# Patient Record
Sex: FEMALE | Race: OTHER | NOT HISPANIC OR LATINO | URBAN - METROPOLITAN AREA
[De-identification: names, ages, dates, MRNs, and addresses within clinical notes are randomized per-mention and may not be internally consistent; named-entity substitution may affect disease eponyms.]

---

## 2021-06-05 ENCOUNTER — EMERGENCY (EMERGENCY)
Facility: HOSPITAL | Age: 26
LOS: 1 days | Discharge: HOME | End: 2021-06-05
Attending: EMERGENCY MEDICINE | Admitting: EMERGENCY MEDICINE
Payer: MEDICAID

## 2021-06-05 VITALS
TEMPERATURE: 98 F | RESPIRATION RATE: 18 BRPM | SYSTOLIC BLOOD PRESSURE: 138 MMHG | HEART RATE: 68 BPM | DIASTOLIC BLOOD PRESSURE: 86 MMHG | OXYGEN SATURATION: 100 %

## 2021-06-05 VITALS
RESPIRATION RATE: 18 BRPM | OXYGEN SATURATION: 100 % | SYSTOLIC BLOOD PRESSURE: 124 MMHG | DIASTOLIC BLOOD PRESSURE: 90 MMHG | HEART RATE: 74 BPM | TEMPERATURE: 98 F

## 2021-06-05 LAB
ALBUMIN SERPL ELPH-MCNC: 4.3 G/DL — SIGNIFICANT CHANGE UP (ref 3.3–5)
ALP SERPL-CCNC: 52 U/L — SIGNIFICANT CHANGE UP (ref 40–120)
ALT FLD-CCNC: 16 U/L — SIGNIFICANT CHANGE UP (ref 4–33)
ANION GAP SERPL CALC-SCNC: 13 MMOL/L — SIGNIFICANT CHANGE UP (ref 7–14)
APPEARANCE UR: ABNORMAL
AST SERPL-CCNC: 17 U/L — SIGNIFICANT CHANGE UP (ref 4–32)
BACTERIA # UR AUTO: NEGATIVE — SIGNIFICANT CHANGE UP
BASOPHILS # BLD AUTO: 0.01 K/UL — SIGNIFICANT CHANGE UP (ref 0–0.2)
BASOPHILS NFR BLD AUTO: 0.1 % — SIGNIFICANT CHANGE UP (ref 0–2)
BILIRUB SERPL-MCNC: 0.5 MG/DL — SIGNIFICANT CHANGE UP (ref 0.2–1.2)
BILIRUB UR-MCNC: NEGATIVE — SIGNIFICANT CHANGE UP
BUN SERPL-MCNC: 4 MG/DL — LOW (ref 7–23)
CALCIUM SERPL-MCNC: 8.6 MG/DL — SIGNIFICANT CHANGE UP (ref 8.4–10.5)
CHLORIDE SERPL-SCNC: 100 MMOL/L — SIGNIFICANT CHANGE UP (ref 98–107)
CO2 SERPL-SCNC: 20 MMOL/L — LOW (ref 22–31)
COLOR SPEC: ABNORMAL
CREAT SERPL-MCNC: 0.59 MG/DL — SIGNIFICANT CHANGE UP (ref 0.5–1.3)
CRP SERPL-MCNC: <4 MG/L — SIGNIFICANT CHANGE UP
DIFF PNL FLD: ABNORMAL
EOSINOPHIL # BLD AUTO: 0 K/UL — SIGNIFICANT CHANGE UP (ref 0–0.5)
EOSINOPHIL NFR BLD AUTO: 0 % — SIGNIFICANT CHANGE UP (ref 0–6)
EPI CELLS # UR: 1 /HPF — SIGNIFICANT CHANGE UP (ref 0–5)
ERYTHROCYTE [SEDIMENTATION RATE] IN BLOOD: 10 MM/HR — SIGNIFICANT CHANGE UP (ref 4–25)
GLUCOSE SERPL-MCNC: 125 MG/DL — HIGH (ref 70–99)
GLUCOSE UR QL: NEGATIVE — SIGNIFICANT CHANGE UP
HCT VFR BLD CALC: 35.8 % — SIGNIFICANT CHANGE UP (ref 34.5–45)
HGB BLD-MCNC: 11.3 G/DL — LOW (ref 11.5–15.5)
IANC: 7.28 K/UL — SIGNIFICANT CHANGE UP (ref 1.5–8.5)
IMM GRANULOCYTES NFR BLD AUTO: 0.8 % — SIGNIFICANT CHANGE UP (ref 0–1.5)
KETONES UR-MCNC: ABNORMAL
LEUKOCYTE ESTERASE UR-ACNC: ABNORMAL
LIDOCAIN IGE QN: 19 U/L — SIGNIFICANT CHANGE UP (ref 7–60)
LYMPHOCYTES # BLD AUTO: 0.87 K/UL — LOW (ref 1–3.3)
LYMPHOCYTES # BLD AUTO: 10.3 % — LOW (ref 13–44)
MCHC RBC-ENTMCNC: 25.3 PG — LOW (ref 27–34)
MCHC RBC-ENTMCNC: 31.6 GM/DL — LOW (ref 32–36)
MCV RBC AUTO: 80.1 FL — SIGNIFICANT CHANGE UP (ref 80–100)
MONOCYTES # BLD AUTO: 0.18 K/UL — SIGNIFICANT CHANGE UP (ref 0–0.9)
MONOCYTES NFR BLD AUTO: 2.1 % — SIGNIFICANT CHANGE UP (ref 2–14)
NEUTROPHILS # BLD AUTO: 7.28 K/UL — SIGNIFICANT CHANGE UP (ref 1.8–7.4)
NEUTROPHILS NFR BLD AUTO: 86.7 % — HIGH (ref 43–77)
NITRITE UR-MCNC: NEGATIVE — SIGNIFICANT CHANGE UP
NRBC # BLD: 0 /100 WBCS — SIGNIFICANT CHANGE UP
NRBC # FLD: 0 K/UL — SIGNIFICANT CHANGE UP
PH UR: 8.5 — HIGH (ref 5–8)
PLATELET # BLD AUTO: 372 K/UL — SIGNIFICANT CHANGE UP (ref 150–400)
POTASSIUM SERPL-MCNC: 2.9 MMOL/L — CRITICAL LOW (ref 3.5–5.3)
POTASSIUM SERPL-SCNC: 2.9 MMOL/L — CRITICAL LOW (ref 3.5–5.3)
PROT SERPL-MCNC: 7.3 G/DL — SIGNIFICANT CHANGE UP (ref 6–8.3)
PROT UR-MCNC: ABNORMAL
RBC # BLD: 4.47 M/UL — SIGNIFICANT CHANGE UP (ref 3.8–5.2)
RBC # FLD: 13.6 % — SIGNIFICANT CHANGE UP (ref 10.3–14.5)
RBC CASTS # UR COMP ASSIST: >720 /HPF — HIGH (ref 0–4)
SODIUM SERPL-SCNC: 133 MMOL/L — LOW (ref 135–145)
SP GR SPEC: 1.02 — SIGNIFICANT CHANGE UP (ref 1.01–1.02)
UROBILINOGEN FLD QL: SIGNIFICANT CHANGE UP
WBC # BLD: 8.41 K/UL — SIGNIFICANT CHANGE UP (ref 3.8–10.5)
WBC # FLD AUTO: 8.41 K/UL — SIGNIFICANT CHANGE UP (ref 3.8–10.5)
WBC UR QL: 88 /HPF — HIGH (ref 0–5)

## 2021-06-05 PROCEDURE — 93010 ELECTROCARDIOGRAM REPORT: CPT

## 2021-06-05 PROCEDURE — 99284 EMERGENCY DEPT VISIT MOD MDM: CPT | Mod: 25

## 2021-06-05 RX ORDER — SUCRALFATE 1 G
1 TABLET ORAL ONCE
Refills: 0 | Status: COMPLETED | OUTPATIENT
Start: 2021-06-05 | End: 2021-06-05

## 2021-06-05 RX ORDER — MORPHINE SULFATE 50 MG/1
4 CAPSULE, EXTENDED RELEASE ORAL ONCE
Refills: 0 | Status: DISCONTINUED | OUTPATIENT
Start: 2021-06-05 | End: 2021-06-05

## 2021-06-05 RX ORDER — POTASSIUM CHLORIDE 20 MEQ
10 PACKET (EA) ORAL
Refills: 0 | Status: COMPLETED | OUTPATIENT
Start: 2021-06-05 | End: 2021-06-05

## 2021-06-05 RX ORDER — ONDANSETRON 8 MG/1
4 TABLET, FILM COATED ORAL ONCE
Refills: 0 | Status: COMPLETED | OUTPATIENT
Start: 2021-06-05 | End: 2021-06-05

## 2021-06-05 RX ORDER — FAMOTIDINE 10 MG/ML
1 INJECTION INTRAVENOUS
Qty: 60 | Refills: 0
Start: 2021-06-05 | End: 2021-07-04

## 2021-06-05 RX ORDER — POTASSIUM CHLORIDE 20 MEQ
40 PACKET (EA) ORAL ONCE
Refills: 0 | Status: COMPLETED | OUTPATIENT
Start: 2021-06-05 | End: 2021-06-05

## 2021-06-05 RX ORDER — SODIUM CHLORIDE 9 MG/ML
1000 INJECTION, SOLUTION INTRAVENOUS ONCE
Refills: 0 | Status: COMPLETED | OUTPATIENT
Start: 2021-06-05 | End: 2021-06-05

## 2021-06-05 RX ADMIN — ONDANSETRON 4 MILLIGRAM(S): 8 TABLET, FILM COATED ORAL at 19:21

## 2021-06-05 RX ADMIN — Medication 10 MILLIGRAM(S): at 15:49

## 2021-06-05 RX ADMIN — Medication 100 MILLIEQUIVALENT(S): at 16:11

## 2021-06-05 RX ADMIN — Medication 1 GRAM(S): at 20:43

## 2021-06-05 RX ADMIN — Medication 100 MILLIEQUIVALENT(S): at 17:40

## 2021-06-05 RX ADMIN — SODIUM CHLORIDE 1000 MILLILITER(S): 9 INJECTION, SOLUTION INTRAVENOUS at 14:55

## 2021-06-05 RX ADMIN — Medication 100 MILLIEQUIVALENT(S): at 18:49

## 2021-06-05 RX ADMIN — MORPHINE SULFATE 4 MILLIGRAM(S): 50 CAPSULE, EXTENDED RELEASE ORAL at 14:55

## 2021-06-05 RX ADMIN — Medication 40 MILLIEQUIVALENT(S): at 19:12

## 2021-06-05 RX ADMIN — Medication 30 MILLILITER(S): at 21:39

## 2021-06-05 RX ADMIN — Medication 1 GRAM(S): at 15:49

## 2021-06-05 RX ADMIN — ONDANSETRON 4 MILLIGRAM(S): 8 TABLET, FILM COATED ORAL at 14:55

## 2021-06-05 RX ADMIN — MORPHINE SULFATE 4 MILLIGRAM(S): 50 CAPSULE, EXTENDED RELEASE ORAL at 16:11

## 2021-06-05 NOTE — ED PROVIDER NOTE - OBJECTIVE STATEMENT
24 y/o F pmh Chron's disease c/o upper abd pain x 4 days. Pt reports she was admitted to [facility] 2 days ago and left this morning cause they weren't giving her enough pain medication. Pt states she was told she has chron's disease based on CT finding but never had a colonscopy to confirm. States she had a told of 3 flare ups and her last one was 2 weeks ago. States [facility] was giving her antibx (does not recall name) and steroids. +nausea. Denies fever, chills, cp, sob, vomiting, diarrhea.

## 2021-06-05 NOTE — ED ADULT NURSE NOTE - CHIEF COMPLAINT QUOTE
pt was seen at [facility]. this AM for ABD pain x 3 days, coming to [facility] for some GI symptoms/with epigastric pain/nausea.   LMP 6/4/21 all other ROS negative except as per HPI

## 2021-06-05 NOTE — ED PROVIDER NOTE - PROGRESS NOTE DETAILS
JENNIFER Hernadez: Plan was to admit pt, pt does not want to be admitted. States her abd pain is gone but is feeling a burning sensation around her chest. maalox ordered. will likely discharge pt JENNIFER Hernadez: Pt feeling better. tolerated PO K dose and maalox. Pt will f/u with her GI doctor.  The patient was given verbal and written discharge instructions. Specifically, instructions when to return to the ED and when to seek follow-up from their pcp was discussed. Any specialty follow-up was discussed, including how to make an appointment.  Any necessary referral lists provided.  Instructions were discussed in simple, plain language and was understood by the patient. The patient understands that should their symptoms worsen or any new symptoms arise, they should return to the ED immediately for further evaluation. All pt's questions were answered. Patient verbalizes understanding.

## 2021-06-05 NOTE — ED PROVIDER NOTE - PATIENT PORTAL LINK FT
You can access the FollowMyHealth Patient Portal offered by [facility] by registering at the following website: [email or web address]. By joining [facility]’s FollowMyHealth portal, you will also be able to view your health information using other applications (apps) compatible with our system.

## 2021-06-05 NOTE — ED PROVIDER NOTE - NSFOLLOWUPINSTRUCTIONS_ED_ALL_ED_FT
Please follow up with your GI doctor    Gastritis is soreness and swelling (inflammation) of the lining of the stomach. Gastritis can develop as a sudden onset (acute) or long-term (chronic) condition. If gastritis is not treated, it can lead to stomach bleeding and ulcers. Causes include viral and bacterial infections, excessive alcohol consumption, tobacco use, or certain medications. Symptoms include abdominal pain or burning especially after eating, nausea, vomiting. Avoid foods or drinks that make your symptoms worse such as caffeine, chocolate, spicy foods, acidic foods, alcohol.    SEEK IMMEDIATE MEDICAL CARE IF YOU HAVE THE FOLLOWING SYMPTOMS: black or bloody stools, blood or coffee-ground-colored vomitus, worsening abdominal pain, fever, or inability to keep fluids down.    Advance activity as tolerated.  Continue all previously prescribed medications as directed unless otherwise instructed.  Follow up with your primary care physician in 48-72 hours- bring copies of your results.  Return to the ER for worsening or persistent symptoms, and/or ANY NEW OR CONCERNING SYMPTOMS. If you have issues obtaining follow up, please call: [phone] to obtain a doctor or specialist who takes your insurance in your area.  You may call [phone] to make an appointment with the internal medicine clinic.

## 2021-06-05 NOTE — ED PROVIDER NOTE - CLINICAL SUMMARY MEDICAL DECISION MAKING FREE TEXT BOX
24 y/o F pmh Chron's disease c/o upper abd pain x 4 days. Pt reports she was admitted to [facility] 2 days ago and left this morning cause they weren't giving her enough pain medication. Pt states she was told she has chron's disease based on CT finding but never had a colonscopy to confirm. States she had a told of 3 flare ups and her last one was 2 weeks ago.    pt appears mildly uncomfortable, vss, mild tenderness epigastric    -labs, pain control, ivf,

## 2021-06-05 NOTE — ED ADULT TRIAGE NOTE - CHIEF COMPLAINT QUOTE
pt was seen at [facility]. this AM for ABD pain x 3 days, coming to [facility] for some GI symptoms/with epigastric pain/nausea.   LMP 6/4/21

## 2021-06-05 NOTE — ED ADULT NURSE NOTE - OBJECTIVE STATEMENT
Pt h/o Crohn's disease c/o abdominal pain to upper abdomen, + nausea, breathing even and unlabored, denies chest pain, no headache/dizziness, abd soft, tender, non-distended, afebrile, skin cool dry and intact, ivl placed, labs collected and sent.

## 2021-06-05 NOTE — ED ADULT NURSE REASSESSMENT NOTE - NS ED NURSE REASSESS COMMENT FT1
Pt tolerating PO intake, pain is tolerable at this time, receiving 3rd potassium chloride infusion, denies chest pain, no shortness of breath, no abd pain, afebrile. IVL clear and patent, will continue to monitor.

## 2021-06-06 LAB — SARS-COV-2 RNA SPEC QL NAA+PROBE: SIGNIFICANT CHANGE UP

## 2021-06-07 ENCOUNTER — INPATIENT (INPATIENT)
Facility: HOSPITAL | Age: 26
LOS: 5 days | Discharge: HOME | End: 2021-06-13
Attending: HOSPITALIST | Admitting: HOSPITALIST
Payer: MEDICAID

## 2021-06-07 VITALS
SYSTOLIC BLOOD PRESSURE: 119 MMHG | HEART RATE: 93 BPM | OXYGEN SATURATION: 100 % | TEMPERATURE: 99 F | DIASTOLIC BLOOD PRESSURE: 80 MMHG | RESPIRATION RATE: 18 BRPM

## 2021-06-07 DIAGNOSIS — R10.9 UNSPECIFIED ABDOMINAL PAIN: ICD-10-CM

## 2021-06-07 DIAGNOSIS — Z29.9 ENCOUNTER FOR PROPHYLACTIC MEASURES, UNSPECIFIED: ICD-10-CM

## 2021-06-07 DIAGNOSIS — R10.31 RIGHT LOWER QUADRANT PAIN: ICD-10-CM

## 2021-06-07 DIAGNOSIS — E87.6 HYPOKALEMIA: ICD-10-CM

## 2021-06-07 DIAGNOSIS — Z78.9 OTHER SPECIFIED HEALTH STATUS: Chronic | ICD-10-CM

## 2021-06-07 LAB
ALBUMIN SERPL ELPH-MCNC: 4.4 G/DL — SIGNIFICANT CHANGE UP (ref 3.3–5)
ALP SERPL-CCNC: 56 U/L — SIGNIFICANT CHANGE UP (ref 40–120)
ALT FLD-CCNC: 21 U/L — SIGNIFICANT CHANGE UP (ref 4–33)
ANION GAP SERPL CALC-SCNC: 11 MMOL/L — SIGNIFICANT CHANGE UP (ref 7–14)
ANION GAP SERPL CALC-SCNC: 13 MMOL/L — SIGNIFICANT CHANGE UP (ref 7–14)
APPEARANCE UR: ABNORMAL
AST SERPL-CCNC: 19 U/L — SIGNIFICANT CHANGE UP (ref 4–32)
BACTERIA # UR AUTO: NEGATIVE — SIGNIFICANT CHANGE UP
BASE EXCESS BLDV CALC-SCNC: 3.1 MMOL/L — HIGH (ref -3–2)
BASOPHILS # BLD AUTO: 0.03 K/UL — SIGNIFICANT CHANGE UP (ref 0–0.2)
BASOPHILS NFR BLD AUTO: 0.3 % — SIGNIFICANT CHANGE UP (ref 0–2)
BILIRUB SERPL-MCNC: 0.5 MG/DL — SIGNIFICANT CHANGE UP (ref 0.2–1.2)
BILIRUB UR-MCNC: NEGATIVE — SIGNIFICANT CHANGE UP
BLOOD GAS VENOUS - CREATININE: 0.6 MG/DL — SIGNIFICANT CHANGE UP (ref 0.5–1.3)
BLOOD GAS VENOUS COMPREHENSIVE RESULT: SIGNIFICANT CHANGE UP
BUN SERPL-MCNC: 4 MG/DL — LOW (ref 7–23)
BUN SERPL-MCNC: 5 MG/DL — LOW (ref 7–23)
CALCIUM SERPL-MCNC: 7.9 MG/DL — LOW (ref 8.4–10.5)
CALCIUM SERPL-MCNC: 8.9 MG/DL — SIGNIFICANT CHANGE UP (ref 8.4–10.5)
CHLORIDE BLDV-SCNC: 97 MMOL/L — SIGNIFICANT CHANGE UP (ref 96–108)
CHLORIDE SERPL-SCNC: 102 MMOL/L — SIGNIFICANT CHANGE UP (ref 98–107)
CHLORIDE SERPL-SCNC: 96 MMOL/L — LOW (ref 98–107)
CO2 SERPL-SCNC: 20 MMOL/L — LOW (ref 22–31)
CO2 SERPL-SCNC: 24 MMOL/L — SIGNIFICANT CHANGE UP (ref 22–31)
COLOR SPEC: ABNORMAL
CREAT SERPL-MCNC: 0.49 MG/DL — LOW (ref 0.5–1.3)
CREAT SERPL-MCNC: 0.52 MG/DL — SIGNIFICANT CHANGE UP (ref 0.5–1.3)
CRP SERPL-MCNC: <4 MG/L — SIGNIFICANT CHANGE UP
DIFF PNL FLD: ABNORMAL
EOSINOPHIL # BLD AUTO: 0.05 K/UL — SIGNIFICANT CHANGE UP (ref 0–0.5)
EOSINOPHIL NFR BLD AUTO: 0.6 % — SIGNIFICANT CHANGE UP (ref 0–6)
EPI CELLS # UR: 1 /HPF — SIGNIFICANT CHANGE UP (ref 0–5)
ERYTHROCYTE [SEDIMENTATION RATE] IN BLOOD: 5 MM/HR — SIGNIFICANT CHANGE UP (ref 4–25)
GAS PNL BLDV: 134 MMOL/L — LOW (ref 136–146)
GLUCOSE BLDV-MCNC: 104 MG/DL — HIGH (ref 70–99)
GLUCOSE SERPL-MCNC: 111 MG/DL — HIGH (ref 70–99)
GLUCOSE SERPL-MCNC: 98 MG/DL — SIGNIFICANT CHANGE UP (ref 70–99)
GLUCOSE UR QL: NEGATIVE — SIGNIFICANT CHANGE UP
HCO3 BLDV-SCNC: 27 MMOL/L — SIGNIFICANT CHANGE UP (ref 20–27)
HCT VFR BLD CALC: 38.3 % — SIGNIFICANT CHANGE UP (ref 34.5–45)
HCT VFR BLDA CALC: 39.3 % — SIGNIFICANT CHANGE UP (ref 34.5–46.5)
HGB BLD CALC-MCNC: 12.8 G/DL — SIGNIFICANT CHANGE UP (ref 11.5–15.5)
HGB BLD-MCNC: 12.6 G/DL — SIGNIFICANT CHANGE UP (ref 11.5–15.5)
HYALINE CASTS # UR AUTO: 1 /LPF — SIGNIFICANT CHANGE UP (ref 0–7)
IANC: 5.67 K/UL — SIGNIFICANT CHANGE UP (ref 1.5–8.5)
IMM GRANULOCYTES NFR BLD AUTO: 0.5 % — SIGNIFICANT CHANGE UP (ref 0–1.5)
KETONES UR-MCNC: ABNORMAL
LACTATE BLDV-MCNC: 1.6 MMOL/L — SIGNIFICANT CHANGE UP (ref 0.5–2)
LEUKOCYTE ESTERASE UR-ACNC: NEGATIVE — SIGNIFICANT CHANGE UP
LIDOCAIN IGE QN: 31 U/L — SIGNIFICANT CHANGE UP (ref 7–60)
LYMPHOCYTES # BLD AUTO: 1.93 K/UL — SIGNIFICANT CHANGE UP (ref 1–3.3)
LYMPHOCYTES # BLD AUTO: 22.3 % — SIGNIFICANT CHANGE UP (ref 13–44)
MAGNESIUM SERPL-MCNC: 2.4 MG/DL — SIGNIFICANT CHANGE UP (ref 1.6–2.6)
MCHC RBC-ENTMCNC: 25.5 PG — LOW (ref 27–34)
MCHC RBC-ENTMCNC: 32.9 GM/DL — SIGNIFICANT CHANGE UP (ref 32–36)
MCV RBC AUTO: 77.4 FL — LOW (ref 80–100)
MONOCYTES # BLD AUTO: 0.94 K/UL — HIGH (ref 0–0.9)
MONOCYTES NFR BLD AUTO: 10.9 % — SIGNIFICANT CHANGE UP (ref 2–14)
NEUTROPHILS # BLD AUTO: 5.67 K/UL — SIGNIFICANT CHANGE UP (ref 1.8–7.4)
NEUTROPHILS NFR BLD AUTO: 65.4 % — SIGNIFICANT CHANGE UP (ref 43–77)
NITRITE UR-MCNC: NEGATIVE — SIGNIFICANT CHANGE UP
NRBC # BLD: 0 /100 WBCS — SIGNIFICANT CHANGE UP
NRBC # FLD: 0 K/UL — SIGNIFICANT CHANGE UP
PCO2 BLDV: 38 MMHG — LOW (ref 41–51)
PH BLDV: 7.46 — HIGH (ref 7.32–7.43)
PH UR: 8 — SIGNIFICANT CHANGE UP (ref 5–8)
PHOSPHATE SERPL-MCNC: 1.7 MG/DL — LOW (ref 2.5–4.5)
PLATELET # BLD AUTO: 462 K/UL — HIGH (ref 150–400)
PO2 BLDV: 33 MMHG — LOW (ref 35–40)
POTASSIUM BLDV-SCNC: 2.6 MMOL/L — CRITICAL LOW (ref 3.4–4.5)
POTASSIUM SERPL-MCNC: 2.7 MMOL/L — CRITICAL LOW (ref 3.5–5.3)
POTASSIUM SERPL-MCNC: 3.5 MMOL/L — SIGNIFICANT CHANGE UP (ref 3.5–5.3)
POTASSIUM SERPL-SCNC: 2.7 MMOL/L — CRITICAL LOW (ref 3.5–5.3)
POTASSIUM SERPL-SCNC: 3.5 MMOL/L — SIGNIFICANT CHANGE UP (ref 3.5–5.3)
PROT SERPL-MCNC: 7.5 G/DL — SIGNIFICANT CHANGE UP (ref 6–8.3)
PROT UR-MCNC: ABNORMAL
RBC # BLD: 4.95 M/UL — SIGNIFICANT CHANGE UP (ref 3.8–5.2)
RBC # FLD: 13.1 % — SIGNIFICANT CHANGE UP (ref 10.3–14.5)
RBC CASTS # UR COMP ASSIST: >50 /HPF — SIGNIFICANT CHANGE UP (ref 0–4)
SAO2 % BLDV: 64.6 % — SIGNIFICANT CHANGE UP (ref 60–85)
SARS-COV-2 RNA SPEC QL NAA+PROBE: SIGNIFICANT CHANGE UP
SODIUM SERPL-SCNC: 133 MMOL/L — LOW (ref 135–145)
SODIUM SERPL-SCNC: 133 MMOL/L — LOW (ref 135–145)
SP GR SPEC: 1.01 — SIGNIFICANT CHANGE UP (ref 1.01–1.02)
UROBILINOGEN FLD QL: SIGNIFICANT CHANGE UP
WBC # BLD: 8.66 K/UL — SIGNIFICANT CHANGE UP (ref 3.8–10.5)
WBC # FLD AUTO: 8.66 K/UL — SIGNIFICANT CHANGE UP (ref 3.8–10.5)
WBC UR QL: 2 /HPF — SIGNIFICANT CHANGE UP (ref 0–5)

## 2021-06-07 PROCEDURE — 76705 ECHO EXAM OF ABDOMEN: CPT | Mod: 26

## 2021-06-07 PROCEDURE — 99223 1ST HOSP IP/OBS HIGH 75: CPT

## 2021-06-07 PROCEDURE — 99222 1ST HOSP IP/OBS MODERATE 55: CPT | Mod: GC

## 2021-06-07 PROCEDURE — 99284 EMERGENCY DEPT VISIT MOD MDM: CPT

## 2021-06-07 RX ORDER — SODIUM CHLORIDE 9 MG/ML
1000 INJECTION, SOLUTION INTRAVENOUS ONCE
Refills: 0 | Status: COMPLETED | OUTPATIENT
Start: 2021-06-07 | End: 2021-06-07

## 2021-06-07 RX ORDER — KETOROLAC TROMETHAMINE 30 MG/ML
15 SYRINGE (ML) INJECTION ONCE
Refills: 0 | Status: DISCONTINUED | OUTPATIENT
Start: 2021-06-07 | End: 2021-06-07

## 2021-06-07 RX ORDER — MAGNESIUM SULFATE 500 MG/ML
2 VIAL (ML) INJECTION ONCE
Refills: 0 | Status: COMPLETED | OUTPATIENT
Start: 2021-06-07 | End: 2021-06-07

## 2021-06-07 RX ORDER — ONDANSETRON 8 MG/1
4 TABLET, FILM COATED ORAL ONCE
Refills: 0 | Status: COMPLETED | OUTPATIENT
Start: 2021-06-07 | End: 2021-06-07

## 2021-06-07 RX ORDER — METOCLOPRAMIDE HCL 10 MG
10 TABLET ORAL ONCE
Refills: 0 | Status: COMPLETED | OUTPATIENT
Start: 2021-06-07 | End: 2021-06-07

## 2021-06-07 RX ORDER — FAMOTIDINE 10 MG/ML
20 INJECTION INTRAVENOUS ONCE
Refills: 0 | Status: COMPLETED | OUTPATIENT
Start: 2021-06-07 | End: 2021-06-07

## 2021-06-07 RX ORDER — SODIUM CHLORIDE 9 MG/ML
1000 INJECTION INTRAMUSCULAR; INTRAVENOUS; SUBCUTANEOUS
Refills: 0 | Status: DISCONTINUED | OUTPATIENT
Start: 2021-06-07 | End: 2021-06-10

## 2021-06-07 RX ORDER — SIMETHICONE 80 MG/1
80 TABLET, CHEWABLE ORAL ONCE
Refills: 0 | Status: COMPLETED | OUTPATIENT
Start: 2021-06-07 | End: 2021-06-07

## 2021-06-07 RX ORDER — POTASSIUM CHLORIDE 20 MEQ
40 PACKET (EA) ORAL ONCE
Refills: 0 | Status: COMPLETED | OUTPATIENT
Start: 2021-06-07 | End: 2021-06-07

## 2021-06-07 RX ORDER — KETOROLAC TROMETHAMINE 30 MG/ML
15 SYRINGE (ML) INJECTION EVERY 6 HOURS
Refills: 0 | Status: DISCONTINUED | OUTPATIENT
Start: 2021-06-07 | End: 2021-06-09

## 2021-06-07 RX ORDER — POTASSIUM CHLORIDE 20 MEQ
10 PACKET (EA) ORAL
Refills: 0 | Status: COMPLETED | OUTPATIENT
Start: 2021-06-07 | End: 2021-06-07

## 2021-06-07 RX ORDER — SODIUM CHLORIDE 9 MG/ML
1000 INJECTION INTRAMUSCULAR; INTRAVENOUS; SUBCUTANEOUS ONCE
Refills: 0 | Status: COMPLETED | OUTPATIENT
Start: 2021-06-07 | End: 2021-06-07

## 2021-06-07 RX ORDER — ONDANSETRON 8 MG/1
4 TABLET, FILM COATED ORAL EVERY 6 HOURS
Refills: 0 | Status: DISCONTINUED | OUTPATIENT
Start: 2021-06-07 | End: 2021-06-13

## 2021-06-07 RX ADMIN — FAMOTIDINE 20 MILLIGRAM(S): 10 INJECTION INTRAVENOUS at 02:34

## 2021-06-07 RX ADMIN — Medication 30 MILLILITER(S): at 02:34

## 2021-06-07 RX ADMIN — Medication 50 GRAM(S): at 04:37

## 2021-06-07 RX ADMIN — Medication 100 MILLIEQUIVALENT(S): at 07:50

## 2021-06-07 RX ADMIN — Medication 100 MILLIEQUIVALENT(S): at 06:25

## 2021-06-07 RX ADMIN — ONDANSETRON 4 MILLIGRAM(S): 8 TABLET, FILM COATED ORAL at 02:34

## 2021-06-07 RX ADMIN — SODIUM CHLORIDE 1000 MILLILITER(S): 9 INJECTION, SOLUTION INTRAVENOUS at 02:34

## 2021-06-07 RX ADMIN — SODIUM CHLORIDE 1000 MILLILITER(S): 9 INJECTION INTRAMUSCULAR; INTRAVENOUS; SUBCUTANEOUS at 04:37

## 2021-06-07 RX ADMIN — Medication 2 GRAM(S): at 05:37

## 2021-06-07 RX ADMIN — SODIUM CHLORIDE 75 MILLILITER(S): 9 INJECTION INTRAMUSCULAR; INTRAVENOUS; SUBCUTANEOUS at 10:46

## 2021-06-07 RX ADMIN — Medication 15 MILLIGRAM(S): at 04:37

## 2021-06-07 RX ADMIN — Medication 100 MILLIEQUIVALENT(S): at 05:26

## 2021-06-07 RX ADMIN — Medication 15 MILLIGRAM(S): at 02:34

## 2021-06-07 RX ADMIN — Medication 10 MILLIGRAM(S): at 05:25

## 2021-06-07 RX ADMIN — SIMETHICONE 80 MILLIGRAM(S): 80 TABLET, CHEWABLE ORAL at 23:20

## 2021-06-07 RX ADMIN — SODIUM CHLORIDE 1000 MILLILITER(S): 9 INJECTION, SOLUTION INTRAVENOUS at 04:37

## 2021-06-07 RX ADMIN — ONDANSETRON 4 MILLIGRAM(S): 8 TABLET, FILM COATED ORAL at 15:21

## 2021-06-07 RX ADMIN — Medication 10 MILLIEQUIVALENT(S): at 07:50

## 2021-06-07 NOTE — ED PROVIDER NOTE - OBJECTIVE STATEMENT
24yo F no medical problems here for abdominal pain. RUQ for last few days, was just here yesterday for same, says she had CT scan at [facility] 3 days ago and found to have Michel's. Currently admitting to n/v, constipation but no f/c. No abdominal surgeries. Currently menstruating. Yesterday was offered admission but she didn't want that as her pain improved. Now that her pain has been persistent she would like admission. Does not have an OP GI doctor.

## 2021-06-07 NOTE — H&P ADULT - PROBLEM SELECTOR PLAN 2
- Monitor and replete potassium levels   - Monitor on tele - Monitor and replete potassium levels pending   - Monitor on tele

## 2021-06-07 NOTE — ED PROVIDER NOTE - CLINICAL SUMMARY MEDICAL DECISION MAKING FREE TEXT BOX
Pt w/ presumed chrones here with RUQ abdominal pain for last few days w/o other systemic complaints. VSS. Exam with mild ttp in RUQ. Concern for cholecystitis vs chrones. Labs, lipase, ekg, US RUQ, UA, preg. Toradol, zofran, maalox, pepcid. Reassess.

## 2021-06-07 NOTE — CONSULT NOTE ADULT - ASSESSMENT
25F without PMHx p/w recurrent episode of RLQ abd pain + diarrhea x 4 days.       Impression:   #RLQ abd pain, diarrhea, ileitis: pt with recurrent episodes of RLQ abd pain w/ diarrhea, fevers, weight loss + imaging c/f terminal ileitis (+intramural abscess 2.2cm) on CT a/p 6/3/21. Clinical picture with high likelihood of inflammatory bowel disease (Crohn's >> UC) vs will need to r/o infectious colitis (c diff, viral gastroenteritis).       Recommendations:   - Collect c diff, GI PCR   - Send ESR, CRP  - Send quant gold, hep serologies (Hep B surface Ab/Ag, Total Core, Hep C Ab with reflex to PCR)   - If infectious work up negative, pt will likely need endoscopic evaluation for diagnosis of disease        Thank you for involving us in the care of this patient, please reach out if any further questions.     Rhett Sarmiento MD  Gastroenterology Fellow, PGY4    Available on [employer] Teams  [phone] ([facility])  [phone] ([facility])  Please contact on call fellow weekdays after 5pm-7am and weekends: [phone]       25F without PMHx p/w recurrent episode of RLQ abd pain + diarrhea x 4 days.       Impression:   #RLQ abd pain, diarrhea, ileitis: pt with recurrent episodes of RLQ abd pain w/ diarrhea, fevers, weight loss + imaging c/f terminal ileitis (+intramural abscess 2.2cm) on CT a/p 6/3/21. Clinical picture with high likelihood of inflammatory bowel disease (Crohn's >> UC) vs will need to r/o infectious colitis (c diff, viral gastroenteritis).       Recommendations:   - Collect c diff, GI PCR   - Send ESR, CRP  - Send quant gold, hep serologies (Hep B surface Ab/Ag, Total Core, Hep C Ab with reflex to PCR)   - Please obtain MR abd to evaluate for previously seen intramural abscess   - If infectious work up above ^^ negative, pt will likely need endoscopic evaluation for diagnosis of disease        Thank you for involving us in the care of this patient, please reach out if any further questions.     Rhett Sarmiento MD  Gastroenterology Fellow, PGY4    Available on [employer] Teams  [phone] ([facility])  [phone] ([facility])  Please contact on call fellow weekdays after 5pm-7am and weekends: [phone]

## 2021-06-07 NOTE — H&P ADULT - NSHPSOCIALHISTORY_GEN_ALL_CORE
Lives with parents. Denies current or past sexual partners. Denies any current or past alcohol, drug or tobacco use.

## 2021-06-07 NOTE — H&P ADULT - ASSESSMENT
26 YO F no significant PMHx  LMP 21 c/o RLQ x 3 days, most likely Crohn's disease or irritable bowel syndrome.  24 YO F no significant PMHx  LMP 21 c/o RLQ x 3 days admitted to [facility] for intractable abdominal pain and hypokalemia.

## 2021-06-07 NOTE — H&P ADULT - ATTENDING COMMENTS
Pt seen at bedside, laying in stretcher, reports nausea. Otherwise abd pain has subsided.   25F w/?hx of IBD (saw GI in past, never followed up, but has been treated with steroids/abx in the past for "flares") presenting to ED w/abdominal pain, nausea. Pt seen in ER 6/5 and discharged home after pain control achieved w/GI followup.   Labs reviewed, ESR/CRP from 6/5 wnl, repeat pending. K 2.6, repleted, repeat pending.   UA w/blood, pt menstruating, no LE/nitrates, WBC 2, no bacteria, 1 epithelial cell.   RUQ reviewed, 5mm nonobstructing intrarenal calculus, no cholelithiasis or cholecystitis.   Stool studies ordered, GI consulted.   For now c/w symptomatic care, reports normal CTAP at [facility], if pain worsens, can repeat scan here w/oral/IV contrast.   D/w ACP

## 2021-06-07 NOTE — ED ADULT NURSE REASSESSMENT NOTE - COMFORT CARE
steady gait observed Stretcher in lowest position Wheels locked Call bell in reach/ambulated to bathroom/side rails up/warm blanket provided

## 2021-06-07 NOTE — H&P ADULT - HISTORY OF PRESENT ILLNESS
24 YO F no significant PMHx  LMP 21 c/o RLQ x 3 days. Pain described as constant, gnawing, initially 9/10. Pt tried Tylenol without relief. Had seen a GI doctor (Dr. Poli Davis) in 2020 which revealed elevated markers for Crohn's disease and a colonoscopy was recommended. Due to pt's [employer], her family declined. Pt had a flare up in February and went to [facility], was diagnosed with colitis and was treated with cipro and flagyl. 3 days ago when this pain began pt was admitted to [facility] and was diagnosed with Crohn's based on CT scan. Was given cipro and flagyl upon discharge but did not take them consistently. Pt came to [facility] ED on 21 for same pain, was discharged home but pt was unable to  medications from pharmacy and returned to [facility] ED this morning.  Has associated nausea and vomiting (3 days ago she had 5 episodes of vomiting, yesterday had 4 episodes, and today had 3 episodes. Was initially food and bilious in consistency but is now clear). Also admits to being under a lot of stress, weakness, loss of appetite and difficulty going to the bathroom.  Bowel movement described as very little, that is watery consistency. Denies fevers, chills, chest pain, difficulty breathing, abnormal skin lesions, hematuria, hematochezia.

## 2021-06-07 NOTE — ED PROVIDER NOTE - ATTENDING CONTRIBUTION TO CARE
Franc HERMOSILLO: I agree with the above provided history and exam and addend/modify it as follows.    25F w/out known pmh - p/w RUQ abd pain, assc w/ n/v this AM,    similar to recent multiple recent episodes pain.     Pt was seen in ED 2 days ago for upper abd pain x 4 days; at that    time, she reported she had AMA'd from [facility] 2 days prior    from admission for abd pain, and said they weren't giving her    enough pain medication (CTAP at OSH was unremarkable). At that time    patient was offered admission but declined, since the pain    improved, and was discharged for GI followup. At that visit, pt    got: morphine, zofran, dicyclomine, sucralfate, potassium, maalox.     On exam, pt has RUQ tenderness to palpation, no RLQ tenderness or    tenderness elsewhere. Plan to check RUQ US, labs, give pain meds,    reassess - patient at this time is requesting admission for further    workup and pain management, will consider admission    I Stanley Bruner MD performed a history and physical exam of the patient and discussed their management with the resident and /or advanced care provider. I reviewed the resident and /or ACP's note and agree with the documented findings and plan of care. My medical decision making and observations are found above.

## 2021-06-07 NOTE — CONSULT NOTE ADULT - SUBJECTIVE AND OBJECTIVE BOX
Chief Complaint:  Patient is a 25y old  Female who presents with a chief complaint of RLQ abdominal pain (2021 08:49)      HPI: 25F without PMHx p/w recurrent episode of RLQ abd pain + diarrhea x 4 days.     Pt reports RLQ abd pain starting around 6/3, initially gnawing and constant. Accompanied by up to 10 watery brown (non bloody BMs) daily, as well as intermittent fevers throughout the year, and 20lb weight loss / 1 year.     Reports these episodes of RLQ abd pain + diarrhea have now occurred 3x total (February, 4 days ago, and now again). In 2021 was dx with colitis (suspected Crohn's) reportedly and tx with abx at [facility]. She was offered, but did not complete, an outpatient colonoscopy. Pt also went to [facility] a few days prior, where she got a CT scan (6/3) showing "marked thickening of TI w/ adjacent inflammatory changes, fecalization of proximal contents, developing intramural abscess measuring up to 2.2 cm."     Denies vomiting, joint pains, rashes, melena, hematochezia, hematemesis. No family Hx IBD.         Allergies:  No Known Allergies      Home Medications:    Hospital Medications:  ketorolac   Injectable 15 milliGRAM(s) IV Push every 6 hours PRN  ondansetron Injectable 4 milliGRAM(s) IV Push every 6 hours PRN  sodium chloride 0.9%. 1000 milliLiter(s) IV Continuous <Continuous>      PMHX/PSHX:  No pertinent past medical history    No pertinent past surgical history        Family history:      Denies family history of colon cancer/polyps, stomach cancer/polyps, pancreatic cancer/masses, liver cancer/disease, ovarian cancer and endometrial cancer.    Social History:     Tob: Denies  EtOH: Denies  Illicit Drugs: Denies    ROS:     General:  No wt loss, fevers, chills, night sweats, fatigue  Eyes:  Good vision, no reported pain  ENT:  No sore throat, pain, runny nose, dysphagia  CV:  No pain, palpitations, hypo/hypertension  Pulm:  No dyspnea, cough, tachypnea, wheezing  GI: see above  :  No pain, bleeding, incontinence, nocturia  Muscle:  No pain, weakness  Neuro:  No weakness, tingling, memory problems  Psych:  No fatigue, insomnia, mood problems, depression  Endocrine:  No polyuria, polydipsia, cold/heat intolerance  Heme:  No petechiae, ecchymosis, easy bruisability  Skin:  No rash, tattoos, scars, edema    PHYSICAL EXAM:     GENERAL:  No acute distress, thin w/ temporal wasting  HEENT:  Normocephalic/atraumatic, no scleral icterus  CHEST:  Clear to auscultation bilaterally, no wheezes/rales/ronchi, no accessory muscle use  HEART:  Regular rate and rhythm, no murmurs/rubs/gallops  ABDOMEN:  Soft, +TTP RLQ, non-distended, normoactive bowel sounds  EXTREMITIES: No cyanosis, clubbing, or edema  SKIN:  No rash/warm/dry  NEURO:  Alert and oriented x 3    Vital Signs:  Vital Signs Last 24 Hrs  T(C): 36.9 (2021 10:58), Max: 37.1 (2021 00:27)  T(F): 98.5 (2021 10:58), Max: 98.8 (2021 04:36)  HR: 67 (2021 10:58) (67 - 93)  BP: 115/82 (2021 10:58) (115/82 - 127/88)  BP(mean): --  RR: 17 (2021 10:58) (16 - 18)  SpO2: 100% (2021 10:58) (99% - 100%)  Daily Height in cm: 154.94 (2021 08:49)    Daily     LABS:                        12.6   8.66  )-----------( 462      ( 2021 03:18 )             38.3     Mean Cell Volume: 77.4 fL (21 @ 03:18)        133<L>  |  102  |  4<L>  ----------------------------<  111<H>  3.5   |  20<L>  |  0.49<L>    Ca    7.9<L>      2021 10:30  Phos  1.7     -  Mg     2.4     -    TPro  7.5  /  Alb  4.4  /  TBili  0.5  /  DBili  x   /  AST  19  /  ALT  21  /  AlkPhos  56  06-07    LIVER FUNCTIONS - ( 2021 03:18 )  Alb: 4.4 g/dL / Pro: 7.5 g/dL / ALK PHOS: 56 U/L / ALT: 21 U/L / AST: 19 U/L / GGT: x             Urinalysis Basic - ( 2021 03:18 )    Color: Light Brown / Appearance: Slightly Turbid / S.011 / pH: x  Gluc: x / Ketone: Trace  / Bili: Negative / Urobili: <2 mg/dL   Blood: x / Protein: Trace / Nitrite: Negative   Leuk Esterase: Negative / RBC: >50 /HPF / WBC 2 /HPF   Sq Epi: x / Non Sq Epi: 1 /HPF / Bacteria: Negative      Amylase Serum--      Lipase serum31       Ammonia--                          12.6   8.66  )-----------( 462      ( 2021 03:18 )             38.3                         11.3   8.41  )-----------( 372      ( 2021 15:08 )             35.8       Imaging:

## 2021-06-07 NOTE — ED PROVIDER NOTE - PHYSICAL EXAMINATION
General: non-toxic, NAD  HEENT: NCAT, PERRL  Cardiac: RRR, no murmurs, 2+ peripheral pulses  Chest: CTA  Abdomen: soft, non-distended, bowel sounds present, +RUQ ttp, mild  Extremities: no peripheral edema, calf tenderness, or leg size discrepancies  Skin: no rashes  Neuro: AAOx4, 5+motor, sensory grossly intact  Psych: mood and affect appropriate

## 2021-06-07 NOTE — CONSULT NOTE ADULT - ATTENDING COMMENTS
TI inflammation and diarrhea. Plan for infectious workup and if negative, then plan for colonoscopy.

## 2021-06-07 NOTE — H&P ADULT - PROBLEM SELECTOR PLAN 1
- Abdominal x-ray?  - GI consult  - Pain control as needed  - Manage nausea and vomiting   - Patient to be on NPO except for medications - GI consulted  - Pain control as needed  -IV Zofran 4mg PRN Q6h  - Patient to be on NPO except for medications  -ESR, CRP sent  -consider GI imaging after GI consulted  -IV Fluids hydration with NS @ 75cc/hr

## 2021-06-07 NOTE — H&P ADULT - GASTROINTESTINAL DETAILS
soft/no distention/no masses palpable/bowel sounds normal/no bruit/no rebound tenderness/no guarding/no rigidity

## 2021-06-07 NOTE — ED PROVIDER NOTE - NS ED ROS FT
Constitutional: no fevers, chills  HEENT: no cough, rhinorrhea  Cardiac: no chest pain, palpitations  Respiratory: no SOB  GI: +abdominal pain  : no dysuria, frequency, or hematuria  MSK: no joint pain  Skin: no rashes  Neuro: no headache, change in vision, weakness  Psych: negative

## 2021-06-07 NOTE — H&P ADULT - NSHPLABSRESULTS_GEN_ALL_CORE
12.6   8.66  )-----------( 462      ( 2021 03:18 )             38.3     06-07    133<L>  |  96<L>  |  5<L>  ----------------------------<  98  2.7<LL>   |  24  |  0.52    Ca    8.9      2021 03:18    TPro  7.5  /  Alb  4.4  /  TBili  0.5  /  DBili  x   /  AST  19  /  ALT  21  /  AlkPhos  56  06-07      Urinalysis Basic - ( 2021 03:18 )    Color: Light Brown / Appearance: Slightly Turbid / S.011 / pH: x  Gluc: x / Ketone: Trace  / Bili: Negative / Urobili: <2 mg/dL   Blood: x / Protein: Trace / Nitrite: Negative   Leuk Esterase: Negative / RBC: >50 /HPF / WBC 2 /HPF   Sq Epi: x / Non Sq Epi: 1 /HPF / Bacteria: Negative    LIVER FUNCTIONS - ( 2021 03:18 )  Alb: 4.4 g/dL / Pro: 7.5 g/dL / ALK PHOS: 56 U/L / ALT: 21 U/L / AST: 19 U/L / GGT: x           EKG: NSR 74bpm QTc 421ms T wave inversions in AVL, V1    RUQ US: No cholelithiasis or signs of cholecystitis. Non-obstructing 5 mm right intrarenal calculus. 12.6   8.66  )-----------( 462      ( 2021 03:18 )             38.3     06-07    133<L>  |  96<L>  |  5<L>  ----------------------------<  98  2.7<LL>   |  24  |  0.52    Ca    8.9      2021 03:18    TPro  7.5  /  Alb  4.4  /  TBili  0.5  /  DBili  x   /  AST  19  /  ALT  21  /  AlkPhos  56  06-07      Urinalysis Basic - ( 2021 03:18 )    Color: Light Brown / Appearance: Slightly Turbid / S.011 / pH: x  Gluc: x / Ketone: Trace  / Bili: Negative / Urobili: <2 mg/dL   Blood: x / Protein: Trace / Nitrite: Negative   Leuk Esterase: Negative / RBC: >50 /HPF / WBC 2 /HPF   Sq Epi: x / Non Sq Epi: 1 /HPF / Bacteria: Negative    LIVER FUNCTIONS - ( 2021 03:18 )  Alb: 4.4 g/dL / Pro: 7.5 g/dL / ALK PHOS: 56 U/L / ALT: 21 U/L / AST: 19 U/L / GGT: x           EKG: NSR 74bpm QTc 421ms with PAC.    RUQ US: No cholelithiasis or signs of cholecystitis. Non-obstructing 5 mm right intrarenal calculus.

## 2021-06-07 NOTE — ED ADULT NURSE NOTE - OBJECTIVE STATEMENT
patient is a 25y female, A&OX4, ambulatory, steady gait observed, complaining of epigastric pain. Was recently here complaining of epigastric pain and decided to go home because she felt better. Patient was also seen earlier that day at [facility] and had negative CT. Patient is currently menstruating. Denies any urinary symptoms. At this time patient is requesting admission. Respirations even and unlabored. Stretcher in lowest position, wheels locked, side rails up, call bell in reach. patient is a 25y female, A&OX4, ambulatory, steady gait observed, complaining of epigastric pain. Was recently here complaining of epigastric pain and decided to go home because she felt better. Patient was also seen earlier that day at [facility] and had CT that showed Crohns. Patient is currently menstruating. Denies any urinary symptoms. At this time patient is requesting admission. Respirations even and unlabored. Stretcher in lowest position, wheels locked, side rails up, call bell in reach.

## 2021-06-07 NOTE — CHART NOTE - NSCHARTNOTEFT_GEN_A_CORE
CT Abd +Pelvis w/con report from [facility] placed in the chart. The actual record is in [facility].

## 2021-06-08 LAB
ANION GAP SERPL CALC-SCNC: 16 MMOL/L — HIGH (ref 7–14)
BUN SERPL-MCNC: 5 MG/DL — LOW (ref 7–23)
C DIFF BY PCR RESULT: SIGNIFICANT CHANGE UP
C DIFF TOX GENS STL QL NAA+PROBE: SIGNIFICANT CHANGE UP
CALCIUM SERPL-MCNC: 8.9 MG/DL — SIGNIFICANT CHANGE UP (ref 8.4–10.5)
CHLORIDE SERPL-SCNC: 99 MMOL/L — SIGNIFICANT CHANGE UP (ref 98–107)
CO2 SERPL-SCNC: 20 MMOL/L — LOW (ref 22–31)
COVID-19 SPIKE DOMAIN AB INTERP: POSITIVE
COVID-19 SPIKE DOMAIN ANTIBODY RESULT: >250 U/ML — HIGH
CREAT SERPL-MCNC: 0.47 MG/DL — LOW (ref 0.5–1.3)
CULTURE RESULTS: SIGNIFICANT CHANGE UP
CULTURE RESULTS: SIGNIFICANT CHANGE UP
GLUCOSE SERPL-MCNC: 83 MG/DL — SIGNIFICANT CHANGE UP (ref 70–99)
HAV IGM SER-ACNC: SIGNIFICANT CHANGE UP
HBV CORE IGM SER-ACNC: SIGNIFICANT CHANGE UP
HBV SURFACE AG SER-ACNC: SIGNIFICANT CHANGE UP
HCT VFR BLD CALC: 36.1 % — SIGNIFICANT CHANGE UP (ref 34.5–45)
HCV AB S/CO SERPL IA: 0.13 S/CO — SIGNIFICANT CHANGE UP (ref 0–0.99)
HCV AB SERPL-IMP: SIGNIFICANT CHANGE UP
HGB BLD-MCNC: 11.7 G/DL — SIGNIFICANT CHANGE UP (ref 11.5–15.5)
MAGNESIUM SERPL-MCNC: 2.1 MG/DL — SIGNIFICANT CHANGE UP (ref 1.6–2.6)
MCHC RBC-ENTMCNC: 25.9 PG — LOW (ref 27–34)
MCHC RBC-ENTMCNC: 32.4 GM/DL — SIGNIFICANT CHANGE UP (ref 32–36)
MCV RBC AUTO: 80 FL — SIGNIFICANT CHANGE UP (ref 80–100)
NRBC # BLD: 0 /100 WBCS — SIGNIFICANT CHANGE UP
NRBC # FLD: 0 K/UL — SIGNIFICANT CHANGE UP
PHOSPHATE SERPL-MCNC: 3 MG/DL — SIGNIFICANT CHANGE UP (ref 2.5–4.5)
PLATELET # BLD AUTO: 446 K/UL — HIGH (ref 150–400)
POTASSIUM SERPL-MCNC: 3.2 MMOL/L — LOW (ref 3.5–5.3)
POTASSIUM SERPL-SCNC: 3.2 MMOL/L — LOW (ref 3.5–5.3)
RBC # BLD: 4.51 M/UL — SIGNIFICANT CHANGE UP (ref 3.8–5.2)
RBC # FLD: 13.4 % — SIGNIFICANT CHANGE UP (ref 10.3–14.5)
SARS-COV-2 IGG+IGM SERPL QL IA: >250 U/ML — HIGH
SARS-COV-2 IGG+IGM SERPL QL IA: POSITIVE
SODIUM SERPL-SCNC: 135 MMOL/L — SIGNIFICANT CHANGE UP (ref 135–145)
SPECIMEN SOURCE: SIGNIFICANT CHANGE UP
SPECIMEN SOURCE: SIGNIFICANT CHANGE UP
WBC # BLD: 8.79 K/UL — SIGNIFICANT CHANGE UP (ref 3.8–10.5)
WBC # FLD AUTO: 8.79 K/UL — SIGNIFICANT CHANGE UP (ref 3.8–10.5)

## 2021-06-08 PROCEDURE — 99232 SBSQ HOSP IP/OBS MODERATE 35: CPT

## 2021-06-08 PROCEDURE — 99232 SBSQ HOSP IP/OBS MODERATE 35: CPT | Mod: GC

## 2021-06-08 PROCEDURE — 74182 MRI ABDOMEN W/CONTRAST: CPT | Mod: 26

## 2021-06-08 RX ORDER — SIMETHICONE 80 MG/1
80 TABLET, CHEWABLE ORAL ONCE
Refills: 0 | Status: COMPLETED | OUTPATIENT
Start: 2021-06-08 | End: 2021-06-08

## 2021-06-08 RX ORDER — POTASSIUM CHLORIDE 20 MEQ
40 PACKET (EA) ORAL EVERY 4 HOURS
Refills: 0 | Status: COMPLETED | OUTPATIENT
Start: 2021-06-08 | End: 2021-06-08

## 2021-06-08 RX ORDER — FAMOTIDINE 10 MG/ML
20 INJECTION INTRAVENOUS ONCE
Refills: 0 | Status: COMPLETED | OUTPATIENT
Start: 2021-06-08 | End: 2021-06-08

## 2021-06-08 RX ADMIN — FAMOTIDINE 20 MILLIGRAM(S): 10 INJECTION INTRAVENOUS at 08:20

## 2021-06-08 RX ADMIN — Medication 40 MILLIEQUIVALENT(S): at 16:59

## 2021-06-08 RX ADMIN — Medication 30 MILLILITER(S): at 08:20

## 2021-06-08 RX ADMIN — Medication 30 MILLILITER(S): at 12:18

## 2021-06-08 RX ADMIN — SODIUM CHLORIDE 75 MILLILITER(S): 9 INJECTION INTRAMUSCULAR; INTRAVENOUS; SUBCUTANEOUS at 17:01

## 2021-06-08 RX ADMIN — SIMETHICONE 80 MILLIGRAM(S): 80 TABLET, CHEWABLE ORAL at 06:46

## 2021-06-08 RX ADMIN — SODIUM CHLORIDE 75 MILLILITER(S): 9 INJECTION INTRAMUSCULAR; INTRAVENOUS; SUBCUTANEOUS at 04:00

## 2021-06-08 NOTE — PROGRESS NOTE ADULT - PROBLEM SELECTOR PLAN 1
Improved. GI consulted, recs appreciated  - IV Zofran 4mg PRN Q6h  - diet as tolerated, currently on full liquids  - continue with IV fluids  - PRN Maalox  - obtain quant gold, hep serologies (Hep B surface Ab/Ag, Total Core, Hep C Ab with reflex to PCR); thiopurine methyltransferase level  - pending MRI abdomen with contrast  - f/u further GI recs

## 2021-06-08 NOTE — PROGRESS NOTE ADULT - SUBJECTIVE AND OBJECTIVE BOX
Patient is a 25y old  Female who presents with a chief complaint of RLQ abdominal pain (08 Jun 2021 08:13)      SUBJECTIVE / OVERNIGHT EVENTS:  Patient reported improvement in abdominal pain. No fever, chills, chest pain, SOB, n/v. No melena or BRBPR. No mucus in stool noted. Pt with 3 BMs today but of very little volume. Pt tolerating current diet. Pt concerned she may have Celiac disease.     MEDICATIONS  (STANDING):  potassium chloride    Tablet ER 40 milliEquivalent(s) Oral every 4 hours  sodium chloride 0.9%. 1000 milliLiter(s) (75 mL/Hr) IV Continuous <Continuous>    MEDICATIONS  (PRN):  aluminum hydroxide/magnesium hydroxide/simethicone Suspension 30 milliLiter(s) Oral every 6 hours PRN Dyspepsia  ketorolac   Injectable 15 milliGRAM(s) IV Push every 6 hours PRN mod-severe pain  ondansetron Injectable 4 milliGRAM(s) IV Push every 6 hours PRN Nausea and/or Vomiting      Vital Signs Last 24 Hrs  T(C): 37.3 (08 Jun 2021 12:13), Max: 37.3 (07 Jun 2021 22:38)  T(F): 99.2 (08 Jun 2021 12:13), Max: 99.2 (08 Jun 2021 12:13)  HR: 69 (08 Jun 2021 12:13) (69 - 80)  BP: 128/89 (08 Jun 2021 12:13) (126/84 - 128/89)  RR: 17 (08 Jun 2021 12:13) (16 - 17)  SpO2: 100% (08 Jun 2021 12:13) (100% - 100%)          PHYSICAL EXAM  GENERAL: NAD, thin framed,   CHEST/LUNG: Clear to auscultation bilaterally; No wheeze  HEART: Regular rate and rhythm; No murmurs, rubs, or gallops  ABDOMEN: Soft, Nontender, Nondistended; Bowel sounds present  EXTREMITIES:  2+ Peripheral Pulses, No clubbing, cyanosis, or edema  PSYCH: AAOx3        LABS:                        11.7   8.79  )-----------( 446      ( 08 Jun 2021 07:16 )             36.1     06-08    135  |  99  |  5<L>  ----------------------------<  83  3.2<L>   |  20<L>  |  0.47<L>    Ca    8.9      08 Jun 2021 07:16  Phos  3.0     06-08  Mg     2.1     06-08    TPro  7.5  /  Alb  4.4  /  TBili  0.5  /  DBili  x   /  AST  19  /  ALT  21  /  AlkPhos  56  06-07          GI PCR Panel, Stool (collected 07 Jun 2021 21:43)  Source: .Stool Feces  Final Report (08 Jun 2021 11:13):    GI PCR Results: NOT detected    *******Please Note:*******    GI panel PCR evaluates for:    Campylobacter, Plesiomonas shigelloides, Salmonella,    Vibrio, Yersinia enterocolitica, Enteroaggregative    Escherichia coli (EAEC), Enteropathogenic E.coli (EPEC),    Enterotoxigenic E. coli (ETEC) lt/st, Shiga-like    toxin-producing E. coli (STEC) stx1/stx2,    Shigella/ Enteroinvasive E. coli (EIEC), Cryptosporidium,    Cyclospora cayetanensis, Entamoeba histolytica,    Giardia lamblia, Adenovirus F 40/41, Astrovirus,    Norovirus GI/GII, Rotavirus A, Sapovirus    Culture - Urine (collected 07 Jun 2021 02:30)  Source: .Urine Clean Catch (Midstream)  Final Report (08 Jun 2021 08:07):    <10,000 CFU/mL Normal Urogenital Tammie          Consultant(s) Notes Reviewed:  yes  Care Discussed with Consultants/Other Providers:

## 2021-06-08 NOTE — PROGRESS NOTE ADULT - SUBJECTIVE AND OBJECTIVE BOX
Chief Complaint:  Patient is a 25y old  Female who presents with a chief complaint of RLQ abdominal pain (2021 13:13)      Interval Hx:   - NAEON, afebrile HD stable  - ESR, CRP wnl  - Awaiting stool studies + MR abd       Allergies:  No Known Allergies      Home Medications:    Hospital Medications:  aluminum hydroxide/magnesium hydroxide/simethicone Suspension 30 milliLiter(s) Oral once PRN  famotidine Injectable 20 milliGRAM(s) IV Push once  ketorolac   Injectable 15 milliGRAM(s) IV Push every 6 hours PRN  ondansetron Injectable 4 milliGRAM(s) IV Push every 6 hours PRN  sodium chloride 0.9%. 1000 milliLiter(s) IV Continuous <Continuous>      PMHX/PSHX:  No pertinent past medical history    No pertinent past surgical history        Family history:      Denies family history of colon cancer/polyps, stomach cancer/polyps, pancreatic cancer/masses, liver cancer/disease, ovarian cancer and endometrial cancer.    Social History:     Tob: Denies  EtOH: Denies  Illicit Drugs: Denies    ROS:     General:  No wt loss, fevers, chills, night sweats, fatigue  Eyes:  Good vision, no reported pain  ENT:  No sore throat, pain, runny nose, dysphagia  CV:  No pain, palpitations, hypo/hypertension  Pulm:  No dyspnea, cough, tachypnea, wheezing  GI: see above  :  No pain, bleeding, incontinence, nocturia  Muscle:  No pain, weakness  Neuro:  No weakness, tingling, memory problems  Psych:  No fatigue, insomnia, mood problems, depression  Endocrine:  No polyuria, polydipsia, cold/heat intolerance  Heme:  No petechiae, ecchymosis, easy bruisability  Skin:  No rash, tattoos, scars, edema    PHYSICAL EXAM:     GENERAL:  No acute distress, thin w/ temporal wasting  HEENT:  Normocephalic/atraumatic, no scleral icterus  CHEST:  Clear to auscultation bilaterally, no wheezes/rales/ronchi, no accessory muscle use  HEART:  Regular rate and rhythm, no murmurs/rubs/gallops  ABDOMEN:  Soft, +TTP RLQ, non-distended, normoactive bowel sounds  EXTREMITIES: No cyanosis, clubbing, or edema  SKIN:  No rash/warm/dry  NEURO:  Alert and oriented x 3    Vital Signs:  Vital Signs Last 24 Hrs  T(C): 37.1 (2021 06:28), Max: 37.3 (2021 22:38)  T(F): 98.8 (2021 06:28), Max: 99.1 (2021 22:38)  HR: 80 (2021 06:28) (67 - 80)  BP: 126/84 (2021 06:28) (113/70 - 127/88)  BP(mean): --  RR: 17 (2021 06:28) (16 - 18)  SpO2: 100% (2021 06:28) (99% - 100%)  Daily Height in cm: 154.94 (2021 14:48)    Daily     LABS:                        11.7   8.79  )-----------( 446      ( 2021 07:16 )             36.1     Mean Cell Volume: 80.0 fL (- @ 07:16)    -    135  |  99  |  x   ----------------------------<  x   3.2<L>   |  x   |  x     Ca    7.9<L>      2021 10:30  Phos  1.7     06-  Mg     2.1         TPro  7.5  /  Alb  4.4  /  TBili  0.5  /  DBili  x   /  AST  19  /  ALT  21  /  AlkPhos  56  06-07    LIVER FUNCTIONS - ( 2021 03:18 )  Alb: 4.4 g/dL / Pro: 7.5 g/dL / ALK PHOS: 56 U/L / ALT: 21 U/L / AST: 19 U/L / GGT: x             Urinalysis Basic - ( 2021 03:18 )    Color: Light Brown / Appearance: Slightly Turbid / S.011 / pH: x  Gluc: x / Ketone: Trace  / Bili: Negative / Urobili: <2 mg/dL   Blood: x / Protein: Trace / Nitrite: Negative   Leuk Esterase: Negative / RBC: >50 /HPF / WBC 2 /HPF   Sq Epi: x / Non Sq Epi: 1 /HPF / Bacteria: Negative                              11.7   8.79  )-----------( 446      ( 2021 07:16 )             36.1                         12.6   8.66  )-----------( 462      ( 2021 03:18 )             38.3                         11.3   8.41  )-----------( 372      ( 2021 15:08 )             35.8       Imaging:

## 2021-06-09 LAB
ANION GAP SERPL CALC-SCNC: 16 MMOL/L — HIGH (ref 7–14)
BASOPHILS # BLD AUTO: 0.05 K/UL — SIGNIFICANT CHANGE UP (ref 0–0.2)
BASOPHILS NFR BLD AUTO: 0.5 % — SIGNIFICANT CHANGE UP (ref 0–2)
BUN SERPL-MCNC: 5 MG/DL — LOW (ref 7–23)
CALCIUM SERPL-MCNC: 8.9 MG/DL — SIGNIFICANT CHANGE UP (ref 8.4–10.5)
CHLORIDE SERPL-SCNC: 99 MMOL/L — SIGNIFICANT CHANGE UP (ref 98–107)
CO2 SERPL-SCNC: 19 MMOL/L — LOW (ref 22–31)
CREAT SERPL-MCNC: 0.48 MG/DL — LOW (ref 0.5–1.3)
CULTURE RESULTS: SIGNIFICANT CHANGE UP
EOSINOPHIL # BLD AUTO: 0.23 K/UL — SIGNIFICANT CHANGE UP (ref 0–0.5)
EOSINOPHIL NFR BLD AUTO: 2.2 % — SIGNIFICANT CHANGE UP (ref 0–6)
GAMMA INTERFERON BACKGROUND BLD IA-ACNC: 0.01 IU/ML — SIGNIFICANT CHANGE UP
GLUCOSE SERPL-MCNC: 71 MG/DL — SIGNIFICANT CHANGE UP (ref 70–99)
HBV CORE AB SER-ACNC: SIGNIFICANT CHANGE UP
HBV SURFACE AB SER-ACNC: REACTIVE
HCG SERPL-ACNC: <5 MIU/ML — SIGNIFICANT CHANGE UP
HCT VFR BLD CALC: 36.8 % — SIGNIFICANT CHANGE UP (ref 34.5–45)
HCV AB S/CO SERPL IA: 0.15 S/CO — SIGNIFICANT CHANGE UP (ref 0–0.99)
HCV AB SERPL-IMP: SIGNIFICANT CHANGE UP
HGB BLD-MCNC: 11.9 G/DL — SIGNIFICANT CHANGE UP (ref 11.5–15.5)
IANC: 7.4 K/UL — SIGNIFICANT CHANGE UP (ref 1.5–8.5)
IMM GRANULOCYTES NFR BLD AUTO: 0.5 % — SIGNIFICANT CHANGE UP (ref 0–1.5)
LYMPHOCYTES # BLD AUTO: 19.9 % — SIGNIFICANT CHANGE UP (ref 13–44)
LYMPHOCYTES # BLD AUTO: 2.11 K/UL — SIGNIFICANT CHANGE UP (ref 1–3.3)
M TB IFN-G BLD-IMP: NEGATIVE — SIGNIFICANT CHANGE UP
M TB IFN-G CD4+ BCKGRND COR BLD-ACNC: 0 IU/ML — SIGNIFICANT CHANGE UP
M TB IFN-G CD4+CD8+ BCKGRND COR BLD-ACNC: 0 IU/ML — SIGNIFICANT CHANGE UP
MAGNESIUM SERPL-MCNC: 1.9 MG/DL — SIGNIFICANT CHANGE UP (ref 1.6–2.6)
MCHC RBC-ENTMCNC: 25.9 PG — LOW (ref 27–34)
MCHC RBC-ENTMCNC: 32.3 GM/DL — SIGNIFICANT CHANGE UP (ref 32–36)
MCV RBC AUTO: 80.2 FL — SIGNIFICANT CHANGE UP (ref 80–100)
MONOCYTES # BLD AUTO: 0.74 K/UL — SIGNIFICANT CHANGE UP (ref 0–0.9)
MONOCYTES NFR BLD AUTO: 7 % — SIGNIFICANT CHANGE UP (ref 2–14)
NEUTROPHILS # BLD AUTO: 7.4 K/UL — SIGNIFICANT CHANGE UP (ref 1.8–7.4)
NEUTROPHILS NFR BLD AUTO: 69.9 % — SIGNIFICANT CHANGE UP (ref 43–77)
NRBC # BLD: 0 /100 WBCS — SIGNIFICANT CHANGE UP
NRBC # FLD: 0 K/UL — SIGNIFICANT CHANGE UP
PHOSPHATE SERPL-MCNC: 3.2 MG/DL — SIGNIFICANT CHANGE UP (ref 2.5–4.5)
PLATELET # BLD AUTO: 464 K/UL — HIGH (ref 150–400)
POTASSIUM SERPL-MCNC: 3.4 MMOL/L — LOW (ref 3.5–5.3)
POTASSIUM SERPL-SCNC: 3.4 MMOL/L — LOW (ref 3.5–5.3)
QUANT TB PLUS MITOGEN MINUS NIL: >10 IU/ML — SIGNIFICANT CHANGE UP
RBC # BLD: 4.59 M/UL — SIGNIFICANT CHANGE UP (ref 3.8–5.2)
RBC # FLD: 13.4 % — SIGNIFICANT CHANGE UP (ref 10.3–14.5)
SODIUM SERPL-SCNC: 134 MMOL/L — LOW (ref 135–145)
SPECIMEN SOURCE: SIGNIFICANT CHANGE UP
WBC # BLD: 10.58 K/UL — HIGH (ref 3.8–10.5)
WBC # FLD AUTO: 10.58 K/UL — HIGH (ref 3.8–10.5)

## 2021-06-09 PROCEDURE — 99232 SBSQ HOSP IP/OBS MODERATE 35: CPT | Mod: GC

## 2021-06-09 PROCEDURE — 99231 SBSQ HOSP IP/OBS SF/LOW 25: CPT

## 2021-06-09 RX ORDER — SOD SULF/SODIUM/NAHCO3/KCL/PEG
4000 SOLUTION, RECONSTITUTED, ORAL ORAL ONCE
Refills: 0 | Status: DISCONTINUED | OUTPATIENT
Start: 2021-06-09 | End: 2021-06-09

## 2021-06-09 RX ORDER — POTASSIUM CHLORIDE 20 MEQ
20 PACKET (EA) ORAL ONCE
Refills: 0 | Status: COMPLETED | OUTPATIENT
Start: 2021-06-09 | End: 2021-06-09

## 2021-06-09 RX ORDER — POTASSIUM CHLORIDE 20 MEQ
40 PACKET (EA) ORAL ONCE
Refills: 0 | Status: DISCONTINUED | OUTPATIENT
Start: 2021-06-09 | End: 2021-06-09

## 2021-06-09 RX ORDER — POTASSIUM CHLORIDE 20 MEQ
40 PACKET (EA) ORAL ONCE
Refills: 0 | Status: COMPLETED | OUTPATIENT
Start: 2021-06-09 | End: 2021-06-09

## 2021-06-09 RX ORDER — SOD SULF/SODIUM/NAHCO3/KCL/PEG
2000 SOLUTION, RECONSTITUTED, ORAL ORAL ONCE
Refills: 0 | Status: COMPLETED | OUTPATIENT
Start: 2021-06-09 | End: 2021-06-09

## 2021-06-09 RX ADMIN — Medication 20 MILLIEQUIVALENT(S): at 08:45

## 2021-06-09 RX ADMIN — Medication 30 MILLILITER(S): at 09:54

## 2021-06-09 RX ADMIN — Medication 40 MILLIEQUIVALENT(S): at 15:09

## 2021-06-09 RX ADMIN — Medication 2000 MILLILITER(S): at 18:26

## 2021-06-09 NOTE — CHART NOTE - NSCHARTNOTEFT_GEN_A_CORE
Brief GI F/u Note    Plan for colonoscopy tomorrow (MRI without abscess or fistula)  - 4L Golytely today for prep  - Clear liquid diet today  - NPO at [facility] for procedure tomorrow  - Please obtain AM labs early (4am) to avoid delays         Thank you for involving us in the care of this patient, please reach out if any further questions.     Rhett Sarmiento MD  Gastroenterology Fellow, PGY4    Available on [employer] Teams  [phone] ([facility])  [phone] ([facility])  Please contact on call fellow weekdays after 5pm-7am and weekends: [phone]

## 2021-06-09 NOTE — PROGRESS NOTE ADULT - SUBJECTIVE AND OBJECTIVE BOX
Chief Complaint:  Patient is a 25y old  Female who presents with a chief complaint of RLQ abdominal pain (2021 13:13)      Interval Hx:   - NAEON, afebrile HD stable  - 10 loose watery BMs / 24hr + abd cramping + night sweats, denies n/v/f  - ESR, CRP wnl  - C diff + GI PCR negative  - MR abd done (no read as of yet)      Allergies:  No Known Allergies      Home Medications:    Hospital Medications:  aluminum hydroxide/magnesium hydroxide/simethicone Suspension 30 milliLiter(s) Oral once PRN  famotidine Injectable 20 milliGRAM(s) IV Push once  ketorolac   Injectable 15 milliGRAM(s) IV Push every 6 hours PRN  ondansetron Injectable 4 milliGRAM(s) IV Push every 6 hours PRN  sodium chloride 0.9%. 1000 milliLiter(s) IV Continuous <Continuous>      PMHX/PSHX:  No pertinent past medical history    No pertinent past surgical history        Family history:      Denies family history of colon cancer/polyps, stomach cancer/polyps, pancreatic cancer/masses, liver cancer/disease, ovarian cancer and endometrial cancer.    Social History:     Tob: Denies  EtOH: Denies  Illicit Drugs: Denies    ROS:     General:  No wt loss, fevers, chills, night sweats, fatigue  Eyes:  Good vision, no reported pain  ENT:  No sore throat, pain, runny nose, dysphagia  CV:  No pain, palpitations, hypo/hypertension  Pulm:  No dyspnea, cough, tachypnea, wheezing  GI: see above  :  No pain, bleeding, incontinence, nocturia  Muscle:  No pain, weakness  Neuro:  No weakness, tingling, memory problems  Psych:  No fatigue, insomnia, mood problems, depression  Endocrine:  No polyuria, polydipsia, cold/heat intolerance  Heme:  No petechiae, ecchymosis, easy bruisability  Skin:  No rash, tattoos, scars, edema    PHYSICAL EXAM:     GENERAL:  No acute distress, thin w/ temporal wasting  HEENT:  Normocephalic/atraumatic, no scleral icterus  CHEST:  Clear to auscultation bilaterally, no wheezes/rales/ronchi, no accessory muscle use  HEART:  Regular rate and rhythm, no murmurs/rubs/gallops  ABDOMEN:  Soft, +TTP RLQ, non-distended, normoactive bowel sounds  EXTREMITIES: No cyanosis, clubbing, or edema  SKIN:  No rash/warm/dry  NEURO:  Alert and oriented x 3    Vital Signs:  Vital Signs Last 24 Hrs  T(C): 37.1 (2021 06:28), Max: 37.3 (2021 22:38)  T(F): 98.8 (2021 06:28), Max: 99.1 (2021 22:38)  HR: 80 (2021 06:28) (67 - 80)  BP: 126/84 (2021 06:28) (113/70 - 127/88)  BP(mean): --  RR: 17 (2021 06:28) (16 - 18)  SpO2: 100% (2021 06:28) (99% - 100%)  Daily Height in cm: 154.94 (2021 14:48)    Daily     LABS:                        11.7   8.79  )-----------( 446      ( 2021 07:16 )             36.1     Mean Cell Volume: 80.0 fL (- @ 07:16)    06-08    135  |  99  |  x   ----------------------------<  x   3.2<L>   |  x   |  x     Ca    7.9<L>      2021 10:30  Phos  1.7     06-07  Mg     2.1     06-08    TPro  7.5  /  Alb  4.4  /  TBili  0.5  /  DBili  x   /  AST  19  /  ALT  21  /  AlkPhos  56  06-07    LIVER FUNCTIONS - ( 2021 03:18 )  Alb: 4.4 g/dL / Pro: 7.5 g/dL / ALK PHOS: 56 U/L / ALT: 21 U/L / AST: 19 U/L / GGT: x             Urinalysis Basic - ( 2021 03:18 )    Color: Light Brown / Appearance: Slightly Turbid / S.011 / pH: x  Gluc: x / Ketone: Trace  / Bili: Negative / Urobili: <2 mg/dL   Blood: x / Protein: Trace / Nitrite: Negative   Leuk Esterase: Negative / RBC: >50 /HPF / WBC 2 /HPF   Sq Epi: x / Non Sq Epi: 1 /HPF / Bacteria: Negative                              11.7   8.79  )-----------( 446      ( 2021 07:16 )             36.1                         12.6   8.66  )-----------( 462      ( 2021 03:18 )             38.3                         11.3   8.41  )-----------( 372      ( 2021 15:08 )             35.8       Imaging:

## 2021-06-09 NOTE — PROVIDER CONTACT NOTE (MEDICATION) - ASSESSMENT
patients moviprep was suspended, please follow up with pharmacy. They requested order to be changed to moviprep as golytely was out of stock, but now they suspended the moviprep as well.

## 2021-06-09 NOTE — PROGRESS NOTE ADULT - SUBJECTIVE AND OBJECTIVE BOX
Patient is a 25y old  Female who presents with a chief complaint of RLQ abdominal pain (09 Jun 2021 08:32      SUBJECTIVE / OVERNIGHT EVENTS:  Abdominal pain resolved. Pt with tiny "spurts" of stool about 10 times today. No fever, chills, chest pain, SOB, n/v. Pt with decreased appetite but tolerating liquid diet. However, pt stated she is mainly drinking water.     MEDICATIONS  (STANDING):  potassium chloride   Powder 40 milliEquivalent(s) Oral once  sodium chloride 0.9%. 1000 milliLiter(s) (75 mL/Hr) IV Continuous <Continuous>    MEDICATIONS  (PRN):  aluminum hydroxide/magnesium hydroxide/simethicone Suspension 30 milliLiter(s) Oral every 6 hours PRN Dyspepsia  ondansetron Injectable 4 milliGRAM(s) IV Push every 6 hours PRN Nausea and/or Vomiting      Vital Signs Last 24 Hrs  T(C): 37.2 (09 Jun 2021 12:25), Max: 37.4 (08 Jun 2021 20:54)  T(F): 99 (09 Jun 2021 12:25), Max: 99.3 (08 Jun 2021 20:54)  HR: 92 (09 Jun 2021 12:25) (84 - 92)  BP: 128/83 (09 Jun 2021 12:25) (128/83 - 132/82)  RR: 17 (09 Jun 2021 12:25) (16 - 18)  SpO2: 100% (09 Jun 2021 12:25) (99% - 100%)            PHYSICAL EXAM  GENERAL: NAD, thin framed,   CHEST/LUNG: Clear to auscultation bilaterally; No wheeze  HEART: Regular rate and rhythm; No murmurs, rubs, or gallops  ABDOMEN: Soft, Nontender, Nondistended; Bowel sounds present  EXTREMITIES:  2+ Peripheral Pulses, No clubbing, cyanosis, or edema  PSYCH: AAOx3          LABS:                        11.9   10.58 )-----------( 464      ( 09 Jun 2021 06:49 )             36.8     06-09    134<L>  |  99  |  5<L>  ----------------------------<  71  3.4<L>   |  19<L>  |  0.48<L>    Ca    8.9      09 Jun 2021 06:49  Phos  3.2     06-09  Mg     1.9     06-09        RADIOLOGY & ADDITIONAL TESTS:    EXAM:  MR ABDOMEN IC        PROCEDURE DATE:  Jun 8 2021         INTERPRETATION:  CLINICAL INFORMATION: Evaluate abdominal abscess. Recent CT showed abscess. RLQ abd pain, diarrhea, ileitis: pt with recurrent episodes of RLQ abd pain w/ diarrhea, fevers, weight loss + imaging c/f terminal ileitis (+intramural abscess 2.2cm) on CT a/p 6/3/21.    COMPARISON: None.    CONTRAST/COMPLICATIONS:  IV Contrast: Gadavist  3 cc administered   4.5 cc discarded  Oral Contrast: NONE  Complications: None reported at time of study completion    PROCEDURE:  MRI of the abdomen was performed.    FINDINGS:  LOWER CHEST: Within normal limits.    LIVER: Within normal limits.  BILE DUCTS: Normal caliber.  GALLBLADDER: Within normal limits.  SPLEEN: Within normal limits.  PANCREAS: Within normal limits.  ADRENALS: Within normal limits.  KIDNEYS/URETERS: Right renal cyst measuring 1.2 cm.    VISUALIZED PORTIONS:  BOWEL: No bowel obstruction. Moderate (9 mm) wall thickening and transmural enhancement of the terminal ileum and base of the cecum with engorgement of the adjacent small vessels. Pattern is consistent with active Crohn's disease. (12:50). Region of avid enhancement cephalad to the terminal ileum, consistent with inflammatory change, without evidence of underlying drainable fluid collection (9:25 and 12:43).    Probable retrocecal appendix (13:13). No additional regions of disease are identified. No evidence of fistula or drainable fluid collection. Moderate luminal narrowing of the terminal ileum in the region of inflammatory change, without evidence of upstream dilatation (3:20). The distal ileum proximal to the region of narrowing demonstrates subthreshold increase in caliber, measuring up to 2.5 cm. Findings are consistent with probable stricture.    PERITONEUM: No ascites.  VESSELS: Within normal limits.  RETROPERITONEUM/LYMPH NODES: No lymphadenopathy.  ABDOMINAL WALL: Within normal limits.  BONES: Within normal limits.    IMPRESSION:  *  Wall thickening and inflammatory change of the terminal ileum and base of the cecum, consistent with changes of active Crohn's disease.  *  No evidence of fistula or drainable abscess.  *  Luminal narrowing of the terminal ileum in the region of moderate wall thickening without evidence of definitive upstream dilatation, consistent with probable stricture.    Imaging Personally Reviewed:  Consultant(s) Notes Reviewed:    Care Discussed with Consultants/Other Providers:

## 2021-06-09 NOTE — PROGRESS NOTE ADULT - PROBLEM SELECTOR PLAN 1
Resolved. GI consulted, recs appreciated. Concern for possible IBD. Reviewed MRI abdomen - no abscess. However, pt with Wall thickening and inflammatory change of the terminal ileum and base of the cecum, consistent with changes of active Crohn's disease as well as probable stricture  - IV Zofran 4mg PRN Q6h  - diet as tolerated, currently on full liquids  - continue with IV fluids  - PRN Maalox  - f/u quant gold, hep serologies (Hep B surface Ab/Ag, Total Core, Hep C Ab with reflex to PCR); thiopurine methyltransferase level  - f/u further GI recs -> anticipate endoscopic evaluation with colonoscopy while inpatient

## 2021-06-10 ENCOUNTER — RESULT REVIEW (OUTPATIENT)
Age: 26
End: 2021-06-10

## 2021-06-10 LAB
ANION GAP SERPL CALC-SCNC: 15 MMOL/L — HIGH (ref 7–14)
BASOPHILS # BLD AUTO: 0.05 K/UL — SIGNIFICANT CHANGE UP (ref 0–0.2)
BASOPHILS NFR BLD AUTO: 0.6 % — SIGNIFICANT CHANGE UP (ref 0–2)
BUN SERPL-MCNC: 6 MG/DL — LOW (ref 7–23)
CALCIUM SERPL-MCNC: 9.2 MG/DL — SIGNIFICANT CHANGE UP (ref 8.4–10.5)
CHLORIDE SERPL-SCNC: 99 MMOL/L — SIGNIFICANT CHANGE UP (ref 98–107)
CO2 SERPL-SCNC: 18 MMOL/L — LOW (ref 22–31)
CREAT SERPL-MCNC: 0.44 MG/DL — LOW (ref 0.5–1.3)
CULTURE RESULTS: SIGNIFICANT CHANGE UP
EOSINOPHIL # BLD AUTO: 0.25 K/UL — SIGNIFICANT CHANGE UP (ref 0–0.5)
EOSINOPHIL NFR BLD AUTO: 3.1 % — SIGNIFICANT CHANGE UP (ref 0–6)
GLUCOSE SERPL-MCNC: 74 MG/DL — SIGNIFICANT CHANGE UP (ref 70–99)
HCT VFR BLD CALC: 37.8 % — SIGNIFICANT CHANGE UP (ref 34.5–45)
HGB BLD-MCNC: 12 G/DL — SIGNIFICANT CHANGE UP (ref 11.5–15.5)
IANC: 5.01 K/UL — SIGNIFICANT CHANGE UP (ref 1.5–8.5)
IMM GRANULOCYTES NFR BLD AUTO: 0.4 % — SIGNIFICANT CHANGE UP (ref 0–1.5)
LYMPHOCYTES # BLD AUTO: 2.07 K/UL — SIGNIFICANT CHANGE UP (ref 1–3.3)
LYMPHOCYTES # BLD AUTO: 25.9 % — SIGNIFICANT CHANGE UP (ref 13–44)
MAGNESIUM SERPL-MCNC: 1.8 MG/DL — SIGNIFICANT CHANGE UP (ref 1.6–2.6)
MCHC RBC-ENTMCNC: 25.8 PG — LOW (ref 27–34)
MCHC RBC-ENTMCNC: 31.7 GM/DL — LOW (ref 32–36)
MCV RBC AUTO: 81.3 FL — SIGNIFICANT CHANGE UP (ref 80–100)
MONOCYTES # BLD AUTO: 0.58 K/UL — SIGNIFICANT CHANGE UP (ref 0–0.9)
MONOCYTES NFR BLD AUTO: 7.3 % — SIGNIFICANT CHANGE UP (ref 2–14)
NEUTROPHILS # BLD AUTO: 5.01 K/UL — SIGNIFICANT CHANGE UP (ref 1.8–7.4)
NEUTROPHILS NFR BLD AUTO: 62.7 % — SIGNIFICANT CHANGE UP (ref 43–77)
NRBC # BLD: 0 /100 WBCS — SIGNIFICANT CHANGE UP
NRBC # FLD: 0 K/UL — SIGNIFICANT CHANGE UP
PHOSPHATE SERPL-MCNC: 3.6 MG/DL — SIGNIFICANT CHANGE UP (ref 2.5–4.5)
PLATELET # BLD AUTO: 441 K/UL — HIGH (ref 150–400)
POTASSIUM SERPL-MCNC: 3.6 MMOL/L — SIGNIFICANT CHANGE UP (ref 3.5–5.3)
POTASSIUM SERPL-SCNC: 3.6 MMOL/L — SIGNIFICANT CHANGE UP (ref 3.5–5.3)
RBC # BLD: 4.65 M/UL — SIGNIFICANT CHANGE UP (ref 3.8–5.2)
RBC # FLD: 13.7 % — SIGNIFICANT CHANGE UP (ref 10.3–14.5)
SODIUM SERPL-SCNC: 132 MMOL/L — LOW (ref 135–145)
SPECIMEN SOURCE: SIGNIFICANT CHANGE UP
WBC # BLD: 7.99 K/UL — SIGNIFICANT CHANGE UP (ref 3.8–10.5)
WBC # FLD AUTO: 7.99 K/UL — SIGNIFICANT CHANGE UP (ref 3.8–10.5)

## 2021-06-10 PROCEDURE — 88305 TISSUE EXAM BY PATHOLOGIST: CPT | Mod: 26

## 2021-06-10 PROCEDURE — 99231 SBSQ HOSP IP/OBS SF/LOW 25: CPT

## 2021-06-10 PROCEDURE — 45380 COLONOSCOPY AND BIOPSY: CPT | Mod: GC

## 2021-06-10 NOTE — PROGRESS NOTE ADULT - SUBJECTIVE AND OBJECTIVE BOX
Patient is a 25y old  Female who presents with a chief complaint of RLQ abdominal pain (09 Jun 2021 14:34)        SUBJECTIVE / OVERNIGHT EVENTS:  Patient evaluated after colonoscopy. She tolerated it well. Pt denied abdominal pain but mentioned mild pressure in R lower quadrant. No fever, chills, chest pain, sOB, n/v or abdominal pain.     MEDICATIONS  (STANDING):  sodium chloride 0.9%. 1000 milliLiter(s) (75 mL/Hr) IV Continuous <Continuous>    MEDICATIONS  (PRN):  aluminum hydroxide/magnesium hydroxide/simethicone Suspension 30 milliLiter(s) Oral every 6 hours PRN Dyspepsia  ondansetron Injectable 4 milliGRAM(s) IV Push every 6 hours PRN Nausea and/or Vomiting      Vital Signs Last 24 Hrs  T(C): 35.6 (10 Adiel 2021 09:20), Max: 37.2 (09 Jun 2021 20:44)  T(F): 96 (10 Adiel 2021 09:20), Max: 98.9 (09 Jun 2021 20:44)  HR: 85 (10 Adiel 2021 09:50) (85 - 103)  BP: 119/90 (10 Adiel 2021 09:50) (113/85 - 122/88)  RR: 20 (10 Adiel 2021 09:50) (16 - 20)  SpO2: 100% (10 Adiel 2021 09:50) (100% - 100%)          PHYSICAL EXAM  GENERAL: NAD, thin framed,   CHEST/LUNG: Clear to auscultation bilaterally; No wheeze  HEART: Regular rate and rhythm; No murmurs, rubs, or gallops  ABDOMEN: Soft, Nontender, Nondistended; Bowel sounds present  EXTREMITIES:  2+ Peripheral Pulses, No clubbing, cyanosis, or edema  PSYCH: AAOx3        LABS:                        12.0   7.99  )-----------( 441      ( 10 Adiel 2021 04:16 )             37.8     06-10    132<L>  |  99  |  6<L>  ----------------------------<  74  3.6   |  18<L>  |  0.44<L>    Ca    9.2      10 Adiel 2021 04:16  Phos  3.6     06-10  Mg     1.8     06-10      RADIOLOGY & ADDITIONAL TESTS:    6/10/2021 COLONOSCOPY                                                                                Findings:       The perianal and digital rectal examinations were normal.       A stricture was found in the ascending colon, not traversed by peds        colonoscope but eventually traversed with upper endoscope. Biopsies of        the stricture and proximal site were taken with a cold forceps for        histology.       Diffuse moderate inflammation characterized by altered vascularity,        erythema and friability was found in the ascending colon leading up to        the stricture. Biopsies were taken with a cold forceps for histology.       Unclear if in the cecum w/ appendiceal oriface located past the        strictured site vs we continued to be in the ascending colon (IC valve        not visualized)       The exam was otherwise normal throughout the examined colon with normal        mucosa + some mild barotrauma in the transverse/descending colon.                                                                                   Impression:    - Stricture in the ascending colon. Biopsied.                       - Diffuse moderate inflammation was found in the                        ascending colon (will need to r/o secondary to Crohn's                        disease with colonic involvement). Biopsied.                       - Colon otherwise normal aside from ascending colon                        inflammation + stricture        Imaging Personally Reviewed:  Consultant(s) Notes Reviewed:    Care Discussed with Consultants/Other Providers:

## 2021-06-10 NOTE — PROGRESS NOTE ADULT - PROBLEM SELECTOR PLAN 1
Resolved. GI consulted, recs appreciated. Concern for possible IBD. Reviewed MRI abdomen - no abscess. However, pt with Wall thickening and inflammatory change of the terminal ileum and base of the cecum, consistent with changes of active Crohn's disease as well as probable stricture. Colonoscopy confirmed stricture and moderate inflammation in ascending colon. Pt with likely Hep B vaccination, but pt is not sure. Otherwise negative hepatitis panel. Quantiferon negative  - IV Zofran 4mg PRN Q6h  - diet as tolerated, advanced to regular diet  - discontinue with IV fluids  - PRN Maalox  - f/u thiopurine methyltransferase level  - Await colonic biopsy results  - Send fecal calprotectin

## 2021-06-11 LAB — SURGICAL PATHOLOGY STUDY: SIGNIFICANT CHANGE UP

## 2021-06-11 PROCEDURE — 99232 SBSQ HOSP IP/OBS MODERATE 35: CPT

## 2021-06-11 RX ORDER — SODIUM CHLORIDE 9 MG/ML
1000 INJECTION INTRAMUSCULAR; INTRAVENOUS; SUBCUTANEOUS
Refills: 0 | Status: DISCONTINUED | OUTPATIENT
Start: 2021-06-11 | End: 2021-06-13

## 2021-06-11 NOTE — PROGRESS NOTE ADULT - SUBJECTIVE AND OBJECTIVE BOX
Chief Complaint:  Patient is a 25y old  Female who presents with a chief complaint of RLQ abdominal pain (07 Jun 2021 13:13)      Interval Hx:   - NAEON, afebrile HD stable  - 10 loose watery BMs / 24hr + abd cramping + night sweats, denies n/v/f  - S/p colonoscopy showing inflammation in ascending colon + stricture, awaiting path for dx      Allergies:  No Known Allergies      Home Medications:    Hospital Medications:  aluminum hydroxide/magnesium hydroxide/simethicone Suspension 30 milliLiter(s) Oral once PRN  famotidine Injectable 20 milliGRAM(s) IV Push once  ketorolac   Injectable 15 milliGRAM(s) IV Push every 6 hours PRN  ondansetron Injectable 4 milliGRAM(s) IV Push every 6 hours PRN  sodium chloride 0.9%. 1000 milliLiter(s) IV Continuous <Continuous>      PMHX/PSHX:  No pertinent past medical history    No pertinent past surgical history        Family history:      Denies family history of colon cancer/polyps, stomach cancer/polyps, pancreatic cancer/masses, liver cancer/disease, ovarian cancer and endometrial cancer.    Social History:     Tob: Denies  EtOH: Denies  Illicit Drugs: Denies    ROS:     General:  No wt loss, fevers, chills, night sweats, fatigue  Eyes:  Good vision, no reported pain  ENT:  No sore throat, pain, runny nose, dysphagia  CV:  No pain, palpitations, hypo/hypertension  Pulm:  No dyspnea, cough, tachypnea, wheezing  GI: see above  :  No pain, bleeding, incontinence, nocturia  Muscle:  No pain, weakness  Neuro:  No weakness, tingling, memory problems  Psych:  No fatigue, insomnia, mood problems, depression  Endocrine:  No polyuria, polydipsia, cold/heat intolerance  Heme:  No petechiae, ecchymosis, easy bruisability  Skin:  No rash, tattoos, scars, edema    PHYSICAL EXAM:     Vital Signs Last 24 Hrs  T(C): 36.7 (11 Jun 2021 07:51), Max: 36.7 (11 Jun 2021 07:51)  T(F): 98 (11 Jun 2021 07:51), Max: 98 (11 Jun 2021 07:51)  HR: 100 (11 Jun 2021 07:51) (82 - 103)  BP: 96/68 (11 Jun 2021 07:51) (96/68 - 122/88)  BP(mean): --  RR: 18 (11 Jun 2021 07:51) (18 - 20)  SpO2: 100% (11 Jun 2021 07:51) (100% - 100%)    GENERAL:  No acute distress, thin w/ temporal wasting  HEENT:  Normocephalic/atraumatic, no scleral icterus  CHEST:  Clear to auscultation bilaterally, no wheezes/rales/ronchi, no accessory muscle use  HEART:  Regular rate and rhythm, no murmurs/rubs/gallops  ABDOMEN:  Soft, +TTP RLQ, non-distended, normoactive bowel sounds  EXTREMITIES: No cyanosis, clubbing, or edema  SKIN:  No rash/warm/dry  NEURO:  Alert and oriented x 3    LABS:                        12.0   7.99  )-----------( 441      ( 10 Adiel 2021 04:16 )             37.8     06-10    132<L>  |  99  |  6<L>  ----------------------------<  74  3.6   |  18<L>  |  0.44<L>    Ca    9.2      10 Adiel 2021 04:16  Phos  3.6     06-10  Mg     1.8     06-10            Imaging:      Findings:       The perianal and digital rectal examinations were normal.       A stricture was found in the ascending colon, not traversed by peds        colonoscope but eventually traversed with upper endoscope. Biopsies of        the stricture and proximal site were taken with a cold forceps for        histology.       Diffuse moderate inflammation characterized by altered vascularity,        erythema and friability was found in the ascending colon leading up to        the stricture. Biopsies were taken with a cold forceps for histology.       Unclear if in the cecum w/ appendiceal oriface located past the        strictured site vs we continued to be in the ascending colon (IC valve        not visualized)       The exam was otherwise normal throughout the examined colon with normal        mucosa + some mild barotrauma in the transverse/descending colon.                                                                                   Impression:          - Stricture in the ascending colon. Biopsied.                       - Diffuse moderate inflammation was found in the   ascending colon (will need to r/o secondary to Crohn's                        disease with colonic involvement). Biopsied.                       - Colon otherwise normal aside from ascending colon                        inflammation + stricture  Recommendation:      - Return patient to [facility] for [facility] care.                       - Await pathology results for diagnosis prior to                        starting on appropriate tx                       - F/u quant gold                       - Send fecal calprotectin                       - Ok to resume diet     Chief Complaint:  Patient is a 25y old  Female who presents with a chief complaint of RLQ abdominal pain (07 Jun 2021 13:13)      Interval Hx:   - NAEON, afebrile HD stable  - 2 loose watery BMs / 24hr + abd cramping denies n/v/f  - S/p colonoscopy showing inflammation in ascending colon + stricture, awaiting path for dx      Allergies:  No Known Allergies      Home Medications:    Hospital Medications:  aluminum hydroxide/magnesium hydroxide/simethicone Suspension 30 milliLiter(s) Oral once PRN  famotidine Injectable 20 milliGRAM(s) IV Push once  ketorolac   Injectable 15 milliGRAM(s) IV Push every 6 hours PRN  ondansetron Injectable 4 milliGRAM(s) IV Push every 6 hours PRN  sodium chloride 0.9%. 1000 milliLiter(s) IV Continuous <Continuous>      PMHX/PSHX:  No pertinent past medical history    No pertinent past surgical history        Family history:      Denies family history of colon cancer/polyps, stomach cancer/polyps, pancreatic cancer/masses, liver cancer/disease, ovarian cancer and endometrial cancer.    Social History:     Tob: Denies  EtOH: Denies  Illicit Drugs: Denies    ROS:     General:  No wt loss, fevers, chills, night sweats, fatigue  Eyes:  Good vision, no reported pain  ENT:  No sore throat, pain, runny nose, dysphagia  CV:  No pain, palpitations, hypo/hypertension  Pulm:  No dyspnea, cough, tachypnea, wheezing  GI: see above  :  No pain, bleeding, incontinence, nocturia  Muscle:  No pain, weakness  Neuro:  No weakness, tingling, memory problems  Psych:  No fatigue, insomnia, mood problems, depression  Endocrine:  No polyuria, polydipsia, cold/heat intolerance  Heme:  No petechiae, ecchymosis, easy bruisability  Skin:  No rash, tattoos, scars, edema    PHYSICAL EXAM:     Vital Signs Last 24 Hrs  T(C): 36.7 (11 Jun 2021 07:51), Max: 36.7 (11 Jun 2021 07:51)  T(F): 98 (11 Jun 2021 07:51), Max: 98 (11 Jun 2021 07:51)  HR: 100 (11 Jun 2021 07:51) (82 - 103)  BP: 96/68 (11 Jun 2021 07:51) (96/68 - 122/88)  BP(mean): --  RR: 18 (11 Jun 2021 07:51) (18 - 20)  SpO2: 100% (11 Jun 2021 07:51) (100% - 100%)    GENERAL:  No acute distress, thin w/ temporal wasting  HEENT:  Normocephalic/atraumatic, no scleral icterus  CHEST:  Clear to auscultation bilaterally, no wheezes/rales/ronchi, no accessory muscle use  HEART:  Regular rate and rhythm, no murmurs/rubs/gallops  ABDOMEN:  Soft, +TTP RLQ, non-distended, normoactive bowel sounds  EXTREMITIES: No cyanosis, clubbing, or edema  SKIN:  No rash/warm/dry  NEURO:  Alert and oriented x 3    LABS:                        12.0   7.99  )-----------( 441      ( 10 Adiel 2021 04:16 )             37.8     06-10    132<L>  |  99  |  6<L>  ----------------------------<  74  3.6   |  18<L>  |  0.44<L>    Ca    9.2      10 Adiel 2021 04:16  Phos  3.6     06-10  Mg     1.8     06-10            Imaging:      Findings:       The perianal and digital rectal examinations were normal.       A stricture was found in the ascending colon, not traversed by peds        colonoscope but eventually traversed with upper endoscope. Biopsies of        the stricture and proximal site were taken with a cold forceps for        histology.       Diffuse moderate inflammation characterized by altered vascularity,        erythema and friability was found in the ascending colon leading up to        the stricture. Biopsies were taken with a cold forceps for histology.       Unclear if in the cecum w/ appendiceal oriface located past the        strictured site vs we continued to be in the ascending colon (IC valve        not visualized)       The exam was otherwise normal throughout the examined colon with normal        mucosa + some mild barotrauma in the transverse/descending colon.                                                                                   Impression:          - Stricture in the ascending colon. Biopsied.                       - Diffuse moderate inflammation was found in the   ascending colon (will need to r/o secondary to Crohn's                        disease with colonic involvement). Biopsied.                       - Colon otherwise normal aside from ascending colon                        inflammation + stricture  Recommendation:      - Return patient to [facility] for ongoing care.                       - Await pathology results for diagnosis prior to                        starting on appropriate tx                       - F/u quant gold                       - Send fecal calprotectin                       - Ok to resume diet

## 2021-06-11 NOTE — PROGRESS NOTE ADULT - SUBJECTIVE AND OBJECTIVE BOX
Patient is a 25y old  Female who presents with a chief complaint of RLQ abdominal pain (11 Jun 2021 08:24)      SUBJECTIVE / OVERNIGHT EVENTS:  Patient feeling better and tolerating diet. No fever, chills, chest pain, n/v or abdominal pain.     MEDICATIONS  (STANDING):    MEDICATIONS  (PRN):  aluminum hydroxide/magnesium hydroxide/simethicone Suspension 30 milliLiter(s) Oral every 6 hours PRN Dyspepsia  ondansetron Injectable 4 milliGRAM(s) IV Push every 6 hours PRN Nausea and/or Vomiting      Vital Signs Last 24 Hrs  T(C): 36.7 (11 Jun 2021 12:05), Max: 36.7 (11 Jun 2021 07:51)  T(F): 98.1 (11 Jun 2021 12:05), Max: 98.1 (11 Jun 2021 12:05)  HR: 100 (11 Jun 2021 14:48) (100 - 100)  BP: 100/68 (11 Jun 2021 12:05) (96/68 - 102/72)  RR: 18 (11 Jun 2021 12:05) (18 - 18)  SpO2: 99% (11 Jun 2021 12:05) (99% - 100%)          PHYSICAL EXAM  GENERAL: NAD, thin framed,   CHEST/LUNG: Clear to auscultation bilaterally; No wheeze  HEART: Regular rate and rhythm; No murmurs, rubs, or gallops  ABDOMEN: Soft, Nontender, Nondistended; Bowel sounds present  EXTREMITIES:  2+ Peripheral Pulses, No clubbing, cyanosis, or edema  PSYCH: AAOx3          LABS:                        12.0   7.99  )-----------( 441      ( 10 Adiel 2021 04:16 )             37.8     06-10    132<L>  |  99  |  6<L>  ----------------------------<  74  3.6   |  18<L>  |  0.44<L>    Ca    9.2      10 Adiel 2021 04:16  Phos  3.6     06-10  Mg     1.8     06-10          Surgical Final Report      Final Diagnosis  1. Ascending colon stricture, biopsy:  - Focal active chronic colitis with focal erosion,  cryptitis, focal fibrosis and mild crypt distortion.  - Negative for granulomata, dysplasia or viral cytopathic  effects.          Consultant(s) Notes Reviewed:    Care Discussed with Consultants/Other Providers:   Patient is a 25y old  Female who presents with a chief complaint of RLQ abdominal pain (11 Jun 2021 08:24)      SUBJECTIVE / OVERNIGHT EVENTS:  Pt evaluated earlier in the afternoon. Patient feeling better and tolerating diet. No fever, chills, chest pain, n/v or abdominal pain.     MEDICATIONS  (STANDING):    MEDICATIONS  (PRN):  aluminum hydroxide/magnesium hydroxide/simethicone Suspension 30 milliLiter(s) Oral every 6 hours PRN Dyspepsia  ondansetron Injectable 4 milliGRAM(s) IV Push every 6 hours PRN Nausea and/or Vomiting      Vital Signs Last 24 Hrs  T(C): 36.7 (11 Jun 2021 12:05), Max: 36.7 (11 Jun 2021 07:51)  T(F): 98.1 (11 Jun 2021 12:05), Max: 98.1 (11 Jun 2021 12:05)  HR: 100 (11 Jun 2021 14:48) (100 - 100)  BP: 100/68 (11 Jun 2021 12:05) (96/68 - 102/72)  RR: 18 (11 Jun 2021 12:05) (18 - 18)  SpO2: 99% (11 Jun 2021 12:05) (99% - 100%)          PHYSICAL EXAM  GENERAL: NAD, thin framed,   CHEST/LUNG: Clear to auscultation bilaterally; No wheeze  HEART: Regular rate and rhythm; No murmurs, rubs, or gallops  ABDOMEN: Soft, Nontender, Nondistended; Bowel sounds present  EXTREMITIES:  2+ Peripheral Pulses, No clubbing, cyanosis, or edema  PSYCH: AAOx3          LABS:                        12.0   7.99  )-----------( 441      ( 10 Adiel 2021 04:16 )             37.8     06-10    132<L>  |  99  |  6<L>  ----------------------------<  74  3.6   |  18<L>  |  0.44<L>    Ca    9.2      10 Adiel 2021 04:16  Phos  3.6     06-10  Mg     1.8     06-10          Surgical Final Report      Final Diagnosis  1. Ascending colon stricture, biopsy:  - Focal active chronic colitis with focal erosion,  cryptitis, focal fibrosis and mild crypt distortion.  - Negative for granulomata, dysplasia or viral cytopathic  effects.          Consultant(s) Notes Reviewed:    Care Discussed with Consultants/Other Providers:

## 2021-06-11 NOTE — PROGRESS NOTE ADULT - PROBLEM SELECTOR PLAN 1
Resolved. GI consulted, recs appreciated. Due to newly diagnosed Crohns ileocolitis. Reviewed MRI abdomen - no abscess. Colonoscopy confirmed stricture and moderate inflammation in ascending colon. Pt with likely Hep B vaccination, but pt is not sure. Otherwise negative hepatitis panel. Quantiferon negative  - IV Zofran 4mg PRN Q6h  - diet as tolerated, regular diet  - PRN Maalox  - f/u thiopurine methyltransferase level  - f/u fecal calprotectin Resolved. GI consulted, recs appreciated. Due to newly diagnosed Crohns ileocolitis. Reviewed MRI abdomen - no abscess. Colonoscopy confirmed stricture and moderate inflammation in ascending colon. Pt with likely Hep B vaccination, but pt is not sure. Otherwise negative hepatitis panel. Quantiferon negative  - IV Zofran 4mg PRN Q6h  - diet as tolerated, regular diet  - PRN Maalox  - f/u thiopurine methyltransferase level  - f/u fecal calprotectin  - f/u GI recs --> pt candidate for infliximab. Pt pending likely inpatient administration if agreeable

## 2021-06-11 NOTE — CHART NOTE - NSCHARTNOTEFT_GEN_A_CORE
Brief GI F/u Note    Colonoscopy biopsy of ascending colon inflammation + stricture resulted:     Ascending colon stricture, biopsy: Focal active chronic colitis with focal erosion, cryptitis, focal fibrosis and mild crypt distortion. Negative for granulomata, dysplasia or viral cytopathic   effects.     Ddx: likely Crohn's ileocolitis. Considering this is mod-severe disease w/ possible fistulizing disease (recent abscess on CT a/p 6/3) discussed starting infliximab with patient. Risks and benefits were discussed with packets of patient information printed.     Patient would like to discuss with her family, will touch base again in AM regarding decision to pursue infliximab.         Thank you for involving us in the care of this patient, please reach out if any further questions.     Rhett Sarmiento MD  Gastroenterology Fellow, PGY4    Available on [employer] Teams  [phone] ([facility])  [id number] ([facility])  Please contact on call fellow weekdays after 5pm-7am and weekends: [phone]

## 2021-06-12 PROCEDURE — 99233 SBSQ HOSP IP/OBS HIGH 50: CPT

## 2021-06-12 PROCEDURE — 99232 SBSQ HOSP IP/OBS MODERATE 35: CPT | Mod: GC

## 2021-06-12 RX ORDER — INFLIXIMAB-DYYB 120 MG/ML
200 INJECTION SUBCUTANEOUS ONCE
Refills: 0 | Status: COMPLETED | OUTPATIENT
Start: 2021-06-12 | End: 2021-06-12

## 2021-06-12 RX ORDER — ACETAMINOPHEN 500 MG
650 TABLET ORAL ONCE
Refills: 0 | Status: COMPLETED | OUTPATIENT
Start: 2021-06-12 | End: 2021-06-12

## 2021-06-12 RX ADMIN — INFLIXIMAB-DYYB 125 MILLIGRAM(S): 120 INJECTION SUBCUTANEOUS at 14:55

## 2021-06-12 RX ADMIN — Medication 650 MILLIGRAM(S): at 14:08

## 2021-06-12 RX ADMIN — SODIUM CHLORIDE 75 MILLILITER(S): 9 INJECTION INTRAMUSCULAR; INTRAVENOUS; SUBCUTANEOUS at 00:33

## 2021-06-12 NOTE — CHART NOTE - NSCHARTNOTEFT_GEN_A_CORE
Notified by Rn that patient was c/o  dizziness when came back after shower. Notified by Rn that patient was c/o  dizziness when came back after shower. Also associated with some shortness of breath when she was exerting. Advised to do orthostatic vitals, which is slightly positive heart rate wise. Assessed patient at bedside. Denies any dizzines and shortness of breath at the time of assessment. Patient is very pleasant young lady without any distress, talking full sentence on room air. Lungs clear. Patient admitted she is very hungry, so eats very good, but drinks very less. Encouraged to take more po fluids. NS - IVF  ordered for 8 hours. Will continue to monitor

## 2021-06-12 NOTE — PROVIDER CONTACT NOTE (OTHER) - BACKGROUND
Patient admitted with abdominal pain.
Pt admitted for abdominal pain
Pt. admitted for abdominal pain

## 2021-06-12 NOTE — PROGRESS NOTE ADULT - SUBJECTIVE AND OBJECTIVE BOX
Chief Complaint:  Patient is a 25y old  Female who presents with a chief complaint of RLQ abdominal pain (07 Jun 2021 13:13)      Interval Hx:   - NAEON, afebrile HD stable  - 4 loose watery BMs / 24hr + abd cramping denies n/v/f  - Yesterday path (+) chronic colitis c/f Crohn's disease based on clinical, endoscopic and histopath picture   - Planning to start infliximab 200mg today         Allergies:  No Known Allergies      Home Medications:    Hospital Medications:  aluminum hydroxide/magnesium hydroxide/simethicone Suspension 30 milliLiter(s) Oral once PRN  famotidine Injectable 20 milliGRAM(s) IV Push once  ketorolac   Injectable 15 milliGRAM(s) IV Push every 6 hours PRN  ondansetron Injectable 4 milliGRAM(s) IV Push every 6 hours PRN  sodium chloride 0.9%. 1000 milliLiter(s) IV Continuous <Continuous>      PMHX/PSHX:  No pertinent past medical history    No pertinent past surgical history        Family history:      Denies family history of colon cancer/polyps, stomach cancer/polyps, pancreatic cancer/masses, liver cancer/disease, ovarian cancer and endometrial cancer.    Social History:     Tob: Denies  EtOH: Denies  Illicit Drugs: Denies    ROS:     General:  No wt loss, fevers, chills, night sweats, fatigue  Eyes:  Good vision, no reported pain  ENT:  No sore throat, pain, runny nose, dysphagia  CV:  No pain, palpitations, hypo/hypertension  Pulm:  No dyspnea, cough, tachypnea, wheezing  GI: see above  :  No pain, bleeding, incontinence, nocturia  Muscle:  No pain, weakness  Neuro:  No weakness, tingling, memory problems  Psych:  No fatigue, insomnia, mood problems, depression  Endocrine:  No polyuria, polydipsia, cold/heat intolerance  Heme:  No petechiae, ecchymosis, easy bruisability  Skin:  No rash, tattoos, scars, edema    PHYSICAL EXAM:     Vital Signs Last 24 Hrs  T(C): 36.2 (12 Jun 2021 05:33), Max: 36.6 (11 Jun 2021 20:50)  T(F): 97.2 (12 Jun 2021 05:33), Max: 97.9 (11 Jun 2021 20:50)  HR: 93 (12 Jun 2021 05:33) (93 - 102)  BP: 108/62 (12 Jun 2021 05:33) (106/63 - 108/64)  BP(mean): --  RR: 16 (12 Jun 2021 05:33) (16 - 19)  SpO2: 99% (12 Jun 2021 05:33) (99% - 100%)    GENERAL:  No acute distress, thin w/ temporal wasting  HEENT:  Normocephalic/atraumatic, no scleral icterus  CHEST:  Clear to auscultation bilaterally, no wheezes/rales/ronchi, no accessory muscle use  HEART:  Regular rate and rhythm, no murmurs/rubs/gallops  ABDOMEN:  Soft, +TTP RLQ, non-distended, normoactive bowel sounds  EXTREMITIES: No cyanosis, clubbing, or edema  SKIN:  No rash/warm/dry  NEURO:  Alert and oriented x 3    LABS:                Imaging:      Findings:       The perianal and digital rectal examinations were normal.       A stricture was found in the ascending colon, not traversed by peds        colonoscope but eventually traversed with upper endoscope. Biopsies of        the stricture and proximal site were taken with a cold forceps for        histology.       Diffuse moderate inflammation characterized by altered vascularity,        erythema and friability was found in the ascending colon leading up to        the stricture. Biopsies were taken with a cold forceps for histology.       Unclear if in the cecum w/ appendiceal oriface located past the        strictured site vs we continued to be in the ascending colon (IC valve        not visualized)       The exam was otherwise normal throughout the examined colon with normal        mucosa + some mild barotrauma in the transverse/descending colon.                                                                                   Impression:          - Stricture in the ascending colon. Biopsied.                       - Diffuse moderate inflammation was found in the   ascending colon (will need to r/o secondary to Crohn's                        disease with colonic involvement). Biopsied.                       - Colon otherwise normal aside from ascending colon                        inflammation + stricture  Recommendation:      - [facility] patient to [facility] for [facility] care.                       - Await pathology results for diagnosis prior to                        starting on appropriate tx                       - F/u quant gold                       - Send fecal calprotectin                       - Ok to resume diet     Chief Complaint:  Patient is a 25y old  Female who presents with a chief complaint of RLQ abdominal pain (07 Jun 2021 13:13)      Interval Hx:   - NAEON, afebrile HD stable  - 4 loose watery BMs / 24hr + abd cramping denies n/v/f  - Yesterday path (+) chronic colitis c/f Crohn's disease based on clinical, endoscopic and histopath picture   - Planning to start infliximab 200mg today         Allergies:  No Known Allergies      Home Medications:  see [facility] Medications:  aluminum hydroxide/magnesium hydroxide/simethicone Suspension 30 milliLiter(s) Oral once PRN  famotidine Injectable 20 milliGRAM(s) IV Push once  ketorolac   Injectable 15 milliGRAM(s) IV Push every 6 hours PRN  ondansetron Injectable 4 milliGRAM(s) IV Push every 6 hours PRN  sodium chloride 0.9%. 1000 milliLiter(s) IV Continuous <Continuous>      PMHX/PSHX:  No pertinent past medical history    No pertinent past surgical history        Family history:      Denies family history of colon cancer/polyps, stomach cancer/polyps, pancreatic cancer/masses, liver cancer/disease, ovarian cancer and endometrial cancer.    Social History:     Tob: Denies  EtOH: Denies  Illicit Drugs: Denies    ROS:     General:  No wt loss, fevers, chills, night sweats, fatigue  Eyes:  Good vision, no reported pain  ENT:  No sore throat, pain, runny nose, dysphagia  CV:  No pain, palpitations, hypo/hypertension  Pulm:  No dyspnea, cough, tachypnea, wheezing  GI: see above  :  No pain, bleeding, incontinence, nocturia  Muscle:  No pain, weakness  Neuro:  No weakness, tingling, memory problems  Psych:  No fatigue, insomnia, mood problems, depression  Endocrine:  No polyuria, polydipsia, cold/heat intolerance  Heme:  No petechiae, ecchymosis, easy bruisability  Skin:  No rash, tattoos, scars, edema    PHYSICAL EXAM:     Vital Signs Last 24 Hrs  T(C): 36.2 (12 Jun 2021 05:33), Max: 36.6 (11 Jun 2021 20:50)  T(F): 97.2 (12 Jun 2021 05:33), Max: 97.9 (11 Jun 2021 20:50)  HR: 93 (12 Jun 2021 05:33) (93 - 102)  BP: 108/62 (12 Jun 2021 05:33) (106/63 - 108/64)  BP(mean): --  RR: 16 (12 Jun 2021 05:33) (16 - 19)  SpO2: 99% (12 Jun 2021 05:33) (99% - 100%)    GENERAL:  No acute distress, thin w/ temporal wasting  HEENT:  Normocephalic/atraumatic, no scleral icterus  CHEST:  Clear to auscultation bilaterally, no wheezes/rales/ronchi, no accessory muscle use  HEART:  Regular rate and rhythm, no murmurs/rubs/gallops  ABDOMEN:  Soft, +TTP RLQ, non-distended, normoactive bowel sounds  EXTREMITIES: No cyanosis, clubbing, or edema  SKIN:  No rash/warm/dry  NEURO:  Alert and oriented x 3    LABS:                Imaging:      Findings:       The perianal and digital rectal examinations were normal.       A stricture was found in the ascending colon, not traversed by peds        colonoscope but eventually traversed with upper endoscope. Biopsies of        the stricture and proximal site were taken with a cold forceps for        histology.       Diffuse moderate inflammation characterized by altered vascularity,        erythema and friability was found in the ascending colon leading up to        the stricture. Biopsies were taken with a cold forceps for histology.       Unclear if in the cecum w/ appendiceal oriface located past the        strictured site vs we continued to be in the ascending colon (IC valve        not visualized)       The exam was otherwise normal throughout the examined colon with normal        mucosa + some mild barotrauma in the transverse/descending colon.                                                                                   Impression:          - Stricture in the ascending colon. Biopsied.                       - Diffuse moderate inflammation was found in the   ascending colon (will need to r/o secondary to Crohn's                        disease with colonic involvement). Biopsied.                       - Colon otherwise normal aside from ascending colon                        inflammation + stricture  Recommendation:      - Return patient to [facility] for ongoing care.                       - Await pathology results for diagnosis prior to                        starting on appropriate tx                       - F/u quant gold                       - Send fecal calprotectin                       - Ok to resume diet

## 2021-06-12 NOTE — PROGRESS NOTE ADULT - SUBJECTIVE AND OBJECTIVE BOX
Patient is a 25y old  Female who presents with a chief complaint of RLQ abdominal pain (12 Jun 2021 12:30)        SUBJECTIVE / OVERNIGHT EVENTS:      MEDICATIONS  (STANDING):  sodium chloride 0.9%. 1000 milliLiter(s) (75 mL/Hr) IV Continuous <Continuous>    MEDICATIONS  (PRN):  aluminum hydroxide/magnesium hydroxide/simethicone Suspension 30 milliLiter(s) Oral every 6 hours PRN Dyspepsia  ondansetron Injectable 4 milliGRAM(s) IV Push every 6 hours PRN Nausea and/or Vomiting      Vital Signs Last 24 Hrs  T(C): 36.7 (12 Jun 2021 17:17), Max: 36.9 (12 Jun 2021 13:26)  T(F): 98 (12 Jun 2021 17:17), Max: 98.4 (12 Jun 2021 13:26)  HR: 98 (12 Jun 2021 17:17) (93 - 102)  BP: 102/68 (12 Jun 2021 17:17) (102/68 - 112/69)  RR: 16 (12 Jun 2021 17:17) (16 - 19)  SpO2: 100% (12 Jun 2021 17:17) (99% - 100%)          PHYSICAL EXAM  GENERAL: NAD, thin framed,   CHEST/LUNG: Clear to auscultation bilaterally; No wheeze  HEART: Regular rate and rhythm; No murmurs, rubs, or gallops  ABDOMEN: Soft, Nontender, Nondistended; Bowel sounds present  EXTREMITIES:  2+ Peripheral Pulses, No clubbing, cyanosis, or edema  PSYCH: AAOx3          Consultant(s) Notes Reviewed:  yes  Care Discussed with Consultants/Other Providers:   Patient is a 25y old  Female who presents with a chief complaint of RLQ abdominal pain (12 Jun 2021 12:30)      SUBJECTIVE / OVERNIGHT EVENTS:  Patient evaluated after receiving infliximab. Pt tolerated it well and felt fine. No fever, chest pain, SOB, n/v or abdominal pain.    MEDICATIONS  (STANDING):  sodium chloride 0.9%. 1000 milliLiter(s) (75 mL/Hr) IV Continuous <Continuous>    MEDICATIONS  (PRN):  aluminum hydroxide/magnesium hydroxide/simethicone Suspension 30 milliLiter(s) Oral every 6 hours PRN Dyspepsia  ondansetron Injectable 4 milliGRAM(s) IV Push every 6 hours PRN Nausea and/or Vomiting      Vital Signs Last 24 Hrs  T(C): 36.7 (12 Jun 2021 17:17), Max: 36.9 (12 Jun 2021 13:26)  T(F): 98 (12 Jun 2021 17:17), Max: 98.4 (12 Jun 2021 13:26)  HR: 98 (12 Jun 2021 17:17) (93 - 102)  BP: 102/68 (12 Jun 2021 17:17) (102/68 - 112/69)  RR: 16 (12 Jun 2021 17:17) (16 - 19)  SpO2: 100% (12 Jun 2021 17:17) (99% - 100%)          PHYSICAL EXAM  GENERAL: NAD, thin framed,   CHEST/LUNG: Clear to auscultation bilaterally; No wheeze  HEART: Regular rate and rhythm; No murmurs, rubs, or gallops  ABDOMEN: Soft, Nontender, Nondistended; Bowel sounds present  EXTREMITIES:  2+ Peripheral Pulses, No clubbing, cyanosis, or edema  PSYCH: AAOx3          Consultant(s) Notes Reviewed:  yes  Care Discussed with Consultants/Other Providers:

## 2021-06-12 NOTE — PROVIDER CONTACT NOTE (OTHER) - REASON
Pt. is hypotensive 96/68
Patient c/o dizziness and dyspnea on exertion.
Outpatient quantiferon negative which is what is recommended in order to give INFLECTRA IVPB

## 2021-06-12 NOTE — PROVIDER CONTACT NOTE (OTHER) - RECOMMENDATIONS
Obtain vitals signs pre and post administration of INFLECTRA IVPB. Continue to monitor
Continue to monitor. RN encouraged pt. to increase fluid intake

## 2021-06-12 NOTE — PROGRESS NOTE ADULT - PROBLEM SELECTOR PLAN 1
Resolved. GI consulted, recs appreciated. Due to newly diagnosed Crohns ileocolitis. Reviewed MRI abdomen - no abscess. Colonoscopy confirmed stricture and moderate inflammation in ascending colon. Pt with likely Hep B vaccination, but pt is not sure. Otherwise negative hepatitis panel. Quantiferon negative  - received infliximab 200mg today  - diet as tolerated, regular diet  - PRN Maalox  - f/u thiopurine methyltransferase level, pending in lab  - f/u fecal calprotectin, pending in lab  - f/u transglumatinase Ab serologies, currently in lab.  - f/u GI recs

## 2021-06-12 NOTE — PROVIDER CONTACT NOTE (OTHER) - SITUATION
Pt. is hypotensive 96/68
Outpatient quantiferon negative which is what is recommended in order to give INFLECTRA IVPB
Patient c/o dizziness and dyspnea on exertion s/p shower and praying.

## 2021-06-13 ENCOUNTER — TRANSCRIPTION ENCOUNTER (OUTPATIENT)
Age: 26
End: 2021-06-13

## 2021-06-13 VITALS
SYSTOLIC BLOOD PRESSURE: 101 MMHG | TEMPERATURE: 98 F | RESPIRATION RATE: 16 BRPM | DIASTOLIC BLOOD PRESSURE: 60 MMHG | OXYGEN SATURATION: 100 % | HEART RATE: 100 BPM

## 2021-06-13 LAB
ALBUMIN SERPL ELPH-MCNC: 3.8 G/DL — SIGNIFICANT CHANGE UP (ref 3.3–5)
ALP SERPL-CCNC: 64 U/L — SIGNIFICANT CHANGE UP (ref 40–120)
ALT FLD-CCNC: 10 U/L — SIGNIFICANT CHANGE UP (ref 4–33)
ANION GAP SERPL CALC-SCNC: 12 MMOL/L — SIGNIFICANT CHANGE UP (ref 7–14)
AST SERPL-CCNC: 11 U/L — SIGNIFICANT CHANGE UP (ref 4–32)
BILIRUB SERPL-MCNC: <0.2 MG/DL — SIGNIFICANT CHANGE UP (ref 0.2–1.2)
BUN SERPL-MCNC: 9 MG/DL — SIGNIFICANT CHANGE UP (ref 7–23)
CALCIUM SERPL-MCNC: 9.2 MG/DL — SIGNIFICANT CHANGE UP (ref 8.4–10.5)
CHLORIDE SERPL-SCNC: 105 MMOL/L — SIGNIFICANT CHANGE UP (ref 98–107)
CO2 SERPL-SCNC: 21 MMOL/L — LOW (ref 22–31)
CREAT SERPL-MCNC: 0.52 MG/DL — SIGNIFICANT CHANGE UP (ref 0.5–1.3)
GLUCOSE SERPL-MCNC: 97 MG/DL — SIGNIFICANT CHANGE UP (ref 70–99)
HCT VFR BLD CALC: 33.7 % — LOW (ref 34.5–45)
HGB BLD-MCNC: 10.6 G/DL — LOW (ref 11.5–15.5)
MAGNESIUM SERPL-MCNC: 1.9 MG/DL — SIGNIFICANT CHANGE UP (ref 1.6–2.6)
MCHC RBC-ENTMCNC: 26.2 PG — LOW (ref 27–34)
MCHC RBC-ENTMCNC: 31.5 GM/DL — LOW (ref 32–36)
MCV RBC AUTO: 83.4 FL — SIGNIFICANT CHANGE UP (ref 80–100)
NRBC # BLD: 0 /100 WBCS — SIGNIFICANT CHANGE UP
NRBC # FLD: 0 K/UL — SIGNIFICANT CHANGE UP
PHOSPHATE SERPL-MCNC: 4 MG/DL — SIGNIFICANT CHANGE UP (ref 2.5–4.5)
PLATELET # BLD AUTO: 365 K/UL — SIGNIFICANT CHANGE UP (ref 150–400)
POTASSIUM SERPL-MCNC: 3.6 MMOL/L — SIGNIFICANT CHANGE UP (ref 3.5–5.3)
POTASSIUM SERPL-SCNC: 3.6 MMOL/L — SIGNIFICANT CHANGE UP (ref 3.5–5.3)
PROT SERPL-MCNC: 6.3 G/DL — SIGNIFICANT CHANGE UP (ref 6–8.3)
RBC # BLD: 4.04 M/UL — SIGNIFICANT CHANGE UP (ref 3.8–5.2)
RBC # FLD: 14.1 % — SIGNIFICANT CHANGE UP (ref 10.3–14.5)
SODIUM SERPL-SCNC: 138 MMOL/L — SIGNIFICANT CHANGE UP (ref 135–145)
WBC # BLD: 7.94 K/UL — SIGNIFICANT CHANGE UP (ref 3.8–10.5)
WBC # FLD AUTO: 7.94 K/UL — SIGNIFICANT CHANGE UP (ref 3.8–10.5)

## 2021-06-13 PROCEDURE — 99238 HOSP IP/OBS DSCHRG MGMT 30/<: CPT

## 2021-06-13 PROCEDURE — 99232 SBSQ HOSP IP/OBS MODERATE 35: CPT | Mod: GC

## 2021-06-13 NOTE — PROGRESS NOTE ADULT - REASON FOR ADMISSION
RLQ abdominal pain

## 2021-06-13 NOTE — PROGRESS NOTE ADULT - ASSESSMENT
25F without PMHx p/w recurrent episode of RLQ abd pain + diarrhea x 4 days.       Impression:   #RLQ abd pain, diarrhea, ileitis: pt with recurrent episodes of RLQ abd pain w/ diarrhea, fevers, weight loss + imaging c/f terminal ileitis (+intramural abscess 2.2cm) on CT a/p 6/3/21. Clinical picture with high likelihood of inflammatory bowel disease (Crohn's >> UC), thus far work up negative for infectious colitis (c diff, viral gastroenteritis).       Recommendations:   - F/u MR abd read --> based on results will decide regarding colonoscopy   - F/u quant gold, hep serologies (Hep B surface Ab/Ag, Total Core, Hep C Ab with reflex to PCR)   - F/u TPMT            Thank you for involving us in the care of this patient, please reach out if any further questions.     Rhett Sarmiento MD  Gastroenterology Fellow, PGY4    Available on [employer] Teams  [phone] ([facility])  [id number] ([facility])  Please contact on call fellow weekdays after 5pm-7am and weekends: [phone]      
25F without PMHx p/w recurrent episode of RLQ abd pain + diarrhea, weight loss s/p colonoscopy showing inflammation + stricture in ascending colon, path (+) chronic colitis c/f Crohn's disease,    Impression:   #Crohn's: pt with recurrent episodes of RLQ abd pain w/ diarrhea, fevers, weight loss since Feb 2021 and imaging with terminal ileitis + intramural abscess 2.2cm on CT a/p 6/3/21 c/f Crohn's initially. After negative infectious work up, pt underwent colonoscopy. Colonoscopy (6/10) showing inflammation in ascending colon + stricture which could be traversed w/ upper endoscopic. Biopsies of stricture + colonic inflammation taken, resulted (+) chronic colitis w/ crypt distortion. Pt's likely dx mod-severe, fistulizing Crohn's disease -- decision made to initiate on infliximab after discussion with patient.   Work up: negative quant gold, Hep B infection, path negative for CMV  #Ascending colon stricture: inflammatory in setting of new dx Crohn's vs fibrostenotic 2/2 chronic inflammation         Recommendations:   - Initate on infliximab 200mg (5mg/Kg) today   - Premedication with tylenol   - Pt will need repeat dosing in 2 weeks, then 6 weeks, followed by 8 weeks thereafter  - GI team will send paperwork to infusion center to coordinate   - Trend ESR, CRP  - Please change diet to low fiber, low residue diet considering stricture   - Monitor response after IFX                                                                              Thank you for involving us in the care of this patient, please reach out if any further questions.     Rhett Sarmiento MD  Gastroenterology Fellow, PGY4    Available on [employer] Teams  [phone] ([facility])  [id number] ([facility])  Please contact on call fellow weekdays after 5pm-7am and weekends: [phone]      
26 YO F no significant PMHx  LMP 21 c/o RLQ x 3 days admitted intractable abdominal pain and hypokalemia and subsequent admitted for further management as well as evaluation for suspected IBD, such as Crohns disease. Pt s/p colonoscopy on  revealing stricture and diffuse moderate inflammation in ascending colon. Pathology c/w Crohns disease. Pt s/p infliximab infusion today.
26 YO F no significant PMHx  LMP 21 c/o RLQ x 3 days admitted intractable abdominal pain and hypokalemia.
24 YO F no significant PMHx  LMP 21 c/o RLQ x 3 days admitted intractable abdominal pain and hypokalemia and subsequent admitted for further management as well as evaluation for suspected IBD, such as Crohns disease. 
25F without PMHx p/w recurrent episode of RLQ abd pain + diarrhea x 4 days.       Impression:   #RLQ abd pain, diarrhea, ileitis: pt with recurrent episodes of RLQ abd pain w/ diarrhea, fevers, weight loss + imaging c/f terminal ileitis (+intramural abscess 2.2cm) on CT a/p 6/3/21. Clinical picture with high likelihood of inflammatory bowel disease (Crohn's >> UC) vs will need to r/o infectious colitis (c diff, viral gastroenteritis).       Recommendations:   - F/u c diff, GI PCR   - Send quant gold, hep serologies (Hep B surface Ab/Ag, Total Core, Hep C Ab with reflex to PCR)   - Send TPMT   - Please obtain MR abd to evaluate for previously seen intramural abscess   - If infectious work up above ^^ negative, pt will likely need endoscopic evaluation for diagnosis of disease        Thank you for involving us in the care of this patient, please reach out if any further questions.     Rhett Sarmiento MD  Gastroenterology Fellow, PGY4    Available on [employer] Teams  [phone] ([facility])  [id number] ([facility])  Please contact on call fellow weekdays after 5pm-7am and weekends: [phone]      
25F without PMHx p/w recurrent episode of RLQ abd pain + diarrhea, weight loss s/p colonoscopy showing inflammation + stricture in ascending colon, currently awaiting path for dx.       Impression:   #RLQ abd pain, diarrhea, ileitis: pt with recurrent episodes of RLQ abd pain w/ diarrhea, fevers, weight loss + imaging c/f terminal ileitis (+intramural abscess 2.2cm) on CT a/p 6/3/21. Clinical picture with high likelihood of inflammatory bowel disease (Crohn's >> UC), thus far work up negative for infectious colitis (c diff, viral gastroenteritis), other ddx TI inflammation includes TB (quant gold negative), lymphoma, yersinia, CMV, inflammatory diseases (psoriasis, reactive arthritis), vasculitis (SLE, PAN, Behcet's), eosinophilic enteritis, sarcoidosis, amyloidosis, NSAID induced enteropathy.       Recommendations:   - Await pathology results for diagnosis prior to  starting on appropriate tx  - Send fecal calprotectin  - Continue on regular diet  - Trend ESR, CRP                                                                                            Thank you for involving us in the care of this patient, please reach out if any further questions.     Rhett Sarmiento MD  Gastroenterology Fellow, PGY4    Available on [employer] Teams  [phone] ([facility])  [phone] ([facility])  Please contact on call fellow weekdays after 5pm-7am and weekends: [phone]      
25F without PMHx p/w recurrent episode of RLQ abd pain + diarrhea, weight loss s/p colonoscopy showing inflammation + stricture in ascending colon, path (+) chronic colitis c/f Crohn's disease, now initiated on IFX (6/12/21)    Impression:   #Crohn's: pt with recurrent episodes of RLQ abd pain w/ diarrhea, fevers, weight loss since Feb 2021 and imaging with terminal ileitis + intramural abscess 2.2cm on CT a/p 6/3/21 c/f Crohn's initially. After negative infectious work up, pt underwent colonoscopy. Colonoscopy (6/10) showing inflammation in ascending colon + stricture which could be traversed w/ upper endoscopic. Biopsies of stricture + colonic inflammation taken, resulted (+) chronic colitis w/ crypt distortion. Pt's likely dx mod-severe, fistulizing Crohn's disease -- decision made to initiate on infliximab after discussion with patient. S/p week 0 dose on 6/12/21 -- next dose due 6/28/21.  Work up: negative quant gold, Hep B infection, path negative for CMV  #Ascending colon stricture: inflammatory in setting of new dx Crohn's vs fibrostenotic 2/2 chronic inflammation         Recommendations:   - S/p infliximab 200mg (5mg/Kg) 6/12/21  - Monitor response after IFX             - Pt will need repeat dosing in 2 weeks, then 6 weeks, followed by 8 weeks thereafter  - GI team will send paperwork to infusion center to coordinate   - Please change diet to low fiber, low residue diet considering stricture  - Will email for f/u appointment with Dr. Berger or myself in GI fellows clinic  - Pt to talk to SW Monday AM                                                                    Thank you for involving us in the care of this patient, please reach out if any further questions.     Rhett Sarmiento MD  Gastroenterology Fellow, PGY4    Available on [employer] Teams  [phone] ([facility])  [phone] ([facility])  Please contact on call fellow weekdays after 5pm-7am and weekends: [phone]      
24 YO F no significant PMHx  LMP 21 c/o RLQ x 3 days admitted intractable abdominal pain and hypokalemia and subsequent admitted for further management as well as evaluation for suspected IBD, such as Crohns disease. Pt s/p colonoscopy on 6/10t revealing stricture and diffuse moderate inflammation in ascending colon. Pathology c/w Crohns disease
24 YO F no significant PMHx  LMP 21 c/o RLQ x 3 days admitted intractable abdominal pain and hypokalemia and subsequent admitted for further management as well as evaluation for suspected IBD, such as Crohns disease. Pt s/p colonoscopy on 6/10t revealing stricture and diffuse moderate inflammation in ascending colon. Pathology c/w Crohns disease. Pt s/p infliximab infusion on 
26 YO F no significant PMHx  LMP 21 c/o RLQ x 3 days admitted intractable abdominal pain and hypokalemia and subsequent admitted for further management as well as evaluation for suspected IBD, such as Crohns disease. Pt s/p colonoscopy today revealing stricture and diffuse moderate inflammation in ascending colon.

## 2021-06-13 NOTE — PROGRESS NOTE ADULT - PROBLEM SELECTOR PROBLEM 1
Intractable abdominal pain
Pt still with persistent dizziness after receiving meclizine and Zofran.  CT head and ED workup unremarkable.    Dr. Talley informed and accepts admission, covering for Dr. Naranjo.  TED Foster informed.

## 2021-06-13 NOTE — PROGRESS NOTE ADULT - NSICDXPILOT_GEN_ALL_CORE
[place]

## 2021-06-13 NOTE — PROGRESS NOTE ADULT - ATTENDING COMMENTS
25F without PMHx p/w recurrent episode of RLQ abd pain + diarrhea, weight loss s/p colonoscopy showing inflammation + stricture in ascending colon, path (+) chronic colitis c/f Crohn's disease (inflammatory vs fibrostenotic?). No CMV or infection.  Biologic treatment discussed with patient at length  Will plan for inpatient initiation of Remicade today (5mg/kg, dose 1).  Please premedicate with tylenol/benadryl/steroid to decrease risk of infusion reaction.  Patient will need to be set up for rest of induction and then maintenance doses with outpatient infusion center, to be arranged by GI team.  Please continue prophylactic anticoagulation given high risk of DVT/PE in active IBD and encourage ambulation.  Low fiber diet.  Monitor bowel movements, CBC/BMP, fever curve  Will need HAV vaccination as non-immune as well as routine IBD health maintenance evaluation which can be done as an outpatient.    Thank you for this interesting consult.  Please call the GI service with any questions or concerns.
25F without PMHx p/w recurrent episode of RLQ abd pain + diarrhea, weight loss s/p colonoscopy showing inflammation + stricture in ascending colon, path (+) chronic colitis c/f Crohn's disease, now initiated on IFX (6/12/21) without issues.  Monitor clinically for evidence of response. Continue low residue diet.    Thank you for this interesting consult.  Please call the GI service with any questions or concerns.
Awaiting MR of abdomen to rule out abscess.
Awaiting pathology from the colonoscopy biopsy.
MRI showed no abscess but did show TI inflammation.  Plan for colonoscopy tomorrow.

## 2021-06-13 NOTE — PROGRESS NOTE ADULT - SUBJECTIVE AND OBJECTIVE BOX
Patient is a 25y old  Female who presents with a chief complaint of RLQ abdominal pain (13 Jun 2021 14:59)    SUBJECTIVE / OVERNIGHT EVENTS:  Patient feels well. No n/v or abdominal pain. Eating well.     MEDICATIONS  (STANDING):  sodium chloride 0.9%. 1000 milliLiter(s) (75 mL/Hr) IV Continuous <Continuous>    MEDICATIONS  (PRN):  aluminum hydroxide/magnesium hydroxide/simethicone Suspension 30 milliLiter(s) Oral every 6 hours PRN Dyspepsia  ondansetron Injectable 4 milliGRAM(s) IV Push every 6 hours PRN Nausea and/or Vomiting      Vital Signs Last 24 Hrs  T(C): 36.6 (13 Jun 2021 12:08), Max: 36.9 (12 Jun 2021 22:47)  T(F): 97.8 (13 Jun 2021 12:08), Max: 98.5 (12 Jun 2021 22:47)  HR: 100 (13 Jun 2021 12:08) (93 - 103)  BP: 101/60 (13 Jun 2021 12:08) (101/60 - 105/66)  RR: 16 (13 Jun 2021 12:08) (16 - 17)  SpO2: 100% (13 Jun 2021 12:08) (99% - 100%)          PHYSICAL EXAM  GENERAL: NAD, thin framed,   CHEST/LUNG: Clear to auscultation bilaterally; No wheeze  HEART: Regular rate and rhythm; No murmurs, rubs, or gallops  ABDOMEN: Soft, Nontender, Nondistended; Bowel sounds present  EXTREMITIES:  2+ Peripheral Pulses, No clubbing, cyanosis, or edema  PSYCH: AAOx3        LABS:                        10.6   7.94  )-----------( 365      ( 13 Jun 2021 07:27 )             33.7     06-13    138  |  105  |  9   ----------------------------<  97  3.6   |  21<L>  |  0.52    Ca    9.2      13 Jun 2021 07:27  Phos  4.0     06-13  Mg     1.9     06-13    TPro  6.3  /  Alb  3.8  /  TBili  <0.2  /  DBili  x   /  AST  11  /  ALT  10  /  AlkPhos  64  06-13            Consultant(s) Notes Reviewed:  yes  Care Discussed with Consultants/Other Providers:

## 2021-06-13 NOTE — DISCHARGE NOTE PROVIDER - NSDCCPCAREPLAN_GEN_ALL_CORE_FT
PRINCIPAL DISCHARGE DIAGNOSIS  Diagnosis: Abdominal pain  Assessment and Plan of Treatment: You had infliximab given 6/12/21.      You will need repeat dosing in 2 weeks, then 6 weeks, followed by 8 weeks thereafter. Please follow up with GI Dr. Berger within 1-2 weeks for further outpatient instructions.         SECONDARY DISCHARGE DIAGNOSES  Diagnosis: Intractable abdominal pain  Assessment and Plan of Treatment: Due to newly diagnosed Crohns ileocolitis. Reviewed MRI abdomen - no abscess. Colonoscopy confirmed stricture and moderate inflammation in ascending colon. You had infliximab given 6/12/21.      You will need repeat dosing in 2 weeks, then 6 weeks, followed by 8 weeks thereafter. Please follow up with GI Dr. Berger within 1-2 weeks for further outpatient instructions.

## 2021-06-13 NOTE — PROGRESS NOTE ADULT - SUBJECTIVE AND OBJECTIVE BOX
Chief Complaint:  Patient is a 25y old  Female who presents with a chief complaint of RLQ abdominal pain (07 Jun 2021 13:13)      Interval Hx:   - NAEON, afebrile HD stable  - No BMs / 24hr + feels bloated now, denies n/v/f  - S/p infliximab 200mg  (6/12/21)        Allergies:  No Known Allergies      Home Medications:  see [facility] Medications:  aluminum hydroxide/magnesium hydroxide/simethicone Suspension 30 milliLiter(s) Oral once PRN  famotidine Injectable 20 milliGRAM(s) IV Push once  ketorolac   Injectable 15 milliGRAM(s) IV Push every 6 hours PRN  ondansetron Injectable 4 milliGRAM(s) IV Push every 6 hours PRN  sodium chloride 0.9%. 1000 milliLiter(s) IV Continuous <Continuous>      PMHX/PSHX:  No pertinent past medical history    No pertinent past surgical history        Family history:      Denies family history of colon cancer/polyps, stomach cancer/polyps, pancreatic cancer/masses, liver cancer/disease, ovarian cancer and endometrial cancer.    Social History:     Tob: Denies  EtOH: Denies  Illicit Drugs: Denies    ROS:     General:  No wt loss, fevers, chills, night sweats, fatigue  Eyes:  Good vision, no reported pain  ENT:  No sore throat, pain, runny nose, dysphagia  CV:  No pain, palpitations, hypo/hypertension  Pulm:  No dyspnea, cough, tachypnea, wheezing  GI: see above  :  No pain, bleeding, incontinence, nocturia  Muscle:  No pain, weakness  Neuro:  No weakness, tingling, memory problems  Psych:  No fatigue, insomnia, mood problems, depression  Endocrine:  No polyuria, polydipsia, cold/heat intolerance  Heme:  No petechiae, ecchymosis, easy bruisability  Skin:  No rash, tattoos, scars, edema    PHYSICAL EXAM:     Vital Signs Last 24 Hrs  Vital Signs Last 24 Hrs  T(C): 36.7 (13 Jun 2021 06:17), Max: 36.9 (12 Jun 2021 13:26)  T(F): 98 (13 Jun 2021 06:17), Max: 98.5 (12 Jun 2021 22:47)  HR: 93 (13 Jun 2021 06:17) (93 - 103)  BP: 105/66 (13 Jun 2021 06:17) (102/68 - 112/69)  BP(mean): --  RR: 16 (13 Jun 2021 06:17) (16 - 17)  SpO2: 100% (13 Jun 2021 06:17) (99% - 100%)    GENERAL:  No acute distress, thin w/ temporal wasting  HEENT:  Normocephalic/atraumatic, no scleral icterus  CHEST:  Clear to auscultation bilaterally, no wheezes/rales/ronchi, no accessory muscle use  HEART:  Regular rate and rhythm, no murmurs/rubs/gallops  ABDOMEN:  Soft, +TTP RLQ, non-distended, normoactive bowel sounds  EXTREMITIES: No cyanosis, clubbing, or edema  SKIN:  No rash/warm/dry  NEURO:  Alert and oriented x 3    LABS:          Imaging:      Findings:       The perianal and digital rectal examinations were normal.       A stricture was found in the ascending colon, not traversed by peds        colonoscope but eventually traversed with upper endoscope. Biopsies of        the stricture and proximal site were taken with a cold forceps for        histology.       Diffuse moderate inflammation characterized by altered vascularity,        erythema and friability was found in the ascending colon leading up to        the stricture. Biopsies were taken with a cold forceps for histology.       Unclear if in the cecum w/ appendiceal oriface located past the        strictured site vs we continued to be in the ascending colon (IC valve        not visualized)       The exam was otherwise normal throughout the examined colon with normal        mucosa + some mild barotrauma in the transverse/descending colon.                                                                                   Impression:          - Stricture in the ascending colon. Biopsied.                       - Diffuse moderate inflammation was found in the   ascending colon (will need to r/o secondary to Crohn's                        disease with colonic involvement). Biopsied.                       - Colon otherwise normal aside from ascending colon                        inflammation + stricture  Recommendation:      - [facility] patient to [facility] for [facility] care.                       - Await pathology results for diagnosis prior to                        starting on appropriate tx                       - F/u quant gold                       - Send fecal calprotectin                       - Ok to resume diet

## 2021-06-13 NOTE — DISCHARGE NOTE PROVIDER - NSDCMRMEDTOKEN_GEN_ALL_CORE_FT
aluminum hydroxide-magnesium hydroxide 200 mg-200 mg/5 mL oral suspension: 30 milliliter(s) orally every 6 hours, As needed, Dyspepsia

## 2021-06-13 NOTE — PROGRESS NOTE ADULT - PROVIDER SPECIALTY LIST ADULT
Gastroenterology
Hospitalist
Gastroenterology
Hospitalist
Gastroenterology
Hospitalist

## 2021-06-13 NOTE — DISCHARGE NOTE PROVIDER - HOSPITAL COURSE
24 YO F no significant PMHx  LMP 21 c/o RLQ x 3 days admitted to [facility] for intractable abdominal pain and hypokalemia.      Abdominal pain  -COVID: neg. UCX  <10K nml genital derian   -US abdomen with non obstructing 5 mm right intrarenal calculus.  -ESR/CRP WNL  -IVF, Zofran, Maalox, Toradol PRN   -CT Abd +Pelvis w/con report from [facility] ?abscess R/U Crohn's vs UC---  -GI PCR neg; Cdiff neg, Stool Cx neg, O&P neg   -MRI abdomen w/o abscess   -Colonoscopy 6/10 with wall thickening and inflammatory change of the terminal ileum and base of the cecum, consistent with changes of active Crohn's disease. No evidence of fistula or drainable abscess. Luminal narrowing of the terminal ileum in the region of moderate wall thickening without evidence of definitive upstream dilatation, consistent with probable stricture.  -Path with focal active chronic colitis with focal erosion, cryptitis, focal fibrosis and mild crypt distortion.   -GI consulted, Path c/w Crohn's. Pt started on Infliximib   -Quantiferon negative. Hepatitis C negative   -- S/p infliximab 200mg (5mg/Kg) 21  - Monitor response after IFX             - Pt will need repeat dosing in 2 weeks, then 6 weeks, followed by 8 weeks thereafter    Hypokalemia  -Supplemented   -EKG: NSR 74bpm QTc 421ms with PAC.    As per Dr. Mg patient is medically cleared for discharge  and Dr. Mg will speak with SW in am to reach out to patient to help set up infusion center. 26 YO F no significant PMHx  LMP 21 c/o RLQ x 3 days admitted to [facility] for intractable abdominal pain and hypokalemia.      Abdominal pain  -COVID: neg. UCX  <10K nml genital derian   -US abdomen with non obstructing 5 mm right intrarenal calculus.  -ESR/CRP WNL  -IVF, Zofran, Maalox, Toradol PRN   -CT Abd +Pelvis w/con report [facility] ?abscess R/U Crohn's vs UC---  -GI PCR neg; Cdiff neg, Stool Cx neg, O&P neg   -MRI abdomen w/o abscess   -Colonoscopy 6/10 with wall thickening and inflammatory change of the terminal ileum and base of the cecum, consistent with changes of active Crohn's disease. No evidence of fistula or drainable abscess. Luminal narrowing of the terminal ileum in the region of moderate wall thickening without evidence of definitive upstream dilatation, consistent with probable stricture.  -Path with focal active chronic colitis with focal erosion, cryptitis, focal fibrosis and mild crypt distortion.   -GI consulted, Path c/w Crohn's. Pt started on Infliximib   -Quantiferon negative. Hepatitis C negative   -- S/p infliximab 200mg (5mg/Kg) 21  - Monitor response after IFX             - Pt will need repeat dosing in 2 weeks, then 6 weeks, followed by 8 weeks thereafter    Hypokalemia  -Supplemented   -EKG: NSR 74bpm QTc 421ms with PAC.    As per Dr. Mg patient is medically cleared for discharge  and Dr. Mg will speak with SW in am to reach out to patient to help set up infusion center. Pt agreeable with plan

## 2021-06-13 NOTE — DISCHARGE NOTE PROVIDER - CARE PROVIDER_API CALL
Bonifacio Berger (MD)  Gastroenterology; Internal Medicine  [address] [place] [id number]  Phone: [phone]  Fax: [phone]  Follow Up Time: 1 week

## 2021-06-13 NOTE — DISCHARGE NOTE NURSING/CASE MANAGEMENT/SOCIAL WORK - PATIENT PORTAL LINK FT
You can access the FollowMyHealth Patient Portal offered by [facility] by registering at the following website: [email or web address]. By joining [employer]’s FollowMyHealth portal, you will also be able to view your health information using other applications (apps) compatible with our system.

## 2021-06-13 NOTE — PROGRESS NOTE ADULT - PROBLEM SELECTOR PLAN 1
Resolved. GI consulted, recs appreciated. Due to newly diagnosed Crohns ileocolitis. Reviewed MRI abdomen - no abscess. Colonoscopy confirmed stricture and moderate inflammation in ascending colon. Pt with likely Hep B vaccination, but pt is not sure. Otherwise negative hepatitis panel. Quantiferon negative  - received infliximab 200mg on 6/12. Will need to f/u with GI clinic to assess medication coverage and next infusion.  - diet as tolerated, regular diet  - PRN Maalox  - f/u thiopurine methyltransferase level, pending in lab  - f/u fecal calprotectin, pending in lab  - f/u transglumatinase Ab serologies, currently in lab.

## 2021-06-13 NOTE — PROGRESS NOTE ADULT - PROBLEM SELECTOR PLAN 2
Resolved  - Monitor and replete potassium levels as needed
- Monitor and replete potassium levels as needed
- Monitor and replete potassium levels as needed
Ambulate as tolerated.
Ambulate as tolerated.      DISPO: Anticipate d/c home likely tomorrow if pt remains clinically stable.
Ambulate as tolerated.      DISPO: Patient medically stable to be discharged home today. Pt will need close f/u with GI and establish care in the clinic for further management.

## 2021-06-14 PROBLEM — K52.9 INFLAMMATORY BOWEL DISEASE: Status: ACTIVE | Noted: 2021-06-14

## 2021-06-14 LAB
THIOPURINE METHYLTRANSFERASE (TPMT) ENZY: 20.4 — SIGNIFICANT CHANGE UP
TPMT ENZYME METHODOLOGY: SIGNIFICANT CHANGE UP
TPMT GENE PROD MET ACT IMP BLD/T-IMP: SIGNIFICANT CHANGE UP

## 2021-06-15 LAB
TTG IGA SER-ACNC: <1.2 U/ML — SIGNIFICANT CHANGE UP
TTG IGA SER-ACNC: NEGATIVE — SIGNIFICANT CHANGE UP
TTG IGG SER IA-ACNC: NEGATIVE — SIGNIFICANT CHANGE UP
TTG IGG SER-ACNC: 4 U/ML — SIGNIFICANT CHANGE UP

## 2021-06-17 LAB — CALPROTECTIN STL-MCNT: 493 UG/G — HIGH (ref 0–120)

## 2021-06-20 RX ORDER — INFLIXIMAB 100 MG/1
100 INJECTION, POWDER, LYOPHILIZED, FOR SOLUTION INTRAVENOUS
Refills: 0 | Status: ACTIVE | OUTPATIENT
Start: 2021-06-20

## 2021-06-24 ENCOUNTER — NON-APPOINTMENT (OUTPATIENT)
Age: 26
End: 2021-06-24

## 2021-06-25 ENCOUNTER — APPOINTMENT (OUTPATIENT)
Dept: RHEUMATOLOGY | Facility: CLINIC | Age: 26
End: 2021-06-25
Payer: MEDICAID

## 2021-06-25 PROCEDURE — 96415 CHEMO IV INFUSION ADDL HR: CPT

## 2021-06-25 PROCEDURE — 96413 CHEMO IV INFUSION 1 HR: CPT

## 2021-07-19 ENCOUNTER — INPATIENT (INPATIENT)
Facility: HOSPITAL | Age: 26
LOS: 5 days | Discharge: HOME | End: 2021-07-25
Attending: STUDENT IN AN ORGANIZED HEALTH CARE EDUCATION/TRAINING PROGRAM | Admitting: STUDENT IN AN ORGANIZED HEALTH CARE EDUCATION/TRAINING PROGRAM
Payer: MEDICAID

## 2021-07-19 VITALS
TEMPERATURE: 99 F | HEIGHT: 61 IN | RESPIRATION RATE: 20 BRPM | OXYGEN SATURATION: 99 % | HEART RATE: 94 BPM | SYSTOLIC BLOOD PRESSURE: 121 MMHG | DIASTOLIC BLOOD PRESSURE: 98 MMHG

## 2021-07-19 DIAGNOSIS — Z78.9 OTHER SPECIFIED HEALTH STATUS: Chronic | ICD-10-CM

## 2021-07-19 LAB
ALBUMIN SERPL ELPH-MCNC: 4.8 G/DL — SIGNIFICANT CHANGE UP (ref 3.3–5)
ALP SERPL-CCNC: 90 U/L — SIGNIFICANT CHANGE UP (ref 40–120)
ALT FLD-CCNC: 20 U/L — SIGNIFICANT CHANGE UP (ref 4–33)
ANION GAP SERPL CALC-SCNC: 21 MMOL/L — HIGH (ref 7–14)
AST SERPL-CCNC: 20 U/L — SIGNIFICANT CHANGE UP (ref 4–32)
BASE EXCESS BLDV CALC-SCNC: -0.2 MMOL/L — SIGNIFICANT CHANGE UP (ref -3–2)
BASOPHILS # BLD AUTO: 0.05 K/UL — SIGNIFICANT CHANGE UP (ref 0–0.2)
BASOPHILS NFR BLD AUTO: 0.5 % — SIGNIFICANT CHANGE UP (ref 0–2)
BILIRUB SERPL-MCNC: 0.6 MG/DL — SIGNIFICANT CHANGE UP (ref 0.2–1.2)
BLOOD GAS VENOUS - CREATININE: 0.5 MG/DL — SIGNIFICANT CHANGE UP (ref 0.5–1.3)
BLOOD GAS VENOUS COMPREHENSIVE RESULT: SIGNIFICANT CHANGE UP
BLOOD GAS VENOUS COMPREHENSIVE RESULT: SIGNIFICANT CHANGE UP
BUN SERPL-MCNC: 10 MG/DL — SIGNIFICANT CHANGE UP (ref 7–23)
CALCIUM SERPL-MCNC: 9.6 MG/DL — SIGNIFICANT CHANGE UP (ref 8.4–10.5)
CHLORIDE BLDV-SCNC: 108 MMOL/L — SIGNIFICANT CHANGE UP (ref 96–108)
CHLORIDE SERPL-SCNC: 100 MMOL/L — SIGNIFICANT CHANGE UP (ref 98–107)
CO2 SERPL-SCNC: 17 MMOL/L — LOW (ref 22–31)
CREAT SERPL-MCNC: 0.53 MG/DL — SIGNIFICANT CHANGE UP (ref 0.5–1.3)
CRP SERPL-MCNC: 5.6 MG/L — HIGH
EOSINOPHIL # BLD AUTO: 0.02 K/UL — SIGNIFICANT CHANGE UP (ref 0–0.5)
EOSINOPHIL NFR BLD AUTO: 0.2 % — SIGNIFICANT CHANGE UP (ref 0–6)
ERYTHROCYTE [SEDIMENTATION RATE] IN BLOOD: 28 MM/HR — HIGH (ref 4–25)
GAS PNL BLDV: 138 MMOL/L — SIGNIFICANT CHANGE UP (ref 136–146)
GLUCOSE BLDV-MCNC: 134 MG/DL — HIGH (ref 70–99)
GLUCOSE SERPL-MCNC: 147 MG/DL — HIGH (ref 70–99)
HCG SERPL-ACNC: <5 MIU/ML — SIGNIFICANT CHANGE UP
HCO3 BLDV-SCNC: 24 MMOL/L — SIGNIFICANT CHANGE UP (ref 20–27)
HCT VFR BLD CALC: 38.1 % — SIGNIFICANT CHANGE UP (ref 34.5–45)
HCT VFR BLDA CALC: 33.9 % — LOW (ref 34.5–46.5)
HGB BLD CALC-MCNC: 11 G/DL — LOW (ref 11.5–15.5)
HGB BLD-MCNC: 12 G/DL — SIGNIFICANT CHANGE UP (ref 11.5–15.5)
IANC: 8.1 K/UL — SIGNIFICANT CHANGE UP (ref 1.5–8.5)
IMM GRANULOCYTES NFR BLD AUTO: 0.3 % — SIGNIFICANT CHANGE UP (ref 0–1.5)
LACTATE BLDV-MCNC: 1.8 MMOL/L — SIGNIFICANT CHANGE UP (ref 0.5–2)
LIDOCAIN IGE QN: 34 U/L — SIGNIFICANT CHANGE UP (ref 7–60)
LYMPHOCYTES # BLD AUTO: 1.22 K/UL — SIGNIFICANT CHANGE UP (ref 1–3.3)
LYMPHOCYTES # BLD AUTO: 12.4 % — LOW (ref 13–44)
MCHC RBC-ENTMCNC: 25.9 PG — LOW (ref 27–34)
MCHC RBC-ENTMCNC: 31.5 GM/DL — LOW (ref 32–36)
MCV RBC AUTO: 82.3 FL — SIGNIFICANT CHANGE UP (ref 80–100)
MONOCYTES # BLD AUTO: 0.4 K/UL — SIGNIFICANT CHANGE UP (ref 0–0.9)
MONOCYTES NFR BLD AUTO: 4.1 % — SIGNIFICANT CHANGE UP (ref 2–14)
NEUTROPHILS # BLD AUTO: 8.1 K/UL — HIGH (ref 1.8–7.4)
NEUTROPHILS NFR BLD AUTO: 82.5 % — HIGH (ref 43–77)
NRBC # BLD: 0 /100 WBCS — SIGNIFICANT CHANGE UP
NRBC # FLD: 0 K/UL — SIGNIFICANT CHANGE UP
PCO2 BLDV: 38 MMHG — LOW (ref 41–51)
PH BLDV: 7.41 — SIGNIFICANT CHANGE UP (ref 7.32–7.43)
PLATELET # BLD AUTO: 470 K/UL — HIGH (ref 150–400)
PO2 BLDV: 78 MMHG — HIGH (ref 35–40)
POTASSIUM BLDV-SCNC: 3.3 MMOL/L — LOW (ref 3.4–4.5)
POTASSIUM SERPL-MCNC: 3.5 MMOL/L — SIGNIFICANT CHANGE UP (ref 3.5–5.3)
POTASSIUM SERPL-SCNC: 3.5 MMOL/L — SIGNIFICANT CHANGE UP (ref 3.5–5.3)
PROT SERPL-MCNC: 8 G/DL — SIGNIFICANT CHANGE UP (ref 6–8.3)
RBC # BLD: 4.63 M/UL — SIGNIFICANT CHANGE UP (ref 3.8–5.2)
RBC # FLD: 14.2 % — SIGNIFICANT CHANGE UP (ref 10.3–14.5)
SAO2 % BLDV: 95.8 % — HIGH (ref 60–85)
SARS-COV-2 RNA SPEC QL NAA+PROBE: SIGNIFICANT CHANGE UP
SODIUM SERPL-SCNC: 138 MMOL/L — SIGNIFICANT CHANGE UP (ref 135–145)
WBC # BLD: 9.82 K/UL — SIGNIFICANT CHANGE UP (ref 3.8–10.5)
WBC # FLD AUTO: 9.82 K/UL — SIGNIFICANT CHANGE UP (ref 3.8–10.5)

## 2021-07-19 PROCEDURE — 74177 CT ABD & PELVIS W/CONTRAST: CPT | Mod: 26

## 2021-07-19 RX ORDER — MORPHINE SULFATE 50 MG/1
2 CAPSULE, EXTENDED RELEASE ORAL ONCE
Refills: 0 | Status: DISCONTINUED | OUTPATIENT
Start: 2021-07-19 | End: 2021-07-19

## 2021-07-19 RX ORDER — ONDANSETRON 8 MG/1
4 TABLET, FILM COATED ORAL ONCE
Refills: 0 | Status: COMPLETED | OUTPATIENT
Start: 2021-07-19 | End: 2021-07-19

## 2021-07-19 RX ORDER — MORPHINE SULFATE 50 MG/1
4 CAPSULE, EXTENDED RELEASE ORAL EVERY 6 HOURS
Refills: 0 | Status: DISCONTINUED | OUTPATIENT
Start: 2021-07-19 | End: 2021-07-20

## 2021-07-19 RX ORDER — METOCLOPRAMIDE HCL 10 MG
10 TABLET ORAL EVERY 6 HOURS
Refills: 0 | Status: DISCONTINUED | OUTPATIENT
Start: 2021-07-19 | End: 2021-07-20

## 2021-07-19 RX ORDER — METOCLOPRAMIDE HCL 10 MG
10 TABLET ORAL ONCE
Refills: 0 | Status: COMPLETED | OUTPATIENT
Start: 2021-07-19 | End: 2021-07-19

## 2021-07-19 RX ORDER — SODIUM CHLORIDE 9 MG/ML
1000 INJECTION, SOLUTION INTRAVENOUS ONCE
Refills: 0 | Status: COMPLETED | OUTPATIENT
Start: 2021-07-19 | End: 2021-07-19

## 2021-07-19 RX ORDER — ONDANSETRON 8 MG/1
4 TABLET, FILM COATED ORAL EVERY 6 HOURS
Refills: 0 | Status: DISCONTINUED | OUTPATIENT
Start: 2021-07-19 | End: 2021-07-20

## 2021-07-19 RX ORDER — SODIUM CHLORIDE 9 MG/ML
1000 INJECTION, SOLUTION INTRAVENOUS
Refills: 0 | Status: DISCONTINUED | OUTPATIENT
Start: 2021-07-19 | End: 2021-07-21

## 2021-07-19 RX ORDER — KETOROLAC TROMETHAMINE 30 MG/ML
15 SYRINGE (ML) INJECTION ONCE
Refills: 0 | Status: DISCONTINUED | OUTPATIENT
Start: 2021-07-19 | End: 2021-07-19

## 2021-07-19 RX ORDER — MORPHINE SULFATE 50 MG/1
4 CAPSULE, EXTENDED RELEASE ORAL ONCE
Refills: 0 | Status: DISCONTINUED | OUTPATIENT
Start: 2021-07-19 | End: 2021-07-19

## 2021-07-19 RX ADMIN — MORPHINE SULFATE 4 MILLIGRAM(S): 50 CAPSULE, EXTENDED RELEASE ORAL at 02:58

## 2021-07-19 RX ADMIN — Medication 10 MILLIGRAM(S): at 19:16

## 2021-07-19 RX ADMIN — MORPHINE SULFATE 4 MILLIGRAM(S): 50 CAPSULE, EXTENDED RELEASE ORAL at 10:04

## 2021-07-19 RX ADMIN — MORPHINE SULFATE 2 MILLIGRAM(S): 50 CAPSULE, EXTENDED RELEASE ORAL at 23:03

## 2021-07-19 RX ADMIN — ONDANSETRON 4 MILLIGRAM(S): 8 TABLET, FILM COATED ORAL at 20:52

## 2021-07-19 RX ADMIN — Medication 10 MILLIGRAM(S): at 10:04

## 2021-07-19 RX ADMIN — MORPHINE SULFATE 2 MILLIGRAM(S): 50 CAPSULE, EXTENDED RELEASE ORAL at 21:08

## 2021-07-19 RX ADMIN — Medication 15 MILLIGRAM(S): at 09:05

## 2021-07-19 RX ADMIN — ONDANSETRON 4 MILLIGRAM(S): 8 TABLET, FILM COATED ORAL at 02:58

## 2021-07-19 RX ADMIN — MORPHINE SULFATE 2 MILLIGRAM(S): 50 CAPSULE, EXTENDED RELEASE ORAL at 21:24

## 2021-07-19 RX ADMIN — ONDANSETRON 4 MILLIGRAM(S): 8 TABLET, FILM COATED ORAL at 18:46

## 2021-07-19 RX ADMIN — MORPHINE SULFATE 4 MILLIGRAM(S): 50 CAPSULE, EXTENDED RELEASE ORAL at 12:52

## 2021-07-19 RX ADMIN — Medication 1 MILLIGRAM(S): at 11:50

## 2021-07-19 RX ADMIN — Medication 15 MILLIGRAM(S): at 11:52

## 2021-07-19 RX ADMIN — ONDANSETRON 4 MILLIGRAM(S): 8 TABLET, FILM COATED ORAL at 09:04

## 2021-07-19 RX ADMIN — SODIUM CHLORIDE 125 MILLILITER(S): 9 INJECTION, SOLUTION INTRAVENOUS at 13:13

## 2021-07-19 RX ADMIN — SODIUM CHLORIDE 1000 MILLILITER(S): 9 INJECTION, SOLUTION INTRAVENOUS at 02:58

## 2021-07-19 RX ADMIN — MORPHINE SULFATE 2 MILLIGRAM(S): 50 CAPSULE, EXTENDED RELEASE ORAL at 22:38

## 2021-07-19 NOTE — ED CDU PROVIDER INITIAL DAY NOTE - ATTENDING CONTRIBUTION TO CARE
Attending MD Anderson.  Pt signed out to me in stable condition pending PO chal 24 yo em hx crohn's likely tbdc with GI f/u, recently dxed crohn's, on immune infusion, no abd'l surgeries, came for R sided abd'l pain, vomiting, loose stool.  Pt continues to have [id number] pain and n/v.  CDU has seen pt and accepted her to their service for ongoing sx management.  Pt stable for transfer of care to CDU.

## 2021-07-19 NOTE — ED CDU PROVIDER INITIAL DAY NOTE - CPE EDP ENMT NORM
COVID-19 Telephone Triage  Has the patient tested positive for Coronavirus within the past month or in the process of testing for Coronavirus now?    NO - Has the patient or anyone in the patient's household, or if the patient will require a person to accompany them in the exam room (patient is a minor, disabled, etc.) has that person tested positive for Coronavirus within the past month? No - Identify symptoms and obtain travel and exposure history:       Does the patient or any member of the patient's household, or the person who will accompany the patient to their appointment have a fever, sore throat, cough, shortness of breath, runny nose, congestion, nausea, vomiting, diarrhea, shaking or chills or new onset of loss of taste or smell? no    In the past 14 days has the patient or the person who will accompany the patient to their appointment, traveled outside of [place]? (For patients who live in [place], do not consider [place] as out of state travel) no    In the past 14 days has the patient or the person who will accompany the patient to their appointment, had close contact with an individual outside of their household who has received a Coronavirus diagnosis or had contact with anyone who is in the process of testing? no  If all the questions in the screening were NO-   Clinic visit can proceed as scheduled or be scheduled per current scheduling protocols.     Arvind was advised of increased risk of COVID-19 exposure due to contact with communal spaces and additional persons. Patient Proceed with appointment as scheduled. .             If the patient needs to be seen in the ED for any reason:  Call the ED of patient’s choice to let them know of a patient with a positive COVID-19 screen is being sent to the ED for further evaluation.    Have the ED clarify the location the patient should go once they arrive at the ED. [place] of any instructions  givento you by the ED.    Communicate instructions provided by the ED to the patient.  Inform the patient they must put on a mask once they arrive to the ED.   normal...

## 2021-07-19 NOTE — ED ADULT NURSE REASSESSMENT NOTE - NS ED NURSE REASSESS COMMENT FT1
Pt resting comfortably in bed, states pain is improving but continues to feel nauseous at this time, meds given as ordered, vital signs stable, awaiting bed, will reassess.

## 2021-07-19 NOTE — ED CDU PROVIDER INITIAL DAY NOTE - OBJECTIVE STATEMENT
25F PMH Crohn's disease p/w abd pain and NVD today. Abd pain is diffuse and described as squeezing sensation. States it feels like a Crohn's flare. Denies fevers, urinary sx. LMP ended a few days ago. Diagnosed with Crohn's last month, had colonoscopy/MRI/CT performed. Has had 2 Renflexis infusions, last 6/25, 3rd infusion scheduled for this Wed 7/21.    CDU JENNIFER Rodas: Agree with above, 24 Y/O F PMH Crohn's disease diagnosed 1 month ago C/O diffuse abdominal pain with associated nausea which began on 71/18/21. Pt has had recurrent nausea in additional to abdominal pain. Pt was sent to CDU for supportive treatment and further GI evaluation.

## 2021-07-19 NOTE — ED PROVIDER NOTE - PHYSICAL EXAMINATION
I have reviewed the triage vital signs.  Const: AAOx3, in NAD  Eyes: no conjunctival injection  HENT: NCAT, Neck supple, oral mucosa moist  CV: RRR, +S1, S2  Resp: CTAB, no respiratory distress  GI: Abdomen soft, RUQ>RLQ/epigastric TTP, no guarding, no CVA tenderness  Extremities: No peripheral edema  Skin: Warm, well perfused, no rash  MSK: No gross deformities appreciated  Neuro: No focal sensory or motor deficits  Psych: Appropriate mood and affect

## 2021-07-19 NOTE — ED PROVIDER NOTE - OBJECTIVE STATEMENT
25F PMH Crohn's disease p/w abd pain and NVD today. Abd pain is diffuse and described as squeezing sensation. States it feels like a Crohn's flare. Denies fevers, urinary sx. LMP ended a few days ago. Diagnosed with Crohn's last month, had colonoscopy/MRI/CT performed. 25F PMH Crohn's disease p/w abd pain and NVD today. Abd pain is diffuse and described as squeezing sensation. States it feels like a Crohn's flare. Denies fevers, urinary sx. LMP ended a few days ago. Diagnosed with Crohn's last month, had colonoscopy/MRI/CT performed. Has had 2 Renflexis infusions, last 6/25, 3rd infusion scheduled for this Wed 7/21.

## 2021-07-19 NOTE — ED PROVIDER NOTE - ATTENDING CONTRIBUTION TO CARE
26yo F PMH Crohn's disease recently diagnosed via colonoscopy on IV Renflexis infusions, no prior abdominal surgeries, p/w right sided abdominal pain and multiple episodes of n/v along with nonbloody loose stools x 1 day.  No fevers, chest pain, or other complaints.      (exam below conducted after pain meds given)    General: Patient in no apparent distress, AAO x 3  Skin: Dry and intact  HEENT: Head atraumatic. Oral mucosa moist. No pharyngeal exudates or tonsillar enlargement  Eyes: Conjunctiva normal  Cardiac: Regular rhythm and rate. No pretibial edema b/l  Respiratory: Lungs clear b/l and symmetric. No respiratory distress. Able to speak in complete sentences.  Gastrointestinal: Abdomen soft, nondistended, ++tender to R flank and RUQ and RLQ.   Musculoskeletal: Moves all extremities spontaneously  Neurological: alert and oriented to person, place, and time  Psychiatric: Cooperative    a/p  concern for chrons flare, ?appendicitis, gallbladder dz, colitis, abscess, stricture with partial obstruction  CT abd/pelvis, pain meds, ivf, labs, obs in ED

## 2021-07-19 NOTE — ED CDU PROVIDER INITIAL DAY NOTE - MEDICAL DECISION MAKING DETAILS
Attending MD Anderson.  Pt signed out to me in stable condition pending PO chal 26 yo em hx crohn's likely tbdc with GI f/u, recently dxed crohn's, on immune infusion, no abd'l surgeries, came for R sided abd'l pain, vomiting, loose stool.  Pt continues to have [id number] pain and n/v.  CDU has seen pt and accepted her to their service for ongoing sx management.  Pt stable for transfer of care to CDU.

## 2021-07-19 NOTE — ED ADULT TRIAGE NOTE - CHIEF COMPLAINT QUOTE
Pt with PMH of Chron' s disease c/o abdominal pain, nausea, vomiting x4 and diarrhea since Yesterday. Pt appears uncomfortable.

## 2021-07-19 NOTE — ED PROVIDER NOTE - NSFOLLOWUPINSTRUCTIONS_ED_ALL_ED_FT
You were seen for abdominal pain.     A copy of lab results and CTscan is provided for you.     Please take 600 mg of Ibuprofen (aka Motrin, Advil) and/or 650 mg Acetaminophen (aka Tylenol) every 6 hours, as needed, for mild-moderate pain.      Follow up with your GI doctor for a Chron's flair up.     Seek immediate medical assistance for any new or worsening symptoms. You were seen for abdominal pain.     A copy of lab results and CT scan is provided for you.      Please take 600 mg of Ibuprofen (aka Motrin, Advil) and/or 650 mg Acetaminophen (aka Tylenol) every 6 hours, as needed, for mild-moderate pain.      Follow up with your GI doctor for a Chron's flair up.     Seek immediate medical assistance for any new or worsening symptoms.

## 2021-07-19 NOTE — ED ADULT NURSE NOTE - OBJECTIVE STATEMENT
Pt received in rm #10, 25Y F aox4, ambulatory c/o abd pain. PMHX recently diagnosed with Crohns. Upin assessment pt appears uncomfortable, bent over in stretcher. Pt with family at bedside, IV placed, medicated as ordered, will continue to montir. Pt received in rm #10, 25Y F aox4, ambulatory c/o abd pain. PMHX recently diagnosed with Crohns. Upin assessment pt appears uncomfortable, bent over in stretcher. Pt with family at bedside, IV placed, medicated as ordered, will continue to monitor.

## 2021-07-19 NOTE — ED PROVIDER NOTE - PROGRESS NOTE DETAILS
Dyan - pt reassessed. pain is better. vss. ct scan and labs reviewed with pt. pt will follow up with her GI doctor for dx of chrons bethany. Attending MD Anderson.  Pt signed out to me in stable condition pending PO chal 24 yo em hx crohn's likely tbdc with GI f/u, recently dxed crohn's, on immune infusion, no abd'l surgeries, came for R sided abd'l pain, vomiting, loose stool. Attending MD Anderson.  Pt continues to have [id number] pain and n/v.  CDU has seen pt and accepted her to their service for ongoing sx management.  Pt stable for transfer of care to CDU.

## 2021-07-19 NOTE — CONSULT NOTE ADULT - SUBJECTIVE AND OBJECTIVE BOX
Chief Complaint:  Patient is a 25y old  Female who presents with a chief complaint of     HPI:    Allergies:  No Known Allergies      Home Medications:    Hospital Medications:  lactated ringers. 1000 milliLiter(s) IV Continuous <Continuous>  metoclopramide Injectable 10 milliGRAM(s) IV Push every 6 hours PRN  morphine  - Injectable 4 milliGRAM(s) IV Push every 6 hours PRN  ondansetron Injectable 4 milliGRAM(s) IV Push every 6 hours PRN      PMHX/PSHX:  No pertinent past medical history    Crohn disease    No pertinent past surgical history        Family history:      Social History:     ROS:     General:  No weight loss, fevers, chills, night sweats, fatigue  Eyes:  No vision changes, no yellowing of eyes   ENT:  No throat pain, runny nose  CV:  No chest pain, palpitations  Resp:  No SOB, cough, wheezing  GI:  See HPI  :  No burning with urination, no hematuria   Muscle:  No muscle pain, weakness  Neuro:  No numbness/tingling, memory problems  Psych:  No fatigue, insomnia, mood problems  Heme:  No easy bruisability  Skin:  No rash, itching       PHYSICAL EXAM:     GENERAL:  Appears stated age, well-groomed, well-nourished, no distress  HEENT:  NC/AT,  conjunctivae clear and pink,  no JVD  CHEST:  Full & symmetric excursion, no increased effort, breath sounds clear  HEART:  Regular rhythm, S1, S2, no murmur/rub/S3/S4, no abdominal bruit, no edema  ABDOMEN:  Soft, non-tender, non-distended, normoactive bowel sounds,  no masses ,  EXTREMITIES:  no cyanosis,clubbing or edema  SKIN:  No rash/erythema/ecchymoses/petechiae/wounds/abscess/warm/dry  NEURO:  Alert, oriented    Vital Signs:  Vital Signs Last 24 Hrs  T(C): 36.9 (19 Jul 2021 11:51), Max: 37.1 (19 Jul 2021 02:15)  T(F): 98.5 (19 Jul 2021 11:51), Max: 98.7 (19 Jul 2021 02:15)  HR: 90 (19 Jul 2021 11:51) (70 - 94)  BP: 122/91 (19 Jul 2021 11:51) (115/70 - 122/91)  BP(mean): --  RR: 16 (19 Jul 2021 11:51) (16 - 20)  SpO2: 100% (19 Jul 2021 11:51) (99% - 100%)  Daily Height in cm: 154.94 (19 Jul 2021 02:15)    Daily     LABS:                        12.0   9.82  )-----------( 470      ( 19 Jul 2021 03:15 )             38.1     07-19    138  |  100  |  10  ----------------------------<  147<H>  3.5   |  17<L>  |  0.53    Ca    9.6      19 Jul 2021 03:15    TPro  8.0  /  Alb  4.8  /  TBili  0.6  /  DBili  x   /  AST  20  /  ALT  20  /  AlkPhos  90  07-19    LIVER FUNCTIONS - ( 19 Jul 2021 03:15 )  Alb: 4.8 g/dL / Pro: 8.0 g/dL / ALK PHOS: 90 U/L / ALT: 20 U/L / AST: 20 U/L / GGT: x               Amylase Serum--      Lipase serum34       Ammonia--      Imaging:     CT A/P w IV cont 7/19:  FINDINGS:  LOWER CHEST: Within normal limits.    LIVER: Within normal limits.  BILE DUCTS: Normal caliber.  GALLBLADDER: Within normal limits.  SPLEEN: Within normal limits.  PANCREAS: Within normal limits.  ADRENALS: Within normal limits.  KIDNEYS/URETERS: Right renal cyst. No hydronephrosis.    BLADDER: Within normal limits.  REPRODUCTIVE ORGANS: Uterus and adnexa within normal limits.    BOWEL: Wall thickening, hyperenhancement, and surrounding edema/inflammatory changes involving the terminal ileum. No bowel obstruction. Appendix is not visualized.  PERITONEUM: No ascites. No evidence of drainable abscess.  VESSELS: Within normal limits.  RETROPERITONEUM/LYMPH NODES: No lymphadenopathy.  ABDOMINAL WALL: Within normal limits.  BONES: Within normal limits.    IMPRESSION:  Wall thickening, inflammatory change, and hyper-enhancement of the terminal ileum, consistent with active inflammatory bowel disease.           Chief Complaint:  Patient is a 25y old  Female who presents with a chief complaint of     HPI: 25F PMH Crohn's disease p/w abd pain and NVD today. Abd pain is diffuse and described as squeezing sensation. States it feels like a Crohn's flare. Denies fevers, urinary sx. LMP ended a few days ago. Diagnosed with Crohn's last month, s/p colonoscopy 06/2021 showing inflammation in ascending colon + stricture which could be traversed w/ upper endoscopic. Biopsies of stricture + colonic inflammation taken, resulted (+) chronic colitis w/ crypt distortion. Has had 2 Infliximab infusions, last 6/25, 3rd infusion scheduled for this Wed 7/21.    Allergies:  No Known Allergies      Home Medications:    Hospital Medications:  lactated ringers. 1000 milliLiter(s) IV Continuous <Continuous>  metoclopramide Injectable 10 milliGRAM(s) IV Push every 6 hours PRN  morphine  - Injectable 4 milliGRAM(s) IV Push every 6 hours PRN  ondansetron Injectable 4 milliGRAM(s) IV Push every 6 hours PRN      PMHX/PSHX:  No pertinent past medical history    Crohn disease    No pertinent past surgical history        Family history:      Social History:     ROS: 10 point ROS neg unless stated in subjective      PHYSICAL EXAM:     GENERAL:  Appears stated age, well-groomed, well-nourished, no distress  HEENT:  NC/AT,  conjunctivae clear and pink,  no JVD  CHEST:  Full & symmetric excursion, no increased effort, breath sounds clear  HEART:  Regular rhythm, S1, S2, no murmur/rub/S3/S4, no abdominal bruit, no edema  ABDOMEN:  Soft, non-tender, non-distended, normoactive bowel sounds,  no masses ,  EXTREMITIES:  no cyanosis,clubbing or edema  SKIN:  No rash/erythema/ecchymoses/petechiae/wounds/abscess/warm/dry  NEURO:  Alert, oriented    Vital Signs:  Vital Signs Last 24 Hrs  T(C): 36.9 (19 Jul 2021 11:51), Max: 37.1 (19 Jul 2021 02:15)  T(F): 98.5 (19 Jul 2021 11:51), Max: 98.7 (19 Jul 2021 02:15)  HR: 90 (19 Jul 2021 11:51) (70 - 94)  BP: 122/91 (19 Jul 2021 11:51) (115/70 - 122/91)  BP(mean): --  RR: 16 (19 Jul 2021 11:51) (16 - 20)  SpO2: 100% (19 Jul 2021 11:51) (99% - 100%)  Daily Height in cm: 154.94 (19 Jul 2021 02:15)    Daily     LABS:                        12.0   9.82  )-----------( 470      ( 19 Jul 2021 03:15 )             38.1     07-19    138  |  100  |  10  ----------------------------<  147<H>  3.5   |  17<L>  |  0.53    Ca    9.6      19 Jul 2021 03:15    TPro  8.0  /  Alb  4.8  /  TBili  0.6  /  DBili  x   /  AST  20  /  ALT  20  /  AlkPhos  90  07-19    LIVER FUNCTIONS - ( 19 Jul 2021 03:15 )  Alb: 4.8 g/dL / Pro: 8.0 g/dL / ALK PHOS: 90 U/L / ALT: 20 U/L / AST: 20 U/L / GGT: x               Amylase Serum--      Lipase serum34       Ammonia--      Imaging:     CT A/P w IV cont 7/19:  FINDINGS:  LOWER CHEST: Within normal limits.    LIVER: Within normal limits.  BILE DUCTS: Normal caliber.  GALLBLADDER: Within normal limits.  SPLEEN: Within normal limits.  PANCREAS: Within normal limits.  ADRENALS: Within normal limits.  KIDNEYS/URETERS: Right renal cyst. No hydronephrosis.    BLADDER: Within normal limits.  REPRODUCTIVE ORGANS: Uterus and adnexa within normal limits.    BOWEL: Wall thickening, hyperenhancement, and surrounding edema/inflammatory changes involving the terminal ileum. No bowel obstruction. Appendix is not visualized.  PERITONEUM: No ascites. No evidence of drainable abscess.  VESSELS: Within normal limits.  RETROPERITONEUM/LYMPH NODES: No lymphadenopathy.  ABDOMINAL WALL: Within normal limits.  BONES: Within normal limits.    IMPRESSION:  Wall thickening, inflammatory change, and hyper-enhancement of the terminal ileum, consistent with active inflammatory bowel disease.             Chief Complaint:  Patient is a 25y old  Female who presents with a chief complaint of     HPI: 25F PMH Crohn's disease p/w abd pain and NVD x2 days. States her sx started after menses started 2-3 days ago. Abd pain is diffuse and described as squeezing sensation. States it feels like a Crohn's flare. Had 5x episodes of NBNB emesis yesterday and 3-4 episodes of non-bloody diarrhea yesterday. Denies fevers/chills, sick contacts, urinary sx. Diagnosed with Crohn's last month via colonoscopy, found to have ascending colon stricture. Biopsies of stricture + colonic inflammation taken, resulted (+) chronic colitis w/ crypt distortion. s/p 2 Infliximab infusions, last 6/25, 3rd infusion scheduled for this Wed 7/21.    Allergies:  No Known Allergies      Home Medications:    Hospital Medications:  lactated ringers. 1000 milliLiter(s) IV Continuous <Continuous>  metoclopramide Injectable 10 milliGRAM(s) IV Push every 6 hours PRN  morphine  - Injectable 4 milliGRAM(s) IV Push every 6 hours PRN  ondansetron Injectable 4 milliGRAM(s) IV Push every 6 hours PRN      PMHX/PSHX:  No pertinent past medical history    Crohn disease    No pertinent past surgical history        Family history:  Family history of IBS, no family history of IBD     Social History: Denies ETOH, smoking or illicit drug use    ROS: 10 point ROS neg unless stated in subjective      PHYSICAL EXAM:     GENERAL:  Appears stated age, well-groomed, well-nourished, no distress  HEENT:  NC/AT,  conjunctivae clear and pink  CHEST:  Full & symmetric excursion, no increased effort, breath sounds clear  HEART:  Regular rhythm, S1, S2, no murmur/rub/S3/S4, no abdominal bruit  ABDOMEN:  Soft, non-tender, non-distended, normoactive bowel sounds,  no masses,  EXTREMITIES:  no cyanosis, clubbing or edema  SKIN:  No rash/erythema/ecchymoses/petechiae/wounds/abscess/warm/dry  NEURO:  Alert, oriented    Vital Signs:  Vital Signs Last 24 Hrs  T(C): 36.9 (19 Jul 2021 11:51), Max: 37.1 (19 Jul 2021 02:15)  T(F): 98.5 (19 Jul 2021 11:51), Max: 98.7 (19 Jul 2021 02:15)  HR: 90 (19 Jul 2021 11:51) (70 - 94)  BP: 122/91 (19 Jul 2021 11:51) (115/70 - 122/91)  BP(mean): --  RR: 16 (19 Jul 2021 11:51) (16 - 20)  SpO2: 100% (19 Jul 2021 11:51) (99% - 100%)  Daily Height in cm: 154.94 (19 Jul 2021 02:15)    Daily     LABS:                        12.0   9.82  )-----------( 470      ( 19 Jul 2021 03:15 )             38.1     07-19    138  |  100  |  10  ----------------------------<  147<H>  3.5   |  17<L>  |  0.53    Ca    9.6      19 Jul 2021 03:15    TPro  8.0  /  Alb  4.8  /  TBili  0.6  /  DBili  x   /  AST  20  /  ALT  20  /  AlkPhos  90  07-19    LIVER FUNCTIONS - ( 19 Jul 2021 03:15 )  Alb: 4.8 g/dL / Pro: 8.0 g/dL / ALK PHOS: 90 U/L / ALT: 20 U/L / AST: 20 U/L / GGT: x               Amylase Serum--      Lipase serum34       Ammonia--      Imaging:     CT A/P w IV cont 7/19:  FINDINGS:  LOWER CHEST: Within normal limits.    LIVER: Within normal limits.  BILE DUCTS: Normal caliber.  GALLBLADDER: Within normal limits.  SPLEEN: Within normal limits.  PANCREAS: Within normal limits.  ADRENALS: Within normal limits.  KIDNEYS/URETERS: Right renal cyst. No hydronephrosis.    BLADDER: Within normal limits.  REPRODUCTIVE ORGANS: Uterus and adnexa within normal limits.    BOWEL: Wall thickening, hyperenhancement, and surrounding edema/inflammatory changes involving the terminal ileum. No bowel obstruction. Appendix is not visualized.  PERITONEUM: No ascites. No evidence of drainable abscess.  VESSELS: Within normal limits.  RETROPERITONEUM/LYMPH NODES: No lymphadenopathy.  ABDOMINAL WALL: Within normal limits.  BONES: Within normal limits.    IMPRESSION:  Wall thickening, inflammatory change, and hyper-enhancement of the terminal ileum, consistent with active inflammatory bowel disease.       Chief Complaint:  Patient is a 25y old  Female who presents with a chief complaint of     HPI: 25F PMH Crohn's disease p/w abd pain and NVD x2 days. States her sx started after menses started 2-3 days ago. Abd pain is diffuse and described as squeezing sensation. States it feels like a Crohn's flare. Had 5x episodes of NBNB emesis yesterday and 3-4 episodes of non-bloody diarrhea yesterday. Denies fevers/chills, sick contacts, urinary sx, vision changes, skin changes. Diagnosed with Crohn's last month via colonoscopy, found to have ascending colon stricture. Biopsies of stricture + colonic inflammation taken, resulted (+) chronic colitis w/ crypt distortion. s/p 2 Infliximab infusions, last 6/25, 3rd infusion scheduled for this Wed 7/21.    Allergies:  No Known Allergies      Home Medications:    Hospital Medications:  lactated ringers. 1000 milliLiter(s) IV Continuous <Continuous>  metoclopramide Injectable 10 milliGRAM(s) IV Push every 6 hours PRN  morphine  - Injectable 4 milliGRAM(s) IV Push every 6 hours PRN  ondansetron Injectable 4 milliGRAM(s) IV Push every 6 hours PRN      PMHX/PSHX:  No pertinent past medical history    Crohn disease    No pertinent past surgical history        Family history:  Family history of IBS, no family history of IBD     Social History: Denies ETOH, smoking or illicit drug use    ROS: 10 point ROS neg unless stated in subjective      PHYSICAL EXAM:     GENERAL:  Appears stated age, well-groomed, well-nourished, no distress  HEENT:  NC/AT,  conjunctivae clear and pink  CHEST:  Full & symmetric excursion, no increased effort, breath sounds clear  HEART:  Regular rhythm, S1, S2, no murmur/rub/S3/S4  ABDOMEN:  +thin, soft, +RLQ tenderness, non-distended, normoactive bowel sounds,  no masses,  EXTREMITIES:  no cyanosis, clubbing or edema  SKIN:  No rash/erythema/ecchymoses/petechiae/wounds/abscess/warm/dry  NEURO:  Alert, oriented    Vital Signs:  Vital Signs Last 24 Hrs  T(C): 36.9 (19 Jul 2021 11:51), Max: 37.1 (19 Jul 2021 02:15)  T(F): 98.5 (19 Jul 2021 11:51), Max: 98.7 (19 Jul 2021 02:15)  HR: 90 (19 Jul 2021 11:51) (70 - 94)  BP: 122/91 (19 Jul 2021 11:51) (115/70 - 122/91)  BP(mean): --  RR: 16 (19 Jul 2021 11:51) (16 - 20)  SpO2: 100% (19 Jul 2021 11:51) (99% - 100%)  Daily Height in cm: 154.94 (19 Jul 2021 02:15)    Daily     LABS:                        12.0   9.82  )-----------( 470      ( 19 Jul 2021 03:15 )             38.1     07-19    138  |  100  |  10  ----------------------------<  147<H>  3.5   |  17<L>  |  0.53    Ca    9.6      19 Jul 2021 03:15    TPro  8.0  /  Alb  4.8  /  TBili  0.6  /  DBili  x   /  AST  20  /  ALT  20  /  AlkPhos  90  07-19    LIVER FUNCTIONS - ( 19 Jul 2021 03:15 )  Alb: 4.8 g/dL / Pro: 8.0 g/dL / ALK PHOS: 90 U/L / ALT: 20 U/L / AST: 20 U/L / GGT: x               Amylase Serum--      Lipase serum34       Ammonia--      Imaging:     CT A/P w IV cont 7/19:  FINDINGS:  LOWER CHEST: Within normal limits.    LIVER: Within normal limits.  BILE DUCTS: Normal caliber.  GALLBLADDER: Within normal limits.  SPLEEN: Within normal limits.  PANCREAS: Within normal limits.  ADRENALS: Within normal limits.  KIDNEYS/URETERS: Right renal cyst. No hydronephrosis.    BLADDER: Within normal limits.  REPRODUCTIVE ORGANS: Uterus and adnexa within normal limits.    BOWEL: Wall thickening, hyperenhancement, and surrounding edema/inflammatory changes involving the terminal ileum. No bowel obstruction. Appendix is not visualized.  PERITONEUM: No ascites. No evidence of drainable abscess.  VESSELS: Within normal limits.  RETROPERITONEUM/LYMPH NODES: No lymphadenopathy.  ABDOMINAL WALL: Within normal limits.  BONES: Within normal limits.    IMPRESSION:  Wall thickening, inflammatory change, and hyper-enhancement of the terminal ileum, consistent with active inflammatory bowel disease.       Chief Complaint:  Patient is a 25y old  Female who presents with a chief complaint of     HPI: 25F PMH Crohn's disease p/w abd pain and NVD x2 days. States her sx started after menses started 2-3 days ago. Abd pain is diffuse and described as squeezing sensation. States it feels like a Crohn's flare. Had 5x episodes of NBNB emesis yesterday and 3-4 episodes of non-bloody diarrhea yesterday. Denies fevers/chills, sick contacts, urinary sx, vision changes, skin changes. Diagnosed with Crohn's last month via colonoscopy, found to have ascending colon stricture. Biopsies of stricture + colonic inflammation taken, resulted (+) chronic colitis w/ crypt distortion. s/p 2 Infliximab infusions, last 6/25, 3rd infusion scheduled for this Wed 7/21.    Allergies:  No Known Allergies      Home Medications:    Hospital Medications:  lactated ringers. 1000 milliLiter(s) IV Continuous <Continuous>  metoclopramide Injectable 10 milliGRAM(s) IV Push every 6 hours PRN  morphine  - Injectable 4 milliGRAM(s) IV Push every 6 hours PRN  ondansetron Injectable 4 milliGRAM(s) IV Push every 6 hours PRN      PMHX/PSHX:  No pertinent past medical history    Crohn disease    No pertinent past surgical history        Family history:  Family history of IBS, no family history of IBD     Social History: Denies ETOH, smoking or illicit drug use    ROS: 10 point ROS neg unless stated in subjective      PHYSICAL EXAM:     GENERAL:  Appears stated age, well-groomed, well-nourished, no distress  HEENT:  NC/AT,  conjunctivae clear and pink  CHEST:  Full & symmetric excursion, no increased effort, breath sounds clear  HEART:  Regular rhythm, S1, S2, no murmur/rub/S3/S4  ABDOMEN:  +thin, soft, +mild RLQ tenderness, non-distended, normoactive bowel sounds,  no masses,  EXTREMITIES:  no cyanosis, clubbing or edema  SKIN:  No rash/erythema/ecchymoses/petechiae/wounds/abscess/warm/dry  NEURO:  Alert, oriented    Vital Signs:  Vital Signs Last 24 Hrs  T(C): 36.9 (19 Jul 2021 11:51), Max: 37.1 (19 Jul 2021 02:15)  T(F): 98.5 (19 Jul 2021 11:51), Max: 98.7 (19 Jul 2021 02:15)  HR: 90 (19 Jul 2021 11:51) (70 - 94)  BP: 122/91 (19 Jul 2021 11:51) (115/70 - 122/91)  BP(mean): --  RR: 16 (19 Jul 2021 11:51) (16 - 20)  SpO2: 100% (19 Jul 2021 11:51) (99% - 100%)  Daily Height in cm: 154.94 (19 Jul 2021 02:15)    Daily     LABS:                        12.0   9.82  )-----------( 470      ( 19 Jul 2021 03:15 )             38.1     07-19    138  |  100  |  10  ----------------------------<  147<H>  3.5   |  17<L>  |  0.53    Ca    9.6      19 Jul 2021 03:15    TPro  8.0  /  Alb  4.8  /  TBili  0.6  /  DBili  x   /  AST  20  /  ALT  20  /  AlkPhos  90  07-19    LIVER FUNCTIONS - ( 19 Jul 2021 03:15 )  Alb: 4.8 g/dL / Pro: 8.0 g/dL / ALK PHOS: 90 U/L / ALT: 20 U/L / AST: 20 U/L / GGT: x               Amylase Serum--      Lipase serum34       Ammonia--      Imaging:     CT A/P w IV cont 7/19:  FINDINGS:  LOWER CHEST: Within normal limits.    LIVER: Within normal limits.  BILE DUCTS: Normal caliber.  GALLBLADDER: Within normal limits.  SPLEEN: Within normal limits.  PANCREAS: Within normal limits.  ADRENALS: Within normal limits.  KIDNEYS/URETERS: Right renal cyst. No hydronephrosis.    BLADDER: Within normal limits.  REPRODUCTIVE ORGANS: Uterus and adnexa within normal limits.    BOWEL: Wall thickening, hyperenhancement, and surrounding edema/inflammatory changes involving the terminal ileum. No bowel obstruction. Appendix is not visualized.  PERITONEUM: No ascites. No evidence of drainable abscess.  VESSELS: Within normal limits.  RETROPERITONEUM/LYMPH NODES: No lymphadenopathy.  ABDOMINAL WALL: Within normal limits.  BONES: Within normal limits.    IMPRESSION:  Wall thickening, inflammatory change, and hyper-enhancement of the terminal ileum, consistent with active inflammatory bowel disease.

## 2021-07-19 NOTE — ED PROVIDER NOTE - CLINICAL SUMMARY MEDICAL DECISION MAKING FREE TEXT BOX
Yuri, PGY3 - 25F PMH Crohn's p/w abd pain, NVD x today. No fevers. TTP RUQ>epigastric/RLQ TTP. Suspect Crohn's flare, less likely abscess, cholecystitis, appendicitis. Plan: labs, CT, pain/nausea control, ivf, reeval

## 2021-07-19 NOTE — CONSULT NOTE ADULT - ASSESSMENT
25F with recently diagnosed Crohn's disease 06/2021 p/w recurrent episode of RLQ abd pain. Had colonoscopy 06/2021 showing inflammation + stricture in ascending colon, path (+) chronic colitis c/f Crohn's disease, now initiated on IFX (6/12/21)    Impression:   #Crohn's Disease: dx 06/2021; pt with recurrent episodes of RLQ abd pain w/ diarrhea, fevers, weight loss since Feb 2021 and imaging with terminal ileitis + intramural abscess 2.2cm on CT a/p 6/3/21 c/f Crohn's initially. After negative infectious work up, pt underwent colonoscopy. Colonoscopy (6/10) showing inflammation in ascending colon + stricture which could be traversed w/ upper endoscopic. Biopsies of stricture + colonic inflammation taken, resulted (+) chronic colitis w/ crypt distortion. Pt's likely dx mod-severe, fistulizing Crohn's disease -- decision made to initiate on infliximab after discussion with patient. S/p week 0 dose on 6/12/21 -- next dose due 6/28/21.  Work up: negative quant gold, Hep B infection, path negative for CMV  #Ascending colon stricture: inflammatory in setting of new dx Crohn's vs fibrostenotic 2/2 chronic inflammation     Recommendations  -     Michelle Howard MD  GI Fellow, PGY-4  Available via Microsoft Teams    NON-URGENT CONSULTS:  Please email [email or web address]  AT NIGHT AND ON WEEKENDS:  Contact on-call GI fellow via answering service ([phone]) from 5pm-8am and on weekends/holidays  MONDAY-FRIDAY 8AM-5PM:  Pager# [phone] ([facility]) or [phone] ([facility])  GI Phone# [phone] ([facility])   25F with recently diagnosed Crohn's disease 06/2021 p/w N/V/abd pain. Had colonoscopy 06/2021 showing inflammation + stricture in ascending colon, path (+) chronic colitis c/f Crohn's disease, now initiated on IFX (6/12/21).    Impression:   #Crohn's Disease Flare: dx 06/2021; pt with recurrent episodes of RLQ abd pain w/ diarrhea, fevers, weight loss since Feb 2021 and imaging with terminal ileitis + intramural abscess 2.2cm on CT a/p 6/3/21 c/f Crohn's initially. After negative infectious work up, pt underwent colonoscopy. Colonoscopy (6/10) showing inflammation in ascending colon + stricture which could be traversed w/ upper endoscopic. Biopsies of stricture + colonic inflammation taken, resulted (+) chronic colitis w/ crypt distortion. Pt's likely dx mod-severe, fistulizing Crohn's disease.  Work up: negative quant gold, Hep B infection, path negative for CMV  #Ascending colon stricture: inflammatory in setting of new dx Crohn's vs fibrostenotic 2/2 chronic inflammation     Recommendations  -     Michelle Howard MD  GI Fellow, PGY-4  Available via Microsoft Teams    NON-URGENT CONSULTS:  Please email [email or web address]  [email or web address]  AT NIGHT AND ON WEEKENDS:  Contact on-call GI fellow via answering service ([phone]) from 5pm-8am and on weekends/holidays  MONDAY-FRIDAY 8AM-5PM:  Pager# [phone] ([facility]) or [phone] ([facility])  GI Phone# [phone] ([facility])   25F with recently diagnosed Crohn's disease 06/2021 p/w N/V/D/abd pain x 2d. GI consulted due to c/f recurrent flare in the setting of recent onset of menses. Having recurrent episodes of RLQ abd pain w/ diarrhea, imaging this admission with terminal ileitis. s/p c-scope 6/10/21 showing inflammation in ascending colon + stricture which could be traversed w/ upper endoscopic. Biopsies of stricture + colonic inflammation taken, resulted (+) chronic colitis, cryptitis, mild crypt distortion, neg for granulomata/dysplasia/viral cytopathic effects. Pt's likely dx mod-severe, fistulizing Crohn's disease. Work up: negative quant gold, Hep B infection, path negative for CMV. Started on infliximab s/p 2x infusions, last infusion 6/25.     Impression:   #Crohn's Disease flare thought to be triggered by menses  #H/o ascending colon stricture: inflammatory in setting of new dx Crohn's vs fibrostenotic 2/2 chronic inflammation       Recommendations  - IVFs  - avoid NSAIDS  - Morphine PRN for pain, Zofran PRN for nausea (please check QTc on EKG)  - recommend pt to have COVID vaccine (has not yet been vaccinated)    Michelle Howard MD  GI Fellow, PGY-4  Available via Microsoft Teams    NON-URGENT CONSULTS:  Please email [email or web address]  [email or web address]  AT NIGHT AND ON WEEKENDS:  Contact on-call GI fellow via answering service ([phone]) from 5pm-8am and on weekends/holidays  MONDAY-FRIDAY 8AM-5PM:  Pager# [phone] ([facility]) or [phone] ([facility])  GI Phone# [phone] ([facility])   25F with recently diagnosed Crohn's disease 06/2021 p/w N/V/D/abd pain x 2d. GI consulted due to c/f recurrent flare in the setting of recent onset of menses. Having recurrent episodes of RLQ abd pain w/ diarrhea, imaging this admission with terminal ileitis. s/p c-scope 6/10/21 showing inflammation in ascending colon + stricture which could be traversed w/ upper endoscopic. Biopsies of stricture + colonic inflammation taken, resulted (+) chronic colitis, cryptitis, mild crypt distortion, neg for granulomata/dysplasia/viral cytopathic effects. Pt's likely dx mod-severe, fistulizing Crohn's disease. Work up: negative quant gold, Hep B infection, path negative for CMV. Started on infliximab s/p 2x infusions, last infusion 6/25.     Impression:   #Crohn's Disease flare thought to be triggered by menses  #H/o ascending colon stricture: inflammatory in setting of new dx Crohn's vs fibrostenotic 2/2 chronic inflammation       Recommendations  - IVFs  - please send C diff  - avoid NSAIDS  - Morphine PRN for pain, Zofran PRN for nausea (please check QTc on EKG)  - recommend pt to have COVID vaccine (has not yet been vaccinated)    Michelle Howard MD  GI Fellow, PGY-4  Available via Microsoft Teams    NON-URGENT CONSULTS:  Please email [email or web address]  AT NIGHT AND ON WEEKENDS:  Contact on-call GI fellow via answering service ([phone]) from 5pm-8am and on weekends/holidays  MONDAY-FRIDAY 8AM-5PM:  Pager# [phone] ([facility]) or [phone] ([facility])  GI Phone# [phone] ([facility])   25F with recently diagnosed Crohn's disease 06/2021 p/w N/V/D/abd pain x 2d. GI consulted due to c/f recurrent flare in the setting of recent onset of menses. Having recurrent episodes of RLQ abd pain w/ diarrhea, imaging this admission with terminal ileitis. s/p c-scope 6/10/21 showing inflammation in ascending colon + stricture which could be traversed w/ upper endoscopic. Biopsies of stricture + colonic inflammation taken, resulted (+) chronic colitis, cryptitis, mild crypt distortion, neg for granulomata/dysplasia/viral cytopathic effects. Pt's likely dx mod-severe, fistulizing Crohn's disease. Work up: negative quant gold, Hep B infection, path negative for CMV. Started on infliximab s/p 2x infusions, last infusion 6/25.     Impression:   #Crohn's Disease   #abdominal pain thought to be triggered by menses vs Crohn's flare  #H/o ascending colon stricture: inflammatory in setting of new dx Crohn's vs fibrostenotic 2/2 chronic inflammation       Recommendations  - IVFs  - CLD can advance diet as tolerated  - avoid NSAIDS  - IV Tylenol PRN for pain, Zofran PRN for nausea (please check QTc on EKG)  - recommend pt to have COVID vaccine (has not yet been vaccinated)  - Pt can go home once able to tolerate PO  - Patient has OP infusion clinic appt for infliximab 7/21 and OP f/u with Dr. Berger 10/5    Michelle Howard MD  GI Fellow, PGY-4  Available via [employer] Teams    NON-URGENT CONSULTS:  Please email [email or web address]  AT NIGHT AND ON WEEKENDS:  Contact on-call GI fellow via answering service ([phone]) from 5pm-8am and on weekends/holidays  MONDAY-FRIDAY 8AM-5PM:  Pager# [phone] ([facility]) or [phone] ([facility])  GI Phone# [phone] ([facility])

## 2021-07-19 NOTE — ED PROVIDER NOTE - NS ED ROS FT
General: Denies fevers  ENMT: Denies sore throat  CV: Denies chest pain  Resp: Denies SOB  GI: +Abdominal pain, nausea, vomiting, diarrhea  : Denies painful urination  Skin: Denies new rashes  Neuro: Denies headache, focal weakness  MSK: Denies recent trauma

## 2021-07-20 DIAGNOSIS — Z29.9 ENCOUNTER FOR PROPHYLACTIC MEASURES, UNSPECIFIED: ICD-10-CM

## 2021-07-20 DIAGNOSIS — D64.9 ANEMIA, UNSPECIFIED: ICD-10-CM

## 2021-07-20 DIAGNOSIS — K50.10 CROHN'S DISEASE OF LARGE INTESTINE WITHOUT COMPLICATIONS: ICD-10-CM

## 2021-07-20 LAB
ALBUMIN SERPL ELPH-MCNC: 3.8 G/DL — SIGNIFICANT CHANGE UP (ref 3.3–5)
ALP SERPL-CCNC: 64 U/L — SIGNIFICANT CHANGE UP (ref 40–120)
ALT FLD-CCNC: 16 U/L — SIGNIFICANT CHANGE UP (ref 4–33)
ANION GAP SERPL CALC-SCNC: 13 MMOL/L — SIGNIFICANT CHANGE UP (ref 7–14)
AST SERPL-CCNC: 14 U/L — SIGNIFICANT CHANGE UP (ref 4–32)
BASOPHILS # BLD AUTO: 0.04 K/UL — SIGNIFICANT CHANGE UP (ref 0–0.2)
BASOPHILS NFR BLD AUTO: 0.5 % — SIGNIFICANT CHANGE UP (ref 0–2)
BILIRUB SERPL-MCNC: 0.2 MG/DL — SIGNIFICANT CHANGE UP (ref 0.2–1.2)
BLOOD GAS VENOUS COMPREHENSIVE RESULT: SIGNIFICANT CHANGE UP
BUN SERPL-MCNC: 5 MG/DL — LOW (ref 7–23)
CALCIUM SERPL-MCNC: 8.5 MG/DL — SIGNIFICANT CHANGE UP (ref 8.4–10.5)
CHLORIDE SERPL-SCNC: 101 MMOL/L — SIGNIFICANT CHANGE UP (ref 98–107)
CO2 SERPL-SCNC: 21 MMOL/L — LOW (ref 22–31)
CREAT SERPL-MCNC: 0.44 MG/DL — LOW (ref 0.5–1.3)
EOSINOPHIL # BLD AUTO: 0 K/UL — SIGNIFICANT CHANGE UP (ref 0–0.5)
EOSINOPHIL NFR BLD AUTO: 0 % — SIGNIFICANT CHANGE UP (ref 0–6)
GLUCOSE SERPL-MCNC: 98 MG/DL — SIGNIFICANT CHANGE UP (ref 70–99)
HCT VFR BLD CALC: 29.5 % — LOW (ref 34.5–45)
HGB BLD-MCNC: 9.1 G/DL — LOW (ref 11.5–15.5)
IANC: 6.41 K/UL — SIGNIFICANT CHANGE UP (ref 1.5–8.5)
IMM GRANULOCYTES NFR BLD AUTO: 0.5 % — SIGNIFICANT CHANGE UP (ref 0–1.5)
LYMPHOCYTES # BLD AUTO: 1.9 K/UL — SIGNIFICANT CHANGE UP (ref 1–3.3)
LYMPHOCYTES # BLD AUTO: 21.4 % — SIGNIFICANT CHANGE UP (ref 13–44)
MCHC RBC-ENTMCNC: 25.8 PG — LOW (ref 27–34)
MCHC RBC-ENTMCNC: 30.8 GM/DL — LOW (ref 32–36)
MCV RBC AUTO: 83.6 FL — SIGNIFICANT CHANGE UP (ref 80–100)
MONOCYTES # BLD AUTO: 0.47 K/UL — SIGNIFICANT CHANGE UP (ref 0–0.9)
MONOCYTES NFR BLD AUTO: 5.3 % — SIGNIFICANT CHANGE UP (ref 2–14)
NEUTROPHILS # BLD AUTO: 6.41 K/UL — SIGNIFICANT CHANGE UP (ref 1.8–7.4)
NEUTROPHILS NFR BLD AUTO: 72.3 % — SIGNIFICANT CHANGE UP (ref 43–77)
NRBC # BLD: 0 /100 WBCS — SIGNIFICANT CHANGE UP
NRBC # FLD: 0 K/UL — SIGNIFICANT CHANGE UP
PLATELET # BLD AUTO: 375 K/UL — SIGNIFICANT CHANGE UP (ref 150–400)
POTASSIUM SERPL-MCNC: 3.4 MMOL/L — LOW (ref 3.5–5.3)
POTASSIUM SERPL-SCNC: 3.4 MMOL/L — LOW (ref 3.5–5.3)
PROT SERPL-MCNC: 6.4 G/DL — SIGNIFICANT CHANGE UP (ref 6–8.3)
RBC # BLD: 3.53 M/UL — LOW (ref 3.8–5.2)
RBC # FLD: 14.4 % — SIGNIFICANT CHANGE UP (ref 10.3–14.5)
SODIUM SERPL-SCNC: 135 MMOL/L — SIGNIFICANT CHANGE UP (ref 135–145)
WBC # BLD: 8.86 K/UL — SIGNIFICANT CHANGE UP (ref 3.8–10.5)
WBC # FLD AUTO: 8.86 K/UL — SIGNIFICANT CHANGE UP (ref 3.8–10.5)

## 2021-07-20 PROCEDURE — 99223 1ST HOSP IP/OBS HIGH 75: CPT | Mod: GC

## 2021-07-20 PROCEDURE — 99222 1ST HOSP IP/OBS MODERATE 55: CPT | Mod: GC

## 2021-07-20 RX ORDER — SIMETHICONE 80 MG/1
80 TABLET, CHEWABLE ORAL DAILY
Refills: 0 | Status: DISCONTINUED | OUTPATIENT
Start: 2021-07-20 | End: 2021-07-25

## 2021-07-20 RX ORDER — METOCLOPRAMIDE HCL 10 MG
10 TABLET ORAL EVERY 6 HOURS
Refills: 0 | Status: DISCONTINUED | OUTPATIENT
Start: 2021-07-20 | End: 2021-07-21

## 2021-07-20 RX ORDER — FAMOTIDINE 10 MG/ML
20 INJECTION INTRAVENOUS ONCE
Refills: 0 | Status: COMPLETED | OUTPATIENT
Start: 2021-07-20 | End: 2021-07-20

## 2021-07-20 RX ORDER — ENOXAPARIN SODIUM 100 MG/ML
40 INJECTION SUBCUTANEOUS DAILY
Refills: 0 | Status: DISCONTINUED | OUTPATIENT
Start: 2021-07-20 | End: 2021-07-25

## 2021-07-20 RX ORDER — POTASSIUM CHLORIDE 20 MEQ
10 PACKET (EA) ORAL
Refills: 0 | Status: COMPLETED | OUTPATIENT
Start: 2021-07-20 | End: 2021-07-20

## 2021-07-20 RX ORDER — HYDROMORPHONE HYDROCHLORIDE 2 MG/ML
0.5 INJECTION INTRAMUSCULAR; INTRAVENOUS; SUBCUTANEOUS EVERY 4 HOURS
Refills: 0 | Status: DISCONTINUED | OUTPATIENT
Start: 2021-07-20 | End: 2021-07-21

## 2021-07-20 RX ORDER — HYDROMORPHONE HYDROCHLORIDE 2 MG/ML
1 INJECTION INTRAMUSCULAR; INTRAVENOUS; SUBCUTANEOUS ONCE
Refills: 0 | Status: DISCONTINUED | OUTPATIENT
Start: 2021-07-20 | End: 2021-07-20

## 2021-07-20 RX ORDER — INFLIXIMAB-DYYB 120 MG/ML
200 INJECTION SUBCUTANEOUS ONCE
Refills: 0 | Status: DISCONTINUED | OUTPATIENT
Start: 2021-07-20 | End: 2021-07-20

## 2021-07-20 RX ORDER — SIMETHICONE 80 MG/1
80 TABLET, CHEWABLE ORAL ONCE
Refills: 0 | Status: COMPLETED | OUTPATIENT
Start: 2021-07-20 | End: 2021-07-20

## 2021-07-20 RX ORDER — LIDOCAINE 4 G/100G
10 CREAM TOPICAL ONCE
Refills: 0 | Status: COMPLETED | OUTPATIENT
Start: 2021-07-20 | End: 2021-07-20

## 2021-07-20 RX ORDER — ACETAMINOPHEN 500 MG
120 TABLET ORAL ONCE
Refills: 0 | Status: DISCONTINUED | OUTPATIENT
Start: 2021-07-20 | End: 2021-07-20

## 2021-07-20 RX ORDER — HYDROMORPHONE HYDROCHLORIDE 2 MG/ML
0.5 INJECTION INTRAMUSCULAR; INTRAVENOUS; SUBCUTANEOUS ONCE
Refills: 0 | Status: DISCONTINUED | OUTPATIENT
Start: 2021-07-20 | End: 2021-07-20

## 2021-07-20 RX ORDER — HYDROMORPHONE HYDROCHLORIDE 2 MG/ML
0.5 INJECTION INTRAMUSCULAR; INTRAVENOUS; SUBCUTANEOUS EVERY 4 HOURS
Refills: 0 | Status: DISCONTINUED | OUTPATIENT
Start: 2021-07-20 | End: 2021-07-20

## 2021-07-20 RX ORDER — METOCLOPRAMIDE HCL 10 MG
10 TABLET ORAL ONCE
Refills: 0 | Status: DISCONTINUED | OUTPATIENT
Start: 2021-07-20 | End: 2021-07-20

## 2021-07-20 RX ORDER — INFLIXIMAB-DYYB 120 MG/ML
200 INJECTION SUBCUTANEOUS ONCE
Refills: 0 | Status: COMPLETED | OUTPATIENT
Start: 2021-07-20 | End: 2021-07-20

## 2021-07-20 RX ADMIN — ONDANSETRON 4 MILLIGRAM(S): 8 TABLET, FILM COATED ORAL at 13:32

## 2021-07-20 RX ADMIN — Medication 100 MILLIEQUIVALENT(S): at 21:14

## 2021-07-20 RX ADMIN — INFLIXIMAB-DYYB 125 MILLIGRAM(S): 120 INJECTION SUBCUTANEOUS at 17:11

## 2021-07-20 RX ADMIN — HYDROMORPHONE HYDROCHLORIDE 0.5 MILLIGRAM(S): 2 INJECTION INTRAMUSCULAR; INTRAVENOUS; SUBCUTANEOUS at 23:44

## 2021-07-20 RX ADMIN — FAMOTIDINE 20 MILLIGRAM(S): 10 INJECTION INTRAVENOUS at 14:23

## 2021-07-20 RX ADMIN — Medication 100 MILLIEQUIVALENT(S): at 14:56

## 2021-07-20 RX ADMIN — HYDROMORPHONE HYDROCHLORIDE 0.5 MILLIGRAM(S): 2 INJECTION INTRAMUSCULAR; INTRAVENOUS; SUBCUTANEOUS at 15:35

## 2021-07-20 RX ADMIN — HYDROMORPHONE HYDROCHLORIDE 0.5 MILLIGRAM(S): 2 INJECTION INTRAMUSCULAR; INTRAVENOUS; SUBCUTANEOUS at 20:00

## 2021-07-20 RX ADMIN — SODIUM CHLORIDE 125 MILLILITER(S): 9 INJECTION, SOLUTION INTRAVENOUS at 14:56

## 2021-07-20 RX ADMIN — Medication 100 MILLIEQUIVALENT(S): at 22:37

## 2021-07-20 RX ADMIN — MORPHINE SULFATE 4 MILLIGRAM(S): 50 CAPSULE, EXTENDED RELEASE ORAL at 14:43

## 2021-07-20 RX ADMIN — HYDROMORPHONE HYDROCHLORIDE 1 MILLIGRAM(S): 2 INJECTION INTRAMUSCULAR; INTRAVENOUS; SUBCUTANEOUS at 01:41

## 2021-07-20 RX ADMIN — SIMETHICONE 80 MILLIGRAM(S): 80 TABLET, CHEWABLE ORAL at 13:32

## 2021-07-20 RX ADMIN — HYDROMORPHONE HYDROCHLORIDE 0.5 MILLIGRAM(S): 2 INJECTION INTRAMUSCULAR; INTRAVENOUS; SUBCUTANEOUS at 19:43

## 2021-07-20 NOTE — H&P ADULT - NSHPLABSRESULTS_GEN_ALL_CORE
Personally reviewed imaging and discussed with attending    CTAP w/ contrast:  Impression: Wall thickening, inflammatory change, and hyperenhancement of the terminal ileum, consistent with active inflammatory bowel disease. 9.1    8.86  )-----------( 375      ( 20 Jul 2021 07:01 )             29.5     07-20    135  |  101  |  5<L>  ----------------------------<  98  3.4<L>   |  21<L>  |  0.44<L>    Ca    8.5      20 Jul 2021 07:01    TPro  6.4  /  Alb  3.8  /  TBili  0.2  /  DBili  x   /  AST  14  /  ALT  16  /  AlkPhos  64  07-20    EKG personally reviewed: NSR, rate 90, no ischemic changes, QTc 425ms    CTAP w/ contrast:  Impression: Wall thickening, inflammatory change, and hyperenhancement of the terminal ileum, consistent with active inflammatory bowel disease.

## 2021-07-20 NOTE — ED CDU PROVIDER SUBSEQUENT DAY NOTE - PROGRESS NOTE DETAILS
cdu sandra kellogg: pt needed multiple rounds of pain meds overnight, is now sleeping, will reasses in the AM

## 2021-07-20 NOTE — MEDICAL STUDENT ADULT H&P (EDUCATION) - NS MD HP STUD PE VITALS FT
Vital Signs Last 24 Hrs  T(C): 36.8 (20 Jul 2021 09:24), Max: 37.1 (19 Jul 2021 14:00)  T(F): 98.3 (20 Jul 2021 09:24), Max: 98.8 (19 Jul 2021 14:00)  HR: 92 (20 Jul 2021 09:24) (67 - 92)  BP: 106/63 (20 Jul 2021 09:24) (98/64 - 123/67)  BP(mean): --  RR: 16 (20 Jul 2021 09:24) (16 - 18)  SpO2: 100% (20 Jul 2021 09:24) (97% - 100%)

## 2021-07-20 NOTE — ED CDU PROVIDER SUBSEQUENT DAY NOTE - ATTENDING CONTRIBUTION TO CARE
I performed a face to face evaluation of this patient and obtained a history and performed a full exam.  I agree with the history, physical exam and plan of the PA.    Brief HPI:  26 yo F PMH Crohn's disease diagnosed 1 month ago C/O diffuse abdominal pain with associated nausea which began on 71/18/21. Pt had recurrent nausea in additional to abdominal pain. Pt was sent to CDU for supportive treatment and further GI evaluation.

## 2021-07-20 NOTE — H&P ADULT - PROBLEM SELECTOR PLAN 1
- CT AP revealed wall thickening and inflammation of terminal ileum consistent w/ Crohn's flare  - GI consulted  - Likely will receive Infliximab infusion today  - Zofran 4mg Q6PRN for nausea  - Will obtain daily EKGs to monitor for QT prolongation  - Simethicone once for gas/bloating  - Pepcid 20mg once for reflux  - Clear liquid diet  - Not tolerating PO, all meds IV for now - CT AP revealed wall thickening and inflammation of terminal ileum consistent w/ Crohn's flare  - GI consulted  - Likely will receive Infliximab infusion today  - C/w Zofran 4mg Q6PRN for nausea  - Reglan 10mg IV once  - Will obtain daily EKGs to monitor for QT prolongation  - Simethicone once for gas/bloating  - Pepcid 20mg once for reflux  - Clear liquid diet  - Not tolerating PO, all meds IV for now  - Dilaudid 0.5mg IV q4prn  - Consider adding Ativan 0.5mg IV if pain/anxiety persist  - Will consider adding Ativan if - CT AP revealed wall thickening and inflammation of terminal ileum consistent w/ Crohn's flare  - GI consulted  - Likely will receive Infliximab infusion today  - C/w Zofran 4mg Q6PRN for nausea  - Reglan 10mg IV once  - Will obtain daily EKGs to monitor for QT prolongation  - Simethicone once for gas/bloating  - Pepcid 20mg once for reflux  - Clear liquid diet  - Not tolerating PO, all meds IV for now  - Dilaudid 0.5mg IV q4prn  - Consider adding Ativan 0.5mg IV if pain/anxiety persist  - Monitor BMs - CT AP revealed wall thickening and inflammation of terminal ileum consistent w/ Crohn's flare  - GI consulted  - Likely will receive Infliximab infusion today  - D/c zofran  - c/w Reglan 10mg IV q6prn  - Will obtain daily EKGs to monitor for QT prolongation  - Simethicone once for gas/bloating  - Pepcid 20mg once for reflux  - Clear liquid diet  - Not tolerating PO, all meds IV for now  - Dilaudid 0.5mg IV q4prn  - Consider adding Ativan 0.5mg IV if pain/anxiety persist  - Monitor BMs

## 2021-07-20 NOTE — MEDICAL STUDENT ADULT H&P (EDUCATION) - NS MD HP STUD RESULTS LAB FT
9.1    8.86  )-----------( 375      ( 20 Jul 2021 07:01 )             29.5       07-20    135  |  101  |  5<L>  ----------------------------<  98  3.4<L>   |  21<L>  |  0.44<L>    Ca    8.5      20 Jul 2021 07:01    TPro  6.4  /  Alb  3.8  /  TBili  0.2  /  DBili  x   /  AST  14  /  ALT  16  /  AlkPhos  64  07-20

## 2021-07-20 NOTE — MEDICAL STUDENT ADULT H&P (EDUCATION) - NS MD HP STUD PE GI FT
no skin changes. bowel sounds in all 4 quadrants. abd nondistended, tender to light touch in RLQ and slightly tender in RUQ. nt in FRANCES and LLQ.

## 2021-07-20 NOTE — MEDICAL STUDENT ADULT H&P (EDUCATION) - NS MD HP STUD ASPLAN ASSES FT
24yo female presenting with abdominal pain, nausea, and vomiting consistent with an acute Crohn's flare.

## 2021-07-20 NOTE — H&P ADULT - NSHPPHYSICALEXAM_GEN_ALL_CORE
Neck - Supple  · Respiratory	clear to auscultation bilaterally  · Cardiovascular	regular rate and rhythm, no murmurs, rubs, or gallops  · GI	no skin changes. bowel sounds in all 4 quadrants. abd nondistended, tender to light touch in RLQ and slightly tender in RUQ. nt in FRANCES and LLQ.  · Extremities	no edema or skin changes  · Skin	no rashes, skin changes General: Moderate distress from abdominal pain  Neck - Supple  · Respiratory	clear to auscultation bilaterally  · Cardiovascular	regular rate and rhythm, no murmurs, rubs, or gallops  · GI	no skin changes. bowel sounds in all 4 quadrants. abd nondistended, tender to light touch in RLQ and slightly tender in RUQ. nt in FRANCES and LLQ.  · Extremities	no edema or skin changes  · Skin	no rashes, skin changes  Psych: AAOx4  Neuro: No focal deficits

## 2021-07-20 NOTE — MEDICAL STUDENT ADULT H&P (EDUCATION) - NS MD HP STUD HX OF PRESENT ILLNESS FT
Medicine Acceptance Note    CONTACT DARREN   Marti Corey  MS3    CC: Patient is a 25y old  Female who presents with a chief complaint of abdominal pain, nausea, and vomiting since yesterday.     HPI: patient is a 26 yo female with a history of Crohn's Disease diagnosed last month, presenting with abdominal pain, nausea, and vomiting. The patient states the abdominal pain and vomiting began yesterday and described the pain as "squeezing her stomach." She states that she vomited four times yesterday; the vomit was watery and looked like the food that she had eaten prior. She denies blood in her vomit. She believes that the consumption of solids and liquids makes her vomit and cannot currently tolerate anything by mouth. She states that the only thing that makes the pain better is morphine. Vomiting relieves some of her nausea. The patient was diagnosed with Crohn's disease last month following an acute flare that presented similarly to the symptoms that brought her into the hospital yesterday. She was admitted to \[facility]\"" for the flare last month and was d/c on 6/13. She has been receiving monthly infusions of infliximab and is due for another infusion tomorrow 7/21. She believes that the infliximab has been helping her symptoms. The patient states that her last bowel movement was two days ago and was normal form. She has not had the urge to have a bowel movement since then. She is currently menstruating and believes that the flare may be caused by her period.

## 2021-07-20 NOTE — MEDICAL STUDENT ADULT H&P (EDUCATION) - NS MD HP STUD ASPLAN PLAN FT
problem 1: acute flare of Crohn's disease  - infliximab infusion today if approved by GI    problem 2: nausea/vomiting  - zofran 4mg Q6  - reglan 10mg Q6  - fluid resuscitation with LR 125cc/hr    problem 3: pain  - morphine 4mg Q6  - tylenol suppository, one dose    problem 4: GERD  - pepcid  problem 1: acute flare of Crohn's disease  - refer to GI recommendations  - CT performed, findings consistent with acute IBD flare no evidence of bowel obstruction  - infliximab infusion today  - zofran 4mg Q6, follow up with daily EKG to monitor for QTc prolongation  - fluid resuscitation with LR 125cc/hr    problem 2: pain  - morphine 4mg Q6  - tylenol suppository, one dose    problem 3: GERD  - pepcid     problem 4: anemia  - iron studies  problem 1: acute flare of Crohn's disease  - refer to GI recommendations  - CT performed, findings consistent with acute IBD flare no evidence of bowel obstruction  - infliximab infusion today  - zofran 4mg Q6, follow up with daily EKG to monitor for QTc prolongation  - fluid resuscitation with LR 125cc/hr    problem 2: pain  - morphine 4mg Q6  - tylenol suppository, one dose    problem 3: GERD  - pepcid 20mg IV push    problem 4: anemia  - iron studies  problem 1: acute flare of Crohn's disease  - refer to GI recommendations  - CT performed, findings consistent with acute IBD flare no evidence of bowel obstruction  - aluminum hydroxide/magnesium hydroxide/simethicone suspension 30mL, oral , Q4  - simethicone 80mg, chew daily  - lidocaine 2% viscous swish and spit 10mL one dose  - infliximab infusion today  - zofran 4mg Q6, follow up with daily EKG to monitor for QTc prolongation  - fluid resuscitation with LR 125cc/hr    problem 2: pain  - dilaudid 0.5mg Q4  - tylenol suppository, one dose    problem 3: GERD  - pepcid 20mg IV push    problem 4: anemia  - iron studies, follow up with hemolysis studies if needed

## 2021-07-20 NOTE — H&P ADULT - ASSESSMENT
26yo female presenting with abdominal pain, nausea, and vomiting consistent with an acute Crohn's flare.

## 2021-07-20 NOTE — PROGRESS NOTE ADULT - SUBJECTIVE AND OBJECTIVE BOX
Chief Complaint:  Patient is a 25y old  Female who presents with a chief complaint of     Interval Events:       Hospital Medications:  lactated ringers. 1000 milliLiter(s) IV Continuous <Continuous>  metoclopramide Injectable 10 milliGRAM(s) IV Push every 6 hours PRN  morphine  - Injectable 4 milliGRAM(s) IV Push every 6 hours PRN  ondansetron Injectable 4 milliGRAM(s) IV Push every 6 hours PRN      PMHX/PSHX:  No pertinent past medical history    Crohn disease    No pertinent past surgical history      ROS: 10 point ROS neg unless stated in subjective      PHYSICAL EXAM:     GENERAL:  Appears stated age, well-groomed, well-nourished, no distress  HEENT:  NC/AT,  conjunctivae clear and pink  CHEST:  Full & symmetric excursion, no increased effort, breath sounds clear  HEART:  Regular rhythm, S1, S2, no murmur/rub/S3/S4  ABDOMEN:  +thin, soft, +mild RLQ tenderness, non-distended, normoactive bowel sounds,  no masses,  EXTREMITIES:  no cyanosis, clubbing or edema  SKIN:  No rash/erythema/ecchymoses/petechiae/wounds/abscess/warm/dry  NEURO:  Alert, oriented      Vital Signs:  Vital Signs Last 24 Hrs  T(C): 36.7 (20 Jul 2021 05:18), Max: 37.1 (19 Jul 2021 08:41)  T(F): 98.1 (20 Jul 2021 05:18), Max: 98.8 (19 Jul 2021 14:00)  HR: 89 (20 Jul 2021 05:18) (67 - 93)  BP: 103/53 (20 Jul 2021 05:18) (98/64 - 123/67)  BP(mean): --  RR: 16 (20 Jul 2021 05:18) (16 - 18)  SpO2: 97% (20 Jul 2021 05:18) (97% - 100%)  Daily     Daily     LABS:                        9.1    8.86  )-----------( 375      ( 20 Jul 2021 07:01 )             29.5     07-19    138  |  100  |  10  ----------------------------<  147<H>  3.5   |  17<L>  |  0.53    Ca    9.6      19 Jul 2021 03:15    TPro  8.0  /  Alb  4.8  /  TBili  0.6  /  DBili  x   /  AST  20  /  ALT  20  /  AlkPhos  90  07-19    LIVER FUNCTIONS - ( 19 Jul 2021 03:15 )  Alb: 4.8 g/dL / Pro: 8.0 g/dL / ALK PHOS: 90 U/L / ALT: 20 U/L / AST: 20 U/L / GGT: x                   Imaging:   CT A/P w IV cont 7/19:  FINDINGS:  LOWER CHEST: Within normal limits.    LIVER: Within normal limits.  BILE DUCTS: Normal caliber.  GALLBLADDER: Within normal limits.  SPLEEN: Within normal limits.  PANCREAS: Within normal limits.  ADRENALS: Within normal limits.  KIDNEYS/URETERS: Right renal cyst. No hydronephrosis.    BLADDER: Within normal limits.  REPRODUCTIVE ORGANS: Uterus and adnexa within normal limits.    BOWEL: Wall thickening, hyperenhancement, and surrounding edema/inflammatory changes involving the terminal ileum. No bowel obstruction. Appendix is not visualized.  PERITONEUM: No ascites. No evidence of drainable abscess.  VESSELS: Within normal limits.  RETROPERITONEUM/LYMPH NODES: No lymphadenopathy.  ABDOMINAL WALL: Within normal limits.  BONES: Within normal limits.    IMPRESSION:  Wall thickening, inflammatory change, and hyper-enhancement of the terminal ileum, consistent with active inflammatory bowel disease.               Chief Complaint:  Patient is a 25y old  Female who presents with a chief complaint of     Interval Events: Having N/V after eating. Last ate yesterday evening and reports inability to tolerate PO. States morphine and zofran help with N/V/abdominal pain. Is afraid to try eating this AM.      Hospital Medications:  lactated ringers. 1000 milliLiter(s) IV Continuous <Continuous>  metoclopramide Injectable 10 milliGRAM(s) IV Push every 6 hours PRN  morphine  - Injectable 4 milliGRAM(s) IV Push every 6 hours PRN  ondansetron Injectable 4 milliGRAM(s) IV Push every 6 hours PRN      PMHX/PSHX:  No pertinent past medical history    Crohn disease    No pertinent past surgical history      ROS: 10 point ROS neg unless stated in subjective      PHYSICAL EXAM:     GENERAL:  Appears stated age, well-groomed, well-nourished, no distress  HEENT:  NC/AT,  conjunctivae clear and pink  CHEST:  Full & symmetric excursion, no increased effort, breath sounds clear  HEART:  Regular rhythm, S1, S2, no murmur/rub/S3/S4  ABDOMEN:  +thin, soft, +mild R-sided tenderness, non-distended, normoactive bowel sounds,  no masses,  EXTREMITIES:  no cyanosis, clubbing or edema  SKIN:  No rash/erythema/ecchymoses/petechiae/wounds/abscess/warm/dry  NEURO:  Alert, oriented      Vital Signs:  Vital Signs Last 24 Hrs  T(C): 36.7 (20 Jul 2021 05:18), Max: 37.1 (19 Jul 2021 08:41)  T(F): 98.1 (20 Jul 2021 05:18), Max: 98.8 (19 Jul 2021 14:00)  HR: 89 (20 Jul 2021 05:18) (67 - 93)  BP: 103/53 (20 Jul 2021 05:18) (98/64 - 123/67)  BP(mean): --  RR: 16 (20 Jul 2021 05:18) (16 - 18)  SpO2: 97% (20 Jul 2021 05:18) (97% - 100%)  Daily     Daily     LABS:                        9.1    8.86  )-----------( 375      ( 20 Jul 2021 07:01 )             29.5     07-19    138  |  100  |  10  ----------------------------<  147<H>  3.5   |  17<L>  |  0.53    Ca    9.6      19 Jul 2021 03:15    TPro  8.0  /  Alb  4.8  /  TBili  0.6  /  DBili  x   /  AST  20  /  ALT  20  /  AlkPhos  90  07-19    LIVER FUNCTIONS - ( 19 Jul 2021 03:15 )  Alb: 4.8 g/dL / Pro: 8.0 g/dL / ALK PHOS: 90 U/L / ALT: 20 U/L / AST: 20 U/L / GGT: x                   Imaging:   CT A/P w IV cont 7/19:  FINDINGS:  LOWER CHEST: Within normal limits.    LIVER: Within normal limits.  BILE DUCTS: Normal caliber.  GALLBLADDER: Within normal limits.  SPLEEN: Within normal limits.  PANCREAS: Within normal limits.  ADRENALS: Within normal limits.  KIDNEYS/URETERS: Right renal cyst. No hydronephrosis.    BLADDER: Within normal limits.  REPRODUCTIVE ORGANS: Uterus and adnexa within normal limits.    BOWEL: Wall thickening, hyperenhancement, and surrounding edema/inflammatory changes involving the terminal ileum. No bowel obstruction. Appendix is not visualized.  PERITONEUM: No ascites. No evidence of drainable abscess.  VESSELS: Within normal limits.  RETROPERITONEUM/LYMPH NODES: No lymphadenopathy.  ABDOMINAL WALL: Within normal limits.  BONES: Within normal limits.    IMPRESSION:  Wall thickening, inflammatory change, and hyper-enhancement of the terminal ileum, consistent with active inflammatory bowel disease.

## 2021-07-20 NOTE — PROGRESS NOTE ADULT - ASSESSMENT
25F with recently diagnosed Crohn's disease 06/2021 p/w N/V/D/abd pain x 2d. GI consulted due to c/f recurrent flare in the setting of recent onset of menses. Having recurrent episodes of RLQ abd pain w/ diarrhea, imaging this admission with terminal ileitis. s/p c-scope 6/10/21 showing inflammation in ascending colon + stricture which could be traversed w/ upper endoscopic. Biopsies of stricture + colonic inflammation taken, resulted (+) chronic colitis, cryptitis, mild crypt distortion, neg for granulomata/dysplasia/viral cytopathic effects. Pt's likely dx mod-severe, fistulizing Crohn's disease. Work up: negative quant gold, Hep B infection, path negative for CMV. Started on infliximab s/p 2x infusions, last infusion 6/25.     Impression:   #Crohn's Disease   #abdominal pain thought to be triggered by menses vs Crohn's flare  #H/o ascending colon stricture: inflammatory in setting of new dx Crohn's vs fibrostenotic 2/2 chronic inflammation       Recommendations  - IVFs  - CLD can advance diet as tolerated  - avoid NSAIDS  - IV Tylenol PRN for pain, Zofran PRN for nausea (please check QTc on EKG)  - recommend pt to have COVID vaccine (has not yet been vaccinated)  - Pt can go home once able to tolerate PO  - Patient has OP infusion clinic appt for infliximab 7/21 and OP f/u with Dr. Berger 10/5    Michelle Howard MD  GI Fellow, PGY-4  Available via [employer] Teams    NON-URGENT CONSULTS:  Please email [email or web address]  AT NIGHT AND ON WEEKENDS:  Contact on-call GI fellow via answering service ([phone]) from 5pm-8am and on weekends/holidays  MONDAY-FRIDAY 8AM-5PM:  Pager# [phone] ([facility]) or [phone] ([facility])  GI Phone# [phone] ([facility])   25F with recently diagnosed Crohn's disease 06/2021 p/w N/V/D/abd pain x 2d. GI consulted due to c/f recurrent flare in the setting of recent onset of menses. Having recurrent episodes of RLQ abd pain w/ diarrhea, imaging this admission with terminal ileitis. s/p c-scope 6/10/21 showing inflammation in ascending colon + stricture which could be traversed w/ upper endoscopic. Biopsies of stricture + colonic inflammation taken, resulted (+) chronic colitis, cryptitis, mild crypt distortion, neg for granulomata/dysplasia/viral cytopathic effects. Pt's likely dx mod-severe, fistulizing Crohn's disease. Work up: negative quant gold, Hep B infection, path negative for CMV. Started on infliximab s/p 2x infusions, last infusion 6/25.     Impression:   #Crohn's Disease   #abdominal pain thought to be triggered by menses vs Crohn's flare  #H/o ascending colon stricture: inflammatory in setting of new dx Crohn's vs fibrostenotic 2/2 chronic inflammation       Recommendations  - IVFs  - will order infliximab infusion for pt to receive today  - CLD can advance diet as tolerated  - avoid NSAIDS  - IV Tylenol PRN for pain, Zofran PRN for nausea (please check QTc on EKG)  - recommend pt to have COVID vaccine (has not yet been vaccinated)  - Pt can go home once able to tolerate PO  - Patient has OP f/u with Dr. Berger 10/5    Michelle Howard MD  GI Fellow, PGY-4  Available via [employer] Teams    NON-URGENT CONSULTS:  Please email [email or web address]  AT NIGHT AND ON WEEKENDS:  Contact on-call GI fellow via answering service ([phone]) from 5pm-8am and on weekends/holidays  MONDAY-FRIDAY 8AM-5PM:  Pager# [phone] ([facility]) or [phone] ([facility])  GI Phone# [phone] ([facility])

## 2021-07-20 NOTE — MEDICAL STUDENT ADULT H&P (EDUCATION) - NS MD HP STUD RESULTS RAD FT
CT with IV contrast was performed on 7/19 and showed wall thickening, hyperenhancement, and surrounding edema/inflammatory changes involving the terminal ileum. No bowel obstruction seen. Appendix was not visualized. Consistent with acute inflammatory bowel disease.

## 2021-07-20 NOTE — H&P ADULT - NSICDXFAMILYHX_GEN_ALL_CORE_FT
FAMILY HISTORY:  Father  Still living? Unknown  FH: hypertension, Age at diagnosis: Age Unknown    Sibling  Still living? Yes, Estimated age: 19  FH: type 1 diabetes, Age at diagnosis: Age Unknown

## 2021-07-20 NOTE — H&P ADULT - PROBLEM SELECTOR PLAN 2
- Likely dilutional vs blood loss 2/2 menorrhagia   - Will send iron studies  - Daily CBC  - No need for hemolysis studies i/s/o normal t.bili  - Transfuse if hgb<7

## 2021-07-20 NOTE — H&P ADULT - PROBLEM SELECTOR PLAN 3
DVT PPx- Patient able to ambulate, no AC necessary    Dispo: consider D/C home when patient is tolerating PO per GI DVT PPx- Lovenox    Dispo: consider D/C home when patient is tolerating PO per GI

## 2021-07-20 NOTE — ED CDU PROVIDER SUBSEQUENT DAY NOTE - MEDICAL DECISION MAKING DETAILS
24 Y/O F PMH Crohn's disease diagnosed 1 month ago C/O diffuse abdominal pain with associated nausea which began on 71/18/21. Pt has had recurrent nausea in additional to abdominal pain. Pt was sent to CDU for supportive treatment and further GI evaluation.

## 2021-07-20 NOTE — ED CDU PROVIDER DISPOSITION NOTE - CLINICAL COURSE
26 yo F PMH Crohn's disease diagnosed 1 month ago presented to ED for diffuse abdominal pain with associated nausea which began on 71/18/21.  Had CT showing bowel inflammation. Pt was sent to CDU for supportive treatment and further GI evaluation.  In CDU, patient with persistent pain and inability to tolerate po.  GI evaluated patient and decision was made to admit patient to hospital for further UC care.

## 2021-07-20 NOTE — H&P ADULT - ATTENDING COMMENTS
25 year old female, recently diagnosed with Crohn's disease presenting with abdominal pain, nausea and vomiting. Patient reports abdominal pain in RLQ/RUQ and radiates up to the chest area, does not feel like previous episodes of acid reflux. Pain is not relieved with morphine. CTAP with findings of wall thickening, inflammatory change, and hyperenhancement of the terminal ileum, consistent with active inflammatory bowel disease.    -GI following, ordered for Infliximab today  -Advance diet as tolerated  -Pain control with Dilaudid PRN  -C/w IVF  -Reglan PRN for nausea/vomiting, as patient reports no relief with Zofran  -QTc 425ms on EKG today  -New normocytic anemia noted- check iron studies and ferritin    D/w Dr. Fleming

## 2021-07-21 ENCOUNTER — APPOINTMENT (OUTPATIENT)
Dept: RHEUMATOLOGY | Facility: CLINIC | Age: 26
End: 2021-07-21

## 2021-07-21 LAB
ANION GAP SERPL CALC-SCNC: 16 MMOL/L — HIGH (ref 7–14)
BUN SERPL-MCNC: 7 MG/DL — SIGNIFICANT CHANGE UP (ref 7–23)
CALCIUM SERPL-MCNC: 9 MG/DL — SIGNIFICANT CHANGE UP (ref 8.4–10.5)
CHLORIDE SERPL-SCNC: 101 MMOL/L — SIGNIFICANT CHANGE UP (ref 98–107)
CO2 SERPL-SCNC: 19 MMOL/L — LOW (ref 22–31)
COVID-19 SPIKE DOMAIN AB INTERP: POSITIVE
COVID-19 SPIKE DOMAIN ANTIBODY RESULT: >250 U/ML — HIGH
CREAT SERPL-MCNC: 0.44 MG/DL — LOW (ref 0.5–1.3)
FERRITIN SERPL-MCNC: 43 NG/ML — SIGNIFICANT CHANGE UP (ref 15–150)
GLUCOSE SERPL-MCNC: 87 MG/DL — SIGNIFICANT CHANGE UP (ref 70–99)
HCT VFR BLD CALC: 31.3 % — LOW (ref 34.5–45)
HGB BLD-MCNC: 10 G/DL — LOW (ref 11.5–15.5)
IRON SATN MFR SERPL: 16 % — SIGNIFICANT CHANGE UP (ref 14–50)
IRON SATN MFR SERPL: 48 UG/DL — SIGNIFICANT CHANGE UP (ref 30–160)
MAGNESIUM SERPL-MCNC: 1.9 MG/DL — SIGNIFICANT CHANGE UP (ref 1.6–2.6)
MCHC RBC-ENTMCNC: 26.2 PG — LOW (ref 27–34)
MCHC RBC-ENTMCNC: 31.9 GM/DL — LOW (ref 32–36)
MCV RBC AUTO: 81.9 FL — SIGNIFICANT CHANGE UP (ref 80–100)
NRBC # BLD: 0 /100 WBCS — SIGNIFICANT CHANGE UP
NRBC # FLD: 0 K/UL — SIGNIFICANT CHANGE UP
PHOSPHATE SERPL-MCNC: 3 MG/DL — SIGNIFICANT CHANGE UP (ref 2.5–4.5)
PLATELET # BLD AUTO: 417 K/UL — HIGH (ref 150–400)
POTASSIUM SERPL-MCNC: 3.6 MMOL/L — SIGNIFICANT CHANGE UP (ref 3.5–5.3)
POTASSIUM SERPL-SCNC: 3.6 MMOL/L — SIGNIFICANT CHANGE UP (ref 3.5–5.3)
RBC # BLD: 3.82 M/UL — SIGNIFICANT CHANGE UP (ref 3.8–5.2)
RBC # FLD: 13.9 % — SIGNIFICANT CHANGE UP (ref 10.3–14.5)
SARS-COV-2 IGG+IGM SERPL QL IA: >250 U/ML — HIGH
SARS-COV-2 IGG+IGM SERPL QL IA: POSITIVE
SODIUM SERPL-SCNC: 136 MMOL/L — SIGNIFICANT CHANGE UP (ref 135–145)
TIBC SERPL-MCNC: 305 UG/DL — SIGNIFICANT CHANGE UP (ref 220–430)
UIBC SERPL-MCNC: 257 UG/DL — SIGNIFICANT CHANGE UP (ref 110–370)
WBC # BLD: 10.09 K/UL — SIGNIFICANT CHANGE UP (ref 3.8–10.5)
WBC # FLD AUTO: 10.09 K/UL — SIGNIFICANT CHANGE UP (ref 3.8–10.5)

## 2021-07-21 PROCEDURE — 99233 SBSQ HOSP IP/OBS HIGH 50: CPT | Mod: GC

## 2021-07-21 PROCEDURE — 99232 SBSQ HOSP IP/OBS MODERATE 35: CPT | Mod: GC

## 2021-07-21 RX ORDER — ACETAMINOPHEN 500 MG
650 TABLET ORAL ONCE
Refills: 0 | Status: COMPLETED | OUTPATIENT
Start: 2021-07-21 | End: 2021-07-21

## 2021-07-21 RX ORDER — HYDROMORPHONE HYDROCHLORIDE 2 MG/ML
2 INJECTION INTRAMUSCULAR; INTRAVENOUS; SUBCUTANEOUS EVERY 4 HOURS
Refills: 0 | Status: DISCONTINUED | OUTPATIENT
Start: 2021-07-21 | End: 2021-07-25

## 2021-07-21 RX ORDER — METOCLOPRAMIDE HCL 10 MG
10 TABLET ORAL ONCE
Refills: 0 | Status: COMPLETED | OUTPATIENT
Start: 2021-07-21 | End: 2021-07-21

## 2021-07-21 RX ORDER — METOCLOPRAMIDE HCL 10 MG
5 TABLET ORAL EVERY 8 HOURS
Refills: 0 | Status: DISCONTINUED | OUTPATIENT
Start: 2021-07-21 | End: 2021-07-22

## 2021-07-21 RX ORDER — ACETAMINOPHEN 500 MG
1000 TABLET ORAL ONCE
Refills: 0 | Status: DISCONTINUED | OUTPATIENT
Start: 2021-07-21 | End: 2021-07-21

## 2021-07-21 RX ORDER — HYDROMORPHONE HYDROCHLORIDE 2 MG/ML
0.5 INJECTION INTRAMUSCULAR; INTRAVENOUS; SUBCUTANEOUS ONCE
Refills: 0 | Status: DISCONTINUED | OUTPATIENT
Start: 2021-07-21 | End: 2021-07-21

## 2021-07-21 RX ADMIN — HYDROMORPHONE HYDROCHLORIDE 0.5 MILLIGRAM(S): 2 INJECTION INTRAMUSCULAR; INTRAVENOUS; SUBCUTANEOUS at 03:26

## 2021-07-21 RX ADMIN — Medication 30 MILLILITER(S): at 08:46

## 2021-07-21 RX ADMIN — HYDROMORPHONE HYDROCHLORIDE 2 MILLIGRAM(S): 2 INJECTION INTRAMUSCULAR; INTRAVENOUS; SUBCUTANEOUS at 18:45

## 2021-07-21 RX ADMIN — SODIUM CHLORIDE 125 MILLILITER(S): 9 INJECTION, SOLUTION INTRAVENOUS at 02:26

## 2021-07-21 RX ADMIN — HYDROMORPHONE HYDROCHLORIDE 2 MILLIGRAM(S): 2 INJECTION INTRAMUSCULAR; INTRAVENOUS; SUBCUTANEOUS at 14:39

## 2021-07-21 RX ADMIN — HYDROMORPHONE HYDROCHLORIDE 0.5 MILLIGRAM(S): 2 INJECTION INTRAMUSCULAR; INTRAVENOUS; SUBCUTANEOUS at 00:00

## 2021-07-21 RX ADMIN — HYDROMORPHONE HYDROCHLORIDE 2 MILLIGRAM(S): 2 INJECTION INTRAMUSCULAR; INTRAVENOUS; SUBCUTANEOUS at 21:48

## 2021-07-21 RX ADMIN — HYDROMORPHONE HYDROCHLORIDE 2 MILLIGRAM(S): 2 INJECTION INTRAMUSCULAR; INTRAVENOUS; SUBCUTANEOUS at 13:39

## 2021-07-21 RX ADMIN — Medication 5 MILLIGRAM(S): at 23:04

## 2021-07-21 RX ADMIN — Medication 30 MILLILITER(S): at 21:48

## 2021-07-21 RX ADMIN — HYDROMORPHONE HYDROCHLORIDE 0.5 MILLIGRAM(S): 2 INJECTION INTRAMUSCULAR; INTRAVENOUS; SUBCUTANEOUS at 09:32

## 2021-07-21 RX ADMIN — Medication 260 MILLIGRAM(S): at 17:03

## 2021-07-21 RX ADMIN — Medication 10 MILLIGRAM(S): at 03:26

## 2021-07-21 RX ADMIN — Medication 10 MILLIGRAM(S): at 12:51

## 2021-07-21 RX ADMIN — HYDROMORPHONE HYDROCHLORIDE 0.5 MILLIGRAM(S): 2 INJECTION INTRAMUSCULAR; INTRAVENOUS; SUBCUTANEOUS at 09:47

## 2021-07-21 RX ADMIN — HYDROMORPHONE HYDROCHLORIDE 0.5 MILLIGRAM(S): 2 INJECTION INTRAMUSCULAR; INTRAVENOUS; SUBCUTANEOUS at 03:45

## 2021-07-21 RX ADMIN — HYDROMORPHONE HYDROCHLORIDE 2 MILLIGRAM(S): 2 INJECTION INTRAMUSCULAR; INTRAVENOUS; SUBCUTANEOUS at 22:05

## 2021-07-21 RX ADMIN — HYDROMORPHONE HYDROCHLORIDE 2 MILLIGRAM(S): 2 INJECTION INTRAMUSCULAR; INTRAVENOUS; SUBCUTANEOUS at 17:45

## 2021-07-21 NOTE — PROGRESS NOTE ADULT - SUBJECTIVE AND OBJECTIVE BOX
DATE OF SERVICE: 07-21-21 @ 09:10    Patient is a 25y old  Female who presents with a chief complaint of Crohn's Flare (21 Jul 2021 07:58)      SUBJECTIVE / OVERNIGHT EVENTS: infliximab received yesterday at 17:00. Some pain overnight, required 3 doses of dilaudid. Pt stated that this helped and she was able to sleep. Pt also received reglan and LR at 3:00. When she woke up this morning, she said the pain was completely gone, from both her abdomen and chest. The pt reported less nausea this morning, she had no episodes of vomiting last night or this morning. She has had some gas (burping) this morning. No other events.       MEDICATIONS  (STANDING):  enoxaparin Injectable 40 milliGRAM(s) SubCutaneous daily  lactated ringers. 1000 milliLiter(s) (125 mL/Hr) IV Continuous <Continuous>  simethicone 80 milliGRAM(s) Chew daily    MEDICATIONS  (PRN):  aluminum hydroxide/magnesium hydroxide/simethicone Suspension 30 milliLiter(s) Oral every 4 hours PRN Dyspepsia  HYDROmorphone  Injectable 0.5 milliGRAM(s) IV Push every 4 hours PRN Severe Pain (7 - 10)  metoclopramide Injectable 10 milliGRAM(s) IV Push every 6 hours PRN nausea/vomiting      Vital Signs Last 24 Hrs  T(C): 37.1 (21 Jul 2021 06:04), Max: 37.1 (21 Jul 2021 06:04)  T(F): 98.8 (21 Jul 2021 06:04), Max: 98.8 (21 Jul 2021 06:04)  HR: 94 (21 Jul 2021 06:04) (89 - 100)  BP: 106/59 (21 Jul 2021 06:04) (100/65 - 132/87)  BP(mean): --  RR: 18 (21 Jul 2021 06:04) (16 - 18)  SpO2: 96% (21 Jul 2021 06:04) (96% - 100%)  CAPILLARY BLOOD GLUCOSE        I&O's Summary      PHYSICAL EXAM:  GENERAL: NAD, well-developed  HEAD:  Atraumatic, Normocephalic  EYES: EOMI, PERRLA, conjunctiva and sclera clear  NECK: Supple, No JVD  CHEST/LUNG: Clear to auscultation bilaterally; No wheeze  HEART: Regular rate and rhythm; No murmurs, rubs, or gallops  ABDOMEN: Soft, Nondistended; Bowel sounds present in all four quadrants, slightly tender in right upper and lower quadrant   EXTREMITIES:  2+ Peripheral Pulses, No clubbing, cyanosis, or edema  PSYCH: AAOx3  NEUROLOGY: non-focal  SKIN: No rashes or lesions    LABS:                        10.0   10.09 )-----------( 417      ( 21 Jul 2021 07:38 )             31.3     07-21    136  |  101  |  7   ----------------------------<  87  3.6   |  19<L>  |  0.44<L>    Ca    9.0      21 Jul 2021 07:38  Phos  3.0     07-21  Mg     1.90     07-21    TPro  6.4  /  Alb  3.8  /  TBili  0.2  /  DBili  x   /  AST  14  /  ALT  16  /  AlkPhos  64  07-20              RADIOLOGY & ADDITIONAL TESTS:    Imaging Personally Reviewed:    Consultant(s) Notes Reviewed:      Care Discussed with Consultants/Other Providers:   DATE OF SERVICE: 07-21-21 @ 09:10    Patient is a 25y old  Female who presents with a chief complaint of Crohn's Flare (21 Jul 2021 07:58)      SUBJECTIVE / OVERNIGHT EVENTS: infliximab received yesterday at 17:00. Some pain overnight, required 3 doses of dilaudid. Pt stated that this helped and she was able to sleep. Pt also received reglan 3:00. When she woke up this morning, she said the pain was completely gone, from both her abdomen and chest. The pt reported less nausea this morning, she had no episodes of vomiting last night or this morning. She has had some gas (burping) this morning. No other events.       MEDICATIONS  (STANDING):  enoxaparin Injectable 40 milliGRAM(s) SubCutaneous daily  lactated ringers. 1000 milliLiter(s) (125 mL/Hr) IV Continuous <Continuous>  simethicone 80 milliGRAM(s) Chew daily    MEDICATIONS  (PRN):  aluminum hydroxide/magnesium hydroxide/simethicone Suspension 30 milliLiter(s) Oral every 4 hours PRN Dyspepsia  HYDROmorphone  Injectable 0.5 milliGRAM(s) IV Push every 4 hours PRN Severe Pain (7 - 10)  metoclopramide Injectable 10 milliGRAM(s) IV Push every 6 hours PRN nausea/vomiting      Vital Signs Last 24 Hrs  T(C): 37.1 (21 Jul 2021 06:04), Max: 37.1 (21 Jul 2021 06:04)  T(F): 98.8 (21 Jul 2021 06:04), Max: 98.8 (21 Jul 2021 06:04)  HR: 94 (21 Jul 2021 06:04) (89 - 100)  BP: 106/59 (21 Jul 2021 06:04) (100/65 - 132/87)  BP(mean): --  RR: 18 (21 Jul 2021 06:04) (16 - 18)  SpO2: 96% (21 Jul 2021 06:04) (96% - 100%)  CAPILLARY BLOOD GLUCOSE        I&O's Summary      PHYSICAL EXAM:  GENERAL: NAD, well-developed  HEAD:  Atraumatic, Normocephalic  EYES: EOMI, PERRLA, conjunctiva and sclera clear  NECK: Supple, No JVD  CHEST/LUNG: Clear to auscultation bilaterally; No wheeze  HEART: Regular rate and rhythm; No murmurs, rubs, or gallops  ABDOMEN: Soft, Nondistended; Bowel sounds present in all four quadrants, slightly tender in right upper and lower quadrant   EXTREMITIES:  2+ Peripheral Pulses, No clubbing, cyanosis, or edema  PSYCH: AAOx3  NEUROLOGY: non-focal  SKIN: No rashes or lesions    LABS:                        10.0   10.09 )-----------( 417      ( 21 Jul 2021 07:38 )             31.3     07-21    136  |  101  |  7   ----------------------------<  87  3.6   |  19<L>  |  0.44<L>    Ca    9.0      21 Jul 2021 07:38  Phos  3.0     07-21  Mg     1.90     07-21    TPro  6.4  /  Alb  3.8  /  TBili  0.2  /  DBili  x   /  AST  14  /  ALT  16  /  AlkPhos  64  07-20              RADIOLOGY & ADDITIONAL TESTS:    Imaging Personally Reviewed:    Consultant(s) Notes Reviewed:      Care Discussed with Consultants/Other Providers:

## 2021-07-21 NOTE — PROGRESS NOTE ADULT - SUBJECTIVE AND OBJECTIVE BOX
Chief Complaint:  Patient is a 25y old  Female who presents with a chief complaint of Crohn's Flare (20 Jul 2021 14:00)      Interval Events:       Hospital Medications:  aluminum hydroxide/magnesium hydroxide/simethicone Suspension 30 milliLiter(s) Oral every 4 hours PRN  enoxaparin Injectable 40 milliGRAM(s) SubCutaneous daily  HYDROmorphone  Injectable 0.5 milliGRAM(s) IV Push every 4 hours PRN  lactated ringers. 1000 milliLiter(s) IV Continuous <Continuous>  metoclopramide Injectable 10 milliGRAM(s) IV Push every 6 hours PRN  simethicone 80 milliGRAM(s) Chew daily      PMHX/PSHX:  No pertinent past medical history    Crohn disease    No pertinent past surgical history        ROS: 10 point ROS neg unless stated in subjective      PHYSICAL EXAM:     GENERAL:  Appears stated age, well-groomed, well-nourished, no distress  HEENT:  NC/AT,  conjunctivae clear and pink  CHEST:  Full & symmetric excursion, no increased effort, breath sounds clear  HEART:  Regular rhythm, S1, S2, no murmur/rub/S3/S4  ABDOMEN:  +thin, soft, +mild R-sided tenderness, non-distended, normoactive bowel sounds,  no masses,  EXTREMITIES:  no cyanosis, clubbing or edema  SKIN:  No rash/erythema/ecchymoses/petechiae/wounds/abscess/warm/dry  NEURO:  Alert, oriented      PHYSICAL EXAM:     GENERAL:  Well developed, no distress  HEENT:  NC/AT,  conjunctivae clear, sclera anicteric  CHEST:  Full & symmetric excursion, no increased effort w/ respirations  HEART:  Regular rhythm & rate  ABDOMEN:  Soft, non-tender, non-distended  EXTREMITIES:  no LE  edema  SKIN:  No rash/erythema/ecchymoses/petechiae/wounds/jaundice  NEURO:  Alert, oriented    Vital Signs:  Vital Signs Last 24 Hrs  T(C): 37.1 (21 Jul 2021 06:04), Max: 37.1 (21 Jul 2021 06:04)  T(F): 98.8 (21 Jul 2021 06:04), Max: 98.8 (21 Jul 2021 06:04)  HR: 94 (21 Jul 2021 06:04) (89 - 100)  BP: 106/59 (21 Jul 2021 06:04) (100/65 - 132/87)  BP(mean): --  RR: 18 (21 Jul 2021 06:04) (16 - 18)  SpO2: 96% (21 Jul 2021 06:04) (96% - 100%)  Daily Height in cm: 154.94 (20 Jul 2021 17:02)    Daily     LABS:                        9.1    8.86  )-----------( 375      ( 20 Jul 2021 07:01 )             29.5     07-20    135  |  101  |  5<L>  ----------------------------<  98  3.4<L>   |  21<L>  |  0.44<L>    Ca    8.5      20 Jul 2021 07:01    TPro  6.4  /  Alb  3.8  /  TBili  0.2  /  DBili  x   /  AST  14  /  ALT  16  /  AlkPhos  64  07-20    LIVER FUNCTIONS - ( 20 Jul 2021 07:01 )  Alb: 3.8 g/dL / Pro: 6.4 g/dL / ALK PHOS: 64 U/L / ALT: 16 U/L / AST: 14 U/L / GGT: x                   Imaging:               Chief Complaint:  Patient is a 25y old  Female who presents with a chief complaint of Crohn's Flare (20 Jul 2021 14:00)      Interval Events: Reports her N/V is well controlled on meds. States she is amenable to trying liquids today. States abdominal pain is improving. No bloody stools, no CP. Tolerated infliximab infusion well yesterday.      Hospital Medications:  aluminum hydroxide/magnesium hydroxide/simethicone Suspension 30 milliLiter(s) Oral every 4 hours PRN  enoxaparin Injectable 40 milliGRAM(s) SubCutaneous daily  HYDROmorphone  Injectable 0.5 milliGRAM(s) IV Push every 4 hours PRN  lactated ringers. 1000 milliLiter(s) IV Continuous <Continuous>  metoclopramide Injectable 10 milliGRAM(s) IV Push every 6 hours PRN  simethicone 80 milliGRAM(s) Chew daily      PMHX/PSHX:  No pertinent past medical history    Crohn disease    No pertinent past surgical history        ROS: 10 point ROS neg unless stated in subjective      PHYSICAL EXAM:     GENERAL:  Appears stated age, well-groomed, well-nourished, no distress  HEENT:  NC/AT,  conjunctivae clear and pink  CHEST:  Full & symmetric excursion, no increased effort, breath sounds clear  HEART:  Regular rhythm, S1, S2, no murmur/rub/S3/S4  ABDOMEN:  +thin, soft, +mild R-sided tenderness, non-distended, normoactive bowel sounds,  no masses,  EXTREMITIES:  no cyanosis, clubbing or edema  SKIN:  No rash/erythema/ecchymoses/petechiae/wounds/abscess/warm/dry  NEURO:  Alert, oriented      Vital Signs:  Vital Signs Last 24 Hrs  T(C): 37.1 (21 Jul 2021 06:04), Max: 37.1 (21 Jul 2021 06:04)  T(F): 98.8 (21 Jul 2021 06:04), Max: 98.8 (21 Jul 2021 06:04)  HR: 94 (21 Jul 2021 06:04) (89 - 100)  BP: 106/59 (21 Jul 2021 06:04) (100/65 - 132/87)  BP(mean): --  RR: 18 (21 Jul 2021 06:04) (16 - 18)  SpO2: 96% (21 Jul 2021 06:04) (96% - 100%)  Daily Height in cm: 154.94 (20 Jul 2021 17:02)    Daily     LABS:                        9.1    8.86  )-----------( 375      ( 20 Jul 2021 07:01 )             29.5     07-20    135  |  101  |  5<L>  ----------------------------<  98  3.4<L>   |  21<L>  |  0.44<L>    Ca    8.5      20 Jul 2021 07:01    TPro  6.4  /  Alb  3.8  /  TBili  0.2  /  DBili  x   /  AST  14  /  ALT  16  /  AlkPhos  64  07-20    LIVER FUNCTIONS - ( 20 Jul 2021 07:01 )  Alb: 3.8 g/dL / Pro: 6.4 g/dL / ALK PHOS: 64 U/L / ALT: 16 U/L / AST: 14 U/L / GGT: x                   Imaging: No new imaging

## 2021-07-21 NOTE — PROGRESS NOTE ADULT - PROBLEM SELECTOR PLAN 1
- CT AP revealed wall thickening and inflammation of terminal ileum consistent w/ Crohn's flare  - GI consulted  - Likely will receive Infliximab infusion today  - c/w Reglan 10mg IV q6prn  - Will obtain daily EKGs to monitor for QT prolongation?  - Simethicone once for gas/bloating  - Clear liquid diet, advance as tolerated  - Not tolerating PO, all meds IV for now  - Dilaudid 0.5mg IV q4prn  - Consider adding Ativan 0.5mg IV if pain/anxiety persist  - Monitor BMs - CT AP revealed wall thickening and inflammation of terminal ileum consistent w/ Crohn's flare  - GI consulted  - Infliximab infusion 7/20  - c/w Reglan 10mg IV q6prn  - Will obtain daily EKGs to monitor for QT prolongation  - Clear liquid diet, advance as tolerated  - Not tolerating PO, all meds IV for now  - Dilaudid 0.5mg IV q4prn, will try to convert to PO today  - Monitor BMs

## 2021-07-21 NOTE — MEDICAL STUDENT PROGRESS NOTE(EDUCATION) - SUBJECTIVE AND OBJECTIVE BOX
Patient is a 25y old  Female who presents with a chief complaint of Crohn's Flare (21 Jul 2021 07:58)      SUBJECTIVE / OVERNIGHT EVENTS: infliximab received yesterday at 17:00. Some pain overnight, required 3 doses of dilaudid. Pt stated that this helped and she was able to sleep. Pt also received reglan and LR at 3:00. When she woke up this morning, she said the pain was completely gone, from both her abdomen and chest. The pt reported less nausea this morning, she had no episodes of vomiting last night or this morning. She has had some gas (burping) this morning. No other events.     MEDICATIONS  (STANDING):  enoxaparin Injectable 40 milliGRAM(s) SubCutaneous daily  lactated ringers. 1000 milliLiter(s) (125 mL/Hr) IV Continuous <Continuous>  simethicone 80 milliGRAM(s) Chew daily    MEDICATIONS  (PRN):  aluminum hydroxide/magnesium hydroxide/simethicone Suspension 30 milliLiter(s) Oral every 4 hours PRN Dyspepsia  HYDROmorphone  Injectable 0.5 milliGRAM(s) IV Push every 4 hours PRN Severe Pain (7 - 10)  metoclopramide Injectable 10 milliGRAM(s) IV Push every 6 hours PRN nausea/vomiting        PHYSICAL EXAM:  GENERAL: NAD, well-developed  HEAD:  Atraumatic, Normocephalic  EYES: EOMI, PERRLA, conjunctiva and sclera clear  NECK: Supple, No JVD  CHEST/LUNG: Clear to auscultation bilaterally; No wheeze  HEART: Regular rate and rhythm; No murmurs, rubs, or gallops  ABDOMEN: Soft, Nondistended; Bowel sounds present in all four quadrants, slightly tender in right upper and lower quadrant   EXTREMITIES:  2+ Peripheral Pulses, No clubbing, cyanosis, or edema  PSYCH: AAOx3  NEUROLOGY: non-focal  SKIN: No rashes or lesions    LABS:                        10.0   10.09 )-----------( 417      ( 21 Jul 2021 07:38 )             31.3     07-21    136  |  101  |  7   ----------------------------<  87  3.6   |  19<L>  |  0.44<L>    Ca    9.0      21 Jul 2021 07:38  Phos  3.0     07-21  Mg     1.90     07-21    TPro  6.4  /  Alb  3.8  /  TBili  0.2  /  DBili  x   /  AST  14  /  ALT  16  /  AlkPhos  64  07-20      RADIOLOGY & ADDITIONAL TESTS: none    Consultant(s) Notes Reviewed: GI consult yesterday      assessment: 25 year old female presenting with abdominal pain, nausea, and vomiting consistent with an acute Crohn's flare. CT imaging was consistent with flare as well.     plan:  #1 Crohn's Flare  - refer to GI consult   - infliximab administered yesterday at 17:00  - Reglan if needed today  - daily EKG to monitor for QTc prolongation  - simethicone  - pepcid administered once  - clear liquid diet, regular diet if tolerating today w/o pain  - Dilaudid 0.5mg IV Q4  - consider ativan if pain returns for anxiety   - BM monitoring    #2 Anemia  - iron studies  - daily CBC  - hemolytic studies likely not needed due to normal T bili  - transfuse if hemoglobin < 7    #3 ppx dvt  - lovenox

## 2021-07-21 NOTE — PROGRESS NOTE ADULT - ASSESSMENT
25F with recently diagnosed Crohn's disease 06/2021 p/w N/V/D/abd pain x 2d. GI consulted due to c/f recurrent flare in the setting of recent onset of menses. Having recurrent episodes of RLQ abd pain w/ diarrhea, imaging this admission with terminal ileitis. s/p c-scope 6/10/21 showing inflammation in ascending colon + stricture which could be traversed w/ upper endoscopic. Biopsies of stricture + colonic inflammation taken, resulted (+) chronic colitis, cryptitis, mild crypt distortion, neg for granulomata/dysplasia/viral cytopathic effects. Pt's likely dx mod-severe, fistulizing Crohn's disease. Work up: negative quant gold, Hep B infection, path negative for CMV. Started on infliximab s/p 2x infusions, last infusion 6/25.     Impression:   #Crohn's Disease   #abdominal pain thought to be triggered by menses vs Crohn's flare  #H/o ascending colon stricture: inflammatory in setting of new dx Crohn's vs fibrostenotic 2/2 chronic inflammation       Recommendations  - IVFs  - will order infliximab infusion for pt to receive today  - CLD can advance diet as tolerated  - avoid NSAIDS  - IV Tylenol PRN for pain, Zofran PRN for nausea (please check QTc on EKG)  - recommend pt to have COVID vaccine (has not yet been vaccinated)  - Pt can go home once able to tolerate PO  - Patient has OP f/u with Dr. Berger 10/5    Michelle Howard MD  GI Fellow, PGY-4  Available via [employer] Teams    NON-URGENT CONSULTS:  Please email [email or web address]  AT NIGHT AND ON WEEKENDS:  Contact on-call GI fellow via answering service ([phone]) from 5pm-8am and on weekends/holidays  MONDAY-FRIDAY 8AM-5PM:  Pager# [phone] ([facility]) or [phone] ([facility])  GI Phone# [phone] ([facility])     25F with recently diagnosed Crohn's disease 06/2021 p/w N/V/D/abd pain x 2d. GI consulted due to c/f recurrent flare in the setting of recent onset of menses. Having recurrent episodes of RLQ abd pain w/ diarrhea, imaging this admission with terminal ileitis. s/p c-scope 6/10/21 showing inflammation in ascending colon + stricture which could be traversed w/ upper endoscopic. Biopsies of stricture + colonic inflammation taken, resulted (+) chronic colitis, cryptitis, mild crypt distortion, neg for granulomata/dysplasia/viral cytopathic effects. Pt's likely dx mod-severe, fistulizing Crohn's disease. Work up: negative quant gold, Hep B infection, path negative for CMV. Started on infliximab s/p 3x infusions, last infusion 7/21.     Impression:   #Crohn's Disease  #abdominal pain thought to be triggered by menses vs Crohn's flare  #H/o ascending colon stricture: inflammatory in setting of new dx Crohn's vs fibrostenotic 2/2 chronic inflammation       Recommendations  - IVFs if not taking PO  - s/p infliximab infusion 7/21  - CLD can advance diet as tolerated  - avoid NSAIDS, can try GI cocktail for abd pain  - antiemetics per primary team  - recommend pt to have COVID vaccine (has not yet been vaccinated)  - Pt can go home once able to tolerate PO  - Patient has OP f/u with Dr. Berger 10/5    Michelle Howard MD  GI Fellow, PGY-4  Available via [employer] Teams    NON-URGENT CONSULTS:  Please email [email or web address]  AT NIGHT AND ON WEEKENDS:  Contact on-call GI fellow via answering service ([phone]) from 5pm-8am and on weekends/holidays  MONDAY-FRIDAY 8AM-5PM:  Pager# [phone] ([facility]) or [phone] ([facility])  GI Phone# [phone] ([facility])

## 2021-07-21 NOTE — PROGRESS NOTE ADULT - PROBLEM SELECTOR PLAN 2
- Likely dilutional vs blood loss 2/2 menorrhagia   - Hgb improved- 9.1->10.09  - Iron studies WNL  - Daily CBC  - No need for hemolysis studies i/s/o normal t.bili  - Transfuse if hgb<7

## 2021-07-22 LAB
ANION GAP SERPL CALC-SCNC: 19 MMOL/L — HIGH (ref 7–14)
BUN SERPL-MCNC: 7 MG/DL — SIGNIFICANT CHANGE UP (ref 7–23)
CALCIUM SERPL-MCNC: 9.3 MG/DL — SIGNIFICANT CHANGE UP (ref 8.4–10.5)
CHLORIDE SERPL-SCNC: 95 MMOL/L — LOW (ref 98–107)
CO2 SERPL-SCNC: 18 MMOL/L — LOW (ref 22–31)
CREAT SERPL-MCNC: 0.37 MG/DL — LOW (ref 0.5–1.3)
GLUCOSE SERPL-MCNC: 85 MG/DL — SIGNIFICANT CHANGE UP (ref 70–99)
HCT VFR BLD CALC: 36.5 % — SIGNIFICANT CHANGE UP (ref 34.5–45)
HGB BLD-MCNC: 11.5 G/DL — SIGNIFICANT CHANGE UP (ref 11.5–15.5)
MAGNESIUM SERPL-MCNC: 2.4 MG/DL — SIGNIFICANT CHANGE UP (ref 1.6–2.6)
MCHC RBC-ENTMCNC: 25.4 PG — LOW (ref 27–34)
MCHC RBC-ENTMCNC: 31.5 GM/DL — LOW (ref 32–36)
MCV RBC AUTO: 80.8 FL — SIGNIFICANT CHANGE UP (ref 80–100)
NRBC # BLD: 0 /100 WBCS — SIGNIFICANT CHANGE UP
NRBC # FLD: 0 K/UL — SIGNIFICANT CHANGE UP
PHOSPHATE SERPL-MCNC: 2.7 MG/DL — SIGNIFICANT CHANGE UP (ref 2.5–4.5)
PLATELET # BLD AUTO: 514 K/UL — HIGH (ref 150–400)
POTASSIUM SERPL-MCNC: 3.3 MMOL/L — LOW (ref 3.5–5.3)
POTASSIUM SERPL-SCNC: 3.3 MMOL/L — LOW (ref 3.5–5.3)
RBC # BLD: 4.52 M/UL — SIGNIFICANT CHANGE UP (ref 3.8–5.2)
RBC # FLD: 13.7 % — SIGNIFICANT CHANGE UP (ref 10.3–14.5)
SODIUM SERPL-SCNC: 132 MMOL/L — LOW (ref 135–145)
WBC # BLD: 11.11 K/UL — HIGH (ref 3.8–10.5)
WBC # FLD AUTO: 11.11 K/UL — HIGH (ref 3.8–10.5)

## 2021-07-22 PROCEDURE — 99232 SBSQ HOSP IP/OBS MODERATE 35: CPT | Mod: GC

## 2021-07-22 PROCEDURE — 74018 RADEX ABDOMEN 1 VIEW: CPT | Mod: 26

## 2021-07-22 PROCEDURE — 93010 ELECTROCARDIOGRAM REPORT: CPT

## 2021-07-22 RX ORDER — SODIUM CHLORIDE 9 MG/ML
1000 INJECTION INTRAMUSCULAR; INTRAVENOUS; SUBCUTANEOUS
Refills: 0 | Status: DISCONTINUED | OUTPATIENT
Start: 2021-07-22 | End: 2021-07-22

## 2021-07-22 RX ORDER — ACETAMINOPHEN 500 MG
650 TABLET ORAL ONCE
Refills: 0 | Status: COMPLETED | OUTPATIENT
Start: 2021-07-22 | End: 2021-07-22

## 2021-07-22 RX ORDER — METOCLOPRAMIDE HCL 10 MG
10 TABLET ORAL ONCE
Refills: 0 | Status: COMPLETED | OUTPATIENT
Start: 2021-07-22 | End: 2021-07-22

## 2021-07-22 RX ORDER — METOCLOPRAMIDE HCL 10 MG
10 TABLET ORAL EVERY 8 HOURS
Refills: 0 | Status: DISCONTINUED | OUTPATIENT
Start: 2021-07-22 | End: 2021-07-25

## 2021-07-22 RX ORDER — PANTOPRAZOLE SODIUM 20 MG/1
40 TABLET, DELAYED RELEASE ORAL DAILY
Refills: 0 | Status: DISCONTINUED | OUTPATIENT
Start: 2021-07-22 | End: 2021-07-24

## 2021-07-22 RX ORDER — SODIUM CHLORIDE 9 MG/ML
1000 INJECTION INTRAMUSCULAR; INTRAVENOUS; SUBCUTANEOUS
Refills: 0 | Status: DISCONTINUED | OUTPATIENT
Start: 2021-07-22 | End: 2021-07-24

## 2021-07-22 RX ORDER — POTASSIUM PHOSPHATE, MONOBASIC POTASSIUM PHOSPHATE, DIBASIC 236; 224 MG/ML; MG/ML
30 INJECTION, SOLUTION INTRAVENOUS ONCE
Refills: 0 | Status: DISCONTINUED | OUTPATIENT
Start: 2021-07-22 | End: 2021-07-22

## 2021-07-22 RX ORDER — POTASSIUM CHLORIDE 20 MEQ
10 PACKET (EA) ORAL
Refills: 0 | Status: COMPLETED | OUTPATIENT
Start: 2021-07-22 | End: 2021-07-22

## 2021-07-22 RX ADMIN — Medication 100 MILLIEQUIVALENT(S): at 12:52

## 2021-07-22 RX ADMIN — Medication 650 MILLIGRAM(S): at 00:45

## 2021-07-22 RX ADMIN — SODIUM CHLORIDE 75 MILLILITER(S): 9 INJECTION INTRAMUSCULAR; INTRAVENOUS; SUBCUTANEOUS at 22:27

## 2021-07-22 RX ADMIN — SODIUM CHLORIDE 75 MILLILITER(S): 9 INJECTION INTRAMUSCULAR; INTRAVENOUS; SUBCUTANEOUS at 14:37

## 2021-07-22 RX ADMIN — HYDROMORPHONE HYDROCHLORIDE 2 MILLIGRAM(S): 2 INJECTION INTRAMUSCULAR; INTRAVENOUS; SUBCUTANEOUS at 22:27

## 2021-07-22 RX ADMIN — HYDROMORPHONE HYDROCHLORIDE 2 MILLIGRAM(S): 2 INJECTION INTRAMUSCULAR; INTRAVENOUS; SUBCUTANEOUS at 16:30

## 2021-07-22 RX ADMIN — HYDROMORPHONE HYDROCHLORIDE 2 MILLIGRAM(S): 2 INJECTION INTRAMUSCULAR; INTRAVENOUS; SUBCUTANEOUS at 10:52

## 2021-07-22 RX ADMIN — Medication 100 MILLIEQUIVALENT(S): at 11:42

## 2021-07-22 RX ADMIN — HYDROMORPHONE HYDROCHLORIDE 2 MILLIGRAM(S): 2 INJECTION INTRAMUSCULAR; INTRAVENOUS; SUBCUTANEOUS at 15:32

## 2021-07-22 RX ADMIN — HYDROMORPHONE HYDROCHLORIDE 2 MILLIGRAM(S): 2 INJECTION INTRAMUSCULAR; INTRAVENOUS; SUBCUTANEOUS at 23:27

## 2021-07-22 RX ADMIN — SODIUM CHLORIDE 75 MILLILITER(S): 9 INJECTION INTRAMUSCULAR; INTRAVENOUS; SUBCUTANEOUS at 10:45

## 2021-07-22 RX ADMIN — Medication 100 MILLIEQUIVALENT(S): at 10:44

## 2021-07-22 RX ADMIN — PANTOPRAZOLE SODIUM 40 MILLIGRAM(S): 20 TABLET, DELAYED RELEASE ORAL at 11:42

## 2021-07-22 RX ADMIN — Medication 10 MILLIGRAM(S): at 12:53

## 2021-07-22 RX ADMIN — HYDROMORPHONE HYDROCHLORIDE 2 MILLIGRAM(S): 2 INJECTION INTRAMUSCULAR; INTRAVENOUS; SUBCUTANEOUS at 11:50

## 2021-07-22 RX ADMIN — Medication 260 MILLIGRAM(S): at 00:34

## 2021-07-22 NOTE — PROGRESS NOTE ADULT - SUBJECTIVE AND OBJECTIVE BOX
Chief Complaint:  Patient is a 25y old  Female who presents with a chief complaint of Crohn's Flare (22 Jul 2021 06:38)      Interval Events: Was having N/V and abdominal pain this AM. Reports she is tolerating PO juice. States she is agreeable to EGD tomorrow.      Hospital Medications:  aluminum hydroxide/magnesium hydroxide/simethicone Suspension 30 milliLiter(s) Oral every 4 hours PRN  enoxaparin Injectable 40 milliGRAM(s) SubCutaneous daily  HYDROmorphone   Tablet 2 milliGRAM(s) Oral every 4 hours PRN  metoclopramide Injectable 10 milliGRAM(s) IV Push every 8 hours PRN  pantoprazole  Injectable 40 milliGRAM(s) IV Push daily  simethicone 80 milliGRAM(s) Chew daily  sodium chloride 0.9%. 1000 milliLiter(s) IV Continuous <Continuous>      PMHX/PSHX:  No pertinent past medical history    Crohn disease    No pertinent past surgical history        ROS: 10 point ROS neg unless stated in subjective      PHYSICAL EXAM:     GENERAL:  Appears stated age, well-groomed, well-nourished, no distress  HEENT:  NC/AT,  conjunctivae clear and pink  CHEST:  Full & symmetric excursion, no increased effort, breath sounds clear  HEART:  Regular rhythm, S1, S2, no murmur/rub/S3/S4  ABDOMEN:  +thin, soft, +mild R-sided tenderness, non-distended, normoactive bowel sounds,  no masses,  EXTREMITIES:  no cyanosis, clubbing or edema  SKIN:  No rash/erythema/ecchymoses/petechiae/wounds/abscess/warm/dry  NEURO:  Alert, orientedx3    Vital Signs:  Vital Signs Last 24 Hrs  T(C): 36.8 (22 Jul 2021 12:20), Max: 37 (22 Jul 2021 05:15)  T(F): 98.3 (22 Jul 2021 12:20), Max: 98.6 (22 Jul 2021 05:15)  HR: 88 (22 Jul 2021 12:20) (87 - 88)  BP: 127/84 (22 Jul 2021 12:20) (126/89 - 129/78)  BP(mean): --  RR: 18 (22 Jul 2021 12:20) (17 - 18)  SpO2: 100% (22 Jul 2021 12:20) (100% - 100%)  Daily     Daily     LABS:                        11.5   11.11 )-----------( 514      ( 22 Jul 2021 08:06 )             36.5     07-22    132<L>  |  95<L>  |  7   ----------------------------<  85  3.3<L>   |  18<L>  |  0.37<L>    Ca    9.3      22 Jul 2021 08:06  Phos  2.7     07-22  Mg     2.40     07-22                Imaging: ACE pending

## 2021-07-22 NOTE — PROGRESS NOTE ADULT - SUBJECTIVE AND OBJECTIVE BOX
DATE OF SERVICE: 07-22-21 @ 06:38    Patient is a 25y old  Female who presents with a chief complaint of Crohn's Flare (21 Jul 2021 09:09)      SUBJECTIVE / OVERNIGHT EVENTS:    MEDICATIONS  (STANDING):  enoxaparin Injectable 40 milliGRAM(s) SubCutaneous daily  simethicone 80 milliGRAM(s) Chew daily    MEDICATIONS  (PRN):  aluminum hydroxide/magnesium hydroxide/simethicone Suspension 30 milliLiter(s) Oral every 4 hours PRN Dyspepsia  HYDROmorphone   Tablet 2 milliGRAM(s) Oral every 4 hours PRN Severe Pain (7 - 10)  metoclopramide 5 milliGRAM(s) Oral every 8 hours PRN nausea/vomiting      Vital Signs Last 24 Hrs  T(C): 37 (22 Jul 2021 05:15), Max: 37 (21 Jul 2021 18:00)  T(F): 98.6 (22 Jul 2021 05:15), Max: 98.6 (21 Jul 2021 18:00)  HR: 87 (22 Jul 2021 05:15) (83 - 91)  BP: 129/78 (22 Jul 2021 05:15) (111/82 - 133/87)  BP(mean): --  RR: 18 (22 Jul 2021 05:15) (16 - 18)  SpO2: 100% (22 Jul 2021 05:15) (98% - 100%)  CAPILLARY BLOOD GLUCOSE        I&O's Summary      PHYSICAL EXAM:  GENERAL: NAD, well-developed  HEAD:  Atraumatic, Normocephalic  EYES: EOMI, PERRLA, conjunctiva and sclera clear  NECK: Supple, No JVD  CHEST/LUNG: Clear to auscultation bilaterally; No wheeze  HEART: Regular rate and rhythm; No murmurs, rubs, or gallops  ABDOMEN: Soft, Nontender, Nondistended; Bowel sounds present  EXTREMITIES:  2+ Peripheral Pulses, No clubbing, cyanosis, or edema  PSYCH: AAOx3  NEUROLOGY: non-focal  SKIN: No rashes or lesions    LABS:                        10.0   10.09 )-----------( 417      ( 21 Jul 2021 07:38 )             31.3     07-21    136  |  101  |  7   ----------------------------<  87  3.6   |  19<L>  |  0.44<L>    Ca    9.0      21 Jul 2021 07:38  Phos  3.0     07-21  Mg     1.90     07-21    TPro  6.4  /  Alb  3.8  /  TBili  0.2  /  DBili  x   /  AST  14  /  ALT  16  /  AlkPhos  64  07-20              RADIOLOGY & ADDITIONAL TESTS:    Imaging Personally Reviewed:    Consultant(s) Notes Reviewed:      Care Discussed with Consultants/Other Providers:   DATE OF SERVICE: 07-22-21 @ 06:38    Patient is a 25y old  Female who presents with a chief complaint of Crohn's Flare (21 Jul 2021 09:09)      SUBJECTIVE / OVERNIGHT EVENTS: Patient continues to complain of pain, received Ofirmev 650mg IV last night.     MEDICATIONS  (STANDING):  enoxaparin Injectable 40 milliGRAM(s) SubCutaneous daily  simethicone 80 milliGRAM(s) Chew daily    MEDICATIONS  (PRN):  aluminum hydroxide/magnesium hydroxide/simethicone Suspension 30 milliLiter(s) Oral every 4 hours PRN Dyspepsia  HYDROmorphone   Tablet 2 milliGRAM(s) Oral every 4 hours PRN Severe Pain (7 - 10)  metoclopramide 5 milliGRAM(s) Oral every 8 hours PRN nausea/vomiting      Vital Signs Last 24 Hrs  T(C): 37 (22 Jul 2021 05:15), Max: 37 (21 Jul 2021 18:00)  T(F): 98.6 (22 Jul 2021 05:15), Max: 98.6 (21 Jul 2021 18:00)  HR: 87 (22 Jul 2021 05:15) (83 - 91)  BP: 129/78 (22 Jul 2021 05:15) (111/82 - 133/87)  BP(mean): --  RR: 18 (22 Jul 2021 05:15) (16 - 18)  SpO2: 100% (22 Jul 2021 05:15) (98% - 100%)  CAPILLARY BLOOD GLUCOSE        I&O's Summary      PHYSICAL EXAM:  GENERAL: NAD, well-developed  HEAD:  Atraumatic, Normocephalic  EYES: EOMI, PERRLA, conjunctiva and sclera clear  NECK: Supple, No JVD  CHEST/LUNG: Clear to auscultation bilaterally; No wheeze  HEART: Regular rate and rhythm; No murmurs, rubs, or gallops  ABDOMEN: Soft, Nontender, Nondistended; Bowel sounds present  EXTREMITIES:  2+ Peripheral Pulses, No clubbing, cyanosis, or edema  PSYCH: AAOx3  NEUROLOGY: non-focal  SKIN: No rashes or lesions    LABS:                        10.0   10.09 )-----------( 417      ( 21 Jul 2021 07:38 )             31.3     07-21    136  |  101  |  7   ----------------------------<  87  3.6   |  19<L>  |  0.44<L>    Ca    9.0      21 Jul 2021 07:38  Phos  3.0     07-21  Mg     1.90     07-21    TPro  6.4  /  Alb  3.8  /  TBili  0.2  /  DBili  x   /  AST  14  /  ALT  16  /  AlkPhos  64  07-20              RADIOLOGY & ADDITIONAL TESTS:    Imaging Personally Reviewed:    Consultant(s) Notes Reviewed:      Care Discussed with Consultants/Other Providers:   DATE OF SERVICE: 07-22-21 @ 06:38    Patient is a 25y old  Female who presents with a chief complaint of Crohn's Flare (21 Jul 2021 09:09)      SUBJECTIVE / OVERNIGHT EVENTS: Patient continues to complain of pain, received Ofirmev 650mg IV last night. +N/V, Denies SOB, CP, hematuria, dysuria     MEDICATIONS  (STANDING):  enoxaparin Injectable 40 milliGRAM(s) SubCutaneous daily  simethicone 80 milliGRAM(s) Chew daily    MEDICATIONS  (PRN):  aluminum hydroxide/magnesium hydroxide/simethicone Suspension 30 milliLiter(s) Oral every 4 hours PRN Dyspepsia  HYDROmorphone   Tablet 2 milliGRAM(s) Oral every 4 hours PRN Severe Pain (7 - 10)  metoclopramide 5 milliGRAM(s) Oral every 8 hours PRN nausea/vomiting      Vital Signs Last 24 Hrs  T(C): 37 (22 Jul 2021 05:15), Max: 37 (21 Jul 2021 18:00)  T(F): 98.6 (22 Jul 2021 05:15), Max: 98.6 (21 Jul 2021 18:00)  HR: 87 (22 Jul 2021 05:15) (83 - 91)  BP: 129/78 (22 Jul 2021 05:15) (111/82 - 133/87)  BP(mean): --  RR: 18 (22 Jul 2021 05:15) (16 - 18)  SpO2: 100% (22 Jul 2021 05:15) (98% - 100%)  CAPILLARY BLOOD GLUCOSE        I&O's Summary      PHYSICAL EXAM:  GENERAL: NAD, well-developed  HEAD:  Atraumatic, Normocephalic  EYES: EOMI, PERRLA, conjunctiva and sclera clear  NECK: Supple, No JVD  CHEST/LUNG: Clear to auscultation bilaterally; No wheeze  HEART: Regular rate and rhythm; No murmurs, rubs, or gallops  ABDOMEN: Soft, Nondistended; Bowel sounds present in all four quadrants. Diffuse mild tenderness. No guarding/rebound  EXTREMITIES:  2+ Peripheral Pulses, No clubbing, cyanosis, or edema  PSYCH: AAOx3  NEUROLOGY: non-focal  SKIN: No rashes or lesions      LABS:                        10.0   10.09 )-----------( 417      ( 21 Jul 2021 07:38 )             31.3     07-21    136  |  101  |  7   ----------------------------<  87  3.6   |  19<L>  |  0.44<L>    Ca    9.0      21 Jul 2021 07:38  Phos  3.0     07-21  Mg     1.90     07-21    TPro  6.4  /  Alb  3.8  /  TBili  0.2  /  DBili  x   /  AST  14  /  ALT  16  /  AlkPhos  64  07-20              RADIOLOGY & ADDITIONAL TESTS:    Imaging Personally Reviewed:    Consultant(s) Notes Reviewed:      Care Discussed with Consultants/Other Providers:

## 2021-07-22 NOTE — PROGRESS NOTE ADULT - PROBLEM SELECTOR PLAN 2
- Likely dilutional vs blood loss 2/2 menorrhagia   - Hgb improved- 9.1->10.09  - Iron studies WNL  - Daily CBC  - No need for hemolysis studies i/s/o normal t.bili  - Transfuse if hgb<7 - Likely dilutional vs blood loss 2/2 menorrhagia   - Hgb improving- 11.5  - Iron studies WNL  - Daily CBC  - No need for hemolysis studies i/s/o normal t.bili  - Transfuse if hgb<7

## 2021-07-22 NOTE — PROGRESS NOTE ADULT - PROBLEM SELECTOR PLAN 1
- CT AP revealed wall thickening and inflammation of terminal ileum consistent w/ Crohn's flare  - GI recs appreciated   - Infliximab infusion 7/20  - c/w Reglan 5mg PO q8prn for N/V  - Daily EKGs to monitor for QT prolongation  - c/w simethicone 80mg PO daily   - c/w Maalox 40mL PO q4prn for dyspepsia  - Clear liquid diet, advance as tolerated     - Dilaudid 2mg IV PO for pain  - Received Ofirmev 1g IV yesterday and 650mg IV overnight    - Monitor BMs - CT AP revealed wall thickening and inflammation of terminal ileum consistent w/ Crohn's flare  - GI recs appreciated   - Infliximab infusion 7/20  - c/w Reglan 5mg PO q8prn for N/V  - Daily EKGs to monitor for QT prolongation  - c/w simethicone 80mg PO daily   - c/w Maalox 40mL PO q4prn for dyspepsia  - Protonix 40mg IV daily for nausea   - Clear liquid diet, advance as tolerated- patient has not eaten     - Dilaudid 2mg IV PO for pain  - Received Ofirmev 1g IV yesterday and 650mg IV overnight    - Monitor BMs  - No BMs since admission  - Abdominal XR to r/o obstruction

## 2021-07-22 NOTE — PROGRESS NOTE ADULT - ASSESSMENT
25F with recently diagnosed Crohn's disease 06/2021 p/w N/V/D/abd pain x 2d. GI consulted due to c/f recurrent flare in the setting of recent onset of menses. Having recurrent episodes of RLQ abd pain w/ diarrhea, imaging this admission with terminal ileitis. s/p c-scope 6/10/21 showing inflammation in ascending colon + stricture which could be traversed w/ upper endoscopic. Biopsies of stricture + colonic inflammation taken, resulted (+) chronic colitis, cryptitis, mild crypt distortion, neg for granulomata/dysplasia/viral cytopathic effects. Pt's likely dx mod-severe, fistulizing Crohn's disease. Work up: negative quant gold, Hep B infection, path negative for CMV. Started on infliximab s/p 3x infusions, last infusion 7/21.     Impression:   #Crohn's Disease  #abdominal pain thought to be triggered by menses vs Crohn's flare  #H/o ascending colon stricture: inflammatory in setting of new dx Crohn's vs fibrostenotic 2/2 chronic inflammation       Recommendations  - encourage PO, diet as tolerated  - s/p infliximab infusion 7/21  - NPO after midnight, plan for EGD tomorrow  - avoid NSAIDS, can try GI cocktail for abd pain  - antiemetics per primary team  - recommend pt to have COVID vaccine (has not yet been vaccinated)  - Pt can go home once able to tolerate PO  - Patient has OP f/u with Dr. Berger 10/5    Michelle Howard MD  GI Fellow, PGY-4  Available via [employer] Teams    NON-URGENT CONSULTS:  Please email [email or web address]  AT NIGHT AND ON WEEKENDS:  Contact on-call GI fellow via answering service ([phone]) from 5pm-8am and on weekends/holidays  MONDAY-FRIDAY 8AM-5PM:  Pager# [phone] ([facility]) or [phone] ([facility])  GI Phone# [phone] ([facility])

## 2021-07-22 NOTE — MEDICAL STUDENT PROGRESS NOTE(EDUCATION) - SUBJECTIVE AND OBJECTIVE BOX
Patient is a 25y old  Female who presents with a chief complaint of Crohn's Flare (22 Jul 2021 06:38)      SUBJECTIVE / OVERNIGHT EVENTS: overnight pt continues to report pain and nausea/vomiting. Received 650mg of Ofirmev IV. Pt also complaining of acid reflux.    MEDICATIONS  (STANDING):  enoxaparin Injectable 40 milliGRAM(s) SubCutaneous daily  potassium chloride  10 mEq/100 mL IVPB 10 milliEquivalent(s) IV Intermittent every 1 hour  simethicone 80 milliGRAM(s) Chew daily    MEDICATIONS  (PRN):  aluminum hydroxide/magnesium hydroxide/simethicone Suspension 30 milliLiter(s) Oral every 4 hours PRN Dyspepsia  HYDROmorphone   Tablet 2 milliGRAM(s) Oral every 4 hours PRN Severe Pain (7 - 10)  metoclopramide 5 milliGRAM(s) Oral every 8 hours PRN nausea/vomiting        PHYSICAL EXAM:  GENERAL: NAD, well-developed  HEAD:  Atraumatic, Normocephalic  EYES: EOMI, PERRLA, conjunctiva and sclera clear  NECK: Supple, No JVD  CHEST/LUNG: Clear to auscultation bilaterally; No wheeze  HEART: Regular rate and rhythm; No murmurs, rubs, or gallops  ABDOMEN: Soft, Nondistended; Bowel sounds present in all four quadrants. Diffuse tenderness.   EXTREMITIES:  2+ Peripheral Pulses, No clubbing, cyanosis, or edema  PSYCH: AAOx3  NEUROLOGY: non-focal  SKIN: No rashes or lesions    LABS:                        11.5   11.11 )-----------( 514      ( 22 Jul 2021 08:06 )             36.5     07-22    132<L>  |  95<L>  |  7   ----------------------------<  85  3.3<L>   |  18<L>  |  0.37<L>    Ca    9.3      22 Jul 2021 08:06  Phos  2.7     07-22  Mg     2.40     07-22      Assessment: 24yo female presenting with abdominal pain, nausea, and vomiting consistent with acute flare of Crohn's disease.     Plan  #1 acute Crohn's Flare  - GI consult today, consider oral prednisone or addition of immunomodulator  - Infliximab infusion 7/20  - c/w Reglan 5mg PO q8prn for N/V  - Daily EKGs to monitor for QT prolongation  - c/w simethicone 80mg PO daily   - c/w Maalox 40mL PO q4prn for dyspepsia  - Clear liquid diet, advance as tolerated - consider IV fluids if not tolerating fluids  - IV pepcid for acid reflux     - Dilaudid 2mg IV PO for pain  - Received Ofirmev 1g IV yesterday and 650mg IV overnight    - Monitor BMs.      Problem/Plan - 2:  ·  Problem: Anemia, unspecified type.  Plan: - Likely dilutional vs blood loss 2/2 menorrhagia   - Hgb improved- 9.1->10.09  - Iron studies WNL  - Daily CBC  - No need for hemolysis studies i/s/o normal t.bili  - Transfuse if hgb<7.      Problem/Plan - 3:  ·  Problem: Prophylactic measure.  Plan: DVT PPx- Lovenox  -     Care Discussed with Consultants/Other Providers:   Patient is a 25y old  Female who presents with a chief complaint of Crohn's Flare (22 Jul 2021 06:38)      SUBJECTIVE / OVERNIGHT EVENTS: overnight pt continues to report pain and nausea/vomiting. Received 650mg of Ofirmev IV. Pt also complaining of acid reflux. Pt felt feverish and had chills last night.     MEDICATIONS  (STANDING):  enoxaparin Injectable 40 milliGRAM(s) SubCutaneous daily  potassium chloride  10 mEq/100 mL IVPB 10 milliEquivalent(s) IV Intermittent every 1 hour  simethicone 80 milliGRAM(s) Chew daily    MEDICATIONS  (PRN):  aluminum hydroxide/magnesium hydroxide/simethicone Suspension 30 milliLiter(s) Oral every 4 hours PRN Dyspepsia  HYDROmorphone   Tablet 2 milliGRAM(s) Oral every 4 hours PRN Severe Pain (7 - 10)  metoclopramide 5 milliGRAM(s) Oral every 8 hours PRN nausea/vomiting        PHYSICAL EXAM:  GENERAL: NAD, well-developed  HEAD:  Atraumatic, Normocephalic  EYES: EOMI, PERRLA, conjunctiva and sclera clear  NECK: Supple, No JVD  CHEST/LUNG: Clear to auscultation bilaterally; No wheeze  HEART: Regular rate and rhythm; No murmurs, rubs, or gallops  ABDOMEN: Soft, Nondistended; Bowel sounds present in all four quadrants. Diffuse tenderness.   EXTREMITIES:  2+ Peripheral Pulses, No clubbing, cyanosis, or edema  PSYCH: AAOx3  NEUROLOGY: non-focal  SKIN: No rashes or lesions    LABS:                        11.5   11.11 )-----------( 514      ( 22 Jul 2021 08:06 )             36.5     07-22    132<L>  |  95<L>  |  7   ----------------------------<  85  3.3<L>   |  18<L>  |  0.37<L>    Ca    9.3      22 Jul 2021 08:06  Phos  2.7     07-22  Mg     2.40     07-22      Assessment: 26yo female presenting with abdominal pain, nausea, and vomiting consistent with acute flare of Crohn's disease.     Plan  #1 acute Crohn's Flare  - GI consult today, consider oral prednisone or addition of immunomodulator  - Infliximab infusion 7/20  - c/w Reglan 5mg PO q8prn for N/V  - Daily EKGs to monitor for QT prolongation  - c/w simethicone 80mg PO daily   - c/w Maalox 40mL PO q4prn for dyspepsia  - Clear liquid diet, advance as tolerated - consider IV fluids if not tolerating fluids  - IV pepcid for acid reflux     - Dilaudid 2mg IV PO for pain  - Received Ofirmev 1g IV yesterday and 650mg IV overnight    - Monitor BMs.      Problem/Plan - 2:  ·  Problem: Anemia, unspecified type.  Plan: - Likely dilutional vs blood loss 2/2 menorrhagia   - Hgb improved- 9.1->10.09  - Iron studies WNL  - Daily CBC  - No need for hemolysis studies i/s/o normal t.bili  - Transfuse if hgb<7.      Problem/Plan - 3:  ·  Problem: Prophylactic measure.  Plan: DVT PPx- Lovenox  -     Care Discussed with Consultants/Other Providers:

## 2021-07-23 ENCOUNTER — TRANSCRIPTION ENCOUNTER (OUTPATIENT)
Age: 26
End: 2021-07-23

## 2021-07-23 ENCOUNTER — RESULT REVIEW (OUTPATIENT)
Age: 26
End: 2021-07-23

## 2021-07-23 DIAGNOSIS — R11.0 NAUSEA: ICD-10-CM

## 2021-07-23 DIAGNOSIS — R10.9 UNSPECIFIED ABDOMINAL PAIN: ICD-10-CM

## 2021-07-23 LAB
ANION GAP SERPL CALC-SCNC: 15 MMOL/L — HIGH (ref 7–14)
BUN SERPL-MCNC: 7 MG/DL — SIGNIFICANT CHANGE UP (ref 7–23)
CALCIUM SERPL-MCNC: 8.7 MG/DL — SIGNIFICANT CHANGE UP (ref 8.4–10.5)
CHLORIDE SERPL-SCNC: 97 MMOL/L — LOW (ref 98–107)
CO2 SERPL-SCNC: 20 MMOL/L — LOW (ref 22–31)
CREAT SERPL-MCNC: 0.41 MG/DL — LOW (ref 0.5–1.3)
GLUCOSE SERPL-MCNC: 92 MG/DL — SIGNIFICANT CHANGE UP (ref 70–99)
HCG SERPL-ACNC: <5 MIU/ML — SIGNIFICANT CHANGE UP
MAGNESIUM SERPL-MCNC: 2.1 MG/DL — SIGNIFICANT CHANGE UP (ref 1.6–2.6)
PHOSPHATE SERPL-MCNC: 1.8 MG/DL — LOW (ref 2.5–4.5)
POTASSIUM SERPL-MCNC: 3.4 MMOL/L — LOW (ref 3.5–5.3)
POTASSIUM SERPL-SCNC: 3.4 MMOL/L — LOW (ref 3.5–5.3)
SODIUM SERPL-SCNC: 132 MMOL/L — LOW (ref 135–145)

## 2021-07-23 PROCEDURE — 43239 EGD BIOPSY SINGLE/MULTIPLE: CPT | Mod: GC

## 2021-07-23 PROCEDURE — 93010 ELECTROCARDIOGRAM REPORT: CPT

## 2021-07-23 PROCEDURE — 88305 TISSUE EXAM BY PATHOLOGIST: CPT | Mod: 26

## 2021-07-23 PROCEDURE — 99232 SBSQ HOSP IP/OBS MODERATE 35: CPT | Mod: GC

## 2021-07-23 RX ORDER — POTASSIUM CHLORIDE 20 MEQ
10 PACKET (EA) ORAL
Refills: 0 | Status: COMPLETED | OUTPATIENT
Start: 2021-07-23 | End: 2021-07-23

## 2021-07-23 RX ADMIN — PANTOPRAZOLE SODIUM 40 MILLIGRAM(S): 20 TABLET, DELAYED RELEASE ORAL at 17:19

## 2021-07-23 RX ADMIN — Medication 100 MILLIEQUIVALENT(S): at 09:26

## 2021-07-23 RX ADMIN — HYDROMORPHONE HYDROCHLORIDE 2 MILLIGRAM(S): 2 INJECTION INTRAMUSCULAR; INTRAVENOUS; SUBCUTANEOUS at 23:01

## 2021-07-23 RX ADMIN — Medication 10 MILLIGRAM(S): at 05:31

## 2021-07-23 RX ADMIN — HYDROMORPHONE HYDROCHLORIDE 2 MILLIGRAM(S): 2 INJECTION INTRAMUSCULAR; INTRAVENOUS; SUBCUTANEOUS at 19:45

## 2021-07-23 RX ADMIN — Medication 10 MILLIGRAM(S): at 17:18

## 2021-07-23 RX ADMIN — Medication 100 MILLIEQUIVALENT(S): at 13:59

## 2021-07-23 RX ADMIN — SODIUM CHLORIDE 75 MILLILITER(S): 9 INJECTION INTRAMUSCULAR; INTRAVENOUS; SUBCUTANEOUS at 09:26

## 2021-07-23 RX ADMIN — HYDROMORPHONE HYDROCHLORIDE 2 MILLIGRAM(S): 2 INJECTION INTRAMUSCULAR; INTRAVENOUS; SUBCUTANEOUS at 18:45

## 2021-07-23 RX ADMIN — Medication 100 MILLIEQUIVALENT(S): at 15:09

## 2021-07-23 RX ADMIN — Medication 85 MILLIMOLE(S): at 17:19

## 2021-07-23 NOTE — DISCHARGE NOTE PROVIDER - NSDCCPCAREPLAN_GEN_ALL_CORE_FT
PRINCIPAL DISCHARGE DIAGNOSIS  Diagnosis: Crohn's colitis  Assessment and Plan of Treatment: Last month you were diagnosed with Crohn's Disease and started on a regimine of monthly infliximab infusions. You were admitted to the hospital on 7/20 for abdominal pain, nausea, and vomiting consistent with a Crohn's flare. An abdominal CT scan showed findings consistent with a Crohn's flare. You were given an infliximab infusion on 7/20, one day earlier you were originally scheduled to receive the infusion outpatient. For pain, you received IV dilaudid and IV Ofirmev as needed. For nausea and vomiting, you received IV reglan and protonix. On 7/22, you received an abdominal x-ray to rule out small bowel obstruction; the x-ray showed no significant findings. On 7/23, you received an EGD as per recommendations by the GI team. The EGD showed esophagitis and gastritis with a 1cm hiatal hernia. GI recommended daily reflux inhibitors and follow up with their team. Follow up with your primary care provider within 1 week of discharge.      SECONDARY DISCHARGE DIAGNOSES  Diagnosis: Anemia, unspecified type  Assessment and Plan of Treatment: Anemia, unspecified type. While in the hospital, your hemoglobin levels were below normal limits. We suspect that this was due to the IV fluids that you were receiving. Your iron studies were within normal limits. By 7/22, your hemoglobin levels had returned to normal limits.     PRINCIPAL DISCHARGE DIAGNOSIS  Diagnosis: Crohn's colitis  Assessment and Plan of Treatment: Last month you were diagnosed with Crohn's Disease and started on a regimine of monthly infliximab infusions. You were admitted to the hospital on 7/20 for abdominal pain, nausea, and vomiting consistent with a Crohn's flare. An abdominal CT scan showed findings consistent with a Crohn's flare. You were given an infliximab infusion on 7/20, one day earlier you were originally scheduled to receive the infusion outpatient. For pain, you received IV dilaudid and IV Ofirmev as needed. For nausea and vomiting, you received IV reglan and protonix. On 7/22, you received an abdominal x-ray to rule out small bowel obstruction; the x-ray showed no significant findings. On 7/23, you received an EGD as per recommendations by the GI team. The EGD showed esophagitis and gastritis with a 1cm hiatal hernia. GI recommended daily reflux inhibitors and follow up with Dr. Davis within 1 week of discharge. Follow up with your primary care provider within 1 week of discharge.  Please take your medications as prescribed.      SECONDARY DISCHARGE DIAGNOSES  Diagnosis: Anemia, unspecified type  Assessment and Plan of Treatment: Anemia, unspecified type. While in the hospital, your hemoglobin levels were below normal limits. We suspect that this was due to the IV fluids that you were receiving. Your iron studies were within normal limits. By 7/22, your hemoglobin levels had returned to normal limits.

## 2021-07-23 NOTE — PROGRESS NOTE ADULT - PROBLEM SELECTOR PLAN 2
- Likely dilutional vs blood loss 2/2 menorrhagia   - Hgb improving- 11.5  - Iron studies WNL  - Daily CBC  - No need for hemolysis studies i/s/o normal t.bili  - Transfuse if hgb<7

## 2021-07-23 NOTE — DISCHARGE NOTE PROVIDER - HOSPITAL COURSE
7/19: arrived to the ED and kept overnight for nausea, vomiting, and abdominal pain. CT AP revealed wall thickening and inflammation of terminal ileum consistent w/ Crohn's flare.  7/20: admitted to medicine team for Crohn's flare. GI was consulted and infliximab infusion was given. Pain was controlled with dilaudid 5mg q6prn. Reglan 10mg IV Q8 was given for nausea with daily EKGs to monitor QTc.  7/21: continues to have pain, tolerated dilaudid PO, whitney try IV tylenol 650mg. Patient found to be anemic, likely dilutional, daily CBC monitoring.  7/22: Abdominal x-ray was performed and showed no significant findings; ruled out small bowel obstruction. Patient is not to receive IV opioids. Patient tolerated juice today. GI recommeded EGD for 7/23 to r/o esophagitis, NPO at midnight.  7/23: EGD today to r/o esophagitis today per GI, EKG q3 days as QTc have been normal, CBC tomorrow   26 yo female with a history of Crohn's Disease diagnosed last month, presenting with abdominal pain, nausea, and vomiting. The patient states the abdominal pain and vomiting began yesterday and described the pain as "squeezing her stomach." She states that she vomited four times yesterday; the vomit was watery and looked like the food that she had eaten prior. She denies blood in her vomit. She believes that the consumption of solids and liquids makes her vomit and cannot currently tolerate anything by mouth. She states that the only thing that makes the pain better is morphine, patient has had three doses. Vomiting relieves some of her nausea. The patient was diagnosed with Crohn's disease last month following an acute flare that presented similarly to the symptoms that brought her into the hospital yesterday. She was admitted to [facility] for the flare last month and was d/c on 6/13. She has been receiving monthly infusions of infliximab and is due for another infusion tomorrow 7/21. She believes that the infliximab has been helping her symptoms. The patient states that her last bowel movement was two days ago and was normal in consistency and color. The patient endorses decreased PO intake in last couple of days. She has not had the urge to have a bowel movement since then. She is currently menstruating and believes that the flare may be caused by her period. The patient denies fever, chills, headache, SOB, dysuria, hematuria.    26 yo female with a history of Crohn's Disease diagnosed last month, presented to ED on 7/20 with abdominal pain, nausea, and vomiting. The had abdominal pain and vomiting began 1 day PTA and described the pain as "squeezing her stomach" and vomited four times; the vomit was watery and looked like the food that she had eaten prior. She denied blood in her vomit. She states that the only thing that makes the pain better is morphine, patient has had three doses. The patient was diagnosed with Crohn's disease last month following an acute flare that presented similarly to the symptoms that brought her into the hospital yesterday. She was admitted to [facility] for the flare last month and was d/c on 6/13. She has been receiving monthly infusions of infliximab and is due for another infusion tomorrow 7/21. She believes that the infliximab has been helping her symptoms. The patient states that her last bowel movement was two days ago and was normal in consistency and color. She had been menstruating and believed that the flare may be caused by her period. The patient denied fever, chills, headache, SOB, dysuria, hematuria.     In ED    On the medicine floor, the patient had the infliximab infusion 7/20. The patient had pain that required dilaudid 2mg PO q4prn. The patient also had nausea and vomiting treated with reglan and reflux symptoms controlled with protonix. The patient's LQ abdominal pain improved but began to have episgastric pain. The patient had no BMs for several days likely 2/2 no PO intake and an abdominal X-ray on 7/22 ruled out obstructive pathology. Due to onset of epigastric pain GI recommended an endoscopy on 7/23 which revealed LA Grade B reflux esophagitis, gastritis (biopsied), normal duodenum (Bx for celiac) and a 1cm hiatal hernia. GI recommended 30 days of PO PPI. The patient steadily improved but was unable to tolerate PO until 7/24.     The patient has been clinically stable and is ready to be discharged.   The patient has an appt w/ [facility] on 10/5. The patient is to follow up with her PCP Dr. Downey within one week of discharge. Additionally the patient will follow up with Dr. Davis within one week of discharge.    24 yo female with a history of Crohn's Disease diagnosed last month, presented to ED on 7/20 with abdominal pain, nausea, and vomiting. The had abdominal pain and vomiting began 1 day PTA and described the pain as "squeezing her stomach" and vomited four times; the vomit was watery and looked like the food that she had eaten prior. She denied blood in her vomit. She states that the only thing that makes the pain better is morphine, patient has had three doses. The patient was diagnosed with Crohn's disease last month following an acute flare that presented similarly to the symptoms that brought her into the hospital yesterday. She was [facility] [place] [facility] the flare last month and was d/c on 6/13. She has been receiving monthly infusions of infliximab and is due for another infusion tomorrow 7/21. She believes that the infliximab has been helping her symptoms. The patient states that her last bowel movement was two days ago and was normal in consistency and color. She had been menstruating and believed that the flare may be caused by her period. The patient denied fever, chills, headache, SOB, dysuria, hematuria.     In ED patients vitals were vitals were stable and patient had episodes low volume emesis nonbloody/nonbilious  Patient was transferred to medicine floor to continue management.    On the medicine floor, the patient had the infliximab infusion 7/20. The patient had pain that required dilaudid 2mg PO q4prn. The patient also had nausea and vomiting treated with reglan and reflux symptoms controlled with protonix. The patient's LQ abdominal pain improved but began to have episgastric pain. The patient had no BMs for several days likely 2/2 no PO intake and an abdominal X-ray on 7/22 ruled out obstructive pathology. Due to onset of epigastric pain GI recommended an endoscopy on 7/23 which revealed LA Grade B reflux esophagitis, gastritis (biopsied), normal duodenum (Bx for celiac) and a 1cm hiatal hernia. GI recommended 30 days of PO PPI. The patient steadily improved but was unable to tolerate PO until 7/24.     The patient has been clinically stable and is ready to be discharged.   The patient has an appt w/ [facility] on 10/5. The patient is to follow up with her PCP Dr. Downey within one week of discharge. Additionally the patient will follow up with Dr. Davis within one week of discharge.

## 2021-07-23 NOTE — DISCHARGE NOTE PROVIDER - NSDCMRMEDTOKEN_GEN_ALL_CORE_FT
aluminum hydroxide-magnesium hydroxide 200 mg-200 mg/5 mL oral suspension: 30 milliliter(s) orally every 6 hours, As needed, Dyspepsia   aluminum hydroxide-magnesium hydroxide 200 mg-200 mg/5 mL oral suspension: 30 milliliter(s) orally every 6 hours, As needed, Dyspepsia  pantoprazole 40 mg oral delayed release tablet: 1 tab(s) orally once a day (before a meal)   pantoprazole 40 mg oral delayed release tablet: 1 tab(s) orally once a day (before a meal)

## 2021-07-23 NOTE — PROGRESS NOTE ADULT - PROBLEM SELECTOR PLAN 1
- CT AP revealed wall thickening and inflammation of terminal ileum consistent w/ Crohn's flare  - GI recs appreciated   - Infliximab infusion 7/20  - c/w Reglan 10mg IV q8prn for N/V  - Daily EKGs to monitor for QT prolongation  - Protonix 40mg IV daily for dyspepsia   - Clear liquid diet, advance as tolerated- patient has not eaten     - EGD on 7/23 to r/o esophagitis per GI  - Dilaudid 2mg IV PO for pain  - Ofirmev IV for breakthrough pain    - Monitor BMs  - No BMs since admission  - Abdominal XR to r/o obstruction - CT AP revealed wall thickening and inflammation of terminal ileum consistent w/ Crohn's flare  - GI recs appreciated   - Infliximab infusion 7/20  - c/w Reglan 10mg IV q8prn for N/V  - EKG every 3 days to monitor for QT prolongation  - Protonix 40mg IV daily for dyspepsia   - Clear liquid diet, advance as tolerated- patient has not eaten     - EGD on 7/23 to r/o esophagitis per GI  - Dilaudid 2mg IV PO for pain  - Ofirmev IV for breakthrough pain    - Monitor BMs  - No BMs since admission  - Abdominal XR to r/o obstruction

## 2021-07-23 NOTE — DISCHARGE NOTE PROVIDER - CARE PROVIDER_API CALL
Poli Davis)  Gastroenterology; Internal Medicine  [address] [place] [address]  Phone: [phone]  Fax: [phone]  Follow Up Time:     GINA NAVA  Internal Medicine  [address] [place] [address]  Phone: [phone]  Fax: [phone]  Follow Up Time:

## 2021-07-23 NOTE — MEDICAL STUDENT PROGRESS NOTE(EDUCATION) - SUBJECTIVE AND OBJECTIVE BOX
Patient is a 25y old  Female who presents with a chief complaint of Crohn's Flare (23 Jul 2021 06:25)      SUBJECTIVE / OVERNIGHT EVENTS: no overnight events. Consistent GERD and upper GI gas. One small episode of emesis last night. NPO since midnight for EGD today to r/o esophagitis per GI.    MEDICATIONS  (STANDING):  enoxaparin Injectable 40 milliGRAM(s) SubCutaneous daily  pantoprazole  Injectable 40 milliGRAM(s) IV Push daily  simethicone 80 milliGRAM(s) Chew daily  sodium chloride 0.9%. 1000 milliLiter(s) (75 mL/Hr) IV Continuous <Continuous>    MEDICATIONS  (PRN):  aluminum hydroxide/magnesium hydroxide/simethicone Suspension 30 milliLiter(s) Oral every 4 hours PRN Dyspepsia  HYDROmorphone   Tablet 2 milliGRAM(s) Oral every 4 hours PRN Severe Pain (7 - 10)  metoclopramide Injectable 10 milliGRAM(s) IV Push every 8 hours PRN Nausea        PHYSICAL EXAM:  GENERAL: NAD, well-developed  HEAD:  Atraumatic, Normocephalic  EYES: EOMI, PERRLA, conjunctiva and sclera clear  NECK: Supple, No JVD  CHEST/LUNG: Clear to auscultation bilaterally; No wheeze  HEART: Regular rate and rhythm; No murmurs, rubs, or gallops  ABDOMEN: Soft, Nontender, Nondistended; Bowel sounds present in all 4 quadrants  EXTREMITIES:  2+ Peripheral Pulses, No clubbing, cyanosis, or edema  PSYCH: AAOx3  NEUROLOGY: non-focal  SKIN: No rashes or lesions    LABS:                        11.5   11.11 )-----------( 514      ( 22 Jul 2021 08:06 )             36.5     07-23    132<L>  |  97<L>  |  7   ----------------------------<  92  3.4<L>   |  20<L>  |  0.41<L>    Ca    8.7      23 Jul 2021 07:50  Phos  1.8     07-23  Mg     2.10     07-23      RADIOLOGY & ADDITIONAL TESTS:    Imaging Personally Reviewed: CT AP revealed wall thickening and inflammation of terminal ileum consistent w/ Crohn's flare. Abd xray with no significant findings, sbo ruled out    Consultant(s) Notes Reviewed:  GI, recommend EGD today to r/o esophagitis    · Assessment	  24yo female presenting with abdominal pain, nausea, and vomiting consistent with an acute Crohn's flare.       Problem/Plan - 1:  ·  Problem: R/O Crohn's disease of ileum without complication.  Plan: - CT AP revealed wall thickening and inflammation of terminal ileum consistent w/ Crohn's flare  - GI recs appreciated   - Infliximab infusion 7/20  - c/w Reglan 10mg IV q8prn for N/V  - Daily EKGs to monitor for QT prolongation  - Protonix 40mg IV daily for dyspepsia   - Clear liquid diet, advance as tolerated- patient tolerating juice yesterday     - EGD on 7/23 to r/o esophagitis per GI  - Dilaudid 2mg IV PO for pain  - Ofirmev IV for breakthrough pain    - Monitor BMs  - No BMs since admission  - Abdominal XR to r/o obstruction.      Problem/Plan - 2:  ·  Problem: Anemia, unspecified type.  Plan: - Likely dilutional vs blood loss 2/2 menorrhagia   - Hgb improving- 11.5  - Iron studies WNL  - Daily CBC  - No need for hemolysis studies i/s/o normal t.bili  - Transfuse if hgb<7.      Problem/Plan - 3:  ·  Problem: Prophylactic measure.  Plan: DVT PPx- Lovenox    Dispo: consider D/C home when patient is tolerating PO per GI.

## 2021-07-23 NOTE — PROGRESS NOTE ADULT - SUBJECTIVE AND OBJECTIVE BOX
DATE OF SERVICE: 07-23-21 @ 06:26    Patient is a 25y old  Female who presents with a chief complaint of Crohn's Flare (22 Jul 2021 18:38)      SUBJECTIVE / OVERNIGHT EVENTS: Seen by GI yesterday, recommended EGD to r/o esophagitis. NPO after midnight.     MEDICATIONS  (STANDING):  enoxaparin Injectable 40 milliGRAM(s) SubCutaneous daily  pantoprazole  Injectable 40 milliGRAM(s) IV Push daily  simethicone 80 milliGRAM(s) Chew daily  sodium chloride 0.9%. 1000 milliLiter(s) (75 mL/Hr) IV Continuous <Continuous>    MEDICATIONS  (PRN):  aluminum hydroxide/magnesium hydroxide/simethicone Suspension 30 milliLiter(s) Oral every 4 hours PRN Dyspepsia  HYDROmorphone   Tablet 2 milliGRAM(s) Oral every 4 hours PRN Severe Pain (7 - 10)  metoclopramide Injectable 10 milliGRAM(s) IV Push every 8 hours PRN Nausea      Vital Signs Last 24 Hrs  T(C): 36.7 (23 Jul 2021 05:25), Max: 36.8 (22 Jul 2021 12:20)  T(F): 98.1 (23 Jul 2021 05:25), Max: 98.3 (22 Jul 2021 12:20)  HR: 86 (23 Jul 2021 05:25) (85 - 88)  BP: 134/91 (23 Jul 2021 05:25) (127/84 - 136/90)  BP(mean): --  RR: 18 (23 Jul 2021 05:25) (18 - 18)  SpO2: 100% (23 Jul 2021 05:25) (100% - 100%)  CAPILLARY BLOOD GLUCOSE        I&O's Summary      PHYSICAL EXAM:  GENERAL: NAD, well-developed  HEAD:  Atraumatic, Normocephalic  EYES: EOMI, PERRLA, conjunctiva and sclera clear  NECK: Supple, No JVD  CHEST/LUNG: Clear to auscultation bilaterally; No wheeze  HEART: Regular rate and rhythm; No murmurs, rubs, or gallops  ABDOMEN: Soft, Nontender, Nondistended; Bowel sounds present  EXTREMITIES:  2+ Peripheral Pulses, No clubbing, cyanosis, or edema  PSYCH: AAOx3  NEUROLOGY: non-focal  SKIN: No rashes or lesions    LABS:                        11.5   11.11 )-----------( 514      ( 22 Jul 2021 08:06 )             36.5     07-22    132<L>  |  95<L>  |  7   ----------------------------<  85  3.3<L>   |  18<L>  |  0.37<L>    Ca    9.3      22 Jul 2021 08:06  Phos  2.7     07-22  Mg     2.40     07-22                RADIOLOGY & ADDITIONAL TESTS:    Imaging Personally Reviewed:    Consultant(s) Notes Reviewed:      Care Discussed with Consultants/Other Providers:   DATE OF SERVICE: 07-23-21 @ 06:26    Patient is a 25y old  Female who presents with a chief complaint of Crohn's Flare (22 Jul 2021 18:38)      SUBJECTIVE / OVERNIGHT EVENTS: Seen by GI yesterday, recommended EGD to r/o esophagitis. NPO after midnight. Denies CP, SOB, headache, dysuria, hematuria, obstipation.     MEDICATIONS  (STANDING):  enoxaparin Injectable 40 milliGRAM(s) SubCutaneous daily  pantoprazole  Injectable 40 milliGRAM(s) IV Push daily  simethicone 80 milliGRAM(s) Chew daily  sodium chloride 0.9%. 1000 milliLiter(s) (75 mL/Hr) IV Continuous <Continuous>    MEDICATIONS  (PRN):  aluminum hydroxide/magnesium hydroxide/simethicone Suspension 30 milliLiter(s) Oral every 4 hours PRN Dyspepsia  HYDROmorphone   Tablet 2 milliGRAM(s) Oral every 4 hours PRN Severe Pain (7 - 10)  metoclopramide Injectable 10 milliGRAM(s) IV Push every 8 hours PRN Nausea      Vital Signs Last 24 Hrs  T(C): 36.7 (23 Jul 2021 05:25), Max: 36.8 (22 Jul 2021 12:20)  T(F): 98.1 (23 Jul 2021 05:25), Max: 98.3 (22 Jul 2021 12:20)  HR: 86 (23 Jul 2021 05:25) (85 - 88)  BP: 134/91 (23 Jul 2021 05:25) (127/84 - 136/90)  BP(mean): --  RR: 18 (23 Jul 2021 05:25) (18 - 18)  SpO2: 100% (23 Jul 2021 05:25) (100% - 100%)  CAPILLARY BLOOD GLUCOSE        I&O's Summary      PHYSICAL EXAM:  GENERAL: NAD, well-developed  HEAD:  Atraumatic, Normocephalic  EYES: EOMI, PERRLA, conjunctiva and sclera clear  NECK: Supple, No JVD  CHEST/LUNG: Clear to auscultation bilaterally; No wheeze  HEART: Regular rate and rhythm; No murmurs, rubs, or gallops  ABDOMEN: Soft, Nontender, Nondistended; Bowel sounds present  EXTREMITIES:  2+ Peripheral Pulses, No clubbing, cyanosis, or edema  PSYCH: AAOx3  NEUROLOGY: non-focal  SKIN: No rashes or lesions    LABS:                        11.5   11.11 )-----------( 514      ( 22 Jul 2021 08:06 )             36.5     07-22    132<L>  |  95<L>  |  7   ----------------------------<  85  3.3<L>   |  18<L>  |  0.37<L>    Ca    9.3      22 Jul 2021 08:06  Phos  2.7     07-22  Mg     2.40     07-22                RADIOLOGY & ADDITIONAL TESTS:    Imaging Personally Reviewed:    Consultant(s) Notes Reviewed:      Care Discussed with Consultants/Other Providers:   DATE OF SERVICE: 07-23-21 @ 06:26    Patient is a 25y old  Female who presents with a chief complaint of Crohn's Flare (22 Jul 2021 18:38)      SUBJECTIVE / OVERNIGHT EVENTS: Seen by GI yesterday, recommended EGD to r/o esophagitis. NPO after midnight. Denies CP, SOB, headache, dysuria, hematuria, obstipation.     MEDICATIONS  (STANDING):  enoxaparin Injectable 40 milliGRAM(s) SubCutaneous daily  pantoprazole  Injectable 40 milliGRAM(s) IV Push daily  simethicone 80 milliGRAM(s) Chew daily  sodium chloride 0.9%. 1000 milliLiter(s) (75 mL/Hr) IV Continuous <Continuous>    MEDICATIONS  (PRN):  aluminum hydroxide/magnesium hydroxide/simethicone Suspension 30 milliLiter(s) Oral every 4 hours PRN Dyspepsia  HYDROmorphone   Tablet 2 milliGRAM(s) Oral every 4 hours PRN Severe Pain (7 - 10)  metoclopramide Injectable 10 milliGRAM(s) IV Push every 8 hours PRN Nausea      Vital Signs Last 24 Hrs  T(C): 36.7 (23 Jul 2021 05:25), Max: 36.8 (22 Jul 2021 12:20)  T(F): 98.1 (23 Jul 2021 05:25), Max: 98.3 (22 Jul 2021 12:20)  HR: 86 (23 Jul 2021 05:25) (85 - 88)  BP: 134/91 (23 Jul 2021 05:25) (127/84 - 136/90)  BP(mean): --  RR: 18 (23 Jul 2021 05:25) (18 - 18)  SpO2: 100% (23 Jul 2021 05:25) (100% - 100%)  CAPILLARY BLOOD GLUCOSE        I&O's Summary      PHYSICAL EXAM:  GENERAL: NAD, well-developed  HEAD:  Atraumatic, Normocephalic  EYES: EOMI, PERRLA, conjunctiva and sclera clear  NECK: Supple, No JVD  CHEST/LUNG: Clear to auscultation bilaterally; No wheeze  HEART: Regular rate and rhythm; No murmurs, rubs, or gallops  ABDOMEN: Soft, Nontender, Nondistended; Bowel sounds present  EXTREMITIES:  2+ Peripheral Pulses, No clubbing, cyanosis, or edema  PSYCH: AAOx3  NEUROLOGY: non-focal  SKIN: No rashes or lesions    LABS:                        11.5   11.11 )-----------( 514      ( 22 Jul 2021 08:06 )             36.5     07-22    132<L>  |  95<L>  |  7   ----------------------------<  85  3.3<L>   |  18<L>  |  0.37<L>    Ca    9.3      22 Jul 2021 08:06  Phos  2.7     07-22  Mg     2.40     07-22                RADIOLOGY & ADDITIONAL TESTS:    Imaging Personally Reviewed and discussed with attending:   XR ABDOMEN PORTABLE ROUTINE 1V      IMPRESSION:    Nonobstructive bowel gas pattern.      Consultant(s) Notes Reviewed:      Care Discussed with Consultants/Other Providers:

## 2021-07-24 LAB
ANION GAP SERPL CALC-SCNC: 16 MMOL/L — HIGH (ref 7–14)
BUN SERPL-MCNC: 5 MG/DL — LOW (ref 7–23)
CALCIUM SERPL-MCNC: 9.5 MG/DL — SIGNIFICANT CHANGE UP (ref 8.4–10.5)
CHLORIDE SERPL-SCNC: 99 MMOL/L — SIGNIFICANT CHANGE UP (ref 98–107)
CO2 SERPL-SCNC: 21 MMOL/L — LOW (ref 22–31)
CREAT SERPL-MCNC: 0.47 MG/DL — LOW (ref 0.5–1.3)
GLUCOSE SERPL-MCNC: 89 MG/DL — SIGNIFICANT CHANGE UP (ref 70–99)
HCT VFR BLD CALC: 37.6 % — SIGNIFICANT CHANGE UP (ref 34.5–45)
HGB BLD-MCNC: 11.9 G/DL — SIGNIFICANT CHANGE UP (ref 11.5–15.5)
MAGNESIUM SERPL-MCNC: 2.3 MG/DL — SIGNIFICANT CHANGE UP (ref 1.6–2.6)
MCHC RBC-ENTMCNC: 25.5 PG — LOW (ref 27–34)
MCHC RBC-ENTMCNC: 31.6 GM/DL — LOW (ref 32–36)
MCV RBC AUTO: 80.5 FL — SIGNIFICANT CHANGE UP (ref 80–100)
NRBC # BLD: 0 /100 WBCS — SIGNIFICANT CHANGE UP
NRBC # FLD: 0 K/UL — SIGNIFICANT CHANGE UP
PHOSPHATE SERPL-MCNC: 3.4 MG/DL — SIGNIFICANT CHANGE UP (ref 2.5–4.5)
PLATELET # BLD AUTO: 573 K/UL — HIGH (ref 150–400)
POTASSIUM SERPL-MCNC: 3.3 MMOL/L — LOW (ref 3.5–5.3)
POTASSIUM SERPL-SCNC: 3.3 MMOL/L — LOW (ref 3.5–5.3)
RBC # BLD: 4.67 M/UL — SIGNIFICANT CHANGE UP (ref 3.8–5.2)
RBC # FLD: 14 % — SIGNIFICANT CHANGE UP (ref 10.3–14.5)
SODIUM SERPL-SCNC: 136 MMOL/L — SIGNIFICANT CHANGE UP (ref 135–145)
WBC # BLD: 10.58 K/UL — HIGH (ref 3.8–10.5)
WBC # FLD AUTO: 10.58 K/UL — HIGH (ref 3.8–10.5)

## 2021-07-24 PROCEDURE — 99232 SBSQ HOSP IP/OBS MODERATE 35: CPT | Mod: GC

## 2021-07-24 RX ORDER — PANTOPRAZOLE SODIUM 20 MG/1
40 TABLET, DELAYED RELEASE ORAL
Refills: 0 | Status: DISCONTINUED | OUTPATIENT
Start: 2021-07-24 | End: 2021-07-25

## 2021-07-24 RX ORDER — POTASSIUM CHLORIDE 20 MEQ
40 PACKET (EA) ORAL ONCE
Refills: 0 | Status: COMPLETED | OUTPATIENT
Start: 2021-07-24 | End: 2021-07-24

## 2021-07-24 RX ORDER — SENNA PLUS 8.6 MG/1
2 TABLET ORAL AT BEDTIME
Refills: 0 | Status: DISCONTINUED | OUTPATIENT
Start: 2021-07-24 | End: 2021-07-25

## 2021-07-24 RX ORDER — POLYETHYLENE GLYCOL 3350 17 G/17G
17 POWDER, FOR SOLUTION ORAL DAILY
Refills: 0 | Status: DISCONTINUED | OUTPATIENT
Start: 2021-07-24 | End: 2021-07-25

## 2021-07-24 RX ADMIN — SODIUM CHLORIDE 75 MILLILITER(S): 9 INJECTION INTRAMUSCULAR; INTRAVENOUS; SUBCUTANEOUS at 05:44

## 2021-07-24 RX ADMIN — HYDROMORPHONE HYDROCHLORIDE 2 MILLIGRAM(S): 2 INJECTION INTRAMUSCULAR; INTRAVENOUS; SUBCUTANEOUS at 06:16

## 2021-07-24 RX ADMIN — SENNA PLUS 2 TABLET(S): 8.6 TABLET ORAL at 22:45

## 2021-07-24 RX ADMIN — HYDROMORPHONE HYDROCHLORIDE 2 MILLIGRAM(S): 2 INJECTION INTRAMUSCULAR; INTRAVENOUS; SUBCUTANEOUS at 00:01

## 2021-07-24 RX ADMIN — PANTOPRAZOLE SODIUM 40 MILLIGRAM(S): 20 TABLET, DELAYED RELEASE ORAL at 11:58

## 2021-07-24 RX ADMIN — POLYETHYLENE GLYCOL 3350 17 GRAM(S): 17 POWDER, FOR SOLUTION ORAL at 12:09

## 2021-07-24 RX ADMIN — Medication 10 MILLIGRAM(S): at 06:57

## 2021-07-24 RX ADMIN — HYDROMORPHONE HYDROCHLORIDE 2 MILLIGRAM(S): 2 INJECTION INTRAMUSCULAR; INTRAVENOUS; SUBCUTANEOUS at 07:16

## 2021-07-24 RX ADMIN — SIMETHICONE 80 MILLIGRAM(S): 80 TABLET, CHEWABLE ORAL at 11:58

## 2021-07-24 NOTE — PROGRESS NOTE ADULT - PROBLEM SELECTOR PLAN 2
- Likely dilutional vs blood loss 2/2 menorrhagia   - Hgb improving- 11.5  - Iron studies WNL  - Daily CBC  - No need for hemolysis studies i/s/o normal t.bili  - Transfuse if hgb<7 - Likely dilutional vs blood loss 2/2 menorrhagia   - Hgb stable 10.58  - Iron studies WNL  - No need for hemolysis studies i/s/o normal t.bili  - Transfuse if hgb<7

## 2021-07-24 NOTE — PROGRESS NOTE ADULT - PROBLEM SELECTOR PLAN 3
DVT PPx- Lovenox    Dispo: consider D/C home when patient is tolerating PO per GI DVT PPx- Lovenox    Started bowel regimen- miralax, senna    Dispo: consider D/C home when patient is tolerating PO per GI

## 2021-07-24 NOTE — PROGRESS NOTE ADULT - SUBJECTIVE AND OBJECTIVE BOX
DATE OF SERVICE: 07-24-21 @ 07:08    Patient is a 25y old  Female who presents with a chief complaint of Crohn's Flare (23 Jul 2021 11:42)      SUBJECTIVE / OVERNIGHT EVENTS:    MEDICATIONS  (STANDING):  enoxaparin Injectable 40 milliGRAM(s) SubCutaneous daily  pantoprazole  Injectable 40 milliGRAM(s) IV Push daily  simethicone 80 milliGRAM(s) Chew daily  sodium chloride 0.9%. 1000 milliLiter(s) (75 mL/Hr) IV Continuous <Continuous>    MEDICATIONS  (PRN):  aluminum hydroxide/magnesium hydroxide/simethicone Suspension 30 milliLiter(s) Oral every 4 hours PRN Dyspepsia  HYDROmorphone   Tablet 2 milliGRAM(s) Oral every 4 hours PRN Severe Pain (7 - 10)  metoclopramide Injectable 10 milliGRAM(s) IV Push every 8 hours PRN Nausea      Vital Signs Last 24 Hrs  T(C): 36.3 (24 Jul 2021 05:50), Max: 37.7 (23 Jul 2021 09:44)  T(F): 97.4 (24 Jul 2021 05:50), Max: 99.8 (23 Jul 2021 09:44)  HR: 75 (24 Jul 2021 05:50) (20 - 96)  BP: 137/88 (24 Jul 2021 05:50) (110/74 - 140/97)  BP(mean): --  RR: 18 (24 Jul 2021 05:50) (13 - 20)  SpO2: 100% (24 Jul 2021 05:50) (100% - 100%)  CAPILLARY BLOOD GLUCOSE        I&O's Summary      PHYSICAL EXAM:  GENERAL: NAD, well-developed  HEAD:  Atraumatic, Normocephalic  EYES: EOMI, PERRLA, conjunctiva and sclera clear  NECK: Supple, No JVD  CHEST/LUNG: Clear to auscultation bilaterally; No wheeze  HEART: Regular rate and rhythm; No murmurs, rubs, or gallops  ABDOMEN: Soft, Nontender, Nondistended; Bowel sounds present  EXTREMITIES:  2+ Peripheral Pulses, No clubbing, cyanosis, or edema  PSYCH: AAOx3  NEUROLOGY: non-focal  SKIN: No rashes or lesions    LABS:                        11.5   11.11 )-----------( 514      ( 22 Jul 2021 08:06 )             36.5     07-23    132<L>  |  97<L>  |  7   ----------------------------<  92  3.4<L>   |  20<L>  |  0.41<L>    Ca    8.7      23 Jul 2021 07:50  Phos  1.8     07-23  Mg     2.10     07-23                RADIOLOGY & ADDITIONAL TESTS:    Imaging Personally Reviewed:    Consultant(s) Notes Reviewed:      Care Discussed with Consultants/Other Providers:   DATE OF SERVICE: 07-24-21 @ 07:08    Patient is a 25y old  Female who presents with a chief complaint of Crohn's Flare (23 Jul 2021 11:42)      SUBJECTIVE / OVERNIGHT EVENTS: Tolerated apple juice and some soup overnight. Diet will advance today. Says feels bloating and some nausea but less pain, did receive 3 doses of dilaudid overnight. Patient had 1 episode of low volume diarrhea and 1 small BM. Denies CP, SOB, dysuria, vomitting.     MEDICATIONS  (STANDING):  enoxaparin Injectable 40 milliGRAM(s) SubCutaneous daily  pantoprazole  Injectable 40 milliGRAM(s) IV Push daily  simethicone 80 milliGRAM(s) Chew daily  sodium chloride 0.9%. 1000 milliLiter(s) (75 mL/Hr) IV Continuous <Continuous>    MEDICATIONS  (PRN):  aluminum hydroxide/magnesium hydroxide/simethicone Suspension 30 milliLiter(s) Oral every 4 hours PRN Dyspepsia  HYDROmorphone   Tablet 2 milliGRAM(s) Oral every 4 hours PRN Severe Pain (7 - 10)  metoclopramide Injectable 10 milliGRAM(s) IV Push every 8 hours PRN Nausea      Vital Signs Last 24 Hrs  T(C): 36.3 (24 Jul 2021 05:50), Max: 37.7 (23 Jul 2021 09:44)  T(F): 97.4 (24 Jul 2021 05:50), Max: 99.8 (23 Jul 2021 09:44)  HR: 75 (24 Jul 2021 05:50) (20 - 96)  BP: 137/88 (24 Jul 2021 05:50) (110/74 - 140/97)  BP(mean): --  RR: 18 (24 Jul 2021 05:50) (13 - 20)  SpO2: 100% (24 Jul 2021 05:50) (100% - 100%)  CAPILLARY BLOOD GLUCOSE        I&O's Summary      PHYSICAL EXAM:  GENERAL: NAD, well-developed  HEAD:  Atraumatic, Normocephalic  EYES: EOMI, PERRLA, conjunctiva and sclera clear  NECK: Supple, No JVD  CHEST/LUNG: Clear to auscultation bilaterally; No wheeze  HEART: Regular rate and rhythm; No murmurs, rubs, or gallops  ABDOMEN: Soft, Nontender, Nondistended; Bowel sounds present  EXTREMITIES:  2+ Peripheral Pulses, No clubbing, cyanosis, or edema  PSYCH: AAOx3  NEUROLOGY: non-focal  SKIN: No rashes or lesions    LABS:                        11.5   11.11 )-----------( 514      ( 22 Jul 2021 08:06 )             36.5     07-23    132<L>  |  97<L>  |  7   ----------------------------<  92  3.4<L>   |  20<L>  |  0.41<L>    Ca    8.7      23 Jul 2021 07:50  Phos  1.8     07-23  Mg     2.10     07-23                RADIOLOGY & ADDITIONAL TESTS:    Imaging Personally Reviewed:    Consultant(s) Notes Reviewed:      Care Discussed with Consultants/Other Providers:

## 2021-07-24 NOTE — PROGRESS NOTE ADULT - PROBLEM SELECTOR PLAN 1
- CT AP revealed wall thickening and inflammation of terminal ileum consistent w/ Crohn's flare  - GI recs appreciated   - Infliximab infusion 7/20  - c/w Reglan 10mg IV q8prn for N/V  - EKG every 3 days to monitor for QT prolongation  - Protonix 40mg IV daily for dyspepsia   - Clear liquid diet, advance as tolerated- patient has not eaten     - EGD on 7/23 to r/o esophagitis per GI  - Dilaudid 2mg IV PO for pain  - Ofirmev IV for breakthrough pain    - Monitor BMs  - No BMs since admission  - Abdominal XR to r/o obstruction - CT AP revealed wall thickening and inflammation of terminal ileum consistent w/ Crohn's flare  - GI recs appreciated   - Infliximab infusion 7/20  - c/w Reglan 10mg IV q8prn for N/V  - EKG every 3 days to monitor for QT prolongation (Last EKG on 7/22)  - Protonix 40mg IV daily for dyspepsia   - Will try regular diet today     - EGD on 7/23 revelaed esophagitis, gastritis, hiatal hernia Bx sent to r/o celiac  - Dilaudid 2mg IV PO for pain  - Ofirmev IV for breakthrough pain    - Monitor BMs  - 2 BMs yesterday - CT AP revealed wall thickening and inflammation of terminal ileum consistent w/ Crohn's flare  - GI recs appreciated   - Infliximab infusion 7/20  - Protonix 40mg PO daily for Nausea  - Reglan 10mg IV q8h  - EKG every 3 days to monitor for QT prolongation (Last EKG on 7/22)   - Will try regular diet today     - EGD on 7/23 revelaed esophagitis, gastritis, hiatal hernia Bx sent to r/o celiac  - Dilaudid 2mg IV PO for pain  - Ofirmev IV for breakthrough pain    - Monitor BMs  - 2 BMs yesterday

## 2021-07-25 ENCOUNTER — TRANSCRIPTION ENCOUNTER (OUTPATIENT)
Age: 26
End: 2021-07-25

## 2021-07-25 VITALS
SYSTOLIC BLOOD PRESSURE: 115 MMHG | RESPIRATION RATE: 18 BRPM | DIASTOLIC BLOOD PRESSURE: 74 MMHG | HEART RATE: 111 BPM | OXYGEN SATURATION: 99 % | TEMPERATURE: 98 F

## 2021-07-25 LAB
ANION GAP SERPL CALC-SCNC: 15 MMOL/L — HIGH (ref 7–14)
BUN SERPL-MCNC: 8 MG/DL — SIGNIFICANT CHANGE UP (ref 7–23)
CALCIUM SERPL-MCNC: 9.8 MG/DL — SIGNIFICANT CHANGE UP (ref 8.4–10.5)
CHLORIDE SERPL-SCNC: 97 MMOL/L — LOW (ref 98–107)
CO2 SERPL-SCNC: 24 MMOL/L — SIGNIFICANT CHANGE UP (ref 22–31)
CREAT SERPL-MCNC: 0.5 MG/DL — SIGNIFICANT CHANGE UP (ref 0.5–1.3)
GLUCOSE SERPL-MCNC: 96 MG/DL — SIGNIFICANT CHANGE UP (ref 70–99)
HCT VFR BLD CALC: 39.3 % — SIGNIFICANT CHANGE UP (ref 34.5–45)
HGB BLD-MCNC: 12.7 G/DL — SIGNIFICANT CHANGE UP (ref 11.5–15.5)
MAGNESIUM SERPL-MCNC: 2.2 MG/DL — SIGNIFICANT CHANGE UP (ref 1.6–2.6)
MCHC RBC-ENTMCNC: 25.7 PG — LOW (ref 27–34)
MCHC RBC-ENTMCNC: 32.3 GM/DL — SIGNIFICANT CHANGE UP (ref 32–36)
MCV RBC AUTO: 79.6 FL — LOW (ref 80–100)
NRBC # BLD: 0 /100 WBCS — SIGNIFICANT CHANGE UP
NRBC # FLD: 0 K/UL — SIGNIFICANT CHANGE UP
PHOSPHATE SERPL-MCNC: 4.1 MG/DL — SIGNIFICANT CHANGE UP (ref 2.5–4.5)
PLATELET # BLD AUTO: 585 K/UL — HIGH (ref 150–400)
POTASSIUM SERPL-MCNC: 3.3 MMOL/L — LOW (ref 3.5–5.3)
POTASSIUM SERPL-SCNC: 3.3 MMOL/L — LOW (ref 3.5–5.3)
RBC # BLD: 4.94 M/UL — SIGNIFICANT CHANGE UP (ref 3.8–5.2)
RBC # FLD: 13.7 % — SIGNIFICANT CHANGE UP (ref 10.3–14.5)
SODIUM SERPL-SCNC: 136 MMOL/L — SIGNIFICANT CHANGE UP (ref 135–145)
WBC # BLD: 10.76 K/UL — HIGH (ref 3.8–10.5)
WBC # FLD AUTO: 10.76 K/UL — HIGH (ref 3.8–10.5)

## 2021-07-25 PROCEDURE — 93010 ELECTROCARDIOGRAM REPORT: CPT

## 2021-07-25 PROCEDURE — 99239 HOSP IP/OBS DSCHRG MGMT >30: CPT | Mod: GC

## 2021-07-25 RX ORDER — PANTOPRAZOLE SODIUM 20 MG/1
1 TABLET, DELAYED RELEASE ORAL
Qty: 30 | Refills: 0
Start: 2021-07-25 | End: 2021-08-23

## 2021-07-25 RX ORDER — POTASSIUM CHLORIDE 20 MEQ
40 PACKET (EA) ORAL ONCE
Refills: 0 | Status: COMPLETED | OUTPATIENT
Start: 2021-07-25 | End: 2021-07-25

## 2021-07-25 RX ORDER — PANTOPRAZOLE SODIUM 20 MG/1
1 TABLET, DELAYED RELEASE ORAL
Qty: 0 | Refills: 0 | DISCHARGE
Start: 2021-07-25

## 2021-07-25 RX ADMIN — PANTOPRAZOLE SODIUM 40 MILLIGRAM(S): 20 TABLET, DELAYED RELEASE ORAL at 05:25

## 2021-07-25 RX ADMIN — POLYETHYLENE GLYCOL 3350 17 GRAM(S): 17 POWDER, FOR SOLUTION ORAL at 11:22

## 2021-07-25 RX ADMIN — SIMETHICONE 80 MILLIGRAM(S): 80 TABLET, CHEWABLE ORAL at 11:22

## 2021-07-25 NOTE — PROGRESS NOTE ADULT - SUBJECTIVE AND OBJECTIVE BOX
DATE OF SERVICE: 07-25-21 @ 06:33    Patient is a 25y old  Female who presents with a chief complaint of Crohn's Flare (24 Jul 2021 07:08)      SUBJECTIVE / OVERNIGHT EVENTS:    MEDICATIONS  (STANDING):  enoxaparin Injectable 40 milliGRAM(s) SubCutaneous daily  pantoprazole    Tablet 40 milliGRAM(s) Oral before breakfast  polyethylene glycol 3350 17 Gram(s) Oral daily  senna 2 Tablet(s) Oral at bedtime  simethicone 80 milliGRAM(s) Chew daily    MEDICATIONS  (PRN):  aluminum hydroxide/magnesium hydroxide/simethicone Suspension 30 milliLiter(s) Oral every 4 hours PRN Dyspepsia  HYDROmorphone   Tablet 2 milliGRAM(s) Oral every 4 hours PRN Severe Pain (7 - 10)  metoclopramide Injectable 10 milliGRAM(s) IV Push every 8 hours PRN Nausea      Vital Signs Last 24 Hrs  T(C): 36.4 (25 Jul 2021 05:22), Max: 36.8 (24 Jul 2021 22:40)  T(F): 97.5 (25 Jul 2021 05:22), Max: 98.2 (24 Jul 2021 22:40)  HR: 95 (25 Jul 2021 05:22) (82 - 98)  BP: 109/79 (25 Jul 2021 05:22) (109/79 - 137/97)  BP(mean): --  RR: 18 (25 Jul 2021 05:22) (16 - 18)  SpO2: 100% (25 Jul 2021 05:22) (99% - 100%)  CAPILLARY BLOOD GLUCOSE        I&O's Summary      PHYSICAL EXAM:  GENERAL: NAD, well-developed  HEAD:  Atraumatic, Normocephalic  EYES: EOMI, PERRLA, conjunctiva and sclera clear  NECK: Supple, No JVD  CHEST/LUNG: Clear to auscultation bilaterally; No wheeze  HEART: Regular rate and rhythm; No murmurs, rubs, or gallops  ABDOMEN: Soft, Nontender, Nondistended; Bowel sounds present  EXTREMITIES:  2+ Peripheral Pulses, No clubbing, cyanosis, or edema  PSYCH: AAOx3  NEUROLOGY: non-focal  SKIN: No rashes or lesions    LABS:                        11.9   10.58 )-----------( 573      ( 24 Jul 2021 08:13 )             37.6     07-24    136  |  99  |  5<L>  ----------------------------<  89  3.3<L>   |  21<L>  |  0.47<L>    Ca    9.5      24 Jul 2021 08:13  Phos  3.4     07-24  Mg     2.30     07-24                RADIOLOGY & ADDITIONAL TESTS:    Imaging Personally Reviewed:    Consultant(s) Notes Reviewed:      Care Discussed with Consultants/Other Providers:   DATE OF SERVICE: 07-25-21 @ 06:33    Patient is a 25y old  Female who presents with a chief complaint of Crohn's Flare (24 Jul 2021 07:08)      SUBJECTIVE / OVERNIGHT EVENTS: Patient is feeling much better, tolerating PO. Denies CP, SOB, N/V/D, fevers/chills, dysuria, hematuria. Likely discharge today.    MEDICATIONS  (STANDING):  enoxaparin Injectable 40 milliGRAM(s) SubCutaneous daily  pantoprazole    Tablet 40 milliGRAM(s) Oral before breakfast  polyethylene glycol 3350 17 Gram(s) Oral daily  senna 2 Tablet(s) Oral at bedtime  simethicone 80 milliGRAM(s) Chew daily    MEDICATIONS  (PRN):  aluminum hydroxide/magnesium hydroxide/simethicone Suspension 30 milliLiter(s) Oral every 4 hours PRN Dyspepsia  HYDROmorphone   Tablet 2 milliGRAM(s) Oral every 4 hours PRN Severe Pain (7 - 10)  metoclopramide Injectable 10 milliGRAM(s) IV Push every 8 hours PRN Nausea      Vital Signs Last 24 Hrs  T(C): 36.4 (25 Jul 2021 05:22), Max: 36.8 (24 Jul 2021 22:40)  T(F): 97.5 (25 Jul 2021 05:22), Max: 98.2 (24 Jul 2021 22:40)  HR: 95 (25 Jul 2021 05:22) (82 - 98)  BP: 109/79 (25 Jul 2021 05:22) (109/79 - 137/97)  BP(mean): --  RR: 18 (25 Jul 2021 05:22) (16 - 18)  SpO2: 100% (25 Jul 2021 05:22) (99% - 100%)  CAPILLARY BLOOD GLUCOSE        I&O's Summary      PHYSICAL EXAM:  GENERAL: NAD, well-developed  HEAD:  Atraumatic, Normocephalic  EYES: EOMI, PERRLA, conjunctiva and sclera clear  NECK: Supple, No JVD  CHEST/LUNG: Clear to auscultation bilaterally; No wheeze  HEART: Regular rate and rhythm; No murmurs, rubs, or gallops  ABDOMEN: Soft, Nontender, Nondistended; Bowel sounds present  EXTREMITIES:  2+ Peripheral Pulses, No clubbing, cyanosis, or edema  PSYCH: AAOx3  NEUROLOGY: non-focal  SKIN: No rashes or lesions    LABS:                        11.9   10.58 )-----------( 573      ( 24 Jul 2021 08:13 )             37.6     07-24    136  |  99  |  5<L>  ----------------------------<  89  3.3<L>   |  21<L>  |  0.47<L>    Ca    9.5      24 Jul 2021 08:13  Phos  3.4     07-24  Mg     2.30     07-24                RADIOLOGY & ADDITIONAL TESTS:    Imaging Personally Reviewed:    Consultant(s) Notes Reviewed:      Care Discussed with Consultants/Other Providers:

## 2021-07-25 NOTE — PROGRESS NOTE ADULT - PROBLEM SELECTOR PLAN 2
- Likely dilutional vs blood loss 2/2 menorrhagia   - Hgb stable 10.58  - Iron studies WNL  - No need for hemolysis studies i/s/o normal t.bili  - Transfuse if hgb<7

## 2021-07-25 NOTE — PROGRESS NOTE ADULT - ATTENDING COMMENTS
Abdominal pain  Now severe chest pain  Patient on floor of CDU bathroom calling out with chest pain  Discussed with medical team  Workup in progress  Rec PPI, GI cocktail, remicade as scheduled  will follow
CD flare  Improving chest pain  suspect esophagitis from vomiting  EGD tomorrow
Resolving pain  Trial of PO
25 year old female, recently diagnosed with Crohn's disease presenting with abdominal pain, nausea and vomiting. Patient reports abdominal pain in RLQ/RUQ and radiates up to the chest area, does not feel like previous episodes of acid reflux. CTAP with findings of wall thickening, inflammatory change, and hyperenhancement of the terminal ileum, consistent with active inflammatory bowel disease.    -GI following, s/p Infliximab yesterday  -Abd pain improved today but patient does not want to try eating anything  -C/w clear liquid diet  -Pain control with Dilaudid 2mg PO PRN  -C/w IVF  -Reglan PRN for nausea/vomiting  -New normocytic anemia noted- check iron studies and ferritin    D/w Dr. Fleming
Patient seen and examined at bedside. 25 year old female, recently diagnosed with Crohn's disease presenting with abdominal pain, nausea and vomiting. Patient reports abdominal pain in RLQ/RUQ and radiates up to the chest area, does not feel like previous episodes of acid reflux. CTAP with findings of wall thickening, inflammatory change, and hyper-enhancement of the terminal ileum, consistent with active inflammatory bowel disease.    - GI following, s/p Infliximab on 7/20  - tolerating regular diet clear for d/c  - no po opiate for pain    cleared for discharge, total time spent 41 minutes.
25 year old female, recently diagnosed with Crohn's disease presenting with abdominal pain, nausea and vomiting. Patient reports abdominal pain in RLQ/RUQ and radiates up to the chest area, does not feel like previous episodes of acid reflux. CTAP with findings of wall thickening, inflammatory change, and hyper-enhancement of the terminal ileum, consistent with active inflammatory bowel disease.    -GI following, s/p Infliximab on 7/20  -S/p EGD today with esophagitis and gastritis noted  -C/w clear liquid diet, advance diet as tolerated  -C/w IVF  -Reglan PRN for nausea/vomiting    D/w Dr. Fleming
Patient seen and examined at bedside. 25 year old female, recently diagnosed with Crohn's disease presenting with abdominal pain, nausea and vomiting. Patient reports abdominal pain in RLQ/RUQ and radiates up to the chest area, does not feel like previous episodes of acid reflux. CTAP with findings of wall thickening, inflammatory change, and hyper-enhancement of the terminal ileum, consistent with active inflammatory bowel disease.    -GI following, s/p Infliximab on 7/20  -Regular diet, if tolerating clear for d/c  - will ISTOP for dilaudid, hx of opiate use, bowel regimen  -C/w IVF  -Reglan PRN for nausea/vomiting
25 year old female, recently diagnosed with Crohn's disease presenting with abdominal pain, nausea and vomiting. Patient reports abdominal pain in RLQ/RUQ and radiates up to the chest area, does not feel like previous episodes of acid reflux. CTAP with findings of wall thickening, inflammatory change, and hyperenhancement of the terminal ileum, consistent with active inflammatory bowel disease.    -GI following, s/p Infliximab on 7/20  -Still with abd pain, nausea  -C/w clear liquid diet  -Pain control with Dilaudid 2mg PO PRN  -C/w IVF  -Reglan PRN for nausea/vomiting    D/w Dr. Fleming

## 2021-07-25 NOTE — PROGRESS NOTE ADULT - PROBLEM SELECTOR PLAN 3
DVT PPx- Lovenox    Started bowel regimen- miralax, senna    Dispo: consider D/C home when patient is tolerating PO per GI

## 2021-07-25 NOTE — PROVIDER CONTACT NOTE (OTHER) - BACKGROUND
patient admitted for abdominal pain, nausea, vomiting. crohns disease of large intestine without complication.

## 2021-07-25 NOTE — PROGRESS NOTE ADULT - PROBLEM SELECTOR PLAN 1
- CT AP revealed wall thickening and inflammation of terminal ileum consistent w/ Crohn's flare  - GI recs appreciated   - Infliximab infusion 7/20  - Protonix 40mg PO daily for Nausea  - Reglan 10mg IV q8h  - EKG every 3 days to monitor for QT prolongation (Last EKG on 7/22)   - Will try regular diet today     - EGD on 7/23 revelaed esophagitis, gastritis, hiatal hernia Bx sent to r/o celiac  - Dilaudid 2mg IV PO for pain  - Ofirmev IV for breakthrough pain    - Monitor BMs  - 2 BMs yesterday - CT AP revealed wall thickening and inflammation of terminal ileum consistent w/ Crohn's flare  - GI recs appreciated   - Infliximab infusion 7/20  - Protonix 40mg PO daily for Nausea  - Reglan 10mg IV q8prn  - EKG every 3 days to monitor for QT prolongation (Last EKG on 7/22)   - Will try regular diet today     - EGD on 7/23 revelaed esophagitis, gastritis, hiatal hernia Bx sent to r/o celiac  - Dilaudid 2mg IV PO for pain  - Ofirmev IV for breakthrough pain    - Monitor BMs  - 2 BMs yesterday

## 2021-07-25 NOTE — DISCHARGE NOTE NURSING/CASE MANAGEMENT/SOCIAL WORK - PATIENT PORTAL LINK FT
You can access the FollowMyHealth Patient Portal offered by [facility] by registering at the following website: [email or web address]. By joining [facility]’s FollowMyHealth portal, you will also be able to view your health information using other applications (apps) compatible with our system.

## 2021-07-25 NOTE — PROGRESS NOTE ADULT - PROBLEM SELECTOR PROBLEM 1
R/O Crohn's disease of ileum without complication

## 2021-07-26 LAB
IGA FLD-MCNC: 254 MG/DL — SIGNIFICANT CHANGE UP (ref 84–499)
IGG FLD-MCNC: 1314 MG/DL — SIGNIFICANT CHANGE UP (ref 610–1660)
IGM SERPL-MCNC: 160 MG/DL — SIGNIFICANT CHANGE UP (ref 35–242)
KAPPA LC SER QL IFE: 0.88 MG/DL — SIGNIFICANT CHANGE UP (ref 0.33–1.94)
KAPPA/LAMBDA FREE LIGHT CHAIN RATIO, SERUM: 0.86 RATIO — SIGNIFICANT CHANGE UP (ref 0.26–1.65)
LAMBDA LC SER QL IFE: 1.02 MG/DL — SIGNIFICANT CHANGE UP (ref 0.57–2.63)

## 2021-07-27 LAB — SURGICAL PATHOLOGY STUDY: SIGNIFICANT CHANGE UP

## 2021-07-30 ENCOUNTER — TRANSCRIPTION ENCOUNTER (OUTPATIENT)
Age: 26
End: 2021-07-30

## 2021-09-16 ENCOUNTER — APPOINTMENT (OUTPATIENT)
Dept: RHEUMATOLOGY | Facility: CLINIC | Age: 26
End: 2021-09-16

## 2021-09-25 ENCOUNTER — INPATIENT (INPATIENT)
Facility: HOSPITAL | Age: 26
LOS: 26 days | Discharge: LEFT BEFORE DISCHARGE | End: 2021-10-22
Attending: INTERNAL MEDICINE | Admitting: INTERNAL MEDICINE
Payer: MEDICAID

## 2021-09-25 VITALS
OXYGEN SATURATION: 100 % | TEMPERATURE: 98 F | RESPIRATION RATE: 18 BRPM | SYSTOLIC BLOOD PRESSURE: 142 MMHG | DIASTOLIC BLOOD PRESSURE: 94 MMHG | HEART RATE: 145 BPM | HEIGHT: 61 IN

## 2021-09-25 DIAGNOSIS — Z78.9 OTHER SPECIFIED HEALTH STATUS: Chronic | ICD-10-CM

## 2021-09-25 LAB
BASOPHILS # BLD AUTO: 0.06 K/UL — SIGNIFICANT CHANGE UP (ref 0–0.2)
BASOPHILS NFR BLD AUTO: 0.5 % — SIGNIFICANT CHANGE UP (ref 0–2)
EOSINOPHIL # BLD AUTO: 0.04 K/UL — SIGNIFICANT CHANGE UP (ref 0–0.5)
EOSINOPHIL NFR BLD AUTO: 0.4 % — SIGNIFICANT CHANGE UP (ref 0–6)
HCT VFR BLD CALC: 35 % — SIGNIFICANT CHANGE UP (ref 34.5–45)
HGB BLD-MCNC: 11.2 G/DL — LOW (ref 11.5–15.5)
IANC: 8.09 K/UL — SIGNIFICANT CHANGE UP (ref 1.5–8.5)
IMM GRANULOCYTES NFR BLD AUTO: 0.5 % — SIGNIFICANT CHANGE UP (ref 0–1.5)
LYMPHOCYTES # BLD AUTO: 18.8 % — SIGNIFICANT CHANGE UP (ref 13–44)
LYMPHOCYTES # BLD AUTO: 2.07 K/UL — SIGNIFICANT CHANGE UP (ref 1–3.3)
MCHC RBC-ENTMCNC: 27.6 PG — SIGNIFICANT CHANGE UP (ref 27–34)
MCHC RBC-ENTMCNC: 32 GM/DL — SIGNIFICANT CHANGE UP (ref 32–36)
MCV RBC AUTO: 86.2 FL — SIGNIFICANT CHANGE UP (ref 80–100)
MONOCYTES # BLD AUTO: 0.71 K/UL — SIGNIFICANT CHANGE UP (ref 0–0.9)
MONOCYTES NFR BLD AUTO: 6.4 % — SIGNIFICANT CHANGE UP (ref 2–14)
NEUTROPHILS # BLD AUTO: 8.09 K/UL — HIGH (ref 1.8–7.4)
NEUTROPHILS NFR BLD AUTO: 73.4 % — SIGNIFICANT CHANGE UP (ref 43–77)
NRBC # BLD: 0 /100 WBCS — SIGNIFICANT CHANGE UP
NRBC # FLD: 0 K/UL — SIGNIFICANT CHANGE UP
PLATELET # BLD AUTO: 477 K/UL — HIGH (ref 150–400)
RBC # BLD: 4.06 M/UL — SIGNIFICANT CHANGE UP (ref 3.8–5.2)
RBC # FLD: 13.5 % — SIGNIFICANT CHANGE UP (ref 10.3–14.5)
WBC # BLD: 11.02 K/UL — HIGH (ref 3.8–10.5)
WBC # FLD AUTO: 11.02 K/UL — HIGH (ref 3.8–10.5)

## 2021-09-25 PROCEDURE — 71045 X-RAY EXAM CHEST 1 VIEW: CPT | Mod: 26

## 2021-09-25 RX ORDER — SODIUM CHLORIDE 9 MG/ML
1000 INJECTION INTRAMUSCULAR; INTRAVENOUS; SUBCUTANEOUS ONCE
Refills: 0 | Status: COMPLETED | OUTPATIENT
Start: 2021-09-25 | End: 2021-09-25

## 2021-09-25 RX ORDER — MORPHINE SULFATE 50 MG/1
4 CAPSULE, EXTENDED RELEASE ORAL ONCE
Refills: 0 | Status: DISCONTINUED | OUTPATIENT
Start: 2021-09-25 | End: 2021-09-25

## 2021-09-25 RX ORDER — ONDANSETRON 8 MG/1
4 TABLET, FILM COATED ORAL ONCE
Refills: 0 | Status: COMPLETED | OUTPATIENT
Start: 2021-09-25 | End: 2021-09-25

## 2021-09-25 RX ADMIN — SODIUM CHLORIDE 1000 MILLILITER(S): 9 INJECTION INTRAMUSCULAR; INTRAVENOUS; SUBCUTANEOUS at 23:23

## 2021-09-25 RX ADMIN — ONDANSETRON 4 MILLIGRAM(S): 8 TABLET, FILM COATED ORAL at 23:23

## 2021-09-25 RX ADMIN — MORPHINE SULFATE 4 MILLIGRAM(S): 50 CAPSULE, EXTENDED RELEASE ORAL at 23:23

## 2021-09-25 NOTE — ED ADULT NURSE NOTE - OBJECTIVE STATEMENT
received pt in room 6 c/o severe abd pain for 3 days and possible crohn's flare.  pt was dc'ed from [facility] 3 days ago... states never felt better.  unable to tolerate po fluids... states causes severe abd pain and vomiting.  has ileostomy.. stoma pink and protruding.. loose stool noted.. states is her normal output.  has dual lumen picc line to left arm. states may use for blood draws and ivf.  waiting to be seen by md.  urine requested.

## 2021-09-25 NOTE — ED PROVIDER NOTE - OBJECTIVE STATEMENT
26 year old female with PMH Crohn's disease s/p surgery for stricture/ostomy placement in mid August, PICC line in place for TPN, presents with epigastric abdominal pain, nausea, and vomiting. Unable to tolerate PO food or liquids. Reports pain is severe, feels similar to Crohn's flare, worse with eating. Denies increased ostomy output or pain around ostomy site. Denies fever, chills, chest pain, shortness of breath, diarrhea, dysuria.

## 2021-09-25 NOTE — ED PROVIDER NOTE - CLINICAL SUMMARY MEDICAL DECISION MAKING FREE TEXT BOX
Marti Galvan DO PGY-1  26 year old female with PMH Crohn's disease s/p surgery for stricture/ostomy placement in mid August, PICC line in place for TPN, presents with epigastric abdominal pain, nausea, and vomiting. Unable to tolerate PO food or liquids. Reports pain is severe, feels similar to Crohn's flare, worse with eating. Appears dry on exam. Considering crohn's flare, dehydration. Obstruction or infection unlikely. Will give IV fluids, do bloodwork, treat pain/nausea, and re-assess. Marti Galvan DO PGY-1  26 year old female with PMH Crohn's disease s/p surgery for stricture/ostomy placement in mid August, PICC line in place for TPN, presents with epigastric abdominal pain, nausea, and vomiting. Unable to tolerate PO food or liquids. Reports pain is severe, feels similar to Crohn's flare, worse with eating. Appears dry on exam. Considering crohn's flare, dehydration. Obstruction or infection unlikely. Will give IV fluids, do bloodwork, treat pain/nausea, and re-assess.    oliver: pt with long hx crohn's wo had surgery for stricture in august, comes with worsening diarrhea, dizziness tachycardia and abdomianl pain.  similar to crohn's flare.  abd diffusely tender, pt dry mucus membranes, tachycardic.  pt already on tpn.  this severely thin patient will need hospitalization to help keep her hydrated through this and treated for her flare.  her GI doc is connected to [facility].

## 2021-09-25 NOTE — ED PROVIDER NOTE - PROGRESS NOTE DETAILS
Marti Galvan,  PGY-1  Patient reports she is currently in no pain but still feels nauseated and thirsty. Discussed plan for admission, and she is in agreement with the plan. Marti Galvan, DO PGY-1   Attempted to flush kidd, was not able to dislodge clot. Will have kidd changed.  Informed by IM team they will consult urology.

## 2021-09-25 NOTE — ED ADULT NURSE NOTE - NSIMPLEMENTINTERV_GEN_ALL_ED
No Implemented All Universal Safety Interventions:  [place] to call system. Call bell, personal items and telephone within reach. Instruct patient to call for assistance. Room bathroom lighting operational. Non-slip footwear when patient is off stretcher. Physically safe environment: no spills, clutter or unnecessary equipment. Stretcher in lowest position, wheels locked, appropriate side rails in place.

## 2021-09-25 NOTE — ED PROVIDER NOTE - NS ED ROS FT
ROS:  -Constitutional: Denies fever  -Head: Denies headache  -Eyes: Denies blurry vision  -Cardiovascular: Denies chest pain  -Pulmonary: Denies shortness of breath  -Gastrointestinal: +nausea   -Genitourinary: Denies dysuria  -Skin: Denies new rashes  -Neuro: Denies numbness

## 2021-09-25 NOTE — ED ADULT TRIAGE NOTE - CHIEF COMPLAINT QUOTE
Pt c/o crohn's flare.  Pt endorses abdominal pain with nausea/vomiting.  Pt states was at [facility] but she felt they were not helpful.  Pt with colostomy bag, states stool is more watery than normal.  Pt tachycardic in triage, denies any chest pain/SOB.  Denies any other PMHx.

## 2021-09-25 NOTE — ED PROVIDER NOTE - PHYSICAL EXAMINATION
PHYSICAL EXAM:  CONSTITUTIONAL: Distressed, thin, awake, alert, oriented to person, place, time/situation.   HEAD: Atraumatic  EYES: Clear bilaterally, pupils equal, round and reactive to light.  ENMT: Airway patent, Nasal mucosa clear. Mouth with dry mucosa and tongue fasciculations. Uvula is midline.   CARDIAC: Tachycardic, regular rhythm.  +S1/S2. No murmurs, rubs or gallops.  RESPIRATORY: Breathing unlabored.  ABDOMEN:  Epigastric tenderness. Ostomy in place, no surround erythema or edema. Soft, nondistended. No rebound tenderness or guarding.  NEUROLOGICAL: Alert and oriented, no focal deficits, no motor or sensory deficits.  SKIN: Skin warm and dry.

## 2021-09-25 NOTE — ED PROVIDER NOTE - ATTENDING CONTRIBUTION TO CARE
oliver: pt with long hx crohn's wo had surgery for stricture in august, comes with worsening diarrhea, dizziness tachycardia and abdomianl pain.  similar to crohn's flare.  abd diffusely tender, pt dry mucus membranes, tachycardic.  pt already on tpn.  this severely thin patient will need hospitalization to help keep her hydrated through this and treated for her flare.  her GI doc is connected to [facility].    I performed a history and physical exam of the patient and discussed their management with the resident and /or advanced care provider. I reviewed the resident and /or ACP's note and agree with the documented findings and plan of care. My medical decison making and observations are found above.

## 2021-09-26 DIAGNOSIS — K50.90 CROHN'S DISEASE, UNSPECIFIED, WITHOUT COMPLICATIONS: ICD-10-CM

## 2021-09-26 DIAGNOSIS — Z29.9 ENCOUNTER FOR PROPHYLACTIC MEASURES, UNSPECIFIED: ICD-10-CM

## 2021-09-26 DIAGNOSIS — Z98.890 OTHER SPECIFIED POSTPROCEDURAL STATES: Chronic | ICD-10-CM

## 2021-09-26 DIAGNOSIS — R74.01 ELEVATION OF LEVELS OF LIVER TRANSAMINASE LEVELS: ICD-10-CM

## 2021-09-26 DIAGNOSIS — Z45.2 ENCOUNTER FOR ADJUSTMENT AND MANAGEMENT OF VASCULAR ACCESS DEVICE: Chronic | ICD-10-CM

## 2021-09-26 LAB
ALBUMIN SERPL ELPH-MCNC: 4.3 G/DL — SIGNIFICANT CHANGE UP (ref 3.3–5)
ALBUMIN SERPL ELPH-MCNC: 4.8 G/DL — SIGNIFICANT CHANGE UP (ref 3.3–5)
ALP SERPL-CCNC: 107 U/L — SIGNIFICANT CHANGE UP (ref 40–120)
ALP SERPL-CCNC: 110 U/L — SIGNIFICANT CHANGE UP (ref 40–120)
ALT FLD-CCNC: 180 U/L — HIGH (ref 4–33)
ALT FLD-CCNC: 201 U/L — HIGH (ref 4–33)
ANION GAP SERPL CALC-SCNC: 14 MMOL/L — SIGNIFICANT CHANGE UP (ref 7–14)
ANION GAP SERPL CALC-SCNC: 18 MMOL/L — HIGH (ref 7–14)
AST SERPL-CCNC: 111 U/L — HIGH (ref 4–32)
AST SERPL-CCNC: 118 U/L — HIGH (ref 4–32)
BILIRUB SERPL-MCNC: 0.5 MG/DL — SIGNIFICANT CHANGE UP (ref 0.2–1.2)
BILIRUB SERPL-MCNC: 0.6 MG/DL — SIGNIFICANT CHANGE UP (ref 0.2–1.2)
BUN SERPL-MCNC: 23 MG/DL — SIGNIFICANT CHANGE UP (ref 7–23)
BUN SERPL-MCNC: 32 MG/DL — HIGH (ref 7–23)
CALCIUM SERPL-MCNC: 10.5 MG/DL — SIGNIFICANT CHANGE UP (ref 8.4–10.5)
CALCIUM SERPL-MCNC: 9.1 MG/DL — SIGNIFICANT CHANGE UP (ref 8.4–10.5)
CHLORIDE SERPL-SCNC: 102 MMOL/L — SIGNIFICANT CHANGE UP (ref 98–107)
CHLORIDE SERPL-SCNC: 107 MMOL/L — SIGNIFICANT CHANGE UP (ref 98–107)
CO2 SERPL-SCNC: 21 MMOL/L — LOW (ref 22–31)
CO2 SERPL-SCNC: 21 MMOL/L — LOW (ref 22–31)
CREAT SERPL-MCNC: 0.51 MG/DL — SIGNIFICANT CHANGE UP (ref 0.5–1.3)
CREAT SERPL-MCNC: 0.58 MG/DL — SIGNIFICANT CHANGE UP (ref 0.5–1.3)
CRP SERPL-MCNC: <4 MG/L — SIGNIFICANT CHANGE UP
ERYTHROCYTE [SEDIMENTATION RATE] IN BLOOD: 17 MM/HR — SIGNIFICANT CHANGE UP (ref 4–25)
GLUCOSE SERPL-MCNC: 92 MG/DL — SIGNIFICANT CHANGE UP (ref 70–99)
GLUCOSE SERPL-MCNC: 99 MG/DL — SIGNIFICANT CHANGE UP (ref 70–99)
HCT VFR BLD CALC: 31.6 % — LOW (ref 34.5–45)
HGB BLD-MCNC: 9.7 G/DL — LOW (ref 11.5–15.5)
LIDOCAIN IGE QN: 26 U/L — SIGNIFICANT CHANGE UP (ref 7–60)
MCHC RBC-ENTMCNC: 27.6 PG — SIGNIFICANT CHANGE UP (ref 27–34)
MCHC RBC-ENTMCNC: 30.7 GM/DL — LOW (ref 32–36)
MCV RBC AUTO: 90 FL — SIGNIFICANT CHANGE UP (ref 80–100)
NRBC # BLD: 0 /100 WBCS — SIGNIFICANT CHANGE UP
NRBC # FLD: 0 K/UL — SIGNIFICANT CHANGE UP
PLATELET # BLD AUTO: 436 K/UL — HIGH (ref 150–400)
POTASSIUM SERPL-MCNC: 3 MMOL/L — LOW (ref 3.5–5.3)
POTASSIUM SERPL-MCNC: 3.8 MMOL/L — SIGNIFICANT CHANGE UP (ref 3.5–5.3)
POTASSIUM SERPL-SCNC: 3 MMOL/L — LOW (ref 3.5–5.3)
POTASSIUM SERPL-SCNC: 3.8 MMOL/L — SIGNIFICANT CHANGE UP (ref 3.5–5.3)
PROT SERPL-MCNC: 7 G/DL — SIGNIFICANT CHANGE UP (ref 6–8.3)
PROT SERPL-MCNC: 7.7 G/DL — SIGNIFICANT CHANGE UP (ref 6–8.3)
RBC # BLD: 3.51 M/UL — LOW (ref 3.8–5.2)
RBC # FLD: 13.5 % — SIGNIFICANT CHANGE UP (ref 10.3–14.5)
SARS-COV-2 RNA SPEC QL NAA+PROBE: SIGNIFICANT CHANGE UP
SODIUM SERPL-SCNC: 137 MMOL/L — SIGNIFICANT CHANGE UP (ref 135–145)
SODIUM SERPL-SCNC: 146 MMOL/L — HIGH (ref 135–145)
WBC # BLD: 10.34 K/UL — SIGNIFICANT CHANGE UP (ref 3.8–10.5)
WBC # FLD AUTO: 10.34 K/UL — SIGNIFICANT CHANGE UP (ref 3.8–10.5)

## 2021-09-26 PROCEDURE — 74177 CT ABD & PELVIS W/CONTRAST: CPT | Mod: 26

## 2021-09-26 PROCEDURE — 99223 1ST HOSP IP/OBS HIGH 75: CPT

## 2021-09-26 RX ORDER — PANTOPRAZOLE SODIUM 20 MG/1
40 TABLET, DELAYED RELEASE ORAL DAILY
Refills: 0 | Status: DISCONTINUED | OUTPATIENT
Start: 2021-09-26 | End: 2021-09-26

## 2021-09-26 RX ORDER — ACETAMINOPHEN 500 MG
500 TABLET ORAL ONCE
Refills: 0 | Status: COMPLETED | OUTPATIENT
Start: 2021-09-26 | End: 2021-09-26

## 2021-09-26 RX ORDER — METOCLOPRAMIDE HCL 10 MG
10 TABLET ORAL ONCE
Refills: 0 | Status: DISCONTINUED | OUTPATIENT
Start: 2021-09-26 | End: 2021-09-28

## 2021-09-26 RX ORDER — ONDANSETRON 8 MG/1
4 TABLET, FILM COATED ORAL ONCE
Refills: 0 | Status: COMPLETED | OUTPATIENT
Start: 2021-09-26 | End: 2021-09-26

## 2021-09-26 RX ORDER — INFLUENZA VIRUS VACCINE 15; 15; 15; 15 UG/.5ML; UG/.5ML; UG/.5ML; UG/.5ML
0.5 SUSPENSION INTRAMUSCULAR ONCE
Refills: 0 | Status: DISCONTINUED | OUTPATIENT
Start: 2021-09-26 | End: 2021-10-22

## 2021-09-26 RX ORDER — ONDANSETRON 8 MG/1
4 TABLET, FILM COATED ORAL EVERY 6 HOURS
Refills: 0 | Status: DISCONTINUED | OUTPATIENT
Start: 2021-09-26 | End: 2021-10-12

## 2021-09-26 RX ORDER — MORPHINE SULFATE 50 MG/1
4 CAPSULE, EXTENDED RELEASE ORAL ONCE
Refills: 0 | Status: DISCONTINUED | OUTPATIENT
Start: 2021-09-26 | End: 2021-09-26

## 2021-09-26 RX ORDER — ACETAMINOPHEN 500 MG
1000 TABLET ORAL ONCE
Refills: 0 | Status: DISCONTINUED | OUTPATIENT
Start: 2021-09-26 | End: 2021-09-26

## 2021-09-26 RX ORDER — PANTOPRAZOLE SODIUM 20 MG/1
40 TABLET, DELAYED RELEASE ORAL
Refills: 0 | Status: DISCONTINUED | OUTPATIENT
Start: 2021-09-26 | End: 2021-10-20

## 2021-09-26 RX ORDER — ACETAMINOPHEN 500 MG
650 TABLET ORAL EVERY 6 HOURS
Refills: 0 | Status: DISCONTINUED | OUTPATIENT
Start: 2021-09-26 | End: 2021-10-14

## 2021-09-26 RX ORDER — INFLIXIMAB-DYYB 120 MG/ML
0 INJECTION SUBCUTANEOUS
Qty: 0 | Refills: 0 | DISCHARGE

## 2021-09-26 RX ORDER — SODIUM CHLORIDE 9 MG/ML
1000 INJECTION INTRAMUSCULAR; INTRAVENOUS; SUBCUTANEOUS ONCE
Refills: 0 | Status: COMPLETED | OUTPATIENT
Start: 2021-09-26 | End: 2021-09-26

## 2021-09-26 RX ORDER — SODIUM CHLORIDE 9 MG/ML
1000 INJECTION INTRAMUSCULAR; INTRAVENOUS; SUBCUTANEOUS
Refills: 0 | Status: DISCONTINUED | OUTPATIENT
Start: 2021-09-26 | End: 2021-09-30

## 2021-09-26 RX ADMIN — SODIUM CHLORIDE 75 MILLILITER(S): 9 INJECTION INTRAMUSCULAR; INTRAVENOUS; SUBCUTANEOUS at 12:40

## 2021-09-26 RX ADMIN — ONDANSETRON 4 MILLIGRAM(S): 8 TABLET, FILM COATED ORAL at 17:50

## 2021-09-26 RX ADMIN — SODIUM CHLORIDE 75 MILLILITER(S): 9 INJECTION INTRAMUSCULAR; INTRAVENOUS; SUBCUTANEOUS at 22:00

## 2021-09-26 RX ADMIN — PANTOPRAZOLE SODIUM 40 MILLIGRAM(S): 20 TABLET, DELAYED RELEASE ORAL at 12:40

## 2021-09-26 RX ADMIN — MORPHINE SULFATE 4 MILLIGRAM(S): 50 CAPSULE, EXTENDED RELEASE ORAL at 23:58

## 2021-09-26 RX ADMIN — MORPHINE SULFATE 4 MILLIGRAM(S): 50 CAPSULE, EXTENDED RELEASE ORAL at 05:54

## 2021-09-26 RX ADMIN — SODIUM CHLORIDE 1000 MILLILITER(S): 9 INJECTION INTRAMUSCULAR; INTRAVENOUS; SUBCUTANEOUS at 07:09

## 2021-09-26 RX ADMIN — MORPHINE SULFATE 4 MILLIGRAM(S): 50 CAPSULE, EXTENDED RELEASE ORAL at 18:15

## 2021-09-26 RX ADMIN — ONDANSETRON 4 MILLIGRAM(S): 8 TABLET, FILM COATED ORAL at 23:28

## 2021-09-26 RX ADMIN — MORPHINE SULFATE 4 MILLIGRAM(S): 50 CAPSULE, EXTENDED RELEASE ORAL at 23:28

## 2021-09-26 RX ADMIN — PANTOPRAZOLE SODIUM 40 MILLIGRAM(S): 20 TABLET, DELAYED RELEASE ORAL at 19:13

## 2021-09-26 RX ADMIN — Medication 500 MILLIGRAM(S): at 21:02

## 2021-09-26 RX ADMIN — ONDANSETRON 4 MILLIGRAM(S): 8 TABLET, FILM COATED ORAL at 01:45

## 2021-09-26 RX ADMIN — MORPHINE SULFATE 4 MILLIGRAM(S): 50 CAPSULE, EXTENDED RELEASE ORAL at 17:49

## 2021-09-26 RX ADMIN — Medication 200 MILLIGRAM(S): at 20:32

## 2021-09-26 NOTE — H&P ADULT - NSHPLABSRESULTS_GEN_ALL_CORE
CT Abd+Pelvis: Right lower quadrant enteritis, suspicious for a Crohn's flare. No obstruction.  COVID: neg  CXR: Clear lungs.Left-sided PICC line with its tip in SVC.  AST/ALT: 118/201                          9.7    10.34 )-----------( 436      ( 26 Sep 2021 12:13 )             31.6     09-25    146<H>  |  107  |  32<H>  ----------------------------<  99  3.8   |  21<L>  |  0.58    Ca    10.5      25 Sep 2021 23:41    TPro  7.7  /  Alb  4.8  /  TBili  0.5  /  DBili  x   /  AST  118<H>  /  ALT  201<H>  /  AlkPhos  110  09-25          LIVER FUNCTIONS - ( 25 Sep 2021 23:41 )  Alb: 4.8 g/dL / Pro: 7.7 g/dL / ALK PHOS: 110 U/L / ALT: 201 U/L / AST: 118 U/L / GGT: x

## 2021-09-26 NOTE — ED ADULT NURSE REASSESSMENT NOTE - NS ED NURSE REASSESS COMMENT FT1
Received rpt from CARMELO Rutledge. Pt. A&OX4, lying in bed comfortably sleeping. Vitals stable. States she was having lower abdominal pain which has subsided with medications given. Denies n/v at this time. Awaiting bed assignment.

## 2021-09-26 NOTE — CONSULT NOTE ADULT - SUBJECTIVE AND OBJECTIVE BOX
Chief Complaint:  Patient is a 26y old  Female who presents with a chief complaint of crohn's flare up (26 Sep 2021 12:05)    HPI:CITLALI PAZ is a 26y Female with Crohn's colitis c/b ascending colon stricture s/p resection and ileostomy, presenting w/ abd pain.         PMHX/PSHX:  No pertinent past medical history    Crohn disease    No pertinent past surgical history    Peripherally inserted central catheter (PICC) in place    History of ileostomy      Allergies:  No Known Allergies      Home Medications: reviewed  Hospital Medications:  acetaminophen   Tablet .. 650 milliGRAM(s) Oral every 6 hours PRN  ondansetron Injectable 4 milliGRAM(s) IV Push every 6 hours PRN  pantoprazole  Injectable 40 milliGRAM(s) IV Push daily  sodium chloride 0.9%. 1000 milliLiter(s) IV Continuous <Continuous>      Social History:   Tob: Denies  EtOH: Denies  Illicit Drugs: Denies    Family history:  No pertinent family history in first degree relatives    FH: type 1 diabetes (Sibling)    FH: hypertension (Father)      Denies family history of colon cancer/polyps, stomach cancer/polyps, pancreatic cancer/masses, liver cancer/disease, ovarian cancer and endometrial cancer.    ROS:   General:  No  fevers, chills, night sweats, fatigue  Eyes:  Good vision, no reported pain  ENT:  No sore throat, pain, runny nose  CV:  No pain, palpitations  Pulm:  No dyspnea, cough  GI:  See HPI, otherwise negative  :  No  incontinence, nocturia  Muscle:  No pain, weakness  Neuro:  No memory problems  Psych:  No insomnia, mood problems, depression  Endocrine:  No polyuria, polydipsia, cold/heat intolerance  Heme:  No petechiae, ecchymosis, easy bruisability  Skin:  No rash    PHYSICAL EXAM:   Vital Signs:  Vital Signs Last 24 Hrs  T(C): 37.2 (26 Sep 2021 14:19), Max: 37.2 (26 Sep 2021 14:19)  T(F): 99 (26 Sep 2021 14:19), Max: 99 (26 Sep 2021 14:19)  HR: 108 (26 Sep 2021 14:19) (103 - 145)  BP: 112/77 (26 Sep 2021 14:19) (112/77 - 142/94)  BP(mean): --  RR: 16 (26 Sep 2021 14:19) (16 - 18)  SpO2: 98% (26 Sep 2021 14:19) (98% - 100%)  Daily Height in cm: 154.94 (26 Sep 2021 12:05)    Daily     GENERAL: no acute distress  NEURO: alert  HEENT: anicteric sclera, no conjunctival pallor appreciated  CHEST: no respiratory distress, no accessory muscle use  CARDIAC: regular rate, rhythm  ABDOMEN: soft, non-tender, non-distended, no rebound or guarding  EXTREMITIES: warm, well perfused, no edema  SKIN: no lesions noted    LABS: reviewed                        9.7    10.34 )-----------( 436      ( 26 Sep 2021 12:13 )             31.6     09-26    137  |  102  |  23  ----------------------------<  92  3.0<L>   |  21<L>  |  0.51    Ca    9.1      26 Sep 2021 12:13    TPro  7.0  /  Alb  4.3  /  TBili  0.6  /  DBili  x   /  AST  111<H>  /  ALT  180<H>  /  AlkPhos  107  09-26    LIVER FUNCTIONS - ( 26 Sep 2021 12:13 )  Alb: 4.3 g/dL / Pro: 7.0 g/dL / ALK PHOS: 107 U/L / ALT: 180 U/L / AST: 111 U/L / GGT: x               Diagnostic Studies: see sunrise for full report         Chief Complaint:  Patient is a 26y old  Female who presents with a chief complaint of crohn's flare up (26 Sep 2021 12:05)    HPI:CITLALI PAZ is a 26y Female with Crohn's colitis c/b ascending colon stricture s/p resection and ileostomy, presenting w/ abd pain.     She initially presented to [facility] 6/2021 c/o diarrhea. Imaging showed TI inflammation. She underwent colonoscopy showing ascending colon stricture. This was biopsied, with path results showing active colitis with cryptitis. She received infliximab 5mg/kg on 6/12/2021. She received her next dose as an outpatient on 6/25.     She was then hospitalized 7/19 for worsening n/v and crampy abd pain. She received her next dose of infliximab while hospitalized on 7/21. She continued to have some nausea and abd pain, so underwent EGD on 7/22. EGD showed grade B esophagitis but was otherwise normal. Biopsies were c/w reflux esophagitis. Duodenal biopsies showed some increased intraepithelial lymphocytes.     PMHX/PSHX:  No pertinent past medical history    Crohn disease    No pertinent past surgical history    Peripherally inserted central catheter (PICC) in place    History of ileostomy      Allergies:  No Known Allergies      Home Medications: reviewed  Hospital Medications:  acetaminophen   Tablet .. 650 milliGRAM(s) Oral every 6 hours PRN  ondansetron Injectable 4 milliGRAM(s) IV Push every 6 hours PRN  pantoprazole  Injectable 40 milliGRAM(s) IV Push daily  sodium chloride 0.9%. 1000 milliLiter(s) IV Continuous <Continuous>      Social History:   Tob: Denies  EtOH: Denies  Illicit Drugs: Denies    Family history:  No pertinent family history in first degree relatives    FH: type 1 diabetes (Sibling)    FH: hypertension (Father)      Denies family history of colon cancer/polyps, stomach cancer/polyps, pancreatic cancer/masses, liver cancer/disease, ovarian cancer and endometrial cancer.    ROS:   General:  No  fevers, chills, night sweats, fatigue  Eyes:  Good vision, no reported pain  ENT:  No sore throat, pain, runny nose  CV:  No pain, palpitations  Pulm:  No dyspnea, cough  GI:  See HPI, otherwise negative  :  No  incontinence, nocturia  Muscle:  No pain, weakness  Neuro:  No memory problems  Psych:  No insomnia, mood problems, depression  Endocrine:  No polyuria, polydipsia, cold/heat intolerance  Heme:  No petechiae, ecchymosis, easy bruisability  Skin:  No rash    PHYSICAL EXAM:   Vital Signs:  Vital Signs Last 24 Hrs  T(C): 37.2 (26 Sep 2021 14:19), Max: 37.2 (26 Sep 2021 14:19)  T(F): 99 (26 Sep 2021 14:19), Max: 99 (26 Sep 2021 14:19)  HR: 108 (26 Sep 2021 14:19) (103 - 145)  BP: 112/77 (26 Sep 2021 14:19) (112/77 - 142/94)  BP(mean): --  RR: 16 (26 Sep 2021 14:19) (16 - 18)  SpO2: 98% (26 Sep 2021 14:19) (98% - 100%)  Daily Height in cm: 154.94 (26 Sep 2021 12:05)    Daily     GENERAL: no acute distress  NEURO: alert  HEENT: anicteric sclera, no conjunctival pallor appreciated  CHEST: no respiratory distress, no accessory muscle use  CARDIAC: regular rate, rhythm  ABDOMEN: soft, non-tender, non-distended, no rebound or guarding  EXTREMITIES: warm, well perfused, no edema  SKIN: no lesions noted    LABS: reviewed                        9.7    10.34 )-----------( 436      ( 26 Sep 2021 12:13 )             31.6     09-26    137  |  102  |  23  ----------------------------<  92  3.0<L>   |  21<L>  |  0.51    Ca    9.1      26 Sep 2021 12:13    TPro  7.0  /  Alb  4.3  /  TBili  0.6  /  DBili  x   /  AST  111<H>  /  ALT  180<H>  /  AlkPhos  107  09-26    LIVER FUNCTIONS - ( 26 Sep 2021 12:13 )  Alb: 4.3 g/dL / Pro: 7.0 g/dL / ALK PHOS: 107 U/L / ALT: 180 U/L / AST: 111 U/L / GGT: x               Diagnostic Studies: see sunrise for full report         Chief Complaint:  Patient is a 26y old  Female who presents with a chief complaint of crohn's flare up (26 Sep 2021 12:05)    HPI:CITLALI PAZ is a 26y Female with Crohn's colitis c/b ascending colon stricture s/p resection and ileostomy, presenting w/ abd pain.     She initially presented to [facility] 6/2021 c/o diarrhea. Imaging showed TI inflammation. She underwent colonoscopy showing ascending colon stricture. This was biopsied, with path results showing active colitis with cryptitis. She received infliximab 5mg/kg on 6/12/2021. She received her next dose as an outpatient on 6/25.     She was then hospitalized 7/19 for worsening n/v and crampy abd pain. She received her next dose of infliximab while hospitalized on 7/21. She continued to have some nausea and abd pain, so underwent EGD on 7/22. EGD showed grade B esophagitis but was otherwise normal. Biopsies were c/w reflux esophagitis. Duodenal biopsies showed some increased intraepithelial lymphocytes. She continued to receive dilaudid for pain for the next 2 days and was discharged.    She reports that after that she continued to have crampy abd pain a/w nausea. She eventually was hospitalized at [facility] and underwent R hemicoletomy and ileostomy for resection of her stricture. She says her symptoms have continued to recur intermittently despite surgery. She was recently hospitalized at [facility] for these symptoms - she says she underwent EGD and "every scan there is" and no one could figure out what was wrong with her. She missed her remicade infusion (scheduled 9/21) because she was inpatient at [facility]. She says they discharged her because they gave up on her.     She presents here w/ central abd pain and nausea. Pain is constant but worse after taking PO. She reports the nausea is immediate after trying to take PO but also persistent. She says these both improve w/ morphine. She denies any change in ostomy output. Patient denies fevers, chills, eye redness/pain, oral ulcers, joint pain/swelling, rash.    CT shows some RLQ enteritis.    PMHX/PSHX:  No pertinent past medical history    Crohn disease    No pertinent past surgical history    Peripherally inserted central catheter (PICC) in place    History of ileostomy      Allergies:  No Known Allergies      Home Medications: reviewed  Hospital Medications:  acetaminophen   Tablet .. 650 milliGRAM(s) Oral every 6 hours PRN  ondansetron Injectable 4 milliGRAM(s) IV Push every 6 hours PRN  pantoprazole  Injectable 40 milliGRAM(s) IV Push daily  sodium chloride 0.9%. 1000 milliLiter(s) IV Continuous <Continuous>      Social History:   Tob: Denies  EtOH: Denies  Illicit Drugs: Denies    Family history:  No pertinent family history in first degree relatives    FH: type 1 diabetes (Sibling)    FH: hypertension (Father)      Denies family history of colon cancer/polyps, stomach cancer/polyps, pancreatic cancer/masses, liver cancer/disease, ovarian cancer and endometrial cancer.    ROS:   General:  No  fevers, chills, night sweats, fatigue  Eyes:  Good vision, no reported pain  ENT:  No sore throat, pain, runny nose  CV:  No pain, palpitations  Pulm:  No dyspnea, cough  GI:  See HPI, otherwise negative  :  No  incontinence, nocturia  Muscle:  No pain, weakness  Neuro:  No memory problems  Psych:  No insomnia, mood problems, depression  Endocrine:  No polyuria, polydipsia, cold/heat intolerance  Heme:  No petechiae, ecchymosis, easy bruisability  Skin:  No rash    PHYSICAL EXAM:   Vital Signs:  Vital Signs Last 24 Hrs  T(C): 37.2 (26 Sep 2021 14:19), Max: 37.2 (26 Sep 2021 14:19)  T(F): 99 (26 Sep 2021 14:19), Max: 99 (26 Sep 2021 14:19)  HR: 108 (26 Sep 2021 14:19) (103 - 145)  BP: 112/77 (26 Sep 2021 14:19) (112/77 - 142/94)  BP(mean): --  RR: 16 (26 Sep 2021 14:19) (16 - 18)  SpO2: 98% (26 Sep 2021 14:19) (98% - 100%)  Daily Height in cm: 154.94 (26 Sep 2021 12:05)    Daily     GENERAL: no acute distress  NEURO: alert  HEENT: anicteric sclera, no conjunctival pallor appreciated, no oral lesions  CHEST: no respiratory distress, no accessory muscle use  CARDIAC: regular rate, rhythm  ABDOMEN: soft, TTP epigastrium, ostomy w/ solid brown stool,  EXTREMITIES: warm, well perfused, no edema  SKIN: no lesions noted    LABS: reviewed                        9.7    10.34 )-----------( 436      ( 26 Sep 2021 12:13 )             31.6     09-26    137  |  102  |  23  ----------------------------<  92  3.0<L>   |  21<L>  |  0.51    Ca    9.1      26 Sep 2021 12:13    TPro  7.0  /  Alb  4.3  /  TBili  0.6  /  DBili  x   /  AST  111<H>  /  ALT  180<H>  /  AlkPhos  107  09-26    LIVER FUNCTIONS - ( 26 Sep 2021 12:13 )  Alb: 4.3 g/dL / Pro: 7.0 g/dL / ALK PHOS: 107 U/L / ALT: 180 U/L / AST: 111 U/L / GGT: x               Diagnostic Studies: see sunrise for full report

## 2021-09-26 NOTE — H&P ADULT - PROBLEM SELECTOR PLAN 2
Transaminitis in a setting of hypovolemia/dehydration.  -s/p IV fluids x2L NS  -c/w maintenance IV NS @ 75cc/hr  -will recheck this morning for repeat CMP.  -recent hepatitis panel in 6/2021 was negative.

## 2021-09-26 NOTE — H&P ADULT - HISTORY OF PRESENT ILLNESS
26 year old female with PMH Crohn's disease s/p surgery for stricture/ileostomy placement in mid August 2021, s/p PICC line in place for TPN 2 weeks ago, presents with epigastric abdominal pain, nausea, and vomiting x 4 days. Pt c/o multiple episodes of nausea and vomiting, food consistency emesis. Unable to retain any PO intake and wasn't eating nor drinking for past 3 days. Epigastric pain described as aching, 9/10, radiating to her esophagus. Admits to good ileostomy output of yellow-green watery stool with no blood. Pt was admitted at [facility] early september for nausea and vomiting and 2 weeks ago had PICC line placed for TPN infusion at home. Pt on Remicade infusion: last infusion was in July 2021 and was supposed to receive the infusion again Sept 16. However, since pt was hospitalized at [facility], she missed the infusion. Pt denies chest pain, sob, palpitations. hematochezia, melena, dysuria, dizziness, lightheadedness or sick contacts.

## 2021-09-26 NOTE — H&P ADULT - ASSESSMENT
26 year old female with PMH Crohn's disease s/p surgery for stricture/ileostomy placement in mid August 2021, s/p PICC line in place for TPN 2 weeks ago, presents with epigastric abdominal pain, nausea, and vomiting x 4 days, admitted to medicine for Crohn's Flare up.

## 2021-09-26 NOTE — CONSULT NOTE ADULT - ASSESSMENT
26F w/ history of Crohn's disease c/b ascending colon stricture s/p resection and ileostomy at [facility], on remicade since 6/2021, presenting w/ persistent abd pain and n/v after missing remicade infusion while hospitalized at [facility] for same symptoms.    Impression:  #Nausea, vomiting, abd pain - d/dx includes persistently active Crohn's disease vs mutlifactorial from Crohn's, erosive esophagitis, GERD, PUD, ? celiac disease  #Crohn's disease - on infliximab, due on 9/21 for infusion, unclear how well she is responding given persistent enteritis on CT; though clinical symptoms don't necessarily correlate and maybe unrelated. No increased ostomy output.  #Elevated liver enzymes - in setting of increased intraepithelial lymphocytes on duodenal biopsies, possibly from celiac disease    Recommendations:  - Please send infliximab levels and antibodies tonight or tomorrow AM.  - Can plan for infliximab infusion as patient is overdue for this.  - IV PPI BID for possible contribution to symptoms from esophagitis/GERD/PUD.  - Please send ESR and CRP.  - Please send fecal calprotectin from ostomy.  - Please send TTG IgA and Total IgA to help w/ evaluation for celiac disease.  - Diet as tolerated for now.  - Antiemetics as needed. Check QTc.  - GI will continue to follow.  - No immediate plan for steroids or endoscopy at this time.  - Will work to get [facility] records.    Faustino Freitas  Gastroenterology/Hepatology Fellow  Available via [employer] Teams    NON-URGENT CONSULTS:  Please email [email or web address]  [email or web address]  AT NIGHT AND ON WEEKENDS:  Contact on-call GI fellow via answering service ([phone]) from 5pm-8am and on weekends/holidays  MONDAY-FRIDAY 8AM-5PM:  Pager# [phone] ([facility]) or [phone] ([facility])  GI Phone# [phone] ([facility])   26F w/ history of Crohn's disease c/b ascending colon stricture s/p resection and ileostomy at [facility], on remicade since 6/2021, presenting w/ persistent abdominal pain, odynophagia, n/v.     Impression:  #Nausea, vomiting, abd pain - d/dx includes persistently active Crohn's disease vs multifactorial from Crohn's, erosive esophagitis (seen on prior EGD), GERD, PUD, ? celiac disease  #Crohn's disease - Started on infliximab during hospitalizations in 6/2021, S/p recent ileocolic resection at [facility]. No increased ostomy output. Unclear that current symptoms are 2/2 flare.   #Elevated liver enzymes - in setting of increased intraepithelial lymphocytes on duodenal biopsies, possibly from celiac disease    Recommendations:  - Please send inflammatory markers (ESR, CRP).   - Please send infliximab levels and antibodies (suspect that they will be low she has missed doses and off therapy)   - IV PPI BID for possible contribution to symptoms from esophagitis/GERD/PUD.  - Please send fecal calprotectin  - Please send TTG IgA and Total IgA to help w/ evaluation for celiac disease.  - Diet as tolerated for now.  - Antiemetics as needed. Check QTc.  - GI will continue to follow.  - No immediate plan for steroids or endoscopy at this time.  - Will work to get [facility] records.    Faustino Freitas  Gastroenterology/Hepatology Fellow  Available via [employer] Teams    NON-URGENT CONSULTS:  Please email [email or web address]  [email or web address]  AT NIGHT AND ON WEEKENDS:  Contact on-call GI fellow via answering service ([phone]) from 5pm-8am and on weekends/holidays  MONDAY-FRIDAY 8AM-5PM:  Pager# [phone] ([facility]) or [phone] ([facility])  GI Phone# [phone] ([facility])

## 2021-09-26 NOTE — H&P ADULT - NSICDXPASTSURGICALHX_GEN_ALL_CORE_FT
PAST SURGICAL HISTORY:  History of ileostomy Aug 2021    No pertinent past surgical history     Peripherally inserted central catheter (PICC) in place LUE PICC place on 9/12/2021

## 2021-09-26 NOTE — H&P ADULT - PROBLEM SELECTOR PLAN 1
admit to medicine  -s/p IV fluids x2L NS  -c/w maintenance IV NS @ 75cc/hr  -clear liquid diet. (pt tolerating po since was given morphine by ED.)  -House GI consulted  -Likely will need inpatient Remicade infusion.

## 2021-09-26 NOTE — H&P ADULT - ATTENDING COMMENTS
Patient with Chrons's flare , feeling better.( abdominal pain intermittent since last week associated with nausea and vomiting , weight loss )  F/u GI recommendations

## 2021-09-27 ENCOUNTER — TRANSCRIPTION ENCOUNTER (OUTPATIENT)
Age: 26
End: 2021-09-27

## 2021-09-27 LAB
ALBUMIN SERPL ELPH-MCNC: 3.6 G/DL — SIGNIFICANT CHANGE UP (ref 3.3–5)
ALP SERPL-CCNC: 91 U/L — SIGNIFICANT CHANGE UP (ref 40–120)
ALT FLD-CCNC: 128 U/L — HIGH (ref 4–33)
ANION GAP SERPL CALC-SCNC: 15 MMOL/L — HIGH (ref 7–14)
AST SERPL-CCNC: 55 U/L — HIGH (ref 4–32)
BILIRUB SERPL-MCNC: 0.7 MG/DL — SIGNIFICANT CHANGE UP (ref 0.2–1.2)
BUN SERPL-MCNC: 16 MG/DL — SIGNIFICANT CHANGE UP (ref 7–23)
CALCIUM SERPL-MCNC: 9 MG/DL — SIGNIFICANT CHANGE UP (ref 8.4–10.5)
CHLORIDE SERPL-SCNC: 103 MMOL/L — SIGNIFICANT CHANGE UP (ref 98–107)
CO2 SERPL-SCNC: 19 MMOL/L — LOW (ref 22–31)
COVID-19 SPIKE DOMAIN AB INTERP: POSITIVE
COVID-19 SPIKE DOMAIN ANTIBODY RESULT: >250 U/ML — HIGH
CREAT SERPL-MCNC: 0.51 MG/DL — SIGNIFICANT CHANGE UP (ref 0.5–1.3)
GLUCOSE SERPL-MCNC: 59 MG/DL — LOW (ref 70–99)
HCT VFR BLD CALC: 27.8 % — LOW (ref 34.5–45)
HGB BLD-MCNC: 8.8 G/DL — LOW (ref 11.5–15.5)
MCHC RBC-ENTMCNC: 27.7 PG — SIGNIFICANT CHANGE UP (ref 27–34)
MCHC RBC-ENTMCNC: 31.7 GM/DL — LOW (ref 32–36)
MCV RBC AUTO: 87.4 FL — SIGNIFICANT CHANGE UP (ref 80–100)
NRBC # BLD: 0 /100 WBCS — SIGNIFICANT CHANGE UP
NRBC # FLD: 0 K/UL — SIGNIFICANT CHANGE UP
PLATELET # BLD AUTO: 358 K/UL — SIGNIFICANT CHANGE UP (ref 150–400)
POTASSIUM SERPL-MCNC: 3.4 MMOL/L — LOW (ref 3.5–5.3)
POTASSIUM SERPL-SCNC: 3.4 MMOL/L — LOW (ref 3.5–5.3)
PROT SERPL-MCNC: 5.9 G/DL — LOW (ref 6–8.3)
RBC # BLD: 3.18 M/UL — LOW (ref 3.8–5.2)
RBC # FLD: 12.7 % — SIGNIFICANT CHANGE UP (ref 10.3–14.5)
SARS-COV-2 IGG+IGM SERPL QL IA: >250 U/ML — HIGH
SARS-COV-2 IGG+IGM SERPL QL IA: POSITIVE
SODIUM SERPL-SCNC: 137 MMOL/L — SIGNIFICANT CHANGE UP (ref 135–145)
WBC # BLD: 6.37 K/UL — SIGNIFICANT CHANGE UP (ref 3.8–10.5)
WBC # FLD AUTO: 6.37 K/UL — SIGNIFICANT CHANGE UP (ref 3.8–10.5)

## 2021-09-27 PROCEDURE — 99233 SBSQ HOSP IP/OBS HIGH 50: CPT | Mod: GC

## 2021-09-27 PROCEDURE — 99232 SBSQ HOSP IP/OBS MODERATE 35: CPT | Mod: GC

## 2021-09-27 RX ORDER — HYDROMORPHONE HYDROCHLORIDE 2 MG/ML
0.25 INJECTION INTRAMUSCULAR; INTRAVENOUS; SUBCUTANEOUS ONCE
Refills: 0 | Status: DISCONTINUED | OUTPATIENT
Start: 2021-09-27 | End: 2021-09-27

## 2021-09-27 RX ORDER — POTASSIUM CHLORIDE 20 MEQ
40 PACKET (EA) ORAL ONCE
Refills: 0 | Status: COMPLETED | OUTPATIENT
Start: 2021-09-27 | End: 2021-09-27

## 2021-09-27 RX ORDER — SUCRALFATE 1 G
1 TABLET ORAL EVERY 6 HOURS
Refills: 0 | Status: DISCONTINUED | OUTPATIENT
Start: 2021-09-27 | End: 2021-09-29

## 2021-09-27 RX ORDER — SODIUM CHLORIDE 9 MG/ML
1000 INJECTION, SOLUTION INTRAVENOUS
Refills: 0 | Status: DISCONTINUED | OUTPATIENT
Start: 2021-09-27 | End: 2021-09-28

## 2021-09-27 RX ADMIN — PANTOPRAZOLE SODIUM 40 MILLIGRAM(S): 20 TABLET, DELAYED RELEASE ORAL at 17:22

## 2021-09-27 RX ADMIN — PANTOPRAZOLE SODIUM 40 MILLIGRAM(S): 20 TABLET, DELAYED RELEASE ORAL at 07:44

## 2021-09-27 RX ADMIN — SODIUM CHLORIDE 50 MILLILITER(S): 9 INJECTION, SOLUTION INTRAVENOUS at 09:38

## 2021-09-27 RX ADMIN — HYDROMORPHONE HYDROCHLORIDE 0.25 MILLIGRAM(S): 2 INJECTION INTRAMUSCULAR; INTRAVENOUS; SUBCUTANEOUS at 19:03

## 2021-09-27 RX ADMIN — ONDANSETRON 4 MILLIGRAM(S): 8 TABLET, FILM COATED ORAL at 19:01

## 2021-09-27 NOTE — PROGRESS NOTE ADULT - ATTENDING COMMENTS
26F w stricturing Crohn's disease initiated on remicade during hospitalization in 6/2021, s/p recent ileocolic resection w/ ileostomy at OSH, now admitted w/ abdominal pain, n/v, odynophagia.   It is unclear that this is due to Crohn's Flare -- will need further evaluation w/ inflammatory markers.   Other possibility is esophagitis (seen on prior EGD). Recommend continuing PPI BID and starting carafate suspension.   Please obtain most recent records from [facility] (EGD, surgical notes, GI notes).   GI team will continue to follow, please call with questions.

## 2021-09-27 NOTE — DISCHARGE NOTE PROVIDER - NSDCCPCAREPLAN_GEN_ALL_CORE_FT
PRINCIPAL DISCHARGE DIAGNOSIS  Diagnosis: Acute Crohn's disease  Assessment and Plan of Treatment: You were admitted for a possible acute Crohn's disease flare with significant abdominal pain. You were on total parenteral nutrition (TPN) when you were admitted. We gave you IV Tylenol and morphine for pain control. Initial inflammatory markers came back negative, suggesting that your abdominal pain may not be secondary to Crohn's disease. We transitioned you off TPN and started giving you food/liquid by mouth. You experienced nausea and vomiting after starting oral intake and we provided you with Dilaudid and Tylenol for pain control.          PRINCIPAL DISCHARGE DIAGNOSIS  Diagnosis: Acute Crohn's disease  Assessment and Plan of Treatment: You were admitted for a possible acute Crohn's disease flare with significant abdominal pain. You were on total parenteral nutrition (TPN) when you were admitted. We gave you IV Tylenol and morphine for pain control. Initial inflammatory markers came back negative, suggesting that your abdominal pain may not be secondary to Crohn's disease. We transitioned you off TPN and started giving you food/liquid by mouth. You experienced nausea and vomiting after starting oral intake and we provided you with Dilaudid and Tylenol for pain control.   Due to your poor oral intake, we restarted TPN in order to make sure you got adequate nutrition. Psychiatry was also consulted to assess whether you were experiencing depression secondary to your medical illness.          PRINCIPAL DISCHARGE DIAGNOSIS  Diagnosis: Acute Crohn's disease  Assessment and Plan of Treatment: You were admitted for a possible acute Crohn's disease flare with significant abdominal pain. You were on total parenteral nutrition (TPN) when you were admitted. We gave you IV Tylenol and morphine for pain control. Initial inflammatory markers came back negative, suggesting that your abdominal pain may not be secondary to Crohn's disease. We transitioned you off TPN and started giving you food/liquid by mouth. You experienced nausea and vomiting after starting oral intake and we provided you with Dilaudid and Tylenol for pain control.   Due to your poor oral intake, we restarted TPN in order to make sure you got adequate nutrition. Psychiatry was also consulted to assess whether you were experiencing depression secondary to your medical illness.   An endoscopy was performed to rule out any esophageal problems that may have been contributing to your pain and difficulty eating. It did not find any esophageal problems but did identify some inflammation in your stomach which was suspicious for possible gastric Crohn's disease. Thus, you were given infliximab to treat it. During your stay your pain initially improved and you were able to tolerate oral intake of food. However, you experienced significant pain again and went back to solely getting nutrition via TPN.          PRINCIPAL DISCHARGE DIAGNOSIS  Diagnosis: Acute Crohn's disease  Assessment and Plan of Treatment: You were admitted for a possible acute Crohn's disease flare with significant abdominal pain. You were on total parenteral nutrition (TPN) when you were admitted. We gave you IV Tylenol and morphine for pain control. Initial inflammatory markers came back negative, suggesting that your abdominal pain may not be secondary to Crohn's disease. We transitioned you off TPN and started giving you food/liquid by mouth. You experienced nausea and vomiting after starting oral intake and we provided you with Dilaudid and Tylenol for pain control.   Due to your poor oral intake, we restarted TPN in order to make sure you got adequate nutrition. Psychiatry was also consulted to assess whether you were experiencing depression secondary to your medical illness.   An endoscopy was performed to rule out any esophageal problems that may have been contributing to your pain and difficulty eating. It did not find any esophageal problems but did identify some inflammation in your stomach which was suspicious for possible gastric Crohn's disease. Thus, you were given infliximab to treat it but the final pathology report did not identify any evidence of Crohn's disease. During your stay your pain initially improved and you were able to tolerate oral intake of food. However, you experienced significant pain again and went back to solely getting nutrition via TPN.          PRINCIPAL DISCHARGE DIAGNOSIS  Diagnosis: Acute Crohn's disease  Assessment and Plan of Treatment: You were admitted for a possible acute Crohn's disease flare with significant abdominal pain. You were on total parenteral nutrition (TPN) when you were admitted. We gave you IV Tylenol and morphine for pain control. Initial inflammatory markers came back negative, suggesting that your abdominal pain may not be secondary to Crohn's disease. We transitioned you off TPN and started giving you food/liquid by mouth. You experienced nausea and vomiting after starting oral intake and we provided you with Dilaudid and Tylenol for pain control.   Due to your poor oral intake, we restarted TPN in order to make sure you got adequate nutrition. Psychiatry was also consulted to assess whether you were experiencing depression secondary to your medical illness.   An endoscopy was performed to rule out any esophageal problems that may have been contributing to your pain and difficulty eating. It did not find any esophageal problems but did identify some inflammation in your stomach which was suspicious for possible gastric Crohn's disease. Thus, you were given infliximab to treat it but the final pathology report did not identify any evidence of Crohn's disease. During your stay your pain initially improved and you were able to tolerate oral intake of food. However, you experienced significant pain again and went back to solely getting nutrition via TPN. For pain management you were kept on Dilaudid and also given IV acetaminophen as needed. Due to lack of improvement, GI recommended starting you on IV methylprednisolone.         PRINCIPAL DISCHARGE DIAGNOSIS  Diagnosis: Acute Crohn's disease  Assessment and Plan of Treatment: You were admitted for a possible acute Crohn's disease flare with significant abdominal pain. You were on total parenteral nutrition (TPN) when you were admitted. We gave you IV Tylenol and morphine for pain control. Initial inflammatory markers came back negative, suggesting that your abdominal pain may not be secondary to Crohn's disease. We transitioned you off TPN and started giving you food/liquid by mouth. You experienced nausea and vomiting after starting oral intake and we provided you with Dilaudid and Tylenol for pain control.   Due to your poor oral intake, we restarted TPN in order to make sure you got adequate nutrition. Psychiatry was also consulted to assess whether you were experiencing depression secondary to your medical illness.   An endoscopy was performed to rule out any esophageal problems that may have been contributing to your pain and difficulty eating. It did not find any esophageal problems but did identify some inflammation in your stomach which was suspicious for possible gastric Crohn's disease. Thus, you were given infliximab to treat it but the final pathology report did not identify any evidence of Crohn's disease. During your stay your pain initially improved and you were able to tolerate oral intake of food. However, you experienced significant pain again and went back to solely getting nutrition via TPN. For pain management you were kept on Dilaudid and also given IV acetaminophen as needed. Due to lack of improvement, GI recommended starting you on IV methylprednisolone.        SECONDARY DISCHARGE DIAGNOSES  Diagnosis: Pain management  Assessment and Plan of Treatment: We have started you on an oral pain regimen. Please continue as prescribe and continue to follow-up with an outpatient pain management specialist.     PRINCIPAL DISCHARGE DIAGNOSIS  Diagnosis: Acute Crohn's disease  Assessment and Plan of Treatment: You were admitted for abdominal pain, nausea/vomiting on TPN. Blood tests did not support a Crohn's flare diagnosis. We transitioned you off TPN and started giving you food/liquid by mouth which you were unable to tolerate due to pain, nausea, vomiting.   An endoscopy was performed which showed some inflammation in your stomach. We do not think this pain is due to a Crohn's flare. It is likely functional and psych related pain. You were unable to tolerate food or water and TPN was restarted. You will continue TPN on discharge and we recommend you follow up with GI and pain management for continued evlauation and treatments. We also recommend you continue steroid taper for symptom improvement.         SECONDARY DISCHARGE DIAGNOSES  Diagnosis: Pain management  Assessment and Plan of Treatment: We have started you on an oral pain regimen. Please continue as prescribe and continue to follow-up with an outpatient pain management specialist.     PRINCIPAL DISCHARGE DIAGNOSIS  Diagnosis: Acute Crohn's disease  Assessment and Plan of Treatment: You were admitted for abdominal pain, nausea/vomiting on TPN. Blood tests did not support a Crohn's flare diagnosis. We transitioned you off TPN and started giving you food/liquid by mouth which you were unable to tolerate due to pain, nausea, vomiting.   An endoscopy was performed which showed some inflammation in your stomach. We do not think this pain is due to a Crohn's flare. It is likely functional and psych related pain. You were unable to tolerate food or water and TPN was restarted. You will continue TPN on discharge and we recommend you follow up with GI and pain management for continued evlauation and treatments. We also recommend you continue steroid taper for symptom improvement.   After discharge please follow-up with your GI doctor and primary care doctor within one week.        SECONDARY DISCHARGE DIAGNOSES  Diagnosis: Pain management  Assessment and Plan of Treatment: We have started you on an oral pain regimen. Please continue as prescribe and continue to follow-up with an outpatient pain management specialist.     PRINCIPAL DISCHARGE DIAGNOSIS  Diagnosis: Acute Crohn's disease  Assessment and Plan of Treatment: You were admitted for abdominal pain, nausea/vomiting on TPN. Blood tests did not support a Crohn's flare diagnosis. We transitioned you off TPN and started giving you food/liquid by mouth which you were unable to tolerate due to pain, nausea, vomiting.   An endoscopy was performed which showed some inflammation in your stomach. We do not think this pain is due to a Crohn's flare. It is likely functional and psych related pain. You were unable to tolerate food or water. GI and nutrition recommended against restarting TPN as you have a functioning gut and would prefer to restart oral diet. You did not want to be in the hospital any further and wished to leave AMA. You did not want to establish outpatient follow up either. We will discharge you on a prednisone taper and medication including few days of pain medications.         SECONDARY DISCHARGE DIAGNOSES  Diagnosis: Pain management  Assessment and Plan of Treatment: We have started you on an oral pain regimen. Please continue as prescribe and continue to follow-up with an outpatient pain management specialist.     PRINCIPAL DISCHARGE DIAGNOSIS  Diagnosis: Acute Crohn's disease  Assessment and Plan of Treatment: You were admitted for abdominal pain, nausea/vomiting on TPN. Blood tests did not support a Crohn's flare diagnosis. We transitioned you off TPN and started giving you food/liquid by mouth which you were unable to tolerate due to pain, nausea, vomiting.   An endoscopy was performed which showed some inflammation in your stomach. We do not think this pain is due to a Crohn's flare. It is likely functional and psych related pain. You were unable to tolerate food or water. GI and nutrition recommended against restarting TPN as you have a functioning gut and would prefer to restart oral diet. You did not want to be in the hospital any further and wished to leave against medical advice. We recommend you follow up outpatient with gastroenterology and pain maangement. We will discharge you on a prednisone taper and medication including few days of pain medications.         SECONDARY DISCHARGE DIAGNOSES  Diagnosis: Pain management  Assessment and Plan of Treatment: We have started you on an oral pain regimen. Please continue as prescribe and continue to follow-up with an outpatient pain management specialist.

## 2021-09-27 NOTE — PROGRESS NOTE ADULT - SUBJECTIVE AND OBJECTIVE BOX
Chief Complaint:  Patient is a 26y old  Female who presents with a chief complaint of crohn's flare up (27 Sep 2021 05:49)    Reason for consult: abd pain, Crohn's disease    Interval Events: Pt again reports able to tolerate clears after morphine. Otherwise unchanged.     Hospital Medications:  acetaminophen   Tablet .. 650 milliGRAM(s) Oral every 6 hours PRN  influenza   Vaccine 0.5 milliLiter(s) IntraMuscular once  metoclopramide Injectable 10 milliGRAM(s) IV Push once  ondansetron Injectable 4 milliGRAM(s) IV Push every 6 hours PRN  pantoprazole  Injectable 40 milliGRAM(s) IV Push two times a day  sodium chloride 0.9%. 1000 milliLiter(s) IV Continuous <Continuous>      ROS:   General:  No  fevers, chills, night sweats, fatigue  Eyes:  Good vision, no reported pain  ENT:  No sore throat, pain, runny nose  CV:  No pain, palpitations  Pulm:  No dyspnea, cough  GI:  See HPI, otherwise negative  :  No  incontinence, nocturia  Muscle:  No pain, weakness  Neuro:  No memory problems  Psych:  No insomnia, mood problems, depression  Endocrine:  No polyuria, polydipsia, cold/heat intolerance  Heme:  No petechiae, ecchymosis, easy bruisability  Skin:  No rash    PHYSICAL EXAM:   Vital Signs:  Vital Signs Last 24 Hrs  T(C): 36.9 (26 Sep 2021 21:25), Max: 37.2 (26 Sep 2021 14:19)  T(F): 98.4 (26 Sep 2021 21:25), Max: 99 (26 Sep 2021 14:19)  HR: 119 (26 Sep 2021 21:25) (103 - 119)  BP: 116/73 (26 Sep 2021 21:25) (112/74 - 125/86)  BP(mean): --  RR: 18 (26 Sep 2021 21:25) (16 - 18)  SpO2: 100% (26 Sep 2021 21:25) (98% - 100%)  Daily Height in cm: 154.94 (26 Sep 2021 12:05)    Daily     GENERAL: no acute distress  NEURO: alert  HEENT: anicteric sclera, no conjunctival pallor appreciated  CHEST: no respiratory distress, no accessory muscle use  CARDIAC: regular rate, rhythm  ABDOMEN: soft, non-tender, non-distended, no rebound or guarding  EXTREMITIES: warm, well perfused, no edema  SKIN: no lesions noted    LABS: reviewed                        8.8    6.37  )-----------( 358      ( 27 Sep 2021 07:00 )             27.8     09-27    137  |  103  |  16  ----------------------------<  59<L>  3.4<L>   |  19<L>  |  0.51    Ca    9.0      27 Sep 2021 07:00    TPro  5.9<L>  /  Alb  3.6  /  TBili  0.7  /  DBili  x   /  AST  55<H>  /  ALT  128<H>  /  AlkPhos  91  09-27    LIVER FUNCTIONS - ( 27 Sep 2021 07:00 )  Alb: 3.6 g/dL / Pro: 5.9 g/dL / ALK PHOS: 91 U/L / ALT: 128 U/L / AST: 55 U/L / GGT: x             Interval Diagnostic Studies: see sunrise for full report

## 2021-09-27 NOTE — DISCHARGE NOTE PROVIDER - NSDCMRMEDTOKEN_GEN_ALL_CORE_FT
pantoprazole 40 mg oral delayed release tablet: 1 tab(s) orally once a day (before a meal)  Remicade 100 mg intravenous injection: last dose in July 2021   amitriptyline 10 mg oral tablet: 1 tab(s) orally once a day (at bedtime)  cyclobenzaprine 5 mg oral tablet: 1 tab(s) orally 3 times a day, As needed, Muscle Spasm  melatonin 3 mg oral tablet: 1 tab(s) orally once a day (at bedtime), As needed, Insomnia  ondansetron 4 mg oral tablet: 1 tab(s) orally every 8 hours, As needed, nausea/vomiting  pantoprazole 40 mg oral delayed release tablet: 1 tab(s) orally once a day (before a meal)  predniSONE 10 mg oral tablet: 3 tab(s) orally once a day   predniSONE 10 mg oral tablet: 1 tab(s) orally once a day   predniSONE 20 mg oral tablet: 2 tab(s) orally once a day   predniSONE 20 mg oral tablet: 1 tab(s) orally once a day   Remicade 100 mg intravenous injection: last dose in July 2021  sucralfate 1 g/10 mL oral suspension: 10 milliliter(s) orally once a day    amitriptyline 10 mg oral tablet: 1 tab(s) orally once a day (at bedtime)  cyclobenzaprine 5 mg oral tablet: 1 tab(s) orally 3 times a day, As needed, Muscle Spasm  Dilaudid 2 mg oral tablet: 1 tab(s) orally every 8 hours, As Needed for severe pain. MDD:6mg maximum  melatonin 3 mg oral tablet: 1 tab(s) orally once a day (at bedtime), As needed, Insomnia  ondansetron 4 mg oral tablet: 1 tab(s) orally every 8 hours, As needed, nausea/vomiting  pantoprazole 40 mg oral delayed release tablet: 1 tab(s) orally once a day (before a meal)  predniSONE 10 mg oral tablet: 3 tab(s) orally once a day   predniSONE 10 mg oral tablet: 1 tab(s) orally once a day   predniSONE 20 mg oral tablet: 2 tab(s) orally once a day   predniSONE 20 mg oral tablet: 1 tab(s) orally once a day   Remicade 100 mg intravenous injection: last dose in July 2021  sucralfate 1 g/10 mL oral suspension: 10 milliliter(s) orally once a day    amitriptyline 10 mg oral tablet: 1 tab(s) orally once a day (at bedtime)  cyclobenzaprine 5 mg oral tablet: 1 tab(s) orally 3 times a day, As needed, Muscle Spasm  Dilaudid 2 mg oral tablet: 1 tab(s) orally every 8 hours, As Needed for severe pain. MDD:6mg maximum  melatonin 3 mg oral tablet: 1 tab(s) orally once a day (at bedtime), As needed, Insomnia  ondansetron 4 mg oral tablet: 1 tab(s) orally every 8 hours, As needed, nausea/vomiting  pantoprazole 40 mg oral delayed release tablet: 1 tab(s) orally once a day (before a meal)  predniSONE 10 mg oral tablet: 1 tab(s) orally once a day for 1 week  predniSONE 10 mg oral tablet: 3 tab(s) orally once a day for 1 week   predniSONE 20 mg oral tablet: 1 tab(s) orally once a day for 1 week  predniSONE 20 mg oral tablet: 2 tab(s) orally once a day for 6 days   Remicade 100 mg intravenous injection: last dose in July 2021  sucralfate 1 g/10 mL oral suspension: 10 milliliter(s) orally once a day

## 2021-09-27 NOTE — DISCHARGE NOTE PROVIDER - HOSPITAL COURSE
HPI:    26 year old female with PMH Crohn's disease s/p surgery for stricture/ileostomy placement in mid August 2021, s/p PICC line in place for TPN 2 weeks ago, presents with epigastric abdominal pain, nausea, and vomiting x 4 days. Pt c/o multiple episodes of nausea and vomiting, food consistency emesis. Unable to retain any PO intake and wasn't eating nor drinking for past 3 days. Epigastric pain described as aching, 9/10, radiating to her esophagus. Admits to good ileostomy output of yellow-green watery stool with no blood. Pt was admitted at [facility] early september for nausea and vomiting and 2 weeks ago had PICC line placed for TPN infusion at home. Pt on Remicade infusion: last infusion was in July 2021 and was supposed to receive the infusion again Sept 16. However, since pt was hospitalized at [facility], she missed the infusion. Pt denies chest pain, sob, palpitations. hematochezia, melena, dysuria, dizziness, lightheadedness or sick contacts. (26 Sep 2021 12:05)   HPI:    26 year old female with PMH Crohn's disease s/p surgery for stricture/ileostomy placement in mid August 2021, s/p PICC line in place for TPN 2 weeks ago, presents with epigastric abdominal pain, nausea, and vomiting x 4 days. Pt c/o multiple episodes of nausea and vomiting, food consistency emesis. Unable to retain any PO intake and wasn't eating nor drinking for past 3 days. Epigastric pain described as aching, 9/10, radiating to her esophagus. Admits to good ileostomy output of yellow-green watery stool with no blood. Pt was admitted at [facility] early september for nausea and vomiting and 2 weeks ago had PICC line placed for TPN infusion at home. Pt on Remicade infusion: last infusion was in July 2021 and was supposed to receive the infusion again Sept 16. However, since pt was hospitalized at [facility], she missed the infusion. Pt denies chest pain, sob, palpitations. hematochezia, melena, dysuria, dizziness, lightheadedness or sick contacts. (26 Sep 2021 12:05)    Hospital Course:  9/27: IV pain control with morphine and acetaminophen. Discontinued TPN and initiated oral feeding. She was started on Protonix and Zofran. Patient experienced significant nausea and vomiting after starting oral intake and was given IV Dilaudid and Tylenol. Inflammatory markers (ESR, CRP) came back within normal limits, decreasing the likelihood that the abdominal pain was secondary to Crohn's disease. Sucralfate was added to medication regime for possible esophagitis in addition to the Protonix.    9/28: Opioids were discontinued since they have been shown to be associated with poor outcomes in Crohn's disease patients. A registered dietician was consulted and recommended adding Ensure clear to meals.    HPI:    26 year old female with PMH Crohn's disease s/p surgery for stricture/ileostomy placement in mid August 2021, s/p PICC line in place for TPN 2 weeks ago, presents with epigastric abdominal pain, nausea, and vomiting x 4 days. Pt c/o multiple episodes of nausea and vomiting, food consistency emesis. Unable to retain any PO intake and wasn't eating nor drinking for past 3 days. Epigastric pain described as aching, 9/10, radiating to her esophagus. Admits to good ileostomy output of yellow-green watery stool with no blood. Pt was admitted at [facility] early september for nausea and vomiting and 2 weeks ago had PICC line placed for TPN infusion at home. Pt on Remicade infusion: last infusion was in July 2021 and was supposed to receive the infusion again Sept 16. However, since pt was hospitalized at [facility], she missed the infusion. Pt denies chest pain, sob, palpitations. hematochezia, melena, dysuria, dizziness, lightheadedness or sick contacts. (26 Sep 2021 12:05)    Hospital Course:  9/27: IV pain control with morphine and acetaminophen. Discontinued TPN and initiated oral feeding. She was started on Protonix and Zofran. Patient experienced significant nausea and vomiting after starting oral intake and was given IV Dilaudid and Tylenol. Inflammatory markers (ESR, CRP) came back within normal limits, decreasing the likelihood that the abdominal pain was secondary to Crohn's disease. Sucralfate was added to medication regime for possible esophagitis in addition to the Protonix.    9/28: Opioids were discontinued since they have been shown to be associated with poor outcomes in Crohn's disease patients. A registered dietician was consulted and recommended adding Ensure clear to meals.     9/29: patient received IV acetaminophen overnight PRN for severe abdominal pain. Pt given Dilaudid during the day due to uncontrolled pain after trying to drink fluids. TPN team was consulted and pt was restarted on TPN. Psychiatry was consulted for possible depression secondary to her medical illness.    HPI:    26 year old female with PMH Crohn's disease s/p surgery for stricture/ileostomy placement in mid August 2021, s/p PICC line in place for TPN 2 weeks ago, presents with epigastric abdominal pain, nausea, and vomiting x 4 days. Pt c/o multiple episodes of nausea and vomiting, food consistency emesis. Unable to retain any PO intake and wasn't eating nor drinking for past 3 days. Epigastric pain described as aching, 9/10, radiating to her esophagus. Admits to good ileostomy output of yellow-green watery stool with no blood. Pt was admitted at [facility] early september for nausea and vomiting and 2 weeks ago had PICC line placed for TPN infusion at home. Pt on Remicade infusion: last infusion was in July 2021 and was supposed to receive the infusion again Sept 16. However, since pt was hospitalized at [facility], she missed the infusion. Pt denies chest pain, sob, palpitations. hematochezia, melena, dysuria, dizziness, lightheadedness or sick contacts. (26 Sep 2021 12:05)    Hospital Course:  9/27: IV pain control with morphine and acetaminophen. Discontinued TPN and initiated oral feeding. She was started on Protonix and Zofran. Patient experienced significant nausea and vomiting after starting oral intake and was given IV Dilaudid and Tylenol. Inflammatory markers (ESR, CRP) came back within normal limits, decreasing the likelihood that the abdominal pain was secondary to Crohn's disease. Sucralfate was added to medication regime for possible esophagitis in addition to the Protonix.    9/28: Opioids were discontinued since they have been shown to be associated with poor outcomes in Crohn's disease patients. A registered dietician was consulted and recommended adding Ensure clear to meals.     9/29: patient received IV acetaminophen overnight PRN for severe abdominal pain. Pt given Dilaudid during the day due to uncontrolled pain after trying to drink fluids. TPN team was consulted and pt was restarted on TPN. Psychiatry was consulted for possible depression secondary to her medical illness.     9/30: patient got an EGD which showed a normal esophagus with no esophagitis, ulcers, mass, or structures. There was diffuse erythema and friability with some areas of superficial ulceration that were biopsied to rule out gastric Crohn's disease. There was erythematous duodenopathy with a normal second portion of the duodenum. Diet was advanced to soft foods and viscous lidocaine was added PRN prior to meals to help with pain.   HPI:    26 year old female with PMH Crohn's disease s/p surgery for stricture/ileostomy placement in mid August 2021, s/p PICC line in place for TPN 2 weeks ago, presents with epigastric abdominal pain, nausea, and vomiting x 4 days. Pt c/o multiple episodes of nausea and vomiting, food consistency emesis. Unable to retain any PO intake and wasn't eating nor drinking for past 3 days. Epigastric pain described as aching, 9/10, radiating to her esophagus. Admits to good ileostomy output of yellow-green watery stool with no blood. Pt was admitted at [facility] early september for nausea and vomiting and 2 weeks ago had PICC line placed for TPN infusion at home. Pt on Remicade infusion: last infusion was in July 2021 and was supposed to receive the infusion again Sept 16. However, since pt was hospitalized at [facility], she missed the infusion. Pt denies chest pain, sob, palpitations. hematochezia, melena, dysuria, dizziness, lightheadedness or sick contacts. (26 Sep 2021 12:05)    Hospital Course:  9/27: IV pain control with morphine and acetaminophen. Discontinued TPN and initiated oral feeding. She was started on Protonix and Zofran. Patient experienced significant nausea and vomiting after starting oral intake and was given IV Dilaudid and Tylenol. Inflammatory markers (ESR, CRP) came back within normal limits, decreasing the likelihood that the abdominal pain was secondary to Crohn's disease. Sucralfate was added to medication regime for possible esophagitis in addition to the Protonix.    9/28: Opioids were discontinued since they have been shown to be associated with poor outcomes in Crohn's disease patients. A registered dietician was consulted and recommended adding Ensure clear to meals.     9/29: patient received IV acetaminophen overnight PRN for severe abdominal pain. Pt given Dilaudid during the day due to uncontrolled pain after trying to drink fluids. TPN team was consulted and pt was restarted on TPN. Psychiatry was consulted for possible depression secondary to her medical illness.     9/30: patient got an EGD which showed a normal esophagus with no esophagitis, ulcers, mass, or structures. There was diffuse erythema and friability with some areas of superficial ulceration that were biopsied to rule out gastric Crohn's disease. There was erythematous duodenopathy with a normal second portion of the duodenum. Diet was advanced to soft foods and viscous lidocaine was added PRN prior to meals to help with pain.            10/1: Patient received PRN Zofran and Dilaudid overnight for vomiting after eating. Infliximab was restarted due to gastric inflammation seen on the EGD.    10/2: Patient's BP was low and was given a 500cc bolus    10/3: Patient is functionally NPO on TPN and EKG was performed    10/4: Patient received Tylenol and Dilaudid overnight for vomiting and abdominal pain.     HPI:    26 year old female with PMH Crohn's disease s/p surgery for stricture/ileostomy placement in mid August 2021, s/p PICC line in place for TPN 2 weeks ago, presents with epigastric abdominal pain, nausea, and vomiting x 4 days. Pt c/o multiple episodes of nausea and vomiting, food consistency emesis. Unable to retain any PO intake and wasn't eating nor drinking for past 3 days. Epigastric pain described as aching, 9/10, radiating to her esophagus. Admits to good ileostomy output of yellow-green watery stool with no blood. Pt was admitted at [facility] early september for nausea and vomiting and 2 weeks ago had PICC line placed for TPN infusion at home. Pt on Remicade infusion: last infusion was in July 2021 and was supposed to receive the infusion again Sept 16. However, since pt was hospitalized at [facility], she missed the infusion. Pt denies chest pain, sob, palpitations. hematochezia, melena, dysuria, dizziness, lightheadedness or sick contacts. (26 Sep 2021 12:05)    Hospital Course:  9/27: IV pain control with morphine and acetaminophen. Discontinued TPN and initiated oral feeding. She was started on Protonix and Zofran. Patient experienced significant nausea and vomiting after starting oral intake and was given IV Dilaudid and Tylenol. Inflammatory markers (ESR, CRP) came back within normal limits, decreasing the likelihood that the abdominal pain was secondary to Crohn's disease. Sucralfate was added to medication regime for possible esophagitis in addition to the Protonix.    9/28: Opioids were discontinued since they have been shown to be associated with poor outcomes in Crohn's disease patients. A registered dietician was consulted and recommended adding Ensure clear to meals.     9/29: patient received IV acetaminophen overnight PRN for severe abdominal pain. Pt given Dilaudid during the day due to uncontrolled pain after trying to drink fluids. TPN team was consulted and pt was restarted on TPN. Psychiatry was consulted for possible depression secondary to her medical illness.     9/30: patient got an EGD which showed a normal esophagus with no esophagitis, ulcers, mass, or structures. There was diffuse erythema and friability with some areas of superficial ulceration that were biopsied to rule out gastric Crohn's disease. There was erythematous duodenopathy with a normal second portion of the duodenum. Diet was advanced to soft foods and viscous lidocaine was added PRN prior to meals to help with pain.            10/1: Patient received PRN Zofran and Dilaudid overnight for vomiting after eating. Infliximab was restarted due to gastric inflammation seen on the EGD.    10/2: Patient's BP was low and was given a 500cc bolus    10/3: Patient is functionally NPO on TPN and EKG was performed    10/4: Patient received Tylenol and Dilaudid overnight for vomiting and abdominal pain.    10/5: Dilaudid overnight for pain. Patient was able to tolerate some oral intake. Repeat ESR and CRP were within normal limits.     HPI:    26 year old female with PMH Crohn's disease s/p surgery for stricture/ileostomy placement in mid August 2021, s/p PICC line in place for TPN 2 weeks ago, presents with epigastric abdominal pain, nausea, and vomiting x 4 days. Pt c/o multiple episodes of nausea and vomiting, food consistency emesis. Unable to retain any PO intake and wasn't eating nor drinking for past 3 days. Epigastric pain described as aching, 9/10, radiating to her esophagus. Admits to good ileostomy output of yellow-green watery stool with no blood. Pt was admitted at [facility] early september for nausea and vomiting and 2 weeks ago had PICC line placed for TPN infusion at home. Pt on Remicade infusion: last infusion was in July 2021 and was supposed to receive the infusion again Sept 16. However, since pt was hospitalized at [facility], she missed the infusion. Pt denies chest pain, sob, palpitations. hematochezia, melena, dysuria, dizziness, lightheadedness or sick contacts. (26 Sep 2021 12:05)    Hospital Course:  9/27: IV pain control with morphine and acetaminophen. Discontinued TPN and initiated oral feeding. She was started on Protonix and Zofran. Patient experienced significant nausea and vomiting after starting oral intake and was given IV Dilaudid and Tylenol. Inflammatory markers (ESR, CRP) came back within normal limits, decreasing the likelihood that the abdominal pain was secondary to Crohn's disease. Sucralfate was added to medication regime for possible esophagitis in addition to the Protonix.    9/28: Opioids were discontinued since they have been shown to be associated with poor outcomes in Crohn's disease patients. A registered dietician was consulted and recommended adding Ensure clear to meals.     9/29: patient received IV acetaminophen overnight PRN for severe abdominal pain. Pt given Dilaudid during the day due to uncontrolled pain after trying to drink fluids. TPN team was consulted and pt was restarted on TPN. Psychiatry was consulted for possible depression secondary to her medical illness.     9/30: patient got an EGD which showed a normal esophagus with no esophagitis, ulcers, mass, or structures. There was diffuse erythema and friability with some areas of superficial ulceration that were biopsied to rule out gastric Crohn's disease. There was erythematous duodenopathy with a normal second portion of the duodenum. Diet was advanced to soft foods and viscous lidocaine was added PRN prior to meals to help with pain.            10/1: Patient received PRN Zofran and Dilaudid overnight for vomiting after eating. Infliximab was restarted due to gastric inflammation seen on the EGD.    10/2: Patient's BP was low and was given a 500cc bolus    10/3: Patient is functionally NPO on TPN and EKG was performed    10/4: Patient received Tylenol and Dilaudid overnight for vomiting and abdominal pain.    10/5: Dilaudid overnight for pain. Patient was able to tolerate some oral intake initially, however had severe n/v later in the day after drinking soup. Repeat ESR and CRP were within normal limits.    10/6: Dilaudid x3 and Zofran x1 overnight     HPI:    26 year old female with PMH Crohn's disease s/p surgery for stricture/ileostomy placement in mid August 2021, s/p PICC line in place for TPN 2 weeks ago, presents with epigastric abdominal pain, nausea, and vomiting x 4 days. Pt c/o multiple episodes of nausea and vomiting, food consistency emesis. Unable to retain any PO intake and wasn't eating nor drinking for past 3 days. Epigastric pain described as aching, 9/10, radiating to her esophagus. Admits to good ileostomy output of yellow-green watery stool with no blood. Pt was admitted at [facility] early september for nausea and vomiting and 2 weeks ago had PICC line placed for TPN infusion at home. Pt on Remicade infusion: last infusion was in July 2021 and was supposed to receive the infusion again Sept 16. However, since pt was hospitalized at [facility], she missed the infusion. Pt denies chest pain, sob, palpitations. hematochezia, melena, dysuria, dizziness, lightheadedness or sick contacts. (26 Sep 2021 12:05)    Hospital Course:  9/27: IV pain control with morphine and acetaminophen. Discontinued TPN and initiated oral feeding. She was started on Protonix and Zofran. Patient experienced significant nausea and vomiting after starting oral intake and was given IV Dilaudid and Tylenol. Inflammatory markers (ESR, CRP) came back within normal limits, decreasing the likelihood that the abdominal pain was secondary to Crohn's disease. Sucralfate was added to medication regime for possible esophagitis in addition to the Protonix.    9/28: Opioids were discontinued since they have been shown to be associated with poor outcomes in Crohn's disease patients. A registered dietician was consulted and recommended adding Ensure clear to meals.     9/29: patient received IV acetaminophen overnight PRN for severe abdominal pain. Pt given Dilaudid during the day due to uncontrolled pain after trying to drink fluids. TPN team was consulted and pt was restarted on TPN. Psychiatry was consulted for possible depression secondary to her medical illness.     9/30: patient got an EGD which showed a normal esophagus with no esophagitis, ulcers, mass, or structures. There was diffuse erythema and friability with some areas of superficial ulceration that were biopsied to rule out gastric Crohn's disease. There was erythematous duodenopathy with a normal second portion of the duodenum. Diet was advanced to soft foods and viscous lidocaine was added PRN prior to meals to help with pain.            10/1: Patient received PRN Zofran and Dilaudid overnight for vomiting after eating. Infliximab was restarted due to gastric inflammation seen on the EGD.    10/2: Patient's BP was low and was given a 500cc bolus    10/3: Patient is functionally NPO on TPN and EKG was performed    10/4: Patient received Tylenol and Dilaudid overnight for vomiting and abdominal pain.    10/5: Dilaudid overnight for pain. Patient was able to tolerate some oral intake initially, however had severe n/v later in the day after drinking soup. Repeat ESR and CRP were within normal limits.    10/6: Dilaudid x3 and Zofran x1 overnight. Dilaudid dose adjusted to 0.5mg q6h for severe pain. EGD biopsy negative for Crohn or H. pylori. Pt made NPO.    10/7: Dilaudid x1 overnight.     HPI:    26 year old female with PMH Crohn's disease s/p surgery for stricture/ileostomy placement in mid August 2021, s/p PICC line in place for TPN 2 weeks ago, presents with epigastric abdominal pain, nausea, and vomiting x 4 days. Pt c/o multiple episodes of nausea and vomiting, food consistency emesis. Unable to retain any PO intake and wasn't eating nor drinking for past 3 days. Epigastric pain described as aching, 9/10, radiating to her esophagus. Admits to good ileostomy output of yellow-green watery stool with no blood. Pt was admitted at [facility] early september for nausea and vomiting and 2 weeks ago had PICC line placed for TPN infusion at home. Pt on Remicade infusion: last infusion was in July 2021 and was supposed to receive the infusion again Sept 16. However, since pt was hospitalized at [facility], she missed the infusion. Pt denies chest pain, sob, palpitations. hematochezia, melena, dysuria, dizziness, lightheadedness or sick contacts. (26 Sep 2021 12:05)    Hospital Course:  In the hospital, the patient was given morphine and acetaminophen initially for pain control. She was also started on Protonix and Zofran. TPN was discontinued and oral feeding was initiated. However, the patient experienced significant nausea and vomiting and stopped having PO intake. Inflammatory markers (ESR, CRP) and fecal calprotectin were within normal limits on admission and sucralfate was added to the treatment regimen to cover for possible esophagitis in addition to the Protonix. Opioids were discontinued per GI since they are associated with worse outcomes in Crohn's patients however her pain was not being well controlled and Dilaudid 0.25mg q8h was started. A registered dietitian was consulted and Ensure was added to meals. Psychiatry was consulted to rule out possible depression secondary to her GI issues and they deemed she was not depressed. A EGD was performed which showed a normal esophagus with no esophagitis, ulcers, mass, or structures. There was diffuse erythema and friability with some areas of superficial ulceration that were biopsied to rule out gastric Crohn's disease. There was erythematous duodenopathy with a normal second portion of the duodenum. Diet was advanced to soft foods and viscous lidocaine was added PRN prior to meals to help with pain when swallowing. From the EGD results, there was a possibility of Crohn's, so a dose of infliximab was administered. ESR and CRP were also repeated but both were negative. Patient was tried on a clear liquid diet since she was not tolerating her soft food diet, but she failed to tolerate clear liquids as well and was made NPO per GI. Due to her poor pain control the dose of Dilaudid was increased to 0.5mg q6h for severe pain. The biopsy results from the EGD came back negative for any signs of Crohn's or H. pylori infection.        HPI:    26 year old female with PMH Crohn's disease s/p surgery for stricture/ileostomy placement in mid August 2021, s/p PICC line in place for TPN 2 weeks ago, presents with epigastric abdominal pain, nausea, and vomiting x 4 days. Pt c/o multiple episodes of nausea and vomiting, food consistency emesis. Unable to retain any PO intake and wasn't eating nor drinking for past 3 days. Epigastric pain described as aching, 9/10, radiating to her esophagus. Admits to good ileostomy output of yellow-green watery stool with no blood. Pt was admitted at [facility] early september for nausea and vomiting and 2 weeks ago had PICC line placed for TPN infusion at home. Pt on Remicade infusion: last infusion was in July 2021 and was supposed to receive the infusion again Sept 16. However, since pt was hospitalized at [facility], she missed the infusion. Pt denies chest pain, sob, palpitations. hematochezia, melena, dysuria, dizziness, lightheadedness or sick contacts. (26 Sep 2021 12:05)    Hospital Course:  In the hospital, the patient was given morphine and acetaminophen initially for pain control. She was also started on Protonix and Zofran. TPN was discontinued and oral feeding was initiated. However, the patient experienced significant nausea and vomiting and stopped having PO intake. Inflammatory markers (ESR, CRP) and fecal calprotectin were within normal limits on admission and sucralfate was added to the treatment regimen to cover for possible esophagitis in addition to the Protonix. Opioids were discontinued per GI since they are associated with worse outcomes in Crohn's patients however her pain was not being well controlled and Dilaudid 0.25mg q8h was started. A registered dietitian was consulted and Ensure was added to meals. Psychiatry was consulted to rule out possible depression secondary to her GI issues and they deemed she was not depressed. A EGD was performed which showed a normal esophagus with no esophagitis, ulcers, mass, or structures. There was diffuse erythema and friability with some areas of superficial ulceration that were biopsied to rule out gastric Crohn's disease. There was erythematous duodenopathy with a normal second portion of the duodenum. Diet was advanced to soft foods and viscous lidocaine was added PRN prior to meals to help with pain when swallowing. From the EGD results, there was a possibility of Crohn's, so a dose of infliximab was administered. ESR and CRP were also repeated but both were negative. Patient was tried on a clear liquid diet since she was not tolerating her soft food diet, but she failed to tolerate clear liquids as well and was made NPO with TPN per GI. Due to her poor pain control the dose of Dilaudid was increased to 0.5mg q6h for severe pain. The biopsy results from the EGD came back negative for any signs of Crohn's or H. pylori infection.        HPI:    26 year old female with PMH Crohn's disease s/p surgery for stricture/ileostomy placement in mid August 2021, s/p PICC line in place for TPN 2 weeks ago, presents with epigastric abdominal pain, nausea, and vomiting x 4 days. Pt c/o multiple episodes of nausea and vomiting, food consistency emesis. Unable to retain any PO intake and wasn't eating nor drinking for past 3 days. Epigastric pain described as aching, 9/10, radiating to her esophagus. Admits to good ileostomy output of yellow-green watery stool with no blood. Pt was admitted at [facility] early september for nausea and vomiting and 2 weeks ago had PICC line placed for TPN infusion at home. Pt on Remicade infusion: last infusion was in July 2021 and was supposed to receive the infusion again Sept 16. However, since pt was hospitalized at [facility], she missed the infusion. Pt denies chest pain, sob, palpitations. hematochezia, melena, dysuria, dizziness, lightheadedness or sick contacts. (26 Sep 2021 12:05)    Hospital Course:  In the hospital, the patient was given morphine and acetaminophen initially for pain control. She was also started on Protonix and Zofran. TPN was discontinued and oral feeding was initiated. However, the patient experienced significant nausea and vomiting and stopped having PO intake. Inflammatory markers (ESR, CRP) and fecal calprotectin were within normal limits on admission and sucralfate was added to the treatment regimen to cover for possible esophagitis in addition to the Protonix. Opioids were discontinued per GI since they are associated with worse outcomes in Crohn's patients however her pain was not being well controlled and Dilaudid 0.25mg q8h was started. A registered dietitian was consulted and Ensure was added to meals. Psychiatry was consulted to rule out possible depression secondary to her GI issues and they deemed she was not depressed. A EGD was performed which showed a normal esophagus with no esophagitis, ulcers, mass, or structures. There was diffuse erythema and friability with some areas of superficial ulceration that were biopsied to rule out gastric Crohn's disease. There was erythematous duodenopathy with a normal second portion of the duodenum. Diet was advanced to soft foods and viscous lidocaine was added PRN prior to meals to help with pain when swallowing. From the EGD results, there was a possibility of Crohn's, so a dose of infliximab was administered. ESR and CRP were also repeated but both were negative. Patient was tried on a clear liquid diet since she was not tolerating her soft food diet, but she failed to tolerate clear liquids as well and was made NPO with TPN per GI. Due to her poor pain control the dose of Dilaudid was increased to 0.5mg q6h for severe pain. The biopsy results from the EGD came back negative for any signs of Crohn's or H. pylori infection. The patient was then slowly transitioned to clears while staying on TPN.         HPI:    26 year old female with PMH Crohn's disease s/p surgery for stricture/ileostomy placement in mid August 2021, s/p PICC line in place for TPN 2 weeks ago, presents with epigastric abdominal pain, nausea, and vomiting x 4 days. Pt c/o multiple episodes of nausea and vomiting, food consistency emesis. Unable to retain any PO intake and wasn't eating nor drinking for past 3 days. Epigastric pain described as aching, 9/10, radiating to her esophagus. Admits to good ileostomy output of yellow-green watery stool with no blood. Pt was admitted at [facility] early september for nausea and vomiting and 2 weeks ago had PICC line placed for TPN infusion at home. Pt on Remicade infusion: last infusion was in July 2021 and was supposed to receive the infusion again Sept 16. However, since pt was hospitalized at [facility], she missed the infusion. Pt denies chest pain, sob, palpitations. hematochezia, melena, dysuria, dizziness, lightheadedness or sick contacts. (26 Sep 2021 12:05)    Hospital Course:  In the hospital, the patient was given morphine and acetaminophen initially for pain control. She was also started on Protonix and Zofran. TPN was discontinued and oral feeding was initiated. However, the patient experienced significant nausea and vomiting and stopped having PO intake. Inflammatory markers (ESR, CRP) and fecal calprotectin were within normal limits on admission and sucralfate was added to the treatment regimen to cover for possible esophagitis in addition to the Protonix. Opioids were discontinued per GI since they are associated with worse outcomes in Crohn's patients however her pain was not being well controlled and Dilaudid 0.25mg q8h was started. A registered dietitian was consulted and Ensure was added to meals. Psychiatry was consulted to rule out possible depression secondary to her GI issues and they deemed she was not depressed. A EGD was performed which showed a normal esophagus with no esophagitis, ulcers, mass, or structures. There was diffuse erythema and friability with some areas of superficial ulceration that were biopsied to rule out gastric Crohn's disease. There was erythematous duodenopathy with a normal second portion of the duodenum. Diet was advanced to soft foods and viscous lidocaine was added PRN prior to meals to help with pain when swallowing. From the EGD results, there was a possibility of Crohn's, so a dose of infliximab was administered. ESR and CRP were also repeated but both were negative. Patient was tried on a clear liquid diet since she was not tolerating her soft food diet, but she failed to tolerate clear liquids as well and was made NPO with TPN per GI. Due to her poor pain control the dose of Dilaudid was increased to 0.5mg q6h for severe pain. The biopsy results from the EGD came back negative for any signs of Crohn's or H. pylori infection. After a pain management consult, the patient was started on either 0.25 or 0.5mg morphine q4h depending on pain severity along with IV acetaminophen. Due to lack of improvement, IV methylprednisolone was started per GI's recs.       HPI:    26 year old female with PMH Crohn's disease s/p surgery for stricture/ileostomy placement in mid August 2021, s/p PICC line in place for TPN 2 weeks ago, presents with epigastric abdominal pain, nausea, and vomiting x 4 days. Pt c/o multiple episodes of nausea and vomiting, food consistency emesis. Unable to retain any PO intake and wasn't eating nor drinking for past 3 days. Epigastric pain described as aching, 9/10, radiating to her esophagus. Admits to good ileostomy output of yellow-green watery stool with no blood. Pt was admitted at [facility] early september for nausea and vomiting and 2 weeks ago had PICC line placed for TPN infusion at home. Pt on Remicade infusion: last infusion was in July 2021 and was supposed to receive the infusion again Sept 16. However, since pt was hospitalized at [facility], she missed the infusion. Pt denies chest pain, sob, palpitations. hematochezia, melena, dysuria, dizziness, lightheadedness or sick contacts. (26 Sep 2021 12:05)    Hospital Course:  In the hospital, the patient was given morphine and acetaminophen initially for pain control. She was also started on Protonix and Zofran. TPN was discontinued and oral feeding was initiated. However, the patient experienced significant nausea and vomiting and stopped having PO intake. Inflammatory markers (ESR, CRP) and fecal calprotectin were within normal limits on admission and sucralfate was added to the treatment regimen to cover for possible esophagitis in addition to the Protonix. Opioids were discontinued per GI since they are associated with worse outcomes in Crohn's patients however her pain was not being well controlled and Dilaudid 0.25mg q8h was started. A registered dietitian was consulted and Ensure was added to meals. Psychiatry was consulted to rule out possible depression secondary to her GI issues and they deemed she was not depressed. A EGD was performed which showed a normal esophagus with no esophagitis, ulcers, mass, or structures. There was diffuse erythema and friability with some areas of superficial ulceration that were biopsied to rule out gastric Crohn's disease. There was erythematous duodenopathy with a normal second portion of the duodenum. Diet was advanced to soft foods and viscous lidocaine was added PRN prior to meals to help with pain when swallowing. From the EGD results, there was a possibility of Crohn's, so a dose of infliximab was administered. ESR and CRP were also repeated but both were negative. Patient was tried on a clear liquid diet since she was not tolerating her soft food diet, but she failed to tolerate clear liquids as well and was made NPO with TPN per GI. Due to her poor pain control the dose of Dilaudid was increased to 0.5mg q6h for severe pain. The biopsy results from the EGD came back negative for any signs of Crohn's or H. pylori infection. After a pain management consult, the patient was started on either 0.25 or 0.5mg Dilaudid q4h depending on pain severity along with IV acetaminophen. Due to lack of improvement, IV methylprednisolone was started per GI's recs.     In the setting of the patient's pain remaining uncontrolled without any clear pathology and the need to trial the patient on PO, primary team and pain management met with the patient to discuss PO pain regimen.       HPI:    26 year old female with PMH Crohn's disease s/p surgery for stricture/ileostomy placement in mid August 2021, s/p PICC line in place for TPN 2 weeks ago, presents with epigastric abdominal pain, nausea, and vomiting x 4 days. Pt c/o multiple episodes of nausea and vomiting, food consistency emesis. Unable to retain any PO intake and wasn't eating nor drinking for past 3 days. Epigastric pain described as aching, 9/10, radiating to her esophagus. Admits to good ileostomy output of yellow-green watery stool with no blood. Pt was admitted at [facility] early september for nausea and vomiting and 2 weeks ago had PICC line placed for TPN infusion at home. Pt on Remicade infusion: last infusion was in July 2021 and was supposed to receive the infusion again Sept 16. However, since pt was hospitalized at [facility], she missed the infusion. Pt denies chest pain, sob, palpitations. hematochezia, melena, dysuria, dizziness, lightheadedness or sick contacts. (26 Sep 2021 12:05)    Hospital Course:  In the hospital, the patient was given morphine and acetaminophen initially for pain control. She was also started on Protonix and Zofran. TPN was discontinued and oral feeding was initiated. However, the patient experienced significant nausea and vomiting and stopped having PO intake. Inflammatory markers (ESR, CRP) and fecal calprotectin were within normal limits on admission and sucralfate was added to the treatment regimen to cover for possible esophagitis in addition to the Protonix. Opioids were discontinued per GI since they are associated with worse outcomes in Crohn's patients however her pain was not being well controlled and Dilaudid 0.25mg q8h was started. A registered dietitian was consulted and Ensure was added to meals. Psychiatry was consulted to rule out possible depression secondary to her GI issues and they deemed she was not depressed. A EGD was performed which showed a normal esophagus with no esophagitis, ulcers, mass, or structures. There was diffuse erythema and friability with some areas of superficial ulceration that were biopsied to rule out gastric Crohn's disease. There was erythematous duodenopathy with a normal second portion of the duodenum. Diet was advanced to soft foods and viscous lidocaine was added PRN prior to meals to help with pain when swallowing. From the EGD results, there was a possibility of Crohn's, so a dose of infliximab was administered. ESR and CRP were also repeated but both were negative. Patient was tried on a clear liquid diet since she was not tolerating her soft food diet, but she failed to tolerate clear liquids as well and was made NPO with TPN per GI. Due to her poor pain control the dose of Dilaudid was increased to 0.5mg q6h for severe pain. The biopsy results from the EGD came back negative for any signs of Crohn's or H. pylori infection. After a pain management consult, the patient was started on either 0.25 or 0.5mg Dilaudid q4h depending on pain severity along with IV acetaminophen. Due to lack of improvement, IV methylprednisolone was started per GI's recs.     In the setting of the patient's pain remaining uncontrolled without any clear pathology and the need to trial the patient on PO, primary team and pain management met with the patient to discuss PO pain regimen. The patient and her family agreed for the patient to be discharged on TPN with oral pain medications and outpatient followup.        HPI:    26 year old female with PMH Crohn's disease s/p surgery for stricture/ileostomy placement in mid August 2021, s/p PICC line in place for TPN 2 weeks ago, presents with epigastric abdominal pain, nausea, and vomiting x 4 days. Pt c/o multiple episodes of nausea and vomiting, food consistency emesis. Unable to retain any PO intake and wasn't eating nor drinking for past 3 days. Epigastric pain described as aching, 9/10, radiating to her esophagus. Admits to good ileostomy output of yellow-green watery stool with no blood. Pt was admitted at [facility] early september for nausea and vomiting and 2 weeks ago had PICC line placed for TPN infusion at home. Pt on Remicade infusion: last infusion was in July 2021 and was supposed to receive the infusion again Sept 16. However, since pt was hospitalized at [facility], she missed the infusion. Pt denies chest pain, sob, palpitations. hematochezia, melena, dysuria, dizziness, lightheadedness or sick contacts. (26 Sep 2021 12:05)    Hospital Course:  In the hospital, the patient was given morphine and acetaminophen initially for pain control. She was also started on Protonix and Zofran. TPN was discontinued and oral feeding was initiated. However, the patient experienced significant nausea and vomiting and stopped having PO intake. Inflammatory markers (ESR, CRP) and fecal calprotectin were within normal limits on admission and sucralfate was added to the treatment regimen to cover for possible esophagitis in addition to the Protonix. Opioids were discontinued per GI since they are associated with worse outcomes in Crohn's patients however her pain was not being well controlled and Dilaudid 0.25mg q8h was started. A EGD was performed which showed a normal esophagus with no esophagitis, ulcers, mass, or structures. There was diffuse erythema and friability with some areas of superficial ulceration that were biopsied to rule out gastric Crohn's disease. There was erythematous duodenopathy with a normal second portion of the duodenum. Diet was advanced to soft foods and viscous lidocaine was added PRN prior to meals to help with pain when swallowing. From the EGD results, there was a possibility of Crohn's, so a dose of infliximab was administered. ESR and CRP were also repeated but both were negative. Patient was tried on a clear liquid diet since she was not tolerating her soft food diet, but she failed to tolerate clear liquids as well and was made NPO with TPN per GI. Due to her poor pain control the dose of Dilaudid was increased to 0.5mg q6h for severe pain. The biopsy results from the EGD came back negative for any signs of Crohn's or H. pylori infection. After a pain management consult, the patient was started on Dilaudid q4h. Due to lack of improvement, IV methylprednisolone was started per GI's recs.     In the setting of the patient's pain remaining uncontrolled without any clear pathology and the need to trial the patient on PO, primary team and pain management met with the patient to discuss PO pain regimen. The patient and her family agreed for the patient to be discharged on TPN with oral pain medications and outpatient followup with GI and pain management.        HPI:    26 year old female with PMH Crohn's disease s/p surgery for stricture/ileostomy placement in mid August 2021, s/p PICC line in place for TPN 2 weeks ago, presents with epigastric abdominal pain, nausea, and vomiting x 4 days. Pt c/o multiple episodes of nausea and vomiting, food consistency emesis. Unable to retain any PO intake and wasn't eating nor drinking for past 3 days. Epigastric pain described as aching, 9/10, radiating to her esophagus. Admits to good ileostomy output of yellow-green watery stool with no blood. Pt was admitted at [facility] early september for nausea and vomiting and 2 weeks ago had PICC line placed for TPN infusion at home. Pt on Remicade infusion: last infusion was in July 2021 and was supposed to receive the infusion again Sept 16. However, since pt was hospitalized at [facility], she missed the infusion. Pt denies chest pain, sob, palpitations. hematochezia, melena, dysuria, dizziness, lightheadedness or sick contacts. (26 Sep 2021 12:05)    Hospital Course:  In the hospital, the patient was given morphine and acetaminophen initially for pain control. She was also started on Protonix and Zofran. TPN was discontinued and oral feeding was initiated. However, the patient experienced significant nausea and vomiting and stopped having PO intake. Inflammatory markers (ESR, CRP) and fecal calprotectin were within normal limits on admission and sucralfate was added to the treatment regimen to cover for possible esophagitis in addition to the Protonix. Opioids were discontinued per GI since they are associated with worse outcomes in Crohn's patients however her pain was not being well controlled and Dilaudid 0.25mg q8h was started. A EGD was performed which showed a normal esophagus with no esophagitis, ulcers, mass, or structures. There was diffuse erythema and friability with some areas of superficial ulceration that were biopsied to rule out gastric Crohn's disease. There was erythematous duodenopathy with a normal second portion of the duodenum. Diet was advanced to soft foods and viscous lidocaine was added PRN prior to meals to help with pain when swallowing. From the EGD results, there was a possibility of Crohn's, so a dose of infliximab was administered. ESR and CRP were also repeated but both were negative. Patient was tried on a clear liquid diet since she was not tolerating her soft food diet, but she failed to tolerate clear liquids as well and was made NPO with TPN per GI. Due to her poor pain control the dose of Dilaudid was increased to 0.5mg q6h for severe pain. The biopsy results from the EGD came back negative for any signs of Crohn's or H. pylori infection. After a pain management consult, the patient was started on Dilaudid q4h. Due to lack of improvement, IV methylprednisolone was started per GI's recs.     In the setting of the patient's pain remaining uncontrolled without any clear pathology and the need to trial the patient on PO, primary team and pain management met with the patient to discuss PO pain regimen. The patient and her family agreed for the patient to be discharged against medical advice.   HPI:    26 year old female with PMH Crohn's disease s/p surgery for stricture/ileostomy placement in mid August 2021, s/p PICC line in place for TPN 2 weeks ago, presents with epigastric abdominal pain, nausea, and vomiting x 4 days. Pt c/o multiple episodes of nausea and vomiting, food consistency emesis. Unable to retain any PO intake and wasn't eating nor drinking for past 3 days. Epigastric pain described as aching, 9/10, radiating to her esophagus. Admits to good ileostomy output of yellow-green watery stool with no blood. Pt was admitted at [facility] early september for nausea and vomiting and 2 weeks ago had PICC line placed for TPN infusion at home. Pt on Remicade infusion: last infusion was in July 2021 and was supposed to receive the infusion again Sept 16. However, since pt was hospitalized at [facility], she missed the infusion. Pt denies chest pain, sob, palpitations. hematochezia, melena, dysuria, dizziness, lightheadedness or sick contacts. (26 Sep 2021 12:05)    Hospital Course:  In the hospital, the patient was given morphine and acetaminophen initially for pain control. She was also started on Protonix and Zofran. TPN was discontinued and oral feeding was initiated. However, the patient experienced significant nausea and vomiting and stopped having PO intake. Inflammatory markers (ESR, CRP) and fecal calprotectin were within normal limits on admission and sucralfate was added to the treatment regimen to cover for possible esophagitis in addition to the Protonix. Opioids were discontinued per GI since they are associated with worse outcomes in Crohn's patients however her pain was not being well controlled and Dilaudid 0.25mg q8h was started. A EGD was performed which showed a normal esophagus with no esophagitis, ulcers, mass, or structures. There was diffuse erythema and friability with some areas of superficial ulceration that were biopsied to rule out gastric Crohn's disease. There was erythematous duodenopathy with a normal second portion of the duodenum. Diet was advanced to soft foods and viscous lidocaine was added PRN prior to meals to help with pain when swallowing. From the EGD results, there was a possibility of Crohn's, so a dose of infliximab was administered. ESR and CRP were also repeated but both were negative. Patient was tried on a clear liquid diet since she was not tolerating her soft food diet, but she failed to tolerate clear liquids as well and was made NPO with TPN per GI. Due to her poor pain control the dose of Dilaudid was increased to 0.5mg q6h for severe pain. The biopsy results from the EGD came back negative for any signs of Crohn's or H. pylori infection. After a pain management consult, the patient was started on Dilaudid q4h. Due to lack of improvement, IV methylprednisolone was started per GI's recs.     In the setting of the patient's pain remaining uncontrolled without any clear pathology and the need to trial the patient on PO, primary team and pain management met with the patient to discuss PO pain regimen. GI and TPN team unwilling to restart TPN as pt has functioning intestinal tract. Pt does not wish to stay in the [facility] [facility] has [facility] to [place] tomorrow. She understands risk of leaving without waiting to see if she can tolerate any oral food. She understands risk of refeeding syndrome and electrolyte abnormalities. She also does not have a plan to follow up outpatient as she will be out of country, she does not know for how long. She understands risk of severe illness and death for leaving against medical advice.     Was called by RN due to patient wanting to sign out AMA. Asked patient why she wanted to leave, reports..........................    Patient is AAO x 3. I explained at length to the patient the risks of signing out AMA including but not limited to harm, injury or death. I explained the risks, benefits and alternatives to treatment as well as the attendant risks of refusing treatment at this time. I offered to answer any questions and fully answered any such questions. We believe that the patient fully understands what has been explained and answered. I informed hospitalist Dr. Hogan of this, aware. Patient signed form to sign out AMA and accepts responsibility for any and all results of this decision.       HPI:    26 year old female with PMH Crohn's disease s/p surgery for stricture/ileostomy placement in mid August 2021, s/p PICC line in place for TPN 2 weeks ago, presents with epigastric abdominal pain, nausea, and vomiting x 4 days. Pt c/o multiple episodes of nausea and vomiting, food consistency emesis. Unable to retain any PO intake and wasn't eating nor drinking for past 3 days. Epigastric pain described as aching, 9/10, radiating to her esophagus. Admits to good ileostomy output of yellow-green watery stool with no blood. Pt was admitted at [facility] early september for nausea and vomiting and 2 weeks ago had PICC line placed for TPN infusion at home. Pt on Remicade infusion: last infusion was in July 2021 and was supposed to receive the infusion again Sept 16. However, since pt was hospitalized at [facility], she missed the infusion. Pt denies chest pain, sob, palpitations. hematochezia, melena, dysuria, dizziness, lightheadedness or sick contacts. (26 Sep 2021 12:05)    Hospital Course:  In the hospital, the patient was given morphine and acetaminophen initially for pain control. She was also started on Protonix and Zofran. TPN was discontinued and oral feeding was initiated. However, the patient experienced significant nausea and vomiting and stopped having PO intake. Inflammatory markers (ESR, CRP) and fecal calprotectin were within normal limits on admission and sucralfate was added to the treatment regimen to cover for possible esophagitis in addition to the Protonix. Opioids were discontinued per GI since they are associated with worse outcomes in Crohn's patients however her pain was not being well controlled and Dilaudid 0.25mg q8h was started. A EGD was performed which showed a normal esophagus with no esophagitis, ulcers, mass, or structures. There was diffuse erythema and friability with some areas of superficial ulceration that were biopsied to rule out gastric Crohn's disease. There was erythematous duodenopathy with a normal second portion of the duodenum. Diet was advanced to soft foods and viscous lidocaine was added PRN prior to meals to help with pain when swallowing. From the EGD results, there was a possibility of Crohn's, so a dose of infliximab was administered. ESR and CRP were also repeated but both were negative. Patient was tried on a clear liquid diet since she was not tolerating her soft food diet, but she failed to tolerate clear liquids as well and was made NPO with TPN per GI. Due to her poor pain control the dose of Dilaudid was increased to 0.5mg q6h for severe pain. The biopsy results from the EGD came back negative for any signs of Crohn's or H. pylori infection. After a pain management consult, the patient was started on Dilaudid q4h. Due to lack of improvement, IV methylprednisolone was started per GI's recs.     In the setting of the patient's pain remaining uncontrolled without any clear pathology and the need to trial the patient on PO, primary team and pain management met with the patient to discuss PO pain regimen. GI and TPN team unwilling to restart TPN as pt has functioning intestinal tract. Pt [facility] has [facility] to [place] tomorrow. She understands risk of leaving without waiting to see if she can tolerate any oral food. She understands risk of refeeding syndrome and electrolyte abnormalities. She also does not have a plan to follow up outpatient as she will be out of country, she does not know for how long. She understands risk of severe illness and death for leaving against medical advice.     Was called by RN due to patient wanting to sign out AMA. Asked patient why she wanted to leave, reports she does not wish to stay here any longer. Understands risk of refeeding syndrome, abdominal pain, death.     Patient is AAO x 3. I explained at length to the patient the risks of signing out AMA including but not limited to harm, injury or death. I explained the risks, benefits and alternatives to treatment as well as the attendant risks of refusing treatment at this time. I offered to answer any questions and fully answered any such questions. We believe that the patient fully understands what has been explained and answered. I informed hospitalist Dr. Hogan of this, aware. Patient signed form to sign out AMA and accepts responsibility for any and all results of this decision.

## 2021-09-27 NOTE — PROGRESS NOTE ADULT - ASSESSMENT
26F w/ history of Crohn's disease c/b ascending colon stricture s/p resection and ileostomy at [facility], on remicade since 6/2021, presenting w/ persistent abd pain and n/v after missing remicade infusion while hospitalized at OSH for same symptoms.    Impression:  #Nausea, vomiting, abd pain - d/dx includes persistently active Crohn's disease vs mutlifactorial from Crohn's, erosive esophagitis, GERD, PUD, ? celiac disease  #Crohn's disease - on infliximab, due on 9/21 for infusion, unclear how well she is responding given persistent enteritis on CT; though clinical symptoms don't necessarily correlate and maybe unrelated. No increased ostomy output.  #Elevated liver enzymes - in setting of increased intraepithelial lymphocytes on duodenal biopsies, possibly from celiac disease    Recommendations:  - Please send infliximab levels and antibodies today (ordered)  - Can plan for infliximab infusion as patient is overdue for this.  - IV PPI BID for possible contribution to symptoms from esophagitis/GERD/PUD.  - F/u ESR and CRP.  - Please send fecal calprotectin from ostomy.  - F/u TTG IgA and Total IgA to help w/ evaluation for celiac disease.  - Diet as tolerated for now.  - Antiemetics as needed. Check QTc.  - GI will continue to follow.  - No immediate plan for steroids or endoscopy at this time.  - Will work to get [facility] records.    Faustino Freitas  Gastroenterology/Hepatology Fellow  Available via Microsoft Teams    NON-URGENT CONSULTS:  Please email [email or web address] OR  [email or web address]  AT NIGHT AND ON WEEKENDS:  Contact on-call GI fellow via answering service ([phone]) from 5pm-8am and on weekends/holidays  MONDAY-FRIDAY 8AM-5PM:  Pager# [phone] ([facility]) or [phone] ([facility])  GI Phone# [phone] ([facility])   26F w/ history of Crohn's disease c/b ascending colon stricture s/p resection and ileostomy at [facility], on remicade since 6/2021, presenting w/ persistent abd pain and n/v after missing remicade infusion while hospitalized at OSH for same symptoms.    Impression:  #Nausea, vomiting, abd pain - d/dx includes persistently active Crohn's disease vs mutlifactorial from Crohn's, erosive esophagitis, GERD, PUD, ? celiac disease  #Crohn's disease - on infliximab, due on 9/21 for infusion. However, s/p surgery for her stricture, and now w/ no e/o active Crohn's disease (inflammatory markers are negative). Unclear significance of CT findings.  #Elevated liver enzymes - in setting of increased intraepithelial lymphocytes on duodenal biopsies, possibly from celiac disease    Recommendations:  - Would treat n/v abd pain symptomatically with IV PPI BID, add sucralfate liquid 1g q6h for esophagitis.  - Pain management per primary team; would avoid opiates in this patient at this time if able.  - Unclear if patient needs ongoing remicade given she is s/p surgery and no real evidence of active Crohn's disease.  - Would be very helpful to check fecal calprotectin from ostomy to confirm no active Crohn's disease.  - F/u TTG IgA and Total IgA to help w/ evaluation for celiac disease.  - Diet as tolerated for now.  - Antiemetics as needed. Check QTc.  - GI will continue to follow.  - No immediate plan for steroids or endoscopy at this time.  - Will work to get [facility] records.    Faustino Freitas  Gastroenterology/Hepatology Fellow  Available via Microsoft Teams    NON-URGENT CONSULTS:  Please email [email or web address]  [email or web address]  AT NIGHT AND ON WEEKENDS:  Contact on-call GI fellow via answering service ([phone]) from 5pm-8am and on weekends/holidays  MONDAY-FRIDAY 8AM-5PM:  Pager# [phone] ([facility]) or [phone] ([facility])  GI Phone# [phone] ([facility])   26F w/ history of Crohn's disease c/b ascending colon stricture s/p resection and ileostomy at [facility], on remicade since 6/2021, presenting w/ persistent abd pain, n/v.     Impression:  #Nausea, vomiting, abd pain - d/dx includes persistently active Crohn's disease vs mutlifactorial from Crohn's, erosive esophagitis, GERD, PUD, ? celiac disease  #Crohn's disease - started on remicade at time of diagnosis in 6/2021.  s/p recent ileocolic resection for ascending colon stricture at [facility].   #Elevated liver enzymes - in setting of increased intraepithelial lymphocytes on duodenal biopsies.     Recommendations:  - Would treat n/v abd pain symptomatically with IV PPI BID, add sucralfate liquid 1g q6h for esophagitis.  - Pain management per primary team; would avoid opiates in this patient.  - Unclear if patient needs ongoing remicade as she is s/p surgery and with unknown disease burden.   - Would be very helpful to check fecal calprotectin from ostomy to confirm no active Crohn's disease.  - F/u TTG IgA and Total IgA to help w/ evaluation for celiac disease.  - Diet as tolerated for now.  - Antiemetics as needed. Check QTc.  - GI will continue to follow.  - No immediate plan for steroids or endoscopy at this time.  - Will work to get [facility] records.    Faustino Freitas  Gastroenterology/Hepatology Fellow  Available via [employer] Teams    NON-URGENT CONSULTS:  Please email [email or web address] OR  [email or web address]  AT NIGHT AND ON WEEKENDS:  Contact on-call GI fellow via answering service ([phone]) from 5pm-8am and on weekends/holidays  MONDAY-FRIDAY 8AM-5PM:  Pager# [phone] ([facility]) or [phone] ([facility])  GI Phone# [phone] ([facility])

## 2021-09-27 NOTE — PROGRESS NOTE ADULT - SUBJECTIVE AND OBJECTIVE BOX
Patient is a 26y old  Female who presents with a chief complaint of crohn's flare up (26 Sep 2021 14:25)      SUBJECTIVE / OVERNIGHT EVENTS: Patient seen and examined at bedside.    MEDICATIONS  (STANDING):  influenza   Vaccine 0.5 milliLiter(s) IntraMuscular once  metoclopramide Injectable 10 milliGRAM(s) IV Push once  pantoprazole  Injectable 40 milliGRAM(s) IV Push two times a day  sodium chloride 0.9%. 1000 milliLiter(s) (75 mL/Hr) IV Continuous <Continuous>    MEDICATIONS  (PRN):  acetaminophen   Tablet .. 650 milliGRAM(s) Oral every 6 hours PRN Temp greater or equal to 38.5C (101.3F), Mild Pain (1 - 3)  ondansetron Injectable 4 milliGRAM(s) IV Push every 6 hours PRN Nausea and/or Vomiting      Vital Signs Last 24 Hrs  T(C): 36.9 (26 Sep 2021 21:25), Max: 37.2 (26 Sep 2021 14:19)  T(F): 98.4 (26 Sep 2021 21:25), Max: 99 (26 Sep 2021 14:19)  HR: 119 (26 Sep 2021 21:25) (103 - 119)  BP: 116/73 (26 Sep 2021 21:25) (112/74 - 134/84)  BP(mean): --  RR: 18 (26 Sep 2021 21:25) (16 - 18)  SpO2: 100% (26 Sep 2021 21:25) (98% - 100%)  CAPILLARY BLOOD GLUCOSE        I&O's Summary      PHYSICAL EXAM:      LABS:                        9.7    10.34 )-----------( 436      ( 26 Sep 2021 12:13 )             31.6     09-26    137  |  102  |  23  ----------------------------<  92  3.0<L>   |  21<L>  |  0.51    Ca    9.1      26 Sep 2021 12:13    TPro  7.0  /  Alb  4.3  /  TBili  0.6  /  DBili  x   /  AST  111<H>  /  ALT  180<H>  /  AlkPhos  107  09-26              RADIOLOGY & ADDITIONAL TESTS:    Imaging Personally Reviewed:    Consultant(s) Notes Reviewed:      Care Discussed with Consultants/Other Providers:    Billie Wyman, PGY-1; [facility] Pager: [phone]; [facility] Pager: [phone]

## 2021-09-27 NOTE — PROGRESS NOTE ADULT - PROBLEM SELECTOR PLAN 1
admit to medicine  -s/p IV fluids x2L NS  -c/w maintenance IV NS @ 75cc/hr  -clear liquid diet. (pt tolerating po since was given morphine by ED.)  -House GI consulted  -Likely will need inpatient Remicade infusion. admit to medicine  -s/p IV fluids x2L NS  -c/w maintenance IV NS @ 75cc/hr  -clear liquid diet. (pt tolerating po since was given morphine by ED.)  -House GI consulted  -Likely will need inpatient Remicade infusion.  -IV tylenol PRN for severe pain

## 2021-09-27 NOTE — DISCHARGE NOTE PROVIDER - NSDCCPTREATMENT_GEN_ALL_CORE_FT
PRINCIPAL PROCEDURE  Procedure: EGD, with biopsy  Findings and Treatment: You got an upper gastrointestinal endoscopy which showed a normal esophagus with no esophagitis, ulcers, mass, or structures. There was diffuse erythema and friability with some areas of superficial ulceration that were biopsied to rule out gastric Crohn's disease. There was erythematous duodenopathy with a normal second portion of the duodenum.          PRINCIPAL PROCEDURE  Procedure: EGD, with biopsy  Findings and Treatment: You got an upper gastrointestinal endoscopy which showed a normal esophagus with no esophagitis, ulcers, mass, or structures. There was diffuse erythema and friability with some areas of superficial ulceration that were biopsied to rule out gastric Crohn's disease. There was erythematous duodenopathy with a normal second portion of the duodenum. These findings were suspicious of possible Crohn's so an infusion of infliximab was given to you.         PRINCIPAL PROCEDURE  Procedure: EGD, with biopsy  Findings and Treatment: You got an upper gastrointestinal endoscopy which showed a normal esophagus with no esophagitis, ulcers, mass, or structures. There was diffuse erythema and friability with some areas of superficial ulceration that were biopsied to rule out gastric Crohn's disease. There was erythematous duodenopathy with a normal second portion of the duodenum. These findings were suspicious of possible Crohn's so an infusion of infliximab was given to you. However, the final pathology report did not find any evidence of Crohn's or H. pylori infection in your stomach biopsy.

## 2021-09-27 NOTE — DISCHARGE NOTE PROVIDER - CARE PROVIDER_API CALL
GINA NAVA  Internal Medicine  [address] [place] [address]  Phone: [phone]  Fax: [phone]  Established Patient  Follow Up Time: 1 week

## 2021-09-27 NOTE — DISCHARGE NOTE PROVIDER - NSDCCAREPROVSEEN_GEN_ALL_CORE_FT
[facility] Medicine Team 5  [facility] Gastroenterology    [facility] Medicine Team 5  [facility] Gastroenterology   [facility] Pain management     [facility] Medicine Team 5  Attending: Dr. Maria Esther Hogan

## 2021-09-27 NOTE — DISCHARGE NOTE PROVIDER - NSDCFUADDAPPT_GEN_ALL_CORE_FT
[ ] please follow up with your primary care doctor within 1 week of discharge  [ ] please follow up with Dr. Davis (gastroenterology) within 1 week of discharge.

## 2021-09-27 NOTE — DISCHARGE NOTE PROVIDER - DETAILS OF MALNUTRITION DIAGNOSIS/DIAGNOSES
This patient has been assessed with a concern for Malnutrition and was treated during this hospitalization for the following Nutrition diagnosis/diagnoses:     -  09/28/2021: Severe protein-calorie malnutrition   -  09/28/2021: Underweight (BMI < 19)

## 2021-09-27 NOTE — PROGRESS NOTE ADULT - ATTENDING COMMENTS
patient seen and examined with team  Agree with above A/P by Dr Wyman  26 year old female with PMH Crohn's disease s/p surgery for stricture/ileostomy placement in mid August 2021, s/p PICC line in place for TPN 2 weeks ago, presents with epigastric abdominal pain, nausea, and vomiting x 4 days, admitted to medicine for Crohn's Flare up.  Patient notes it is painful to swallow, even water.  RN notes she needs K changed to IV due to "painful to swallow pills"  Patient noted they gave her TPN and sent her home from [facility] because " she was not eating"  PE NAD  Vital Signs Last 24 Hrs T 98, , /62, R 18, Sat 100 %  lungs cta, cor rrr, abd - pos cholestomy to RLQ, ext neg e/c/c  A/P  # Pain with even swallowing water- PPI   # Crohns flair- c/w teroids  # ID monitor fevers  IVF hydration  Pain medication with IV tylenol PRN

## 2021-09-28 DIAGNOSIS — R10.9 UNSPECIFIED ABDOMINAL PAIN: ICD-10-CM

## 2021-09-28 LAB
ALBUMIN SERPL ELPH-MCNC: 3.9 G/DL — SIGNIFICANT CHANGE UP (ref 3.3–5)
ALP SERPL-CCNC: 93 U/L — SIGNIFICANT CHANGE UP (ref 40–120)
ALT FLD-CCNC: 108 U/L — HIGH (ref 4–33)
ANION GAP SERPL CALC-SCNC: 15 MMOL/L — HIGH (ref 7–14)
AST SERPL-CCNC: 40 U/L — HIGH (ref 4–32)
BASOPHILS # BLD AUTO: 0.03 K/UL — SIGNIFICANT CHANGE UP (ref 0–0.2)
BASOPHILS NFR BLD AUTO: 0.4 % — SIGNIFICANT CHANGE UP (ref 0–2)
BILIRUB DIRECT SERPL-MCNC: <0.2 MG/DL — SIGNIFICANT CHANGE UP (ref 0–0.2)
BILIRUB INDIRECT FLD-MCNC: >0.2 MG/DL — SIGNIFICANT CHANGE UP (ref 0–1)
BILIRUB SERPL-MCNC: 0.4 MG/DL — SIGNIFICANT CHANGE UP (ref 0.2–1.2)
BUN SERPL-MCNC: 5 MG/DL — LOW (ref 7–23)
CALCIUM SERPL-MCNC: 9.1 MG/DL — SIGNIFICANT CHANGE UP (ref 8.4–10.5)
CHLORIDE SERPL-SCNC: 98 MMOL/L — SIGNIFICANT CHANGE UP (ref 98–107)
CO2 SERPL-SCNC: 25 MMOL/L — SIGNIFICANT CHANGE UP (ref 22–31)
CREAT SERPL-MCNC: 0.46 MG/DL — LOW (ref 0.5–1.3)
EOSINOPHIL # BLD AUTO: 0.02 K/UL — SIGNIFICANT CHANGE UP (ref 0–0.5)
EOSINOPHIL NFR BLD AUTO: 0.3 % — SIGNIFICANT CHANGE UP (ref 0–6)
GLUCOSE SERPL-MCNC: 113 MG/DL — HIGH (ref 70–99)
HCT VFR BLD CALC: 29.9 % — LOW (ref 34.5–45)
HGB BLD-MCNC: 9.9 G/DL — LOW (ref 11.5–15.5)
IANC: 4.7 K/UL — SIGNIFICANT CHANGE UP (ref 1.5–8.5)
IMM GRANULOCYTES NFR BLD AUTO: 0.6 % — SIGNIFICANT CHANGE UP (ref 0–1.5)
LYMPHOCYTES # BLD AUTO: 1.5 K/UL — SIGNIFICANT CHANGE UP (ref 1–3.3)
LYMPHOCYTES # BLD AUTO: 22.4 % — SIGNIFICANT CHANGE UP (ref 13–44)
MAGNESIUM SERPL-MCNC: 1.5 MG/DL — LOW (ref 1.6–2.6)
MCHC RBC-ENTMCNC: 27.7 PG — SIGNIFICANT CHANGE UP (ref 27–34)
MCHC RBC-ENTMCNC: 33.1 GM/DL — SIGNIFICANT CHANGE UP (ref 32–36)
MCV RBC AUTO: 83.5 FL — SIGNIFICANT CHANGE UP (ref 80–100)
MONOCYTES # BLD AUTO: 0.4 K/UL — SIGNIFICANT CHANGE UP (ref 0–0.9)
MONOCYTES NFR BLD AUTO: 6 % — SIGNIFICANT CHANGE UP (ref 2–14)
NEUTROPHILS # BLD AUTO: 4.7 K/UL — SIGNIFICANT CHANGE UP (ref 1.8–7.4)
NEUTROPHILS NFR BLD AUTO: 70.3 % — SIGNIFICANT CHANGE UP (ref 43–77)
NRBC # BLD: 0 /100 WBCS — SIGNIFICANT CHANGE UP
NRBC # FLD: 0 K/UL — SIGNIFICANT CHANGE UP
PHOSPHATE SERPL-MCNC: 3.3 MG/DL — SIGNIFICANT CHANGE UP (ref 2.5–4.5)
PLATELET # BLD AUTO: 468 K/UL — HIGH (ref 150–400)
POTASSIUM SERPL-MCNC: 2.7 MMOL/L — CRITICAL LOW (ref 3.5–5.3)
POTASSIUM SERPL-SCNC: 2.7 MMOL/L — CRITICAL LOW (ref 3.5–5.3)
PROT SERPL-MCNC: 6.5 G/DL — SIGNIFICANT CHANGE UP (ref 6–8.3)
RBC # BLD: 3.58 M/UL — LOW (ref 3.8–5.2)
RBC # FLD: 12.5 % — SIGNIFICANT CHANGE UP (ref 10.3–14.5)
SODIUM SERPL-SCNC: 138 MMOL/L — SIGNIFICANT CHANGE UP (ref 135–145)
TTG IGA SER-ACNC: <1.2 U/ML — SIGNIFICANT CHANGE UP
TTG IGA SER-ACNC: NEGATIVE — SIGNIFICANT CHANGE UP
TTG IGG SER IA-ACNC: NEGATIVE — SIGNIFICANT CHANGE UP
TTG IGG SER-ACNC: 3.2 U/ML — SIGNIFICANT CHANGE UP
WBC # BLD: 6.69 K/UL — SIGNIFICANT CHANGE UP (ref 3.8–10.5)
WBC # FLD AUTO: 6.69 K/UL — SIGNIFICANT CHANGE UP (ref 3.8–10.5)

## 2021-09-28 PROCEDURE — 99232 SBSQ HOSP IP/OBS MODERATE 35: CPT | Mod: GC

## 2021-09-28 PROCEDURE — 93010 ELECTROCARDIOGRAM REPORT: CPT

## 2021-09-28 PROCEDURE — 99233 SBSQ HOSP IP/OBS HIGH 50: CPT | Mod: GC

## 2021-09-28 RX ORDER — ACETAMINOPHEN 500 MG
500 TABLET ORAL ONCE
Refills: 0 | Status: COMPLETED | OUTPATIENT
Start: 2021-09-28 | End: 2021-09-28

## 2021-09-28 RX ORDER — POTASSIUM PHOSPHATE, MONOBASIC POTASSIUM PHOSPHATE, DIBASIC 236; 224 MG/ML; MG/ML
30 INJECTION, SOLUTION INTRAVENOUS
Refills: 0 | Status: DISCONTINUED | OUTPATIENT
Start: 2021-09-28 | End: 2021-09-28

## 2021-09-28 RX ORDER — POTASSIUM CHLORIDE 20 MEQ
10 PACKET (EA) ORAL
Refills: 0 | Status: COMPLETED | OUTPATIENT
Start: 2021-09-28 | End: 2021-09-28

## 2021-09-28 RX ORDER — CHLORHEXIDINE GLUCONATE 213 G/1000ML
1 SOLUTION TOPICAL DAILY
Refills: 0 | Status: DISCONTINUED | OUTPATIENT
Start: 2021-09-28 | End: 2021-10-22

## 2021-09-28 RX ORDER — MAGNESIUM SULFATE 500 MG/ML
2 VIAL (ML) INJECTION ONCE
Refills: 0 | Status: COMPLETED | OUTPATIENT
Start: 2021-09-28 | End: 2021-09-28

## 2021-09-28 RX ORDER — ONDANSETRON 8 MG/1
4 TABLET, FILM COATED ORAL ONCE
Refills: 0 | Status: DISCONTINUED | OUTPATIENT
Start: 2021-09-28 | End: 2021-10-05

## 2021-09-28 RX ORDER — ACETAMINOPHEN 500 MG
500 TABLET ORAL ONCE
Refills: 0 | Status: DISCONTINUED | OUTPATIENT
Start: 2021-09-28 | End: 2021-09-28

## 2021-09-28 RX ORDER — POTASSIUM PHOSPHATE, MONOBASIC POTASSIUM PHOSPHATE, DIBASIC 236; 224 MG/ML; MG/ML
15 INJECTION, SOLUTION INTRAVENOUS ONCE
Refills: 0 | Status: COMPLETED | OUTPATIENT
Start: 2021-09-28 | End: 2021-09-28

## 2021-09-28 RX ORDER — HYDROMORPHONE HYDROCHLORIDE 2 MG/ML
0.25 INJECTION INTRAMUSCULAR; INTRAVENOUS; SUBCUTANEOUS ONCE
Refills: 0 | Status: DISCONTINUED | OUTPATIENT
Start: 2021-09-28 | End: 2021-09-28

## 2021-09-28 RX ORDER — POTASSIUM PHOSPHATE, MONOBASIC POTASSIUM PHOSPHATE, DIBASIC 236; 224 MG/ML; MG/ML
30 INJECTION, SOLUTION INTRAVENOUS ONCE
Refills: 0 | Status: DISCONTINUED | OUTPATIENT
Start: 2021-09-28 | End: 2021-09-28

## 2021-09-28 RX ADMIN — HYDROMORPHONE HYDROCHLORIDE 0.25 MILLIGRAM(S): 2 INJECTION INTRAMUSCULAR; INTRAVENOUS; SUBCUTANEOUS at 02:12

## 2021-09-28 RX ADMIN — HYDROMORPHONE HYDROCHLORIDE 0.25 MILLIGRAM(S): 2 INJECTION INTRAMUSCULAR; INTRAVENOUS; SUBCUTANEOUS at 16:27

## 2021-09-28 RX ADMIN — Medication 100 MILLIEQUIVALENT(S): at 16:27

## 2021-09-28 RX ADMIN — ONDANSETRON 4 MILLIGRAM(S): 8 TABLET, FILM COATED ORAL at 02:12

## 2021-09-28 RX ADMIN — ONDANSETRON 4 MILLIGRAM(S): 8 TABLET, FILM COATED ORAL at 20:13

## 2021-09-28 RX ADMIN — Medication 100 MILLIEQUIVALENT(S): at 14:51

## 2021-09-28 RX ADMIN — Medication 100 MILLIEQUIVALENT(S): at 18:38

## 2021-09-28 RX ADMIN — PANTOPRAZOLE SODIUM 40 MILLIGRAM(S): 20 TABLET, DELAYED RELEASE ORAL at 06:37

## 2021-09-28 RX ADMIN — Medication 50 GRAM(S): at 10:58

## 2021-09-28 RX ADMIN — ONDANSETRON 4 MILLIGRAM(S): 8 TABLET, FILM COATED ORAL at 14:47

## 2021-09-28 RX ADMIN — HYDROMORPHONE HYDROCHLORIDE 0.25 MILLIGRAM(S): 2 INJECTION INTRAMUSCULAR; INTRAVENOUS; SUBCUTANEOUS at 08:36

## 2021-09-28 RX ADMIN — Medication 500 MILLIGRAM(S): at 08:24

## 2021-09-28 RX ADMIN — ONDANSETRON 4 MILLIGRAM(S): 8 TABLET, FILM COATED ORAL at 08:36

## 2021-09-28 RX ADMIN — PANTOPRAZOLE SODIUM 40 MILLIGRAM(S): 20 TABLET, DELAYED RELEASE ORAL at 17:38

## 2021-09-28 RX ADMIN — Medication 200 MILLIGRAM(S): at 06:36

## 2021-09-28 RX ADMIN — CHLORHEXIDINE GLUCONATE 1 APPLICATION(S): 213 SOLUTION TOPICAL at 11:58

## 2021-09-28 RX ADMIN — POTASSIUM PHOSPHATE, MONOBASIC POTASSIUM PHOSPHATE, DIBASIC 62.5 MILLIMOLE(S): 236; 224 INJECTION, SOLUTION INTRAVENOUS at 11:04

## 2021-09-28 RX ADMIN — Medication 200 MILLIGRAM(S): at 21:28

## 2021-09-28 NOTE — DIETITIAN INITIAL EVALUATION ADULT. - PERTINENT MEDS FT
MEDICATIONS  (STANDING):  influenza   Vaccine 0.5 milliLiter(s) IntraMuscular once  pantoprazole  Injectable 40 milliGRAM(s) IV Push two times a day  potassium chloride  10 mEq/100 mL IVPB 10 milliEquivalent(s) IV Intermittent every 1 hour  sodium chloride 0.9%. 1000 milliLiter(s) (75 mL/Hr) IV Continuous <Continuous>  sucralfate 1 Gram(s) Oral every 6 hours

## 2021-09-28 NOTE — DIETITIAN INITIAL EVALUATION ADULT. - OTHER INFO
27 y/o female with Crohn's Disease admitted for exacerbation. Visited with pt to obtain nutrition hx. Pt said she was diagnosed with Crohn's in 6/21 with persistent, ongoing issues with abdominal pain, nausea and inability to tolerate PO. She said that she presently cannot even tolerate water without abdominal distress. She tried to eat one cookie last night and she vomited right after. She showed this RDN her PICC line but said it has never been used. Pt with ileostomy with positive output. GI note states pt's symptoms are unlikely for flare - pt continues to be worked up for probable cause. Brought pt sample of Ensure Clear 3x daily (540 carrie and 24 gm protein) to try. CARMELO Jade was present in room and reassured pt that ONS would be offered in small amounts and cold, as requested. Pt amenable to trying small sips, as tolerated. K+, Mg lab values are low possibly reflecting Refeeding Syndrome; monitor as pt tries to consume more PO. If PO unable to be tolerated, TPN may be warranted. If TPN is being considered, please contact JENNIFER Sumner at pager [phone] - Nutrition Support Team or via MS Teams so that an order can be written. Pt appears cachectic and displays evidence of severe subcutaneous fat store depletion and severe muscle mass wasting presenting her at severe risk for malnutrition. RDN services to remain available as needed.

## 2021-09-28 NOTE — PROGRESS NOTE ADULT - PROBLEM SELECTOR PLAN 2
Transaminitis in a setting of hypovolemia/dehydration.  -s/p IV fluids x2L NS  -c/w maintenance IV NS @ 75cc/hr  -will recheck this morning for repeat CMP.  -recent hepatitis panel in 6/2021 was negative. - Possibly related to esophagitis/reflux, other etiologies include active Crohn's (though no objective evidence pointing towards flare), vs possibly related to elevated liver enzymes though unclear what would be causing this.  - Treat symptomatically with IV PPI BID, add sucralfate liquid 1g q6h for esophagitis per GI recs - Possibly related to esophagitis/reflux, other etiologies include active Crohn's (though no objective evidence pointing towards flare), vs possibly related to elevated liver enzymes though unclear what would be causing this.  - Treat symptomatically with IV PPI BID, add sucralfate liquid 1g q6h for esophagitis per GI recs  - avoid opiates in this patient given increased mortality with opiates in IBD patients  - Diet as tolerated for now.  - Antiemetics as needed. Check QTc.  - obtain [facility] records from recent visit (imaging, EGD, recent GI note)

## 2021-09-28 NOTE — PROGRESS NOTE ADULT - SUBJECTIVE AND OBJECTIVE BOX
Chief Complaint:  Patient is a 26y old  Female who presents with a chief complaint of crohn's flare up (28 Sep 2021 07:21)    Reason for consult: abd pain, hx of Crohn's disease    Interval Events: Pt reporting significant abd pain overnight w/ vomiting. Reports again she was able to eat after receiving dilaudid but otherwise is not able to.     Hospital Medications:  acetaminophen   Tablet .. 650 milliGRAM(s) Oral every 6 hours PRN  acetaminophen  IVPB .. 500 milliGRAM(s) IV Intermittent once  dextrose 5% + sodium chloride 0.9%. 1000 milliLiter(s) IV Continuous <Continuous>  influenza   Vaccine 0.5 milliLiter(s) IntraMuscular once  metoclopramide Injectable 10 milliGRAM(s) IV Push once  ondansetron Injectable 4 milliGRAM(s) IV Push every 6 hours PRN  pantoprazole  Injectable 40 milliGRAM(s) IV Push two times a day  sodium chloride 0.9%. 1000 milliLiter(s) IV Continuous <Continuous>  sucralfate 1 Gram(s) Oral every 6 hours      ROS:   General:  No  fevers, chills, night sweats, fatigue  Eyes:  Good vision, no reported pain  ENT:  No sore throat, pain, runny nose  CV:  No pain, palpitations  Pulm:  No dyspnea, cough  GI:  See HPI, otherwise negative  :  No  incontinence, nocturia  Muscle:  No pain, weakness  Neuro:  No memory problems  Psych:  No insomnia, mood problems, depression  Endocrine:  No polyuria, polydipsia, cold/heat intolerance  Heme:  No petechiae, ecchymosis, easy bruisability  Skin:  No rash    PHYSICAL EXAM:   Vital Signs:  Vital Signs Last 24 Hrs  T(C): 36.9 (28 Sep 2021 06:37), Max: 37.1 (27 Sep 2021 20:48)  T(F): 98.4 (28 Sep 2021 06:37), Max: 98.8 (27 Sep 2021 20:48)  HR: 116 (28 Sep 2021 06:37) (113 - 116)  BP: 116/87 (28 Sep 2021 06:37) (104/67 - 119/79)  BP(mean): --  RR: 18 (28 Sep 2021 06:37) (18 - 18)  SpO2: 100% (28 Sep 2021 06:37) (99% - 100%)  Daily     Daily     GENERAL: no acute distress  NEURO: alert  HEENT: anicteric sclera, no conjunctival pallor appreciated  CHEST: no respiratory distress, no accessory muscle use  CARDIAC: regular rate, rhythm  ABDOMEN: soft, non-tender, non-distended, no rebound or guarding, ostomy well appearing w/ solid brownish greenish stool  EXTREMITIES: warm, well perfused, no edema  SKIN: no lesions noted    LABS: reviewed                        8.8    6.37  )-----------( 358      ( 27 Sep 2021 07:00 )             27.8     09-27    137  |  103  |  16  ----------------------------<  59<L>  3.4<L>   |  19<L>  |  0.51    Ca    9.0      27 Sep 2021 07:00    TPro  5.9<L>  /  Alb  3.6  /  TBili  0.7  /  DBili  x   /  AST  55<H>  /  ALT  128<H>  /  AlkPhos  91  09-27    LIVER FUNCTIONS - ( 27 Sep 2021 07:00 )  Alb: 3.6 g/dL / Pro: 5.9 g/dL / ALK PHOS: 91 U/L / ALT: 128 U/L / AST: 55 U/L / GGT: x             Interval Diagnostic Studies: see sunrise for full report

## 2021-09-28 NOTE — PROGRESS NOTE ADULT - ATTENDING COMMENTS
Agree with above A/P by Dr Wyman  26 year old female with PMH Crohn's disease s/p surgery for stricture/ileostomy placement in mid August 2021, s/p PICC line in place for TPN 2 weeks ago, presents with epigastric abdominal pain, nausea, and vomiting x 4 days, admitted to medicine for Crohn's Flare up.  Patient notes it is painful to swallow, even water.  RN notes she needs K changed to IV due to "painful to swallow pills"  Patient noted they gave her TPN and sent her home from [facility] because " she was not eating"  PE NAD  Vital Signs Last 24 Hrs T98.4, Hr 116, /87, R18, Sat 100 %Ra  lungs cta, cor rrr, abd - pos cholecystectomy  to RLQ, ext neg e/c/c  A/P  # Pain with even swallowing water- PPI GI consifering EGD  # Crohns flair- c/w steroids  # ID monitor fevers  IVF hydration  Pain medication with IV tylenol PRN .  ?ff anti-biotics ?? restart TPN.  Plan d/w team/ patient

## 2021-09-28 NOTE — PROGRESS NOTE ADULT - ASSESSMENT
26F w/ history of Crohn's disease c/b ascending colon stricture s/p resection and ileostomy at [facility], on remicade since 6/2021, presenting w/ persistent abd pain, n/v. Inflammatory markers are negative, pointing away from active Crohn's flare. No e/o SBO.    Impression:  #Nausea, vomiting, abd pain - Possibly related to esophagitis/reflux, other etiologies include active Crohn's (though no objective evidence pointing towards flare), vs possibly related to elevated liver enzymes though unclear what would be causing this.  #Crohn's disease - started on remicade at time of diagnosis in 6/2021.  s/p recent ileocolic resection for ascending colon stricture at [facility].   #Elevated liver enzymes - unclear etiology, can consider MRI/MRCP to r/o biliary sludge (though no e/o cholestasis).   #C/f celiac disease - due to increased intraepithelial lymphocytes on biopsies, however negative serologic work-up and symptoms do not correlate.     Recommendations:  - Would treat n/v abd pain symptomatically with IV PPI BID, add sucralfate liquid 1g q6h for esophagitis.  - Pain management per primary team; would avoid opiates in this patient given increased mortality with opiates in IBD patients.  - Unclear if patient needs ongoing remicade as she is s/p surgery and with unknown disease burden.   - Would be very helpful to check fecal calprotectin from ostomy to confirm no active Crohn's disease.  - Can consider MRI/MRCP to r/o biliary pathology (though very low suspicion for this)  - Can consider endoscopic evaluation (EGD/ileoscopy) to confirm lack of active Crohn's disease, possibly tomorrow (awaiting d/w  GI attending)  - Diet as tolerated for now.  - Antiemetics as needed. Check QTc.  - GI will continue to follow.  - Please help obtain [facility] records from recent visit (imaging, EGD, recent GI note)    Faustino Freitas  Gastroenterology/Hepatology Fellow  Available via Microsoft Teams    NON-URGENT CONSULTS:  Please email [email or web address]  AT NIGHT AND ON WEEKENDS:  Contact on-call GI fellow via answering service ([phone]) from 5pm-8am and on weekends/holidays  MONDAY-FRIDAY 8AM-5PM:  Pager# [phone] ([facility]) or [phone] ([facility])  GI Phone# [phone] ([facility])   26F w/ history of Crohn's disease c/b ascending colon stricture s/p resection and ileostomy at [facility], on remicade since 6/2021, presenting w/ persistent abd pain, n/v. Inflammatory markers are negative, pointing away from active Crohn's flare. No e/o SBO.    Impression:  #Nausea, vomiting, abd pain - Possibly related to esophagitis/reflux, other etiologies include active Crohn's (though no objective evidence pointing towards flare), vs possibly related to elevated liver enzymes though unclear what would be causing this.  #Crohn's disease - started on remicade at time of diagnosis in 6/2021.  s/p recent ileocolic resection for ascending colon stricture at [facility].   #Elevated liver enzymes - unclear etiology, can consider MRI/MRCP to r/o biliary sludge (though no e/o cholestasis).   #C/f celiac disease - due to increased intraepithelial lymphocytes on biopsies, however negative serologic work-up and symptoms do not correlate.     Recommendations:  - Would treat n/v abd pain symptomatically with IV PPI BID, add sucralfate liquid 1g q6h for esophagitis.  - Pain management per primary team; would avoid opiates in this patient given increased mortality with opiates in IBD patients.  - Unclear if patient needs ongoing remicade as she is s/p surgery and with unknown disease burden.   - Would be very helpful to check fecal calprotectin from ostomy to confirm no active Crohn's disease.  - Can consider MRI/MRCP to r/o biliary pathology (though very low suspicion for this)  - Will consider endoscopic evaluation (EGD/ileoscopy) to confirm lack of active Crohn's disease  - Diet as tolerated for now.  - Antiemetics as needed. Check QTc.  - GI will continue to follow.  - Please help obtain [facility] records from recent visit (imaging, EGD, recent GI note)    Fausitno Freitas  Gastroenterology/Hepatology Fellow  Available via Microsoft Teams    NON-URGENT CONSULTS:  Please email [email or web address]  [email or web address]  AT NIGHT AND ON WEEKENDS:  Contact on-call GI fellow via answering service ([phone]) from 5pm-8am and on weekends/holidays  MONDAY-FRIDAY 8AM-5PM:  Pager# [phone] ([facility]) or [phone] ([facility])  GI Phone# [phone] ([facility])

## 2021-09-28 NOTE — DIETITIAN INITIAL EVALUATION ADULT. - PERTINENT LABORATORY DATA
09-28 Na 138 mmol/L Glu 113 mg/dL<H> K+ 2.7 mmol/L<LL> Cr 0.46 mg/dL<L> BUN 5 mg/dL<L> Phos 3.3 mg/dL

## 2021-09-28 NOTE — PROGRESS NOTE ADULT - ATTENDING COMMENTS
26F w recent diagnosis of Crohns Disease c/b ascending colon stricture s/p ileocolic resection at [facility], on remicade (x 3 doses), now here with abdominal pain, odynophagia.   - Felt better this afternoon, able to tolerate ensure and liquids. Continue PPI and carafate solution for now  - Will reach out to and discuss case w/ surgeon at [facility]   - May need endoscopic evaluation to assess disease burden later this week (egd/colonoscopy)   - Remicade infusion pending clinical course and intraluminal evaluation   GI to follow, please call with questions.

## 2021-09-28 NOTE — PROGRESS NOTE ADULT - PROBLEM SELECTOR PLAN 3
BLACK stockings and ambulate as tolerated. Transaminitis in a setting of hypovolemia/dehydration.  -s/p IV fluids x2L NS  -c/w maintenance IV NS @ 75cc/hr  -will recheck this morning for repeat CMP.  -recent hepatitis panel in 6/2021 was negative.  -can consider MRI/MRCP to r/o biliary sludge per GI recs Transaminitis in a setting of hypovolemia/dehydration.  -s/p IV fluids x2L NS  -c/w maintenance IV NS @ 75cc/hr  -will recheck tomorrow morning for repeat CMP.  -recent hepatitis panel in 6/2021 was negative.  -can consider MRI/MRCP to r/o biliary sludge per GI recs

## 2021-09-28 NOTE — PROGRESS NOTE ADULT - PROBLEM SELECTOR PLAN 1
admit to medicine  -s/p IV fluids x2L NS  -c/w maintenance IV NS @ 75cc/hr  -clear liquid diet. (pt tolerating po since was given morphine by ED.)  -House GI consulted  -Likely will need inpatient Remicade infusion.  -IV tylenol PRN for severe pain  -receive Dilaudid and tylenol overnight for severe pain. admit to medicine  -s/p IV fluids x2L NS  -c/w maintenance IV NS @ 75cc/hr  -clear liquid diet. (pt tolerating po since was given morphine by ED.)  -House GI consulted  -Likely will need inpatient Remicade infusion.  -ESR/CRP wnl, decreasing likelihood of Crohn's flare  -IV tylenol PRN for severe pain  -receive Dilaudid and tylenol overnight for severe pain. admit to medicine  -s/p IV fluids x2L NS  -c/w maintenance IV NS @ 75cc/hr  -clear liquid diet. (pt tolerating po since was given morphine by ED.)  -House GI consulted  -Likely will need inpatient Remicade infusion.  -Inflammatory markers wnl, decreasing likelihood of Crohn's flare per GI  -IV tylenol PRN for severe pain  -receive Dilaudid and tylenol overnight for severe pain. admit to medicine  -s/p IV fluids x2L NS  -c/w maintenance IV NS @ 75cc/hr  -clear liquid diet. (pt tolerating po since was given morphine by ED.)  -House GI consulted  -Likely will need inpatient Remicade infusion.  -Inflammatory markers wnl, decreasing likelihood of Crohn's flare per GI  -IV tylenol PRN for severe pain  -receive Dilaudid and tylenol overnight for severe pain.  -f/u fecal calprotectin admit to medicine  -s/p IV fluids x2L NS  -c/w maintenance IV NS @ 75cc/hr  -clear liquid diet. (pt tolerating po since was given morphine by ED.)  -House GI consulted  -Likely will not need inpatient Remicade infusion.  -Inflammatory markers wnl, decreasing likelihood of Crohn's flare per GI  -IV tylenol PRN for severe pain  -receive Dilaudid and tylenol overnight for severe pain.  -f/u fecal calprotectin

## 2021-09-28 NOTE — PROGRESS NOTE ADULT - ASSESSMENT
26 year old female with PMH Crohn's disease s/p surgery for stricture/ileostomy placement in mid August 2021, s/p PICC line in place for TPN 2 weeks ago, presents with epigastric abdominal pain, nausea, and vomiting x 4 days, admitted to medicine for a potential Crohn's Flare up

## 2021-09-28 NOTE — PROGRESS NOTE ADULT - SUBJECTIVE AND OBJECTIVE BOX
Goivanni Prajapati MS3    Patient is a 26y old  Female who presents with a chief complaint of crohn's flare up (27 Sep 2021 12:09)    SUBJECTIVE / OVERNIGHT EVENTS:    Patient was examined at bedside in [facility]. Patient is complaining of abdominal and chest pain that is 7/10 and is requesting more Dilaudid and tylenol, which she received overnight. She said that she tried eating solid food last night and after eating a cookie, she felt significant abdominal pain 9/10 that required pain medications. Patient endorses n/v, and vomited overnight. Patient is having good output in her ileostomy bag that is green. Denies any SOB, f/c.     MEDICATIONS  (STANDING):  dextrose 5% + sodium chloride 0.9%. 1000 milliLiter(s) (50 mL/Hr) IV Continuous <Continuous>  influenza   Vaccine 0.5 milliLiter(s) IntraMuscular once  metoclopramide Injectable 10 milliGRAM(s) IV Push once  pantoprazole  Injectable 40 milliGRAM(s) IV Push two times a day  sodium chloride 0.9%. 1000 milliLiter(s) (75 mL/Hr) IV Continuous <Continuous>  sucralfate 1 Gram(s) Oral every 6 hours    MEDICATIONS  (PRN):  acetaminophen   Tablet .. 650 milliGRAM(s) Oral every 6 hours PRN Temp greater or equal to 38.5C (101.3F), Mild Pain (1 - 3)  ondansetron Injectable 4 milliGRAM(s) IV Push every 6 hours PRN Nausea and/or Vomiting    Vital Signs Last 24 Hrs  T(C): 36.9 (28 Sep 2021 06:37), Max: 37.1 (27 Sep 2021 20:48)  T(F): 98.4 (28 Sep 2021 06:37), Max: 98.8 (27 Sep 2021 20:48)  HR: 116 (28 Sep 2021 06:37) (113 - 116)  BP: 116/87 (28 Sep 2021 06:37) (103/54 - 119/79)  BP(mean): --  RR: 18 (28 Sep 2021 06:37) (18 - 18)  SpO2: 100% (28 Sep 2021 06:37) (99% - 100%)    PHYSICAL EXAM:  GENERAL: NAD, well-developed  HEAD:  Atraumatic, Normocephalic  EYES: Conjunctiva and sclera clear  NECK: No JVD  CHEST/LUNG: patient declined  HEART: patient declined  ABDOMEN: Soft, mildly tender to palpation, Nondistended; Bowel sounds present  EXTREMITIES: No clubbing, cyanosis, or edema  PSYCH: AAOx3  NEUROLOGY: non-focal  SKIN: No rashes or lesions    LABS:                        8.8    6.37  )-----------( 358      ( 27 Sep 2021 07:00 )             27.8     09-27    137  |  103  |  16  ----------------------------<  59<L>  3.4<L>   |  19<L>  |  0.51    Ca    9.0      27 Sep 2021 07:00    TPro  5.9<L>  /  Alb  3.6  /  TBili  0.7  /  DBili  x   /  AST  55<H>  /  ALT  128<H>  /  AlkPhos  91  09-27              RADIOLOGY & ADDITIONAL TESTS:    Imaging Personally Reviewed:    Consultant(s) Notes Reviewed:      Care Discussed with Consultants/Other Providers:   Giovanni Prajapati MS3    Patient is a 26y old  Female who presents with a chief complaint of crohn's flare up (27 Sep 2021 12:09)    SUBJECTIVE / OVERNIGHT EVENTS:    Patient was examined at bedside in [facility]. Patient is complaining of abdominal and chest pain that is 7/10 and is requesting more Dilaudid and tylenol, which she received overnight. She said that she tried eating solid food last night and after eating a cookie, she felt significant abdominal pain 9/10 that required pain medications. Patient endorses n/v, and vomited overnight. Patient is having good output in her ileostomy bag that is green. Denies any SOB, f/c.     MEDICATIONS  (STANDING):  dextrose 5% + sodium chloride 0.9%. 1000 milliLiter(s) (50 mL/Hr) IV Continuous <Continuous>  influenza   Vaccine 0.5 milliLiter(s) IntraMuscular once  metoclopramide Injectable 10 milliGRAM(s) IV Push once  pantoprazole  Injectable 40 milliGRAM(s) IV Push two times a day  sodium chloride 0.9%. 1000 milliLiter(s) (75 mL/Hr) IV Continuous <Continuous>  sucralfate 1 Gram(s) Oral every 6 hours    MEDICATIONS  (PRN):  acetaminophen   Tablet .. 650 milliGRAM(s) Oral every 6 hours PRN Temp greater or equal to 38.5C (101.3F), Mild Pain (1 - 3)  ondansetron Injectable 4 milliGRAM(s) IV Push every 6 hours PRN Nausea and/or Vomiting    Vital Signs Last 24 Hrs  T(C): 36.9 (28 Sep 2021 06:37), Max: 37.1 (27 Sep 2021 20:48)  T(F): 98.4 (28 Sep 2021 06:37), Max: 98.8 (27 Sep 2021 20:48)  HR: 116 (28 Sep 2021 06:37) (113 - 116)  BP: 116/87 (28 Sep 2021 06:37) (103/54 - 119/79)  BP(mean): --  RR: 18 (28 Sep 2021 06:37) (18 - 18)  SpO2: 100% (28 Sep 2021 06:37) (99% - 100%)    PHYSICAL EXAM:  GENERAL: NAD, well-developed  HEAD:  Atraumatic, Normocephalic  EYES: Conjunctiva and sclera clear  NECK: No JVD  CHEST/LUNG: patient declined  HEART: patient declined  ABDOMEN: Soft, mildly tender to palpation, Nondistended; Bowel sounds present; ostomy well appearing w/ greenish stool    EXTREMITIES: No clubbing, cyanosis, or edema  PSYCH: AAOx3  NEUROLOGY: non-focal  SKIN: No rashes or lesions    LABS:                        8.8    6.37  )-----------( 358      ( 27 Sep 2021 07:00 )             27.8     09-27    137  |  103  |  16  ----------------------------<  59<L>  3.4<L>   |  19<L>  |  0.51    Ca    9.0      27 Sep 2021 07:00    TPro  5.9<L>  /  Alb  3.6  /  TBili  0.7  /  DBili  x   /  AST  55<H>  /  ALT  128<H>  /  AlkPhos  91  09-27              RADIOLOGY & ADDITIONAL TESTS:    Imaging Personally Reviewed:    Consultant(s) Notes Reviewed:      Care Discussed with Consultants/Other Providers:

## 2021-09-29 LAB
ALBUMIN SERPL ELPH-MCNC: 3.8 G/DL — SIGNIFICANT CHANGE UP (ref 3.3–5)
ALP SERPL-CCNC: 80 U/L — SIGNIFICANT CHANGE UP (ref 40–120)
ALT FLD-CCNC: 72 U/L — HIGH (ref 4–33)
ANION GAP SERPL CALC-SCNC: 12 MMOL/L — SIGNIFICANT CHANGE UP (ref 7–14)
AST SERPL-CCNC: 20 U/L — SIGNIFICANT CHANGE UP (ref 4–32)
BILIRUB SERPL-MCNC: 0.4 MG/DL — SIGNIFICANT CHANGE UP (ref 0.2–1.2)
BUN SERPL-MCNC: 4 MG/DL — LOW (ref 7–23)
CALCIUM SERPL-MCNC: 8.9 MG/DL — SIGNIFICANT CHANGE UP (ref 8.4–10.5)
CALPROTECTIN STL-MCNT: 48 UG/G — SIGNIFICANT CHANGE UP (ref 0–120)
CHLORIDE SERPL-SCNC: 106 MMOL/L — SIGNIFICANT CHANGE UP (ref 98–107)
CO2 SERPL-SCNC: 21 MMOL/L — LOW (ref 22–31)
CREAT SERPL-MCNC: 0.49 MG/DL — LOW (ref 0.5–1.3)
GLUCOSE BLDC GLUCOMTR-MCNC: 114 MG/DL — HIGH (ref 70–99)
GLUCOSE SERPL-MCNC: 97 MG/DL — SIGNIFICANT CHANGE UP (ref 70–99)
HCG SERPL-ACNC: <5 MIU/ML — SIGNIFICANT CHANGE UP
HCT VFR BLD CALC: 28.4 % — LOW (ref 34.5–45)
HGB BLD-MCNC: 9.5 G/DL — LOW (ref 11.5–15.5)
MAGNESIUM SERPL-MCNC: 1.9 MG/DL — SIGNIFICANT CHANGE UP (ref 1.6–2.6)
MCHC RBC-ENTMCNC: 28 PG — SIGNIFICANT CHANGE UP (ref 27–34)
MCHC RBC-ENTMCNC: 33.5 GM/DL — SIGNIFICANT CHANGE UP (ref 32–36)
MCV RBC AUTO: 83.8 FL — SIGNIFICANT CHANGE UP (ref 80–100)
NRBC # BLD: 0 /100 WBCS — SIGNIFICANT CHANGE UP
NRBC # FLD: 0 K/UL — SIGNIFICANT CHANGE UP
PHOSPHATE SERPL-MCNC: 3.6 MG/DL — SIGNIFICANT CHANGE UP (ref 2.5–4.5)
PLATELET # BLD AUTO: 393 K/UL — SIGNIFICANT CHANGE UP (ref 150–400)
POTASSIUM SERPL-MCNC: 3 MMOL/L — LOW (ref 3.5–5.3)
POTASSIUM SERPL-SCNC: 3 MMOL/L — LOW (ref 3.5–5.3)
PROT SERPL-MCNC: 6.1 G/DL — SIGNIFICANT CHANGE UP (ref 6–8.3)
RBC # BLD: 3.39 M/UL — LOW (ref 3.8–5.2)
RBC # FLD: 12.8 % — SIGNIFICANT CHANGE UP (ref 10.3–14.5)
SARS-COV-2 RNA SPEC QL NAA+PROBE: SIGNIFICANT CHANGE UP
SODIUM SERPL-SCNC: 139 MMOL/L — SIGNIFICANT CHANGE UP (ref 135–145)
WBC # BLD: 4.2 K/UL — SIGNIFICANT CHANGE UP (ref 3.8–10.5)
WBC # FLD AUTO: 4.2 K/UL — SIGNIFICANT CHANGE UP (ref 3.8–10.5)

## 2021-09-29 PROCEDURE — 99222 1ST HOSP IP/OBS MODERATE 55: CPT

## 2021-09-29 PROCEDURE — 99232 SBSQ HOSP IP/OBS MODERATE 35: CPT | Mod: GC

## 2021-09-29 PROCEDURE — 99223 1ST HOSP IP/OBS HIGH 75: CPT

## 2021-09-29 PROCEDURE — 99233 SBSQ HOSP IP/OBS HIGH 50: CPT | Mod: GC

## 2021-09-29 RX ORDER — HYDROMORPHONE HYDROCHLORIDE 2 MG/ML
0.25 INJECTION INTRAMUSCULAR; INTRAVENOUS; SUBCUTANEOUS ONCE
Refills: 0 | Status: DISCONTINUED | OUTPATIENT
Start: 2021-09-29 | End: 2021-09-29

## 2021-09-29 RX ORDER — POTASSIUM CHLORIDE 20 MEQ
10 PACKET (EA) ORAL
Refills: 0 | Status: COMPLETED | OUTPATIENT
Start: 2021-09-29 | End: 2021-09-29

## 2021-09-29 RX ORDER — ELECTROLYTE SOLUTION,INJ
1 VIAL (ML) INTRAVENOUS
Refills: 0 | Status: DISCONTINUED | OUTPATIENT
Start: 2021-09-29 | End: 2021-09-29

## 2021-09-29 RX ORDER — METOCLOPRAMIDE HCL 10 MG
5 TABLET ORAL ONCE
Refills: 0 | Status: COMPLETED | OUTPATIENT
Start: 2021-09-29 | End: 2021-09-29

## 2021-09-29 RX ORDER — SUCRALFATE 1 G
1 TABLET ORAL EVERY 6 HOURS
Refills: 0 | Status: DISCONTINUED | OUTPATIENT
Start: 2021-09-29 | End: 2021-09-30

## 2021-09-29 RX ORDER — ACETAMINOPHEN 500 MG
500 TABLET ORAL ONCE
Refills: 0 | Status: COMPLETED | OUTPATIENT
Start: 2021-09-29 | End: 2021-09-29

## 2021-09-29 RX ORDER — I.V. FAT EMULSION 20 G/100ML
6.25 EMULSION INTRAVENOUS
Qty: 15 | Refills: 0 | Status: DISCONTINUED | OUTPATIENT
Start: 2021-09-29 | End: 2021-09-30

## 2021-09-29 RX ORDER — HYDROMORPHONE HYDROCHLORIDE 2 MG/ML
0.25 INJECTION INTRAMUSCULAR; INTRAVENOUS; SUBCUTANEOUS EVERY 8 HOURS
Refills: 0 | Status: DISCONTINUED | OUTPATIENT
Start: 2021-09-29 | End: 2021-10-05

## 2021-09-29 RX ADMIN — Medication 5 MILLIGRAM(S): at 18:50

## 2021-09-29 RX ADMIN — Medication 200 MILLIGRAM(S): at 12:18

## 2021-09-29 RX ADMIN — Medication 100 MILLIEQUIVALENT(S): at 13:02

## 2021-09-29 RX ADMIN — ONDANSETRON 4 MILLIGRAM(S): 8 TABLET, FILM COATED ORAL at 13:52

## 2021-09-29 RX ADMIN — PANTOPRAZOLE SODIUM 40 MILLIGRAM(S): 20 TABLET, DELAYED RELEASE ORAL at 18:21

## 2021-09-29 RX ADMIN — CHLORHEXIDINE GLUCONATE 1 APPLICATION(S): 213 SOLUTION TOPICAL at 18:56

## 2021-09-29 RX ADMIN — Medication 1 GRAM(S): at 23:11

## 2021-09-29 RX ADMIN — HYDROMORPHONE HYDROCHLORIDE 0.25 MILLIGRAM(S): 2 INJECTION INTRAMUSCULAR; INTRAVENOUS; SUBCUTANEOUS at 14:07

## 2021-09-29 RX ADMIN — Medication 1 EACH: at 18:40

## 2021-09-29 RX ADMIN — Medication 500 MILLIGRAM(S): at 12:33

## 2021-09-29 RX ADMIN — ONDANSETRON 4 MILLIGRAM(S): 8 TABLET, FILM COATED ORAL at 08:23

## 2021-09-29 RX ADMIN — HYDROMORPHONE HYDROCHLORIDE 0.25 MILLIGRAM(S): 2 INJECTION INTRAMUSCULAR; INTRAVENOUS; SUBCUTANEOUS at 13:52

## 2021-09-29 RX ADMIN — I.V. FAT EMULSION 6.25 ML/HR: 20 EMULSION INTRAVENOUS at 18:39

## 2021-09-29 RX ADMIN — HYDROMORPHONE HYDROCHLORIDE 0.25 MILLIGRAM(S): 2 INJECTION INTRAMUSCULAR; INTRAVENOUS; SUBCUTANEOUS at 08:23

## 2021-09-29 RX ADMIN — Medication 1 GRAM(S): at 05:58

## 2021-09-29 RX ADMIN — PANTOPRAZOLE SODIUM 40 MILLIGRAM(S): 20 TABLET, DELAYED RELEASE ORAL at 18:42

## 2021-09-29 RX ADMIN — Medication 100 MILLIEQUIVALENT(S): at 20:19

## 2021-09-29 RX ADMIN — PANTOPRAZOLE SODIUM 40 MILLIGRAM(S): 20 TABLET, DELAYED RELEASE ORAL at 05:58

## 2021-09-29 RX ADMIN — Medication 100 MILLIEQUIVALENT(S): at 16:30

## 2021-09-29 RX ADMIN — Medication 100 MILLIEQUIVALENT(S): at 18:48

## 2021-09-29 RX ADMIN — Medication 100 MILLIEQUIVALENT(S): at 21:24

## 2021-09-29 RX ADMIN — Medication 100 MILLIEQUIVALENT(S): at 14:55

## 2021-09-29 NOTE — CHART NOTE - NSCHARTNOTEFT_GEN_A_CORE
Diet, Clear Liquid:   No Caffeine  Supplement Feeding Modality:  Oral  Ensure Clear Cans or Servings Per Day:  1       Frequency:  Three Times a day (09-28-21 @ 12:30)        Nutrition consult requested for evaluation of TPN for pt. Contacted Resident, Waqas, who confirmed that TPN may be started on pt. Provided pager number for JENNIFER Sumner, of the Nutrition Support Team and reached out to Ailyn via MS Teams to make her aware. Ailyn communicated that she was paged for this evaluation. This RDN assessed patient with estimated nutrition needs calculated on 9/28/21. Please see this note for details. RDN services to remain available as needed.

## 2021-09-29 NOTE — PROGRESS NOTE ADULT - PROBLEM SELECTOR PLAN 1
admit to medicine  -s/p IV fluids x2L NS  -c/w maintenance IV NS @ 75cc/hr  -clear liquid diet. (pt tolerating po since was given morphine by ED.)  -House GI consulted  -Likely will not need inpatient Remicade infusion.  -Inflammatory markers wnl, decreasing likelihood of Crohn's flare per GI  -IV tylenol PRN for severe pain  -received tylenol overnight for severe pain.  -f/u fecal calprotectin

## 2021-09-29 NOTE — PROGRESS NOTE ADULT - ATTENDING COMMENTS
Patient seen and examined [facility] team  Agree with above A/P by Dr Carpenter  26 year old female with PMH Crohn's disease s/p surgery for stricture/ileostomy placement in mid August 2021, s/p PICC line in place for TPN 2 weeks ago, presents with epigastric abdominal pain, nausea, and vomiting x 4 days, admitted to medicine for Crohn's Flare up.  Patient notes it is painful to swallow, even water. "painful to swallow pills"  Patient noted they gave her TPN and sent her home from [facility] because " she was not eating"  Explain to patient she has not eaten much in 3 days- will need to place NGT - patient refuses NGT- will ask Nutrition about restarting TPN at [facility], also patient agree to have GI scope and have psych see her for feeling down with her on going odynophagia   PE NAD  Vital Signs Last 24 Hrs T88, HR 95, Bp 93/60, R 17, Sat 100 % RA  lungs cta, cor rrr, abd - pos cholecystectomy  to RLQ, ext neg e/c/c  A/P  # Pain with even swallowing water- PPI, GI asked to help with  EGD  # Crohn's flair- c/w steroids, c/w small amounts of dilaudid and tylenol  # Psych- depressed feeling over on going n/c/pain- Psych consult requested, ? medcation to improve appetite as well??  # F/E/N- IVF hydration, Nutrition to help with TPN, patient is refusing NGT  Plan d/w team/ patient  Patient agreed we can call sister Beatris at [phone] and update- called on her phone in room and left VM we will carrie again. Patient seen and examined [facility] team  Agree with above A/P by Dr Carpenter  26 year old female with PMH Crohn's disease s/p surgery for stricture/ileostomy placement in mid August 2021, s/p PICC line in place for TPN 2 weeks ago, presents with epigastric abdominal pain, nausea, and vomiting x 4 days, admitted to medicine for Crohn's Flare up.  Patient notes it is painful to swallow, even water. "painful to swallow pills"  Patient noted they gave her TPN and sent her home from [facility] because " she was not eating"  Explain to patient she has not eaten much in 3 days- will need to place NGT - patient refuses NGT- will ask Nutrition about restarting TPN at [facility], also patient agree to have GI scope and have psych see her for feeling down with her on going odynophagia   PE NAD  Vital Signs Last 24 Hrs T88, HR 95, Bp 93/60, R 17, Sat 100 % RA  lungs cta, cor rrr, abd - pos cholecystectomy  to RLQ, ext neg e/c/c  A/P  # Odynophagia with N/V-Pain with even swallowing water- PPI, GI asked to help with  EGD  # Crohn's flair- c/w steroids, c/w small amounts of Dilaudid and Tylenol. Gi not convince of gout flair at this time  # Psych- depressed feeling over on going n/c/pain- Psych consult requested, ? medication  to improve appetite as well??  # F/E/N- IVF hydration, Nutrition to help with TPN, patient is refusing NGT  Plan d/w team/ patient  Patient agreed we can call sister Beatris at [phone] and update- called on her phone in room and left VM we will carrie again.

## 2021-09-29 NOTE — PROGRESS NOTE ADULT - SUBJECTIVE AND OBJECTIVE BOX
Chief Complaint:  Patient is a 26y old  Female who presents with a chief complaint of crohn's flare up (29 Sep 2021 07:36)    Reason for consult: dysphagia/odynophagia    Interval Events: Pt still w/ severe odynophagia to thin liquids, also reporting nausea. No change in ostomy output.     Hospital Medications:  acetaminophen   Tablet .. 650 milliGRAM(s) Oral every 6 hours PRN  chlorhexidine 2% Cloths 1 Application(s) Topical daily  influenza   Vaccine 0.5 milliLiter(s) IntraMuscular once  ondansetron   Disintegrating Tablet 4 milliGRAM(s) Oral once  ondansetron Injectable 4 milliGRAM(s) IV Push every 6 hours PRN  pantoprazole  Injectable 40 milliGRAM(s) IV Push two times a day  potassium chloride  10 mEq/100 mL IVPB 10 milliEquivalent(s) IV Intermittent every 1 hour  potassium chloride  10 mEq/100 mL IVPB 10 milliEquivalent(s) IV Intermittent every 1 hour  sodium chloride 0.9%. 1000 milliLiter(s) IV Continuous <Continuous>  sucralfate suspension 1 Gram(s) Oral every 6 hours    ROS:   General:  No  fevers, chills, night sweats, fatigue  Eyes:  Good vision, no reported pain  ENT:  No sore throat, pain, runny nose  CV:  No pain, palpitations  Pulm:  No dyspnea, cough  GI:  See HPI, otherwise negative  :  No  incontinence, nocturia  Muscle:  No pain, weakness  Neuro:  No memory problems  Psych:  No insomnia, mood problems, depression  Endocrine:  No polyuria, polydipsia, cold/heat intolerance  Heme:  No petechiae, ecchymosis, easy bruisability  Skin:  No rash    PHYSICAL EXAM:   Vital Signs:  Vital Signs Last 24 Hrs  T(C): 36.7 (29 Sep 2021 05:09), Max: 36.8 (28 Sep 2021 13:50)  T(F): 98 (29 Sep 2021 05:09), Max: 98.2 (28 Sep 2021 13:50)  HR: 95 (29 Sep 2021 05:09) (95 - 108)  BP: 93/60 (29 Sep 2021 05:09) (93/60 - 110/66)  BP(mean): --  RR: 17 (29 Sep 2021 05:09) (16 - 17)  SpO2: 100% (29 Sep 2021 05:09) (100% - 100%)  Daily     Daily     GENERAL: no acute distress  NEURO: alert  HEENT: anicteric sclera, no conjunctival pallor appreciated  CHEST: no respiratory distress, no accessory muscle use  CARDIAC: regular rate, rhythm  ABDOMEN: soft, non-tender, non-distended, no rebound or guarding  EXTREMITIES: warm, well perfused, no edema  SKIN: no lesions noted    LABS: reviewed                        9.5    4.20  )-----------( 393      ( 29 Sep 2021 06:59 )             28.4     09-29    139  |  106  |  4<L>  ----------------------------<  97  3.0<L>   |  21<L>  |  0.49<L>    Ca    8.9      29 Sep 2021 06:59  Phos  3.6     09-29  Mg     1.90     09-29    TPro  6.1  /  Alb  3.8  /  TBili  0.4  /  DBili  x   /  AST  20  /  ALT  72<H>  /  AlkPhos  80  09-29    LIVER FUNCTIONS - ( 29 Sep 2021 06:59 )  Alb: 3.8 g/dL / Pro: 6.1 g/dL / ALK PHOS: 80 U/L / ALT: 72 U/L / AST: 20 U/L / GGT: x             Interval Diagnostic Studies: see sunrise for full report

## 2021-09-29 NOTE — PROGRESS NOTE ADULT - PROBLEM SELECTOR PLAN 3
Transaminitis in a setting of hypovolemia/dehydration.  -s/p IV fluids x2L NS  -c/w maintenance IV NS @ 75cc/hr  -will recheck tomorrow morning for repeat CMP.  -AST/ALT improving  -recent hepatitis panel in 6/2021 was negative.  -can consider MRI/MRCP to r/o biliary sludge per GI recs

## 2021-09-29 NOTE — PROGRESS NOTE ADULT - ATTENDING COMMENTS
Continues to have significant odynophagia and abdominal pain, n/v, w/ PO intake.   Continue symptomatic treatment w/ PPI , carafate suspension.  Plan for EGD tomorrow to further evaluate.   GI to follow, please call with questions.

## 2021-09-29 NOTE — CONSULT NOTE ADULT - SUBJECTIVE AND OBJECTIVE BOX
Nutrition Support Consult Note    HPI:  26 year old female with PMH Crohn's disease s/p surgery for stricture/ileostomy placement in mid August 2021, s/p PICC line in place for TPN 2 weeks ago, presents with epigastric abdominal pain, nausea, and vomiting x 4 days. GI is following with possible EGD to evaluate for esophagitis. Nutrition service consulted for resuming parenteral nutrition via PICC line that is in place. Patient states that she feels very weak and is frustrated that she is not able to take in anything by mouth (liquids or food) without nausea or emesis. TPN will be initiated today (9/29).    Allergies  No Known Allergies    PAST MEDICAL & SURGICAL HISTORY:  Crohn disease  Peripherally inserted central catheter (PICC) in place LUE PICC place on 9/12/2021  History of ileostomy Aug 2021    FAMILY HISTORY:  FH: type 1 diabetes (Sibling)  FH: hypertension (Father)    Vital Signs Last 24 Hrs  T(C): 36.7 (29 Sep 2021 05:09), Max: 36.8 (28 Sep 2021 13:50)  T(F): 98 (29 Sep 2021 05:09), Max: 98.2 (28 Sep 2021 13:50)  HR: 95 (29 Sep 2021 05:09) (95 - 108)  BP: 93/60 (29 Sep 2021 05:09) (93/60 - 110/66)  RR: 17 (29 Sep 2021 05:09) (16 - 17)  SpO2: 100% (29 Sep 2021 05:09) (100% - 100%)    MEDICATIONS  (STANDING):  acetaminophen  IVPB .. 500 milliGRAM(s) IV Intermittent once  chlorhexidine 2% Cloths 1 Application(s) Topical daily  fat emulsion (Fish Oil and Plant Based) 20% Infusion 6.25 mL/Hr (6.25 mL/Hr) IV Continuous <Continuous>  influenza   Vaccine 0.5 milliLiter(s) IntraMuscular once  ondansetron   Disintegrating Tablet 4 milliGRAM(s) Oral once  pantoprazole  Injectable 40 milliGRAM(s) IV Push two times a day  Parenteral Nutrition - Adult 1 Each (42 mL/Hr) TPN Continuous <Continuous>  potassium chloride  10 mEq/100 mL IVPB 10 milliEquivalent(s) IV Intermittent every 1 hour  potassium chloride  10 mEq/100 mL IVPB 10 milliEquivalent(s) IV Intermittent every 1 hour  sodium chloride 0.9%. 1000 milliLiter(s) (75 mL/Hr) IV Continuous <Continuous>  sucralfate suspension 1 Gram(s) Oral every 6 hours    MEDICATIONS  (PRN):  acetaminophen   Tablet .. 650 milliGRAM(s) Oral every 6 hours PRN Temp greater or equal to 38.5C (101.3F), Mild Pain (1 - 3)  ondansetron Injectable 4 milliGRAM(s) IV Push every 6 hours PRN Nausea and/or Vomiting    Drug Dosing Weight  Height (cm): 154.9 (26 Sep 2021 12:05)  Weight (kg): 36.5 (26 Sep 2021 12:05)  BMI (kg/m2): 15.2 (26 Sep 2021 12:05)  BSA (m2): 1.28 (26 Sep 2021 12:05)    PHYSICAL EXAM:  GENERAL: malnourished, resting comfortably in bed   HEAD:  Atraumatic, Normocephalic  CHEST/LUNG: nonlabored respirations, CTAB  ABDOMEN: soft, mildly tender to palpation, nondistended   PSYCH: AAOx3  PICC Site: C/D/I    LABS:                        9.5    4.20  )-----------( 393      ( 29 Sep 2021 06:59 )             28.4     09-29    139  |  106  |  4<L>  ----------------------------<  97  3.0<L>   |  21<L>  |  0.49<L>    Ca    8.9      29 Sep 2021 06:59  Phos  3.6     09-29  Mg     1.90     09-29    TPro  6.1  /  Alb  3.8  /  TBili  0.4  /  DBili  x   /  AST  20  /  ALT  72<H>  /  AlkPhos  80  09-29    LIVER FUNCTIONS - ( 29 Sep 2021 06:59 )  Alb: 3.8 g/dL / Pro: 6.1 g/dL / ALK PHOS: 80 U/L / ALT: 72 U/L / AST: 20 U/L / GGT: x           Current Weight: 36.5 kG  Height: 154.9 cm  Ideal Body Weight: 47 kG  BMI: 15.2   Current Diet: [  ]NPO  Appetite: [ x ]Poor [  ]Adequate [  ]Good    CLINICAL INDICATORS  Severe protein calorie malnutrition in acute illness/ injury; secondary to poor appetite, weight loss >5% in 1 month and/or >7.5% in 3 months, caloric Intake <50% of nutrition needs >= 5 days, temporal wasting, severe loss of muscle mass/atrophy and loss of body fat stores.     Metabolic Requirements:  The patient will require:  ______25_______ kilocalories / kg / day  Diagnosis:  [  ] Mild protein malnutrition  [  ] Moderate protein calorie malnutrition in acute illness/ injury  [ x ] Severe protein calorie malnutrition in acute illness/ injury  [  ] Moderate protein calorie malnutrition in chronic illness  [  ] Severe protein calorie malnutrition in chronic illness    Nutritional Requirements  Carbohydrates: 1 gram = 3.4 kcal   Lipids: 1 gram = 10 kcal  Proteins: 1 gram = 4 kcal     Plan:  [ x ] Initiate TPN formula today via PICC line (from [facility] through lumen designated for TPN), will increase infusion volume and calories tomorrow   Carbohydrates:__170____grams, Amino Acid:__75____grams  SMOF Lipids:___30____ grams, Total volume:__1500_____mL.  1. Dedicated Central line must be placed for TPN.  2. Strict Intake and Output.  3. Weights three times a week  4. Monitor BMP, Mg+, Ionized Ca++, Phosphorus daily  5. Monitor Triglycerides monthly  6. Pre-albumin weekly.  7. Fingersticks to monitor glucose every 6 hours until stable then may be decreased to twice a day.    Nutrition Support [id number]

## 2021-09-29 NOTE — CONSULT NOTE ADULT - ATTENDING COMMENTS
I agree with the above history, physical examination, chief complaint/diagnosis, and plan, which I have reviewed and edited where appropriate.  I agree with notes/assessment and detailed interval history of health care providers on my service.  I have seen and examined the patient.  I reviewed the laboratory and available data and agree with the history, physical assessment and plan.  I reviewed and discussed with all consultants, house staff and PA's.  The Nutrition Support Team (NST) discusses on an ongoing basis with the primary team and all consultants, House staff and PA's to have a permanent risk benefit analyses on all decisions and coordinating care.  I was physically present for the key portions of the evaluation and management (E/M) service provided.  26 year old female with PMH Crohn's disease s/p surgery for stricture/ileostomy with esophagitis. Nutrition service consulted for resuming parenteral nutrition.  PSYCH: AAOx3  comfortable in bed   HEAD:  Atraumatic,   CHEST/LUNG: clear  HEART RR  ABDOMEN: soft, mildly tender to palpation, nondistended  Severe protein calorie malnutrition in acute illness/ injury  Metabolic Requirements:  The patient will require: 25kilocalories / kg / day  Diagnosis:  Severe protein calorie malnutrition in acute illness/ injury    Plan:  Initiate TPN
26F w stricturing Crohn's disease initiated on remicade during hospitalization in 6/2021, s/p recent ileocolic resection w/ ileostomy at OSH, now admitted w/ abdominal pain, n/v, odynophagia.   It is unclear that this is due to Crohn's Flare -- will need further evaluation w/ inflammatory markers.   Other possibility is esophagitis (seen on prior EGD). Recommend continuing PPI BID and starting carafate suspension.   Please obtain most recent records from [facility] (EGD, surgical notes, GI notes).   GI team will continue to follow, please call with questions.

## 2021-09-29 NOTE — PROGRESS NOTE ADULT - ASSESSMENT
26 year old female with PMH Crohn's disease s/p surgery for stricture/ileostomy placement in mid August 2021, s/p PICC line in place for TPN 2 weeks ago, presents with epigastric abdominal pain, nausea, and vomiting x 4 days, admitted to medicine for a potential Crohn's Flare up. Inflammatory markers have been normal, suggesting some other possible etiology. GI is following with possible EGD to evaluate for esophagitis.

## 2021-09-29 NOTE — CHART NOTE - NSCHARTNOTEFT_GEN_A_CORE
CL psychiatry consulted for depression in relation to medical issue. Patient refused to speak with writer, stated, "I'm in too much pain." Primary team, Giovanni made aware.  psychiatry will try again tomorrow to see patient. Any concerns or questions please call #[phone].

## 2021-09-29 NOTE — PROGRESS NOTE ADULT - PROBLEM SELECTOR PLAN 2
- Possibly related to esophagitis/reflux, other etiologies include active Crohn's (though no objective evidence pointing towards flare), vs possibly related to elevated liver enzymes though unclear what would be causing this.  - Treat symptomatically with IV PPI BID, add sucralfate liquid 1g q6h for esophagitis per GI recs  - avoid opiates in this patient given increased mortality with opiates in IBD patients  - Diet as tolerated for now.  - Antiemetics as needed. Check QTc.  - obtain [facility] records from recent visit (imaging, EGD, recent GI note)  - nutrition consult recommended adding ensure to diet - Possibly related to esophagitis/reflux, other etiologies include active Crohn's (though no objective evidence pointing towards flare), vs possibly related to elevated liver enzymes though unclear what would be causing this.  - Treat symptomatically with IV PPI BID, add sucralfate liquid 1g q6h for esophagitis per GI recs  - avoid opiates in this patient given increased mortality with opiates in IBD patients  - Diet as tolerated for now.  - Antiemetics as needed. Check QTc.  - obtain [facility] records from recent visit (imaging, EGD, recent GI note)  - nutrition consult recommended adding ensure to diet  - consulted TPN nurse  - Psychiatry consult for possible depression secondary to her abdominal pain - Possibly related to esophagitis/reflux, other etiologies include active Crohn's (though no objective evidence pointing towards flare), vs possibly related to elevated liver enzymes though unclear what would be causing this.  - Treat symptomatically with IV PPI BID, add sucralfate liquid 1g q6h for esophagitis per GI recs  - avoid opiates in this patient given increased mortality with opiates in IBD patients  - Diet as tolerated for now.  - Antiemetics as needed. Check QTc.  - obtain [facility] records from recent visit (imaging, EGD, recent GI note)  - nutrition consult recommended adding ensure to diet  - consulted TPN nurse  - Psychiatry consult for possible depression secondary to her abdominal pain  - possible EGD per GI

## 2021-09-29 NOTE — PROGRESS NOTE ADULT - SUBJECTIVE AND OBJECTIVE BOX
Giovanni Prajapati MS3    Patient is a 26y old  Female who presents with a chief complaint of crohn's flare up (28 Sep 2021 11:43)    SUBJECTIVE / OVERNIGHT EVENTS: Overnight patient received IV acetaminophen PRN pain. Patient was examined at bedside experiencing n/v after trying to consume liquids. Denies any blood in vomit. Patient is requesting more pain medication for my esophageal and abdominal pain. Pt states her ileostomy output is unchanged, still green. Patient denies any f/c, SOB.    MEDICATIONS  (STANDING):  chlorhexidine 2% Cloths 1 Application(s) Topical daily  influenza   Vaccine 0.5 milliLiter(s) IntraMuscular once  ondansetron   Disintegrating Tablet 4 milliGRAM(s) Oral once  pantoprazole  Injectable 40 milliGRAM(s) IV Push two times a day  sodium chloride 0.9%. 1000 milliLiter(s) (75 mL/Hr) IV Continuous <Continuous>  sucralfate suspension 1 Gram(s) Oral every 6 hours    MEDICATIONS  (PRN):  acetaminophen   Tablet .. 650 milliGRAM(s) Oral every 6 hours PRN Temp greater or equal to 38.5C (101.3F), Mild Pain (1 - 3)  ondansetron Injectable 4 milliGRAM(s) IV Push every 6 hours PRN Nausea and/or Vomiting    Vital Signs Last 24 Hrs  T(C): 36.7 (29 Sep 2021 05:09), Max: 36.8 (28 Sep 2021 13:50)  T(F): 98 (29 Sep 2021 05:09), Max: 98.2 (28 Sep 2021 13:50)  HR: 95 (29 Sep 2021 05:09) (95 - 108)  BP: 93/60 (29 Sep 2021 05:09) (93/60 - 110/66)  BP(mean): --  RR: 17 (29 Sep 2021 05:09) (16 - 17)  SpO2: 100% (29 Sep 2021 05:09) (100% - 100%)  CAPILLARY BLOOD GLUCOSE    I&O's Summary    PHYSICAL EXAM:  GENERAL: malnourished  HEAD:  Atraumatic, Normocephalic  CHEST/LUNG: nonlabored respirations  HEART: patient refused  ABDOMEN: patient refused  PSYCH: AAOx3  NEUROLOGY: non-focal    LABS:                        9.5    4.20  )-----------( 393      ( 29 Sep 2021 06:59 )             28.4     09-28    138  |  98  |  5<L>  ----------------------------<  113<H>  2.7<LL>   |  25  |  0.46<L>    Ca    9.1      28 Sep 2021 08:35  Phos  3.3     09-28  Mg     1.50     09-28    TPro  6.5  /  Alb  3.9  /  TBili  0.4  /  DBili  <0.2  /  AST  40<H>  /  ALT  108<H>  /  AlkPhos  93  09-28        RADIOLOGY & ADDITIONAL TESTS:     Giovanni Prajapati MS3    Patient is a 26y old  Female who presents with a chief complaint of crohn's flare up (28 Sep 2021 11:43)    SUBJECTIVE / OVERNIGHT EVENTS: Overnight patient received IV acetaminophen PRN pain. Patient was examined at bedside experiencing n/v after trying to consume liquids. Denies any blood in vomit. Patient is requesting more pain medication for her esophageal and abdominal pain. Pt states her ileostomy output is unchanged, still green. She wants to eat but experiences severe n/v after oral intake. Patient denies any f/c, SOB.    MEDICATIONS  (STANDING):  chlorhexidine 2% Cloths 1 Application(s) Topical daily  influenza   Vaccine 0.5 milliLiter(s) IntraMuscular once  ondansetron   Disintegrating Tablet 4 milliGRAM(s) Oral once  pantoprazole  Injectable 40 milliGRAM(s) IV Push two times a day  sodium chloride 0.9%. 1000 milliLiter(s) (75 mL/Hr) IV Continuous <Continuous>  sucralfate suspension 1 Gram(s) Oral every 6 hours    MEDICATIONS  (PRN):  acetaminophen   Tablet .. 650 milliGRAM(s) Oral every 6 hours PRN Temp greater or equal to 38.5C (101.3F), Mild Pain (1 - 3)  ondansetron Injectable 4 milliGRAM(s) IV Push every 6 hours PRN Nausea and/or Vomiting    Vital Signs Last 24 Hrs  T(C): 36.7 (29 Sep 2021 05:09), Max: 36.8 (28 Sep 2021 13:50)  T(F): 98 (29 Sep 2021 05:09), Max: 98.2 (28 Sep 2021 13:50)  HR: 95 (29 Sep 2021 05:09) (95 - 108)  BP: 93/60 (29 Sep 2021 05:09) (93/60 - 110/66)  BP(mean): --  RR: 17 (29 Sep 2021 05:09) (16 - 17)  SpO2: 100% (29 Sep 2021 05:09) (100% - 100%)  CAPILLARY BLOOD GLUCOSE    I&O's Summary    PHYSICAL EXAM:  GENERAL: malnourished  HEAD:  Atraumatic, Normocephalic  CHEST/LUNG: nonlabored respirations  HEART: patient refused  ABDOMEN: patient refused  PSYCH: AAOx3  NEUROLOGY: non-focal    LABS:                        9.5    4.20  )-----------( 393      ( 29 Sep 2021 06:59 )             28.4     09-28    138  |  98  |  5<L>  ----------------------------<  113<H>  2.7<LL>   |  25  |  0.46<L>    Ca    9.1      28 Sep 2021 08:35  Phos  3.3     09-28  Mg     1.50     09-28    TPro  6.5  /  Alb  3.9  /  TBili  0.4  /  DBili  <0.2  /  AST  40<H>  /  ALT  108<H>  /  AlkPhos  93  09-28        RADIOLOGY & ADDITIONAL TESTS:     Giovanni Prajapati MS3    Patient is a 26y old  Female who presents with a chief complaint of crohn's flare up (28 Sep 2021 11:43)    SUBJECTIVE / OVERNIGHT EVENTS: Overnight patient received IV acetaminophen PRN pain. Patient was examined at bedside experiencing n/v after trying to consume liquids. Denies any blood in vomit. Patient is requesting more pain medication for her esophageal and abdominal pain. Pt states her ileostomy output is unchanged, still green. She wants to eat but experiences severe n/v after oral intake. Patient denies any f/c, SOB.    MEDICATIONS  (STANDING):  chlorhexidine 2% Cloths 1 Application(s) Topical daily  influenza   Vaccine 0.5 milliLiter(s) IntraMuscular once  ondansetron   Disintegrating Tablet 4 milliGRAM(s) Oral once  pantoprazole  Injectable 40 milliGRAM(s) IV Push two times a day  sodium chloride 0.9%. 1000 milliLiter(s) (75 mL/Hr) IV Continuous <Continuous>  sucralfate suspension 1 Gram(s) Oral every 6 hours    MEDICATIONS  (PRN):  acetaminophen   Tablet .. 650 milliGRAM(s) Oral every 6 hours PRN Temp greater or equal to 38.5C (101.3F), Mild Pain (1 - 3)  ondansetron Injectable 4 milliGRAM(s) IV Push every 6 hours PRN Nausea and/or Vomiting    Vital Signs Last 24 Hrs  T(C): 36.7 (29 Sep 2021 05:09), Max: 36.8 (28 Sep 2021 13:50)  T(F): 98 (29 Sep 2021 05:09), Max: 98.2 (28 Sep 2021 13:50)  HR: 95 (29 Sep 2021 05:09) (95 - 108)  BP: 93/60 (29 Sep 2021 05:09) (93/60 - 110/66)  BP(mean): --  RR: 17 (29 Sep 2021 05:09) (16 - 17)  SpO2: 100% (29 Sep 2021 05:09) (100% - 100%)  CAPILLARY BLOOD GLUCOSE    I&O's Summary    PHYSICAL EXAM:  GENERAL: malnourished  HEAD:  Atraumatic, Normocephalic  CHEST/LUNG: nonlabored respirations, CTAB  HEART: RRR, no murmurs  ABDOMEN: patient refused  PSYCH: AAOx3  NEUROLOGY: non-focal    LABS:                        9.5    4.20  )-----------( 393      ( 29 Sep 2021 06:59 )             28.4     09-28    138  |  98  |  5<L>  ----------------------------<  113<H>  2.7<LL>   |  25  |  0.46<L>    Ca    9.1      28 Sep 2021 08:35  Phos  3.3     09-28  Mg     1.50     09-28    TPro  6.5  /  Alb  3.9  /  TBili  0.4  /  DBili  <0.2  /  AST  40<H>  /  ALT  108<H>  /  AlkPhos  93  09-28        RADIOLOGY & ADDITIONAL TESTS:

## 2021-09-29 NOTE — PROGRESS NOTE ADULT - ASSESSMENT
26F w/ history of Crohn's disease c/b ascending colon stricture s/p resection and ileostomy at [facility], on remicade since 6/2021, presenting w/ persistent abd pain, n/v. Inflammatory markers are negative, pointing away from active Crohn's flare. No e/o SBO.    Impression:  #Nausea, vomiting, abd pain - Possibly related to esophagitis/reflux, other etiologies include active Crohn's (though no objective evidence pointing towards flare), vs possibly related to elevated liver enzymes though unclear what would be causing this.  #Crohn's disease - started on remicade at time of diagnosis in 6/2021.  s/p recent ileocolic resection for ascending colon stricture at [facility].   #Elevated liver enzymes - unclear etiology, can consider MRI/MRCP to r/o biliary sludge (though no e/o cholestasis).   #C/f celiac disease - due to increased intraepithelial lymphocytes on biopsies, however negative serologic work-up and symptoms do not correlate.     Recommendations:  - Can plan for EGD to evaluate for infectious etiology of odynophagia  - Would continue to treat n/v abd pain symptomatically with IV PPI BID, add sucralfate liquid 1g q6h for esophagitis.  - Pain management per primary team; would avoid opiates in this patient given increased mortality with opiates in IBD patients.  - Unclear if patient needs ongoing remicade as she is s/p surgery and with unknown disease burden.   - Diet as tolerated for now.  - Antiemetics as needed. Check QTc.  - GI will continue to follow.  - Please help obtain [facility] records from recent visit (imaging, EGD, recent GI note)    Faustino Freitas  Gastroenterology/Hepatology Fellow  Available via Microsoft Teams    NON-URGENT CONSULTS:  Please email [email or web address]  AT NIGHT AND ON WEEKENDS:  Contact on-call GI fellow via answering service ([phone]) from 5pm-8am and on weekends/holidays  MONDAY-FRIDAY 8AM-5PM:  Pager# [phone] ([facility]) or [phone] ([facility])  GI Phone# [phone] ([facility])

## 2021-09-30 ENCOUNTER — RESULT REVIEW (OUTPATIENT)
Age: 26
End: 2021-09-30

## 2021-09-30 LAB
ALBUMIN SERPL ELPH-MCNC: 3.8 G/DL — SIGNIFICANT CHANGE UP (ref 3.3–5)
ALP SERPL-CCNC: 78 U/L — SIGNIFICANT CHANGE UP (ref 40–120)
ALT FLD-CCNC: 58 U/L — HIGH (ref 4–33)
ANION GAP SERPL CALC-SCNC: 12 MMOL/L — SIGNIFICANT CHANGE UP (ref 7–14)
AST SERPL-CCNC: 17 U/L — SIGNIFICANT CHANGE UP (ref 4–32)
BILIRUB SERPL-MCNC: 0.3 MG/DL — SIGNIFICANT CHANGE UP (ref 0.2–1.2)
BUN SERPL-MCNC: 4 MG/DL — LOW (ref 7–23)
CA-I BLD-SCNC: 1.25 MMOL/L — SIGNIFICANT CHANGE UP (ref 1.15–1.29)
CALCIUM SERPL-MCNC: 9.3 MG/DL — SIGNIFICANT CHANGE UP (ref 8.4–10.5)
CHLORIDE SERPL-SCNC: 101 MMOL/L — SIGNIFICANT CHANGE UP (ref 98–107)
CO2 SERPL-SCNC: 23 MMOL/L — SIGNIFICANT CHANGE UP (ref 22–31)
CREAT SERPL-MCNC: 0.48 MG/DL — LOW (ref 0.5–1.3)
GLUCOSE BLDC GLUCOMTR-MCNC: 108 MG/DL — HIGH (ref 70–99)
GLUCOSE BLDC GLUCOMTR-MCNC: 111 MG/DL — HIGH (ref 70–99)
GLUCOSE BLDC GLUCOMTR-MCNC: 125 MG/DL — HIGH (ref 70–99)
GLUCOSE BLDC GLUCOMTR-MCNC: 133 MG/DL — HIGH (ref 70–99)
GLUCOSE BLDC GLUCOMTR-MCNC: 95 MG/DL — SIGNIFICANT CHANGE UP (ref 70–99)
GLUCOSE SERPL-MCNC: 111 MG/DL — HIGH (ref 70–99)
HCT VFR BLD CALC: 27.1 % — LOW (ref 34.5–45)
HGB BLD-MCNC: 9.1 G/DL — LOW (ref 11.5–15.5)
MAGNESIUM SERPL-MCNC: 1.9 MG/DL — SIGNIFICANT CHANGE UP (ref 1.6–2.6)
MCHC RBC-ENTMCNC: 27.8 PG — SIGNIFICANT CHANGE UP (ref 27–34)
MCHC RBC-ENTMCNC: 33.6 GM/DL — SIGNIFICANT CHANGE UP (ref 32–36)
MCV RBC AUTO: 82.9 FL — SIGNIFICANT CHANGE UP (ref 80–100)
NRBC # BLD: 0 /100 WBCS — SIGNIFICANT CHANGE UP
NRBC # FLD: 0 K/UL — SIGNIFICANT CHANGE UP
PHOSPHATE SERPL-MCNC: 3.4 MG/DL — SIGNIFICANT CHANGE UP (ref 2.5–4.5)
PLATELET # BLD AUTO: 391 K/UL — SIGNIFICANT CHANGE UP (ref 150–400)
POTASSIUM SERPL-MCNC: 3.5 MMOL/L — SIGNIFICANT CHANGE UP (ref 3.5–5.3)
POTASSIUM SERPL-SCNC: 3.5 MMOL/L — SIGNIFICANT CHANGE UP (ref 3.5–5.3)
PROT SERPL-MCNC: 6.3 G/DL — SIGNIFICANT CHANGE UP (ref 6–8.3)
RBC # BLD: 3.27 M/UL — LOW (ref 3.8–5.2)
RBC # FLD: 12.7 % — SIGNIFICANT CHANGE UP (ref 10.3–14.5)
SODIUM SERPL-SCNC: 136 MMOL/L — SIGNIFICANT CHANGE UP (ref 135–145)
TRIGL SERPL-MCNC: 71 MG/DL — SIGNIFICANT CHANGE UP
WBC # BLD: 5.71 K/UL — SIGNIFICANT CHANGE UP (ref 3.8–10.5)
WBC # FLD AUTO: 5.71 K/UL — SIGNIFICANT CHANGE UP (ref 3.8–10.5)

## 2021-09-30 PROCEDURE — 99233 SBSQ HOSP IP/OBS HIGH 50: CPT | Mod: GC

## 2021-09-30 PROCEDURE — 99232 SBSQ HOSP IP/OBS MODERATE 35: CPT

## 2021-09-30 PROCEDURE — 88305 TISSUE EXAM BY PATHOLOGIST: CPT | Mod: 26

## 2021-09-30 PROCEDURE — 43239 EGD BIOPSY SINGLE/MULTIPLE: CPT | Mod: GC

## 2021-09-30 RX ORDER — HYDROMORPHONE HYDROCHLORIDE 2 MG/ML
0.25 INJECTION INTRAMUSCULAR; INTRAVENOUS; SUBCUTANEOUS ONCE
Refills: 0 | Status: DISCONTINUED | OUTPATIENT
Start: 2021-09-30 | End: 2021-09-30

## 2021-09-30 RX ORDER — I.V. FAT EMULSION 20 G/100ML
12.5 EMULSION INTRAVENOUS
Qty: 30 | Refills: 0 | Status: DISCONTINUED | OUTPATIENT
Start: 2021-09-30 | End: 2021-10-01

## 2021-09-30 RX ORDER — LIDOCAINE 4 G/100G
2 CREAM TOPICAL THREE TIMES A DAY
Refills: 0 | Status: DISCONTINUED | OUTPATIENT
Start: 2021-09-30 | End: 2021-10-22

## 2021-09-30 RX ORDER — ELECTROLYTE SOLUTION,INJ
1 VIAL (ML) INTRAVENOUS
Refills: 0 | Status: DISCONTINUED | OUTPATIENT
Start: 2021-09-30 | End: 2021-09-30

## 2021-09-30 RX ORDER — SUCRALFATE 1 G
1 TABLET ORAL
Refills: 0 | Status: DISCONTINUED | OUTPATIENT
Start: 2021-09-30 | End: 2021-10-22

## 2021-09-30 RX ADMIN — ONDANSETRON 4 MILLIGRAM(S): 8 TABLET, FILM COATED ORAL at 21:04

## 2021-09-30 RX ADMIN — Medication 1 EACH: at 18:42

## 2021-09-30 RX ADMIN — HYDROMORPHONE HYDROCHLORIDE 0.25 MILLIGRAM(S): 2 INJECTION INTRAMUSCULAR; INTRAVENOUS; SUBCUTANEOUS at 06:40

## 2021-09-30 RX ADMIN — Medication 1 GRAM(S): at 05:52

## 2021-09-30 RX ADMIN — Medication 1 GRAM(S): at 22:52

## 2021-09-30 RX ADMIN — LIDOCAINE 2 MILLILITER(S): 4 CREAM TOPICAL at 13:32

## 2021-09-30 RX ADMIN — Medication 1 GRAM(S): at 13:32

## 2021-09-30 RX ADMIN — Medication 1 GRAM(S): at 18:46

## 2021-09-30 RX ADMIN — I.V. FAT EMULSION 12.5 ML/HR: 20 EMULSION INTRAVENOUS at 18:43

## 2021-09-30 RX ADMIN — HYDROMORPHONE HYDROCHLORIDE 0.25 MILLIGRAM(S): 2 INJECTION INTRAMUSCULAR; INTRAVENOUS; SUBCUTANEOUS at 04:05

## 2021-09-30 RX ADMIN — HYDROMORPHONE HYDROCHLORIDE 0.25 MILLIGRAM(S): 2 INJECTION INTRAMUSCULAR; INTRAVENOUS; SUBCUTANEOUS at 21:07

## 2021-09-30 RX ADMIN — CHLORHEXIDINE GLUCONATE 1 APPLICATION(S): 213 SOLUTION TOPICAL at 13:36

## 2021-09-30 RX ADMIN — ONDANSETRON 4 MILLIGRAM(S): 8 TABLET, FILM COATED ORAL at 04:08

## 2021-09-30 RX ADMIN — PANTOPRAZOLE SODIUM 40 MILLIGRAM(S): 20 TABLET, DELAYED RELEASE ORAL at 05:53

## 2021-09-30 RX ADMIN — HYDROMORPHONE HYDROCHLORIDE 0.25 MILLIGRAM(S): 2 INJECTION INTRAMUSCULAR; INTRAVENOUS; SUBCUTANEOUS at 11:27

## 2021-09-30 RX ADMIN — HYDROMORPHONE HYDROCHLORIDE 0.25 MILLIGRAM(S): 2 INJECTION INTRAMUSCULAR; INTRAVENOUS; SUBCUTANEOUS at 21:22

## 2021-09-30 RX ADMIN — PANTOPRAZOLE SODIUM 40 MILLIGRAM(S): 20 TABLET, DELAYED RELEASE ORAL at 18:46

## 2021-09-30 RX ADMIN — LIDOCAINE 2 MILLILITER(S): 4 CREAM TOPICAL at 20:17

## 2021-09-30 RX ADMIN — HYDROMORPHONE HYDROCHLORIDE 0.25 MILLIGRAM(S): 2 INJECTION INTRAMUSCULAR; INTRAVENOUS; SUBCUTANEOUS at 11:12

## 2021-09-30 NOTE — PROGRESS NOTE ADULT - PROBLEM SELECTOR PLAN 1
admit to medicine  -s/p IV fluids x2L NS  -c/w maintenance IV NS @ 75cc/hr  -clear liquid diet. (pt tolerating po since was given morphine by ED.)  -House GI consulted  -Likely will not need inpatient Remicade infusion.  -Inflammatory markers wnl, decreasing likelihood of Crohn's flare per GI  -received Dilaudid and Zofran overnight   -fecal calprotectin wnl

## 2021-09-30 NOTE — PROGRESS NOTE ADULT - PROBLEM SELECTOR PLAN 3
Transaminitis in a setting of hypovolemia/dehydration.  -s/p IV fluids x2L NS  -c/w maintenance IV NS @ 75cc/hr  -AST/ALT improving  -recent hepatitis panel in 6/2021 was negative.

## 2021-09-30 NOTE — PROGRESS NOTE ADULT - ATTENDING COMMENTS
Patient seen and examined with father and team   Agree with above A/P by Dr Carpenter  26 year old female with PMH Crohn's disease s/p surgery for stricture/ileostomy placement in mid August 2021, s/p PICC line in place for TPN 2 weeks ago, presents with epigastric abdominal pain, nausea, and vomiting x 4 days, admitted to medicine for a potential Crohn's Flare up. Inflammatory markers have been normal, suggesting some other possible etiology. GI is following with EGD today to evaluate for other causes of her sx.  Patient informed of results of Endoscopy- Gi greatly appreciated  feels her s/s are due to another gout flair.  < from: Upper Endoscopy (09.30.21 @ 07:41) >  Impression:          - Normal esophagus. No esophagitis, ulcers, mass,                        strictures.                       - Z-line regular, 35 cm from the incisors.                       - Diffuse erythema and friability w/ some areas of                        superficial ulceration. Biopsied to rule out gastric                        Crohns Disease.                       - Erythematous duodenopathy.                       - Normal second portion of the duodenum.  PE Vital Signs Last 24 Hr T98.1, /82, HR 97, R 16, Sat 100%   Lung cta, cor rrr, abd soft n/t with R colostomy bag, ext neg e/c/c  A/p  #Odynophagia - most likely sec to Gastritis with gastric ulcerations , c/w PPi, c/w Carafate 30 min before meals. add viscus lidocaine s/swallow before meals. Advance meals to soft mechanical with ensured TID  #Nutrition- TPN restarted. Encourage Po intake with use of Carafate/ PPi and lidocaine s/s. Hope to transition of TPN  #GI - greatly appreciated- await results of BX  OOB to chair with assistance daily  above plan d/w patient / team/ father   # Psych -father did not want psych to see patient- she can still benefit from psych . she is 26 and ox3. await psych eval. Patient seen and examined with father and team   Agree with above A/P by Dr Carpenter  26 year old female with PMH Crohn's disease s/p surgery for stricture/ileostomy placement in mid August 2021, s/p PICC line in place for TPN 2 weeks ago, presents with epigastric abdominal pain, nausea, and vomiting x 4 days, admitted to medicine for a potential Crohn's Flare up. Inflammatory markers have been normal, suggesting some other possible etiology. GI is following with EGD today to evaluate for other causes of her sx.  Patient informed of results of Endoscopy- Gi greatly appreciated  feels her s/s are due to another crohns flair.  < from: Upper Endoscopy (09.30.21 @ 07:41) >  Impression:          - Normal esophagus. No esophagitis, ulcers, mass,                        strictures.                       - Z-line regular, 35 cm from the incisors.                       - Diffuse erythema and friability w/ some areas of                        superficial ulceration. Biopsied to rule out gastric                        Crohns Disease.                       - Erythematous duodenopathy.                       - Normal second portion of the duodenum.  PE Vital Signs Last 24 Hr T98.1, /82, HR 97, R 16, Sat 100%   Lung cta, cor rrr, abd soft n/t with R colostomy bag, ext neg e/c/c  A/p  #Odynophagia - most likely sec to Gastritis with gastric ulcerations , c/w PPi, c/w Carafate 30 min before meals. add viscus lidocaine s/swallow before meals. Advance meals to soft mechanical with ensured TID  #Nutrition- TPN restarted. Encourage Po intake with use of Carafate/ PPi and lidocaine s/s. Hope to transition of TPN  #GI - greatly appreciated- await results of BX  OOB to chair with assistance daily  above plan d/w patient / team/ father   # Psych -father did not want psych to see patient- she can still benefit from psych . she is 26 and ox3. await psych eval.

## 2021-09-30 NOTE — PROGRESS NOTE ADULT - SUBJECTIVE AND OBJECTIVE BOX
Giovanni Prajapati MS3    Patient is a 26y old  Female who presents with a chief complaint of crohn's flare up (29 Sep 2021 12:10)      SUBJECTIVE / OVERNIGHT EVENTS:    MEDICATIONS  (STANDING):  chlorhexidine 2% Cloths 1 Application(s) Topical daily  fat emulsion (Fish Oil and Plant Based) 20% Infusion 6.25 mL/Hr (6.25 mL/Hr) IV Continuous <Continuous>  influenza   Vaccine 0.5 milliLiter(s) IntraMuscular once  ondansetron   Disintegrating Tablet 4 milliGRAM(s) Oral once  pantoprazole  Injectable 40 milliGRAM(s) IV Push two times a day  Parenteral Nutrition - Adult 1 Each (42 mL/Hr) TPN Continuous <Continuous>  sodium chloride 0.9%. 1000 milliLiter(s) (35 mL/Hr) IV Continuous <Continuous>  sucralfate suspension 1 Gram(s) Oral every 6 hours    MEDICATIONS  (PRN):  acetaminophen   Tablet .. 650 milliGRAM(s) Oral every 6 hours PRN Temp greater or equal to 38.5C (101.3F), Mild Pain (1 - 3)  HYDROmorphone  Injectable 0.25 milliGRAM(s) IV Push every 8 hours PRN Severe Pain (7 - 10)  ondansetron Injectable 4 milliGRAM(s) IV Push every 6 hours PRN Nausea and/or Vomiting      Vital Signs Last 24 Hrs  T(C): 36.7 (29 Sep 2021 22:15), Max: 36.7 (29 Sep 2021 13:52)  T(F): 98 (29 Sep 2021 22:15), Max: 98 (29 Sep 2021 13:52)  HR: 109 (29 Sep 2021 22:15) (106 - 109)  BP: 102/66 (29 Sep 2021 22:15) (102/66 - 122/88)  BP(mean): --  RR: 17 (29 Sep 2021 22:15) (16 - 17)  SpO2: 100% (29 Sep 2021 22:15) (100% - 100%)  CAPILLARY BLOOD GLUCOSE      POCT Blood Glucose.: 111 mg/dL (30 Sep 2021 06:48)  POCT Blood Glucose.: 133 mg/dL (30 Sep 2021 00:43)  POCT Blood Glucose.: 114 mg/dL (29 Sep 2021 17:48)    I&O's Summary    29 Sep 2021 07:01  -  30 Sep 2021 07:00  --------------------------------------------------------  IN: 400 mL / OUT: 0 mL / NET: 400 mL        PHYSICAL EXAM:  GENERAL: NAD, well-developed  HEAD:  Atraumatic, Normocephalic  EYES: EOMI, PERRLA, conjunctiva and sclera clear  NECK: Supple, No JVD  CHEST/LUNG: Clear to auscultation bilaterally; No wheeze  HEART: Regular rate and rhythm; No murmurs, rubs, or gallops  ABDOMEN: Soft, Nontender, Nondistended; Bowel sounds present  EXTREMITIES:  2+ Peripheral Pulses, No clubbing, cyanosis, or edema  PSYCH: AAOx3  NEUROLOGY: non-focal  SKIN: No rashes or lesions    LABS:                        9.1    5.71  )-----------( 391      ( 30 Sep 2021 06:06 )             27.1     09-30    136  |  101  |  4<L>  ----------------------------<  111<H>  3.5   |  23  |  0.48<L>    Ca    9.3      30 Sep 2021 06:06  Phos  3.4     09-30  Mg     1.90     09-30    TPro  6.3  /  Alb  3.8  /  TBili  0.3  /  DBili  x   /  AST  17  /  ALT  58<H>  /  AlkPhos  78  09-30      Calprotectin, Stool: 48: Concentration     Interpretation   Follow-Up  <16 - 50 ug/g     Normal           None  >50 -120 ug/g     Borderline       Re-evaluate in 4-6 weeks      >120 ug/g     Abnormal         Repeat as clinically                                     indicated  Performed At:  [facility] [id number]  Lalo Nogueira MD Ph:[phone] ug/g (09.27.21 @ 21:00)            RADIOLOGY & ADDITIONAL TESTS:    Imaging Personally Reviewed:    Consultant(s) Notes Reviewed:      Care Discussed with Consultants/Other Providers:   Giovanni Prajapati MS3    Patient is a 26y old  Female who presents with a chief complaint of crohn's flare up (29 Sep 2021 12:10)    SUBJECTIVE / OVERNIGHT EVENTS: Patient examined at bedside in NAD. Pt states she experienced nausea and discomfort overnight and received Zofran and Dilaudid but did not have any vomiting. Yesterday patient was able to consume apple juice and pudding without n/v. Patient currently is feeling no discomfort and states her esophagus is tender when she touches it but otherwise it is better. Ileostomy output is normal. Denies any f/c, n/v, headaches, SOB, chest pain at this time.     MEDICATIONS  (STANDING):  chlorhexidine 2% Cloths 1 Application(s) Topical daily  fat emulsion (Fish Oil and Plant Based) 20% Infusion 6.25 mL/Hr (6.25 mL/Hr) IV Continuous <Continuous>  influenza   Vaccine 0.5 milliLiter(s) IntraMuscular once  ondansetron   Disintegrating Tablet 4 milliGRAM(s) Oral once  pantoprazole  Injectable 40 milliGRAM(s) IV Push two times a day  Parenteral Nutrition - Adult 1 Each (42 mL/Hr) TPN Continuous <Continuous>  sodium chloride 0.9%. 1000 milliLiter(s) (35 mL/Hr) IV Continuous <Continuous>  sucralfate suspension 1 Gram(s) Oral every 6 hours    MEDICATIONS  (PRN):  acetaminophen   Tablet .. 650 milliGRAM(s) Oral every 6 hours PRN Temp greater or equal to 38.5C (101.3F), Mild Pain (1 - 3)  HYDROmorphone  Injectable 0.25 milliGRAM(s) IV Push every 8 hours PRN Severe Pain (7 - 10)  ondansetron Injectable 4 milliGRAM(s) IV Push every 6 hours PRN Nausea and/or Vomiting      Vital Signs Last 24 Hrs  T(C): 36.7 (29 Sep 2021 22:15), Max: 36.7 (29 Sep 2021 13:52)  T(F): 98 (29 Sep 2021 22:15), Max: 98 (29 Sep 2021 13:52)  HR: 109 (29 Sep 2021 22:15) (106 - 109)  BP: 102/66 (29 Sep 2021 22:15) (102/66 - 122/88)  BP(mean): --  RR: 17 (29 Sep 2021 22:15) (16 - 17)  SpO2: 100% (29 Sep 2021 22:15) (100% - 100%)  CAPILLARY BLOOD GLUCOSE      POCT Blood Glucose.: 111 mg/dL (30 Sep 2021 06:48)  POCT Blood Glucose.: 133 mg/dL (30 Sep 2021 00:43)  POCT Blood Glucose.: 114 mg/dL (29 Sep 2021 17:48)    I&O's Summary    29 Sep 2021 07:01  -  30 Sep 2021 07:00  --------------------------------------------------------  IN: 400 mL / OUT: 0 mL / NET: 400 mL    PHYSICAL EXAM:  GENERAL: NAD, malnourished  HEAD:  Atraumatic, Normocephalic  EYES: conjunctiva and sclera clear  NECK: No JVD  CHEST/LUNG: Clear to auscultation bilaterally; No wheeze  HEART: Regular rate and rhythm; No murmurs, rubs, or gallops  ABDOMEN: Soft, Nontender, Nondistended; Bowel sounds present  EXTREMITIES: moving all 4 extremities spontaneously  PSYCH: AAOx3  NEUROLOGY: non-focal  SKIN: No rashes or lesions    LABS:                        9.1    5.71  )-----------( 391      ( 30 Sep 2021 06:06 )             27.1     09-30    136  |  101  |  4<L>  ----------------------------<  111<H>  3.5   |  23  |  0.48<L>    Ca    9.3      30 Sep 2021 06:06  Phos  3.4     09-30  Mg     1.90     09-30    TPro  6.3  /  Alb  3.8  /  TBili  0.3  /  DBili  x   /  AST  17  /  ALT  58<H>  /  AlkPhos  78  09-30      Calprotectin, Stool: 48: Concentration     Interpretation   Follow-Up  <16 - 50 ug/g     Normal           None  >50 -120 ug/g     Borderline       Re-evaluate in 4-6 weeks      >120 ug/g     Abnormal         Repeat as clinically                                     indicated  Performed At:  [facility] [id number]  Lalo Nogueira MD Ph:[phone] /g (09.27.21 @ 21:00)            RADIOLOGY & ADDITIONAL TESTS:    Imaging Personally Reviewed:    Consultant(s) Notes Reviewed:      Care Discussed with Consultants/Other Providers:   Giovanni Prajapati MS3    Patient is a 26y old  Female who presents with a chief complaint of crohn's flare up (29 Sep 2021 12:10)    SUBJECTIVE / OVERNIGHT EVENTS: Patient examined at bedside in NAD. Pt states she experienced nausea and discomfort overnight and received Zofran and Dilaudid but did not have any vomiting. Yesterday patient was able to consume apple juice and pudding without n/v. Patient currently is feeling no discomfort and states her esophagus is tender when she touches it but otherwise it is better. Ileostomy output is normal. Denies any f/c, n/v, headaches, SOB, chest pain at this time.     MEDICATIONS  (STANDING):  chlorhexidine 2% Cloths 1 Application(s) Topical daily  fat emulsion (Fish Oil and Plant Based) 20% Infusion 6.25 mL/Hr (6.25 mL/Hr) IV Continuous <Continuous>  influenza   Vaccine 0.5 milliLiter(s) IntraMuscular once  ondansetron   Disintegrating Tablet 4 milliGRAM(s) Oral once  pantoprazole  Injectable 40 milliGRAM(s) IV Push two times a day  Parenteral Nutrition - Adult 1 Each (42 mL/Hr) TPN Continuous <Continuous>  sodium chloride 0.9%. 1000 milliLiter(s) (35 mL/Hr) IV Continuous <Continuous>  sucralfate suspension 1 Gram(s) Oral every 6 hours    MEDICATIONS  (PRN):  acetaminophen   Tablet .. 650 milliGRAM(s) Oral every 6 hours PRN Temp greater or equal to 38.5C (101.3F), Mild Pain (1 - 3)  HYDROmorphone  Injectable 0.25 milliGRAM(s) IV Push every 8 hours PRN Severe Pain (7 - 10)  ondansetron Injectable 4 milliGRAM(s) IV Push every 6 hours PRN Nausea and/or Vomiting      Vital Signs Last 24 Hrs  T(C): 36.7 (29 Sep 2021 22:15), Max: 36.7 (29 Sep 2021 13:52)  T(F): 98 (29 Sep 2021 22:15), Max: 98 (29 Sep 2021 13:52)  HR: 109 (29 Sep 2021 22:15) (106 - 109)  BP: 102/66 (29 Sep 2021 22:15) (102/66 - 122/88)  BP(mean): --  RR: 17 (29 Sep 2021 22:15) (16 - 17)  SpO2: 100% (29 Sep 2021 22:15) (100% - 100%)  CAPILLARY BLOOD GLUCOSE      POCT Blood Glucose.: 111 mg/dL (30 Sep 2021 06:48)  POCT Blood Glucose.: 133 mg/dL (30 Sep 2021 00:43)  POCT Blood Glucose.: 114 mg/dL (29 Sep 2021 17:48)    I&O's Summary    29 Sep 2021 07:01  -  30 Sep 2021 07:00  --------------------------------------------------------  IN: 400 mL / OUT: 0 mL / NET: 400 mL    PHYSICAL EXAM:  GENERAL: NAD, malnourished  HEAD:  Atraumatic, Normocephalic  EYES: conjunctiva and sclera clear  NECK: No JVD  CHEST/LUNG: Clear to auscultation bilaterally; No wheeze  HEART: Regular rate and rhythm; No murmurs, rubs, or gallops  ABDOMEN: Soft, Nontender, Nondistended; Bowel sounds present  EXTREMITIES: moving all 4 extremities spontaneously  PSYCH: AAOx3  NEUROLOGY: non-focal  SKIN: No rashes or lesions    LABS:                        9.1    5.71  )-----------( 391      ( 30 Sep 2021 06:06 )             27.1     09-30    136  |  101  |  4<L>  ----------------------------<  111<H>  3.5   |  23  |  0.48<L>    Ca    9.3      30 Sep 2021 06:06  Phos  3.4     09-30  Mg     1.90     09-30    TPro  6.3  /  Alb  3.8  /  TBili  0.3  /  DBili  x   /  AST  17  /  ALT  58<H>  /  AlkPhos  78  09-30      Calprotectin, Stool: 48: Concentration     Interpretation   Follow-Up  <16 - 50 ug/g     Normal           None  >50 -120 ug/g     Borderline       Re-evaluate in 4-6 weeks      >120 ug/g     Abnormal         Repeat as clinically                                     indicated  Performed At:  [facility] [id number]  Lalo Nogueira MD Ph:[phone] ug/g (09.27.21 @ 21:00)      RADIOLOGY & ADDITIONAL TESTS:    Procedure:           Upper GI endoscopy  Indications:         Generalized abdominal pain, Odynophagia  Providers:           Chidi Swift MD, JER PEREZ (Fellow)  Medicines:           Monitored Anesthesia Care  Complications:       No immediate complications.  Procedure:           Pre-Anesthesia Assessment:                       - Prior to the procedure, a History and Physical was                        performed, and patient medications and allergies were                        reviewed. The patient is competent. The risks and                        benefits of the procedure and the sedation options and                        risks were discussed with the patient. All questions                        were answered and informed consent was obtained. Patient                        identification and proposed procedure were verified by                        the physician, the nurse and the anesthesiologist in the                        pre-procedure area in the procedure room in the                    endoscopy suite. Mental Status Examination: alert and                        oriented. Respiratory Examination: clear to                        auscultation. CV Examination: normal. Prophylactic                        Antibiotics: The patient does not require prophylactic                        antibiotics. Prior Anticoagulants: The patient has taken                        no previous anticoagulant or antiplatelet agents. ASA                        Grade Assessment: II - A patient with mild systemic                        disease. After reviewing the risks and benefits, the                        patient was deemed in satisfactory condition to undergo                        the procedure. The anesthesia plan was to use monitored                        anesthesia care (MAC). Immediately prior to                        administration of medications, the patient was                        re-assessed for adequacy to receive sedatives. The heart                        rate, respiratory rate, oxygen saturations, blood                        pressure, adequacy of pulmonary ventilation, and                        response to care were monitored throughout the                        procedure. The physical status of the patient was                        re-assessed after the procedure.                       After obtaining informed consent, the endoscope was                        passed under direct vision. Throughout the procedure,                        the patient's blood pressure, pulse, and oxygen                        saturations were monitored continuously. The Endoscope                        was introduced through the mouth, and advanced to the                        second part of duodenum. The upper GI endoscopy was                        accomplished without difficulty. The patient tolerated                        the procedure well.                                                                                   Findings:       The examined esophagus was normal.       The Z-line was regular and was found 35 cm from the incisors.       Diffuse moderate inflammation characterized by congestion (edema),        erosions, erythema and friability was found in the entire examined        stomach. Biopsies were taken with a cold forceps for histology.       Patchy mildly erythematous mucosa was found in the duodenal bulb.       The second portion of the duodenum was normal.        Impression:          - Normal esophagus. No esophagitis, ulcers, mass,                        strictures.                       - Z-line regular, 35 cm from the incisors.                       - Diffuse erythema and friability w/ some areas of                        superficial ulceration. Biopsied to rule out gastric                        Crohns Disease.                       - Erythematous duodenopathy.                       - Normal second portion of the duodenum.  Recommendation:      - [facility] patient to [facility] for [facility] care.                       - Diet as tolerated                       - Await pathology results.                       - Continue present medications.                             Attending Participation:       I was present and participated during the entire procedure, including        non-key portions.                                                                                   _______________  Chidi Swift MD  9/30/2021 9:12:24 AM  This report has been signed electronically.  Number of Addenda: 0    Note Initiated On: 9/30/2021 7:41 AM

## 2021-09-30 NOTE — PROGRESS NOTE ADULT - PROBLEM SELECTOR PLAN 2
- Possibly related to esophagitis/reflux, other etiologies include active Crohn's (though no objective evidence pointing towards flare), vs possibly related to elevated liver enzymes though unclear what would be causing this.  - Treat symptomatically with IV PPI BID, add sucralfate liquid 1g q6h for esophagitis per GI recs  - avoid opiates in this patient given increased mortality with opiates in IBD patients  - Diet as tolerated for now.  - Antiemetics as needed. Check QTc.  - obtain [facility] records from recent visit (imaging, EGD, recent GI note)  - nutrition consult recommended adding ensure to diet  - TPN restarted  - Psychiatry consult for possible depression secondary to her abdominal pain  - EGD per GI today - Possibly related to esophagitis/reflux, other etiologies include active Crohn's (though no objective evidence pointing towards flare), vs possibly related to elevated liver enzymes though unclear what would be causing this.  - Treat symptomatically with IV PPI BID, add sucralfate liquid 1g q6h for esophagitis per GI recs  - avoid opiates in this patient given increased mortality with opiates in IBD patients  - Diet as tolerated for now, trial of advancing to soft diet  - Antiemetics as needed. Check QTc.  - obtain [facility] records from recent visit (imaging, EGD, recent GI note)  - nutrition consult recommended adding ensure to diet  - TPN restarted  - add lidocaine swish and swallow before meals to help with pain  - Psychiatry consult for possible depression secondary to her abdominal pain  - EGD per GI today

## 2021-09-30 NOTE — CHART NOTE - NSCHARTNOTEFT_GEN_A_CORE
We were consulted on 9/29 to see patient for depression; attempts were made yesterday to see patient but limited by her reports of pain (see chart note). Today re-attempted but was just after returning from EGD, and reporting high pain/discomfort. Did deny SI/safety concerns or hopelessness, and in fact remains hopeful that this pain flare will be temporary like others in the past. Father present in room. Offered her to let us know if/when psychiatry can see her again, and she agreed to let the RN/primary team know.     At this time we will pull away but please re-engage us if she in fact explicitly states she wishes to seek psychiatric assistance.     Son Mason MD  Consultation Psychiatry, Director  [phone]

## 2021-09-30 NOTE — PROGRESS NOTE ADULT - ATTENDING COMMENTS
I agree with the above history, physical examination, chief complaint/diagnosis, and plan, which I have reviewed and edited where appropriate.  I agree with notes/assessment and detailed interval history of health care providers on my service.  I have seen and examined the patient.  I reviewed the laboratory and available data and agree with the history, physical assessment and plan.  I reviewed and discussed with all consultants, house staff and PA's.  The Nutrition Support Team (NST) discusses on an ongoing basis with the primary team and all consultants, House staff and PA's to have a permanent risk benefit analyses on all decisions and coordinating care.  I was physically present for the key portions of the evaluation and management (E/M) service provided.  26 year old female with PMH Crohn's disease s/p surgery for stricture/ileostomy receiving TPN   comfortable   HEAD:  Atraumatic,  CHEST/LUNG: clear  ABDOMEN: soft, mildly tender to palpation, nondistended   PSYCH: AAOx3  labs reviewed - electrolytes adjusted  TPN infusion volume increased to 2L/ 1175 kcal/day

## 2021-09-30 NOTE — PROGRESS NOTE ADULT - ASSESSMENT
26 year old female with PMH Crohn's disease s/p surgery for stricture/ileostomy placement in mid August 2021, s/p PICC line in place for TPN 2 weeks ago, presents with epigastric abdominal pain, nausea, and vomiting x 4 days, admitted to medicine for a potential Crohn's Flare up. Inflammatory markers have been normal, suggesting some other possible etiology. GI is following with EGD today to evaluate for other causes of her sx.

## 2021-09-30 NOTE — PROGRESS NOTE ADULT - SUBJECTIVE AND OBJECTIVE BOX
NUTRITION NOTE  [id number] [facility]ACK[facility]  ===============================    Interval events - Patient was seen and examined at bedside, no acute events overnight. Patient denies chest pain, shortness of breath, nausea or vomiting at this time. TPN volume and calories increased today. Diet was changed to mechanical soft and pt states that she will try small amounts throughout the day.     ROS: Except as noted above, all other systems reviewed and are negative     Allergies  No Known Allergies    PAST MEDICAL & SURGICAL HISTORY:  Crohn disease  Peripherally inserted central catheter (PICC) in place LUE PICC place on 9/12/2021  History of ileostomy Aug 2021    Vital Signs Last 24 Hrs  T(C): 37.1 (30 Sep 2021 11:33), Max: 37.2 (30 Sep 2021 07:47)  T(F): 98.8 (30 Sep 2021 11:33), Max: 98.9 (30 Sep 2021 07:47)  HR: 102 (30 Sep 2021 11:33) (97 - 109)  BP: 130/95 (30 Sep 2021 11:33) (102/66 - 130/95)  RR: 19 (30 Sep 2021 11:33) (16 - 26)  SpO2: 100% (30 Sep 2021 11:33) (100% - 100%)    MEDICATIONS  (STANDING):  chlorhexidine 2% Cloths 1 Application(s) Topical daily  fat emulsion (Fish Oil and Plant Based) 20% Infusion 12.5 mL/Hr (12.5 mL/Hr) IV Continuous <Continuous>  influenza   Vaccine 0.5 milliLiter(s) IntraMuscular once  ondansetron   Disintegrating Tablet 4 milliGRAM(s) Oral once  pantoprazole  Injectable 40 milliGRAM(s) IV Push two times a day  Parenteral Nutrition - Adult 1 Each (83 mL/Hr) TPN Continuous <Continuous>  Parenteral Nutrition - Adult 1 Each (42 mL/Hr) TPN Continuous <Continuous>  sucralfate suspension 1 Gram(s) Oral <User Schedule>    MEDICATIONS  (PRN):  acetaminophen   Tablet .. 650 milliGRAM(s) Oral every 6 hours PRN Temp greater or equal to 38.5C (101.3F), Mild Pain (1 - 3)  HYDROmorphone  Injectable 0.25 milliGRAM(s) IV Push every 8 hours PRN Severe Pain (7 - 10)  lidocaine 2% Viscous 2 milliLiter(s) Swish and Spit three times a day PRN pain with swallowing  ondansetron Injectable 4 milliGRAM(s) IV Push every 6 hours PRN Nausea and/or Vomiting    I&O's Detail    29 Sep 2021 07:01  -  30 Sep 2021 07:00  --------------------------------------------------------  IN:    IV PiggyBack: 400 mL  Total IN: 400 mL    OUT:  Total OUT: 0 mL    Total NET: 400 mL    POCT Blood Glucose.: 111 mg/dL (30 Sep 2021 06:48)  POCT Blood Glucose.: 133 mg/dL (30 Sep 2021 00:43)  POCT Blood Glucose.: 114 mg/dL (29 Sep 2021 17:48)    Daily Height in cm: 154.9 (30 Sep 2021 07:47)      Drug Dosing Weight  Height (cm): 154.9 (30 Sep 2021 08:11)  Weight (kg): 35.2 (30 Sep 2021 08:11)  BMI (kg/m2): 14.7 (30 Sep 2021 08:11)  BSA (m2): 1.26 (30 Sep 2021 08:11)    PHYSICAL EXAM:  GENERAL: malnourished, resting comfortably in bed   HEAD:  Atraumatic, Normocephalic  CHEST/LUNG: nonlabored respirations, CTAB  ABDOMEN: soft, mildly tender to palpation, nondistended   PSYCH: AAOx3  PICC Site: C/D/I    Diet: soft diet and TPN/lipids     LABORATORY                        9.1    5.71  )-----------( 391      ( 30 Sep 2021 06:06 )             27.1     09-30    136  |  101  |  4<L>  ----------------------------<  111<H>  3.5   |  23  |  0.48<L>    Ca    9.3      30 Sep 2021 06:06  Phos  3.4     09-30  Mg     1.90     09-30    TPro  6.3  /  Alb  3.8  /  TBili  0.3  /  DBili  x   /  AST  17  /  ALT  58<H>  /  AlkPhos  78  09-30    LIVER FUNCTIONS - ( 30 Sep 2021 06:06 )  Alb: 3.8 g/dL / Pro: 6.3 g/dL / ALK PHOS: 78 U/L / ALT: 58 U/L / AST: 17 U/L / GGT: x           09-30 Chol -- LDL -- HDL -- Trig 71    ASSESSMENT/PLAN:  26 year old female with PMH Crohn's disease s/p surgery for stricture/ileostomy placement in mid August 2021, s/p PICC line in place for TPN 2 weeks ago, presents with epigastric abdominal pain, nausea, and vomiting x 4 days. GI is following with possible EGD to evaluate for esophagitis. Nutrition service consulted for resuming parenteral nutrition via PICC line that is in place. Patient states that she feels very weak and is frustrated that she is not able to take in anything by mouth (liquids or food) without nausea or emesis. TPN was initiated on 9/29/21.    TPN infusion volume increased to 2L, TPN will provide 1175 kcal/day    labs reviewed - electrolytes adjusted in TPN bag     monitor fingersticks, obtain daily weights    continue parenteral nutrition at this time, will follow up with primary team on plan     1.  Severe protein calorie malnutrition being optimized with TPN: CHO [175] gm.  AA [70] gm. SMOF Lipids [30] gm.  2.  Hyperglycemia managed with: [0] units of regular insulin    3.  Check fluid balance daily.  Strict I/O  [ ] [ ]   4.  Daily BMP, Ionized Calcium, Magnesium and Phosphorous   5.  Triglycerides at initiation of TPN and monthly [ ] [ ]     Nutrition Support [id number]

## 2021-10-01 LAB
ANION GAP SERPL CALC-SCNC: 8 MMOL/L — SIGNIFICANT CHANGE UP (ref 7–14)
BASOPHILS # BLD AUTO: 0.04 K/UL — SIGNIFICANT CHANGE UP (ref 0–0.2)
BASOPHILS NFR BLD AUTO: 0.6 % — SIGNIFICANT CHANGE UP (ref 0–2)
BUN SERPL-MCNC: 8 MG/DL — SIGNIFICANT CHANGE UP (ref 7–23)
CA-I BLD-SCNC: 1.11 MMOL/L — LOW (ref 1.15–1.29)
CALCIUM SERPL-MCNC: 9.2 MG/DL — SIGNIFICANT CHANGE UP (ref 8.4–10.5)
CHLORIDE SERPL-SCNC: 102 MMOL/L — SIGNIFICANT CHANGE UP (ref 98–107)
CO2 SERPL-SCNC: 23 MMOL/L — SIGNIFICANT CHANGE UP (ref 22–31)
CREAT SERPL-MCNC: 0.5 MG/DL — SIGNIFICANT CHANGE UP (ref 0.5–1.3)
EOSINOPHIL # BLD AUTO: 0.33 K/UL — SIGNIFICANT CHANGE UP (ref 0–0.5)
EOSINOPHIL NFR BLD AUTO: 4.5 % — SIGNIFICANT CHANGE UP (ref 0–6)
GLUCOSE BLDC GLUCOMTR-MCNC: 116 MG/DL — HIGH (ref 70–99)
GLUCOSE BLDC GLUCOMTR-MCNC: 123 MG/DL — HIGH (ref 70–99)
GLUCOSE BLDC GLUCOMTR-MCNC: 125 MG/DL — HIGH (ref 70–99)
GLUCOSE BLDC GLUCOMTR-MCNC: 92 MG/DL — SIGNIFICANT CHANGE UP (ref 70–99)
GLUCOSE BLDC GLUCOMTR-MCNC: 95 MG/DL — SIGNIFICANT CHANGE UP (ref 70–99)
GLUCOSE SERPL-MCNC: 85 MG/DL — SIGNIFICANT CHANGE UP (ref 70–99)
HCT VFR BLD CALC: 30.1 % — LOW (ref 34.5–45)
HGB BLD-MCNC: 9.6 G/DL — LOW (ref 11.5–15.5)
IANC: 4.93 K/UL — SIGNIFICANT CHANGE UP (ref 1.5–8.5)
IMM GRANULOCYTES NFR BLD AUTO: 0.4 % — SIGNIFICANT CHANGE UP (ref 0–1.5)
LYMPHOCYTES # BLD AUTO: 1.46 K/UL — SIGNIFICANT CHANGE UP (ref 1–3.3)
LYMPHOCYTES # BLD AUTO: 20.1 % — SIGNIFICANT CHANGE UP (ref 13–44)
MAGNESIUM SERPL-MCNC: 2.2 MG/DL — SIGNIFICANT CHANGE UP (ref 1.6–2.6)
MCHC RBC-ENTMCNC: 27.1 PG — SIGNIFICANT CHANGE UP (ref 27–34)
MCHC RBC-ENTMCNC: 31.9 GM/DL — LOW (ref 32–36)
MCV RBC AUTO: 85 FL — SIGNIFICANT CHANGE UP (ref 80–100)
MONOCYTES # BLD AUTO: 0.47 K/UL — SIGNIFICANT CHANGE UP (ref 0–0.9)
MONOCYTES NFR BLD AUTO: 6.5 % — SIGNIFICANT CHANGE UP (ref 2–14)
NEUTROPHILS # BLD AUTO: 4.93 K/UL — SIGNIFICANT CHANGE UP (ref 1.8–7.4)
NEUTROPHILS NFR BLD AUTO: 67.9 % — SIGNIFICANT CHANGE UP (ref 43–77)
NRBC # BLD: 0 /100 WBCS — SIGNIFICANT CHANGE UP
NRBC # FLD: 0 K/UL — SIGNIFICANT CHANGE UP
PHOSPHATE SERPL-MCNC: 3.3 MG/DL — SIGNIFICANT CHANGE UP (ref 2.5–4.5)
PLATELET # BLD AUTO: 434 K/UL — HIGH (ref 150–400)
POTASSIUM SERPL-MCNC: 4.1 MMOL/L — SIGNIFICANT CHANGE UP (ref 3.5–5.3)
POTASSIUM SERPL-SCNC: 4.1 MMOL/L — SIGNIFICANT CHANGE UP (ref 3.5–5.3)
RBC # BLD: 3.54 M/UL — LOW (ref 3.8–5.2)
RBC # FLD: 12.8 % — SIGNIFICANT CHANGE UP (ref 10.3–14.5)
SODIUM SERPL-SCNC: 133 MMOL/L — LOW (ref 135–145)
WBC # BLD: 7.26 K/UL — SIGNIFICANT CHANGE UP (ref 3.8–10.5)
WBC # FLD AUTO: 7.26 K/UL — SIGNIFICANT CHANGE UP (ref 3.8–10.5)

## 2021-10-01 PROCEDURE — 99232 SBSQ HOSP IP/OBS MODERATE 35: CPT

## 2021-10-01 PROCEDURE — 99233 SBSQ HOSP IP/OBS HIGH 50: CPT | Mod: GC

## 2021-10-01 PROCEDURE — 99232 SBSQ HOSP IP/OBS MODERATE 35: CPT | Mod: GC

## 2021-10-01 RX ORDER — DIPHENHYDRAMINE HCL 50 MG
25 CAPSULE ORAL ONCE
Refills: 0 | Status: COMPLETED | OUTPATIENT
Start: 2021-10-01 | End: 2021-10-01

## 2021-10-01 RX ORDER — HYDROMORPHONE HYDROCHLORIDE 2 MG/ML
0.25 INJECTION INTRAMUSCULAR; INTRAVENOUS; SUBCUTANEOUS ONCE
Refills: 0 | Status: DISCONTINUED | OUTPATIENT
Start: 2021-10-01 | End: 2021-10-01

## 2021-10-01 RX ORDER — ELECTROLYTE SOLUTION,INJ
1 VIAL (ML) INTRAVENOUS
Refills: 0 | Status: DISCONTINUED | OUTPATIENT
Start: 2021-10-01 | End: 2021-10-01

## 2021-10-01 RX ORDER — INFLIXIMAB-DYYB 120 MG/ML
200 INJECTION SUBCUTANEOUS ONCE
Refills: 0 | Status: DISCONTINUED | OUTPATIENT
Start: 2021-10-01 | End: 2021-10-01

## 2021-10-01 RX ORDER — INFLIXIMAB-DYYB 120 MG/ML
200 INJECTION SUBCUTANEOUS ONCE
Refills: 0 | Status: COMPLETED | OUTPATIENT
Start: 2021-10-01 | End: 2021-10-01

## 2021-10-01 RX ORDER — I.V. FAT EMULSION 20 G/100ML
12.5 EMULSION INTRAVENOUS
Qty: 30 | Refills: 0 | Status: DISCONTINUED | OUTPATIENT
Start: 2021-10-01 | End: 2021-10-02

## 2021-10-01 RX ADMIN — PANTOPRAZOLE SODIUM 40 MILLIGRAM(S): 20 TABLET, DELAYED RELEASE ORAL at 17:26

## 2021-10-01 RX ADMIN — Medication 1 GRAM(S): at 23:25

## 2021-10-01 RX ADMIN — Medication 1 GRAM(S): at 10:41

## 2021-10-01 RX ADMIN — I.V. FAT EMULSION 12.5 ML/HR: 20 EMULSION INTRAVENOUS at 17:54

## 2021-10-01 RX ADMIN — Medication 25 MILLIGRAM(S): at 17:25

## 2021-10-01 RX ADMIN — CHLORHEXIDINE GLUCONATE 1 APPLICATION(S): 213 SOLUTION TOPICAL at 10:38

## 2021-10-01 RX ADMIN — Medication 1 GRAM(S): at 05:52

## 2021-10-01 RX ADMIN — Medication 650 MILLIGRAM(S): at 17:25

## 2021-10-01 RX ADMIN — Medication 1 EACH: at 17:50

## 2021-10-01 RX ADMIN — HYDROMORPHONE HYDROCHLORIDE 0.25 MILLIGRAM(S): 2 INJECTION INTRAMUSCULAR; INTRAVENOUS; SUBCUTANEOUS at 11:41

## 2021-10-01 RX ADMIN — HYDROMORPHONE HYDROCHLORIDE 0.25 MILLIGRAM(S): 2 INJECTION INTRAMUSCULAR; INTRAVENOUS; SUBCUTANEOUS at 02:48

## 2021-10-01 RX ADMIN — ONDANSETRON 4 MILLIGRAM(S): 8 TABLET, FILM COATED ORAL at 19:30

## 2021-10-01 RX ADMIN — PANTOPRAZOLE SODIUM 40 MILLIGRAM(S): 20 TABLET, DELAYED RELEASE ORAL at 05:52

## 2021-10-01 RX ADMIN — ONDANSETRON 4 MILLIGRAM(S): 8 TABLET, FILM COATED ORAL at 11:25

## 2021-10-01 RX ADMIN — INFLIXIMAB-DYYB 125 MILLIGRAM(S): 120 INJECTION SUBCUTANEOUS at 18:00

## 2021-10-01 RX ADMIN — HYDROMORPHONE HYDROCHLORIDE 0.25 MILLIGRAM(S): 2 INJECTION INTRAMUSCULAR; INTRAVENOUS; SUBCUTANEOUS at 02:33

## 2021-10-01 RX ADMIN — HYDROMORPHONE HYDROCHLORIDE 0.25 MILLIGRAM(S): 2 INJECTION INTRAMUSCULAR; INTRAVENOUS; SUBCUTANEOUS at 20:00

## 2021-10-01 RX ADMIN — Medication 1 GRAM(S): at 17:25

## 2021-10-01 RX ADMIN — HYDROMORPHONE HYDROCHLORIDE 0.25 MILLIGRAM(S): 2 INJECTION INTRAMUSCULAR; INTRAVENOUS; SUBCUTANEOUS at 14:00

## 2021-10-01 RX ADMIN — HYDROMORPHONE HYDROCHLORIDE 0.25 MILLIGRAM(S): 2 INJECTION INTRAMUSCULAR; INTRAVENOUS; SUBCUTANEOUS at 11:26

## 2021-10-01 RX ADMIN — HYDROMORPHONE HYDROCHLORIDE 0.25 MILLIGRAM(S): 2 INJECTION INTRAMUSCULAR; INTRAVENOUS; SUBCUTANEOUS at 14:15

## 2021-10-01 RX ADMIN — LIDOCAINE 2 MILLILITER(S): 4 CREAM TOPICAL at 10:37

## 2021-10-01 RX ADMIN — HYDROMORPHONE HYDROCHLORIDE 0.25 MILLIGRAM(S): 2 INJECTION INTRAMUSCULAR; INTRAVENOUS; SUBCUTANEOUS at 19:31

## 2021-10-01 NOTE — PROGRESS NOTE ADULT - PROBLEM SELECTOR PLAN 2
- Possibly related to esophagitis/reflux, other etiologies include active Crohn's (though no objective evidence pointing towards flare), vs possibly related to elevated liver enzymes though unclear what would be causing this.  - Treat symptomatically with IV PPI BID, add sucralfate liquid 1g q6h for esophagitis per GI recs  - avoid opiates in this patient given increased mortality with opiates in IBD patients  - Diet as tolerated for now, trial of advancing to soft diet  - Antiemetics as needed. Check QTc.  - obtain [facility] records from recent visit (imaging, EGD, recent GI note)  - TPN restarted  - added lidocaine swish and swallow before meals to help with pain  - Psychiatry no longer actively following  - EGD showed gastric friability and was biopsied. There was erythematous duodenopathy but no esophagitis. - Possibly related to esophagitis/reflux, other etiologies include active Crohn's (though no objective evidence pointing towards flare), vs possibly related to elevated liver enzymes though unclear what would be causing this.  - Treat symptomatically with IV PPI BID, add sucralfate liquid 1g q6h for esophagitis per GI recs  - avoid opiates in this patient given increased mortality with opiates in IBD patients  - Diet as tolerated for now, trial of advancing to soft diet  - Antiemetics as needed. Check QTc.  - obtain [facility] records from recent visit (imaging, EGD, recent GI note)  - TPN restarted  - added lidocaine swish and swallow before meals to help with pain  - Psychiatry no longer actively following  - Ionized calcium low (1.11)  - EGD showed gastric friability and was biopsied. There was erythematous duodenopathy but no esophagitis.

## 2021-10-01 NOTE — PROGRESS NOTE ADULT - SUBJECTIVE AND OBJECTIVE BOX
Giovanni Prajapati MS3    Patient is a 26y old  Female who presents with a chief complaint of crohn's flare up (30 Sep 2021 12:00)    SUBJECTIVE / OVERNIGHT EVENTS: Pt examined at bedside in NAD. Pt states that at this time she has no pain (0/10) and is resting comfortable. Pt was tolerating fluids well and was able to eat mashed potatoes yesterday with no issues. However, overnight the patient tried consuming some cheese doodles and experienced pain and vomiting afterwards, requiring Dilaudid. Output in her ostomy bad is normal. Denies any chest pain, SOB, current n/v, f/c. Pt denies any SI/safety concerns at this time and psych is no longer actively following.    MEDICATIONS  (STANDING):  chlorhexidine 2% Cloths 1 Application(s) Topical daily  fat emulsion (Fish Oil and Plant Based) 20% Infusion 12.5 mL/Hr (12.5 mL/Hr) IV Continuous <Continuous>  influenza   Vaccine 0.5 milliLiter(s) IntraMuscular once  ondansetron   Disintegrating Tablet 4 milliGRAM(s) Oral once  pantoprazole  Injectable 40 milliGRAM(s) IV Push two times a day  Parenteral Nutrition - Adult 1 Each (83 mL/Hr) TPN Continuous <Continuous>  sucralfate suspension 1 Gram(s) Oral <User Schedule>    MEDICATIONS  (PRN):  acetaminophen   Tablet .. 650 milliGRAM(s) Oral every 6 hours PRN Temp greater or equal to 38.5C (101.3F), Mild Pain (1 - 3)  HYDROmorphone  Injectable 0.25 milliGRAM(s) IV Push every 8 hours PRN Severe Pain (7 - 10)  lidocaine 2% Viscous 2 milliLiter(s) Swish and Spit three times a day PRN pain with swallowing  ondansetron Injectable 4 milliGRAM(s) IV Push every 6 hours PRN Nausea and/or Vomiting      Vital Signs Last 24 Hrs  T(C): 36.5 (01 Oct 2021 05:45), Max: 37.3 (30 Sep 2021 15:05)  T(F): 97.7 (01 Oct 2021 05:45), Max: 99.2 (30 Sep 2021 15:05)  HR: 99 (01 Oct 2021 05:45) (97 - 110)  BP: 100/68 (01 Oct 2021 05:45) (100/68 - 130/95)  BP(mean): --  RR: 16 (01 Oct 2021 05:45) (16 - 26)  SpO2: 100% (01 Oct 2021 05:45) (99% - 100%)  CAPILLARY BLOOD GLUCOSE      POCT Blood Glucose.: 95 mg/dL (01 Oct 2021 05:59)  POCT Blood Glucose.: 116 mg/dL (01 Oct 2021 01:14)  POCT Blood Glucose.: 125 mg/dL (30 Sep 2021 22:09)  POCT Blood Glucose.: 95 mg/dL (30 Sep 2021 18:17)  POCT Blood Glucose.: 108 mg/dL (30 Sep 2021 12:25)    I&O's Summary    30 Sep 2021 07:01  -  01 Oct 2021 07:00  --------------------------------------------------------  IN: 750 mL / OUT: 0 mL / NET: 750 mL        PHYSICAL EXAM:  GENERAL: NAD, underweight  HEAD:  Atraumatic, Normocephalic  EYES:  conjunctiva and sclera clear  NECK: No JVD  CHEST/LUNG: nonlabored respirations  HEART: patient declined  ABDOMEN: Soft, Nontender, Nondistended; Bowel sounds present; ostomy bag with greenish output  EXTREMITIES:  moving all 4 extremities spontaneously  PSYCH: AAOx3  NEUROLOGY: non-focal    LABS:                        9.1    5.71  )-----------( 391      ( 30 Sep 2021 06:06 )             27.1     09-30    136  |  101  |  4<L>  ----------------------------<  111<H>  3.5   |  23  |  0.48<L>    Ca    9.3      30 Sep 2021 06:06  Phos  3.4     09-30  Mg     1.90     09-30    TPro  6.3  /  Alb  3.8  /  TBili  0.3  /  DBili  x   /  AST  17  /  ALT  58<H>  /  AlkPhos  78  09-30        RADIOLOGY & ADDITIONAL TESTS:    Imaging Personally Reviewed:    Consultant(s) Notes Reviewed:      Care Discussed with Consultants/Other Providers:   Giovanni Prajapati MS3    Patient is a 26y old  Female who presents with a chief complaint of crohn's flare up (30 Sep 2021 12:00)    SUBJECTIVE / OVERNIGHT EVENTS: Pt examined at bedside in NAD. Pt states that at this time she has no pain (0/10) and is resting comfortable. Pt was tolerating fluids well and was able to eat mashed potatoes yesterday with no issues. However, overnight the patient tried consuming some cheese doodles and experienced pain and vomiting afterwards, requiring Dilaudid. Output in her ostomy bad is normal. Denies any chest pain, SOB, current n/v, f/c. Pt denies any SI/safety concerns at this time and psych is no longer actively following.    MEDICATIONS  (STANDING):  chlorhexidine 2% Cloths 1 Application(s) Topical daily  fat emulsion (Fish Oil and Plant Based) 20% Infusion 12.5 mL/Hr (12.5 mL/Hr) IV Continuous <Continuous>  influenza   Vaccine 0.5 milliLiter(s) IntraMuscular once  ondansetron   Disintegrating Tablet 4 milliGRAM(s) Oral once  pantoprazole  Injectable 40 milliGRAM(s) IV Push two times a day  Parenteral Nutrition - Adult 1 Each (83 mL/Hr) TPN Continuous <Continuous>  sucralfate suspension 1 Gram(s) Oral <User Schedule>    MEDICATIONS  (PRN):  acetaminophen   Tablet .. 650 milliGRAM(s) Oral every 6 hours PRN Temp greater or equal to 38.5C (101.3F), Mild Pain (1 - 3)  HYDROmorphone  Injectable 0.25 milliGRAM(s) IV Push every 8 hours PRN Severe Pain (7 - 10)  lidocaine 2% Viscous 2 milliLiter(s) Swish and Spit three times a day PRN pain with swallowing  ondansetron Injectable 4 milliGRAM(s) IV Push every 6 hours PRN Nausea and/or Vomiting      Vital Signs Last 24 Hrs  T(C): 36.5 (01 Oct 2021 05:45), Max: 37.3 (30 Sep 2021 15:05)  T(F): 97.7 (01 Oct 2021 05:45), Max: 99.2 (30 Sep 2021 15:05)  HR: 99 (01 Oct 2021 05:45) (97 - 110)  BP: 100/68 (01 Oct 2021 05:45) (100/68 - 130/95)  BP(mean): --  RR: 16 (01 Oct 2021 05:45) (16 - 26)  SpO2: 100% (01 Oct 2021 05:45) (99% - 100%)  CAPILLARY BLOOD GLUCOSE      POCT Blood Glucose.: 95 mg/dL (01 Oct 2021 05:59)  POCT Blood Glucose.: 116 mg/dL (01 Oct 2021 01:14)  POCT Blood Glucose.: 125 mg/dL (30 Sep 2021 22:09)  POCT Blood Glucose.: 95 mg/dL (30 Sep 2021 18:17)  POCT Blood Glucose.: 108 mg/dL (30 Sep 2021 12:25)    I&O's Summary    30 Sep 2021 07:01  -  01 Oct 2021 07:00  --------------------------------------------------------  IN: 750 mL / OUT: 0 mL / NET: 750 mL        PHYSICAL EXAM:  GENERAL: NAD, underweight  HEAD:  Atraumatic, Normocephalic  EYES:  conjunctiva and sclera clear  NECK: No JVD  CHEST/LUNG: nonlabored respirations  HEART: patient declined  ABDOMEN: Soft, Nontender, Nondistended; Bowel sounds present; ostomy bag with greenish output  EXTREMITIES:  moving all 4 extremities spontaneously  PSYCH: AAOx3  NEUROLOGY: non-focal    LABS:                        9.1    5.71  )-----------( 391      ( 30 Sep 2021 06:06 )             27.1     09-30    136  |  101  |  4<L>  ----------------------------<  111<H>  3.5   |  23  |  0.48<L>    Ca    9.3      30 Sep 2021 06:06  Phos  3.4     09-30  Mg     1.90     09-30    TPro  6.3  /  Alb  3.8  /  TBili  0.3  /  DBili  x   /  AST  17  /  ALT  58<H>  /  AlkPhos  78  09-30    Calcium, Ionized in AM (10.01.21 @ 07:36)    Calcium, Ionized: 1.11 mmol/L      RADIOLOGY & ADDITIONAL TESTS:    Imaging Personally Reviewed:    Consultant(s) Notes Reviewed:      Care Discussed with Consultants/Other Providers:

## 2021-10-01 NOTE — PROGRESS NOTE ADULT - ATTENDING COMMENTS
Patient seen and examined with team. Mother and father in room.  26 year old female with PMH Crohn's disease s/p surgery for stricture/ileostomy placement in mid August 2021, s/p PICC line in place for TPN 2 weeks ago, presents with epigastric abdominal pain, nausea, and vomiting x 4 days, admitted to medicine for a potential Crohn's Flare up. Inflammatory markers have been normal, suggesting some other possible etiology. GI is following with EGD today to evaluate for other causes of her sx.   Patient and family notes patient has been tolerating food  Father happy that GI plans to treat the crohns  PE Vital Signs Last T 98, , Bp121/98, R17, Sat 100 % RA  Lung cta, cor rrr, abd soft n/t with R colostomy bag, ext neg e/c/c  A/p  #Odynophagia - most likely sec to Gastritis with gastric ulcerations , c/w PPi, c/w Carafate 30 min before meals. add viscus lidocaine s/swallow before meals. Advance meals to soft mechanical with ensured TID- patient is tolerating her meal  #Nutrition- TPN restarted. Plan to stop now that patient is eating and GI plans to treat Crohn's   #GI - greatly appreciated- starting treatment for crohns   OOB to chair with assistance daily  above plan d/w patient / team/ father and mother Patient seen and examined with team. Mother and father in room.  26 year old female with PMH Crohn's disease s/p surgery for stricture/ileostomy placement in mid August 2021, s/p PICC line in place for TPN 2 weeks ago, presents with epigastric abdominal pain, nausea, and vomiting x 4 days, admitted to medicine for a potential Crohn's Flare up. Inflammatory markers have been normal, suggesting some other possible etiology. GI is following with EGD today to evaluate for other causes of her sx.   Patient and family notes patient has been tolerating food  Father happy that GI plans to treat the crohns  PE Vital Signs Last T 98, , Bp121/98, R17, Sat 100 % RA  Lung cta, cor rrr, abd soft n/t with R colostomy bag, ext neg e/c/c  A/p  #Odynophagia - most likely sec to Gastritis with gastric ulcerations , c/w PPi, c/w Carafate 30 min before meals. add viscus lidocaine s/swallow before meals. Advance meals to soft mechanical with ensured TID- patient is tolerating her meal  #Nutrition- TPN restarted. Plan to stop now that patient is eating and GI plans to treat Crohn's   #GI - greatly appreciated- starting treatment for Crohn's- starting infliximab totay   OOB to chair with assistance daily  above plan d/w patient / team/ father and mother

## 2021-10-01 NOTE — PROGRESS NOTE ADULT - SUBJECTIVE AND OBJECTIVE BOX
Chief Complaint:  Patient is a 26y old  Female who presents with a chief complaint of crohn's flare up (01 Oct 2021 07:39)    Reason for consult: abd pain, hx of Crohn's disease    Interval Events: Patient tolerated mashed potatoes and soup yesterday without pain or vomiting. Then she had cheese puff and soup and had abd pain and vomiting after that.     Hospital Medications:  acetaminophen   Tablet .. 650 milliGRAM(s) Oral every 6 hours PRN  chlorhexidine 2% Cloths 1 Application(s) Topical daily  fat emulsion (Fish Oil and Plant Based) 20% Infusion 12.5 mL/Hr IV Continuous <Continuous>  HYDROmorphone  Injectable 0.25 milliGRAM(s) IV Push every 8 hours PRN  influenza   Vaccine 0.5 milliLiter(s) IntraMuscular once  lidocaine 2% Viscous 2 milliLiter(s) Swish and Spit three times a day PRN  ondansetron   Disintegrating Tablet 4 milliGRAM(s) Oral once  ondansetron Injectable 4 milliGRAM(s) IV Push every 6 hours PRN  pantoprazole  Injectable 40 milliGRAM(s) IV Push two times a day  Parenteral Nutrition - Adult 1 Each TPN Continuous <Continuous>  Parenteral Nutrition - Adult 1 Each TPN Continuous <Continuous>  sucralfate suspension 1 Gram(s) Oral <User Schedule>      ROS:   General:  No  fevers, chills, night sweats, fatigue  Eyes:  Good vision, no reported pain  ENT:  No sore throat, pain, runny nose  CV:  No pain, palpitations  Pulm:  No dyspnea, cough  GI:  See HPI, otherwise negative  :  No  incontinence, nocturia  Muscle:  No pain, weakness  Neuro:  No memory problems  Psych:  No insomnia, mood problems, depression  Endocrine:  No polyuria, polydipsia, cold/heat intolerance  Heme:  No petechiae, ecchymosis, easy bruisability  Skin:  No rash    PHYSICAL EXAM:   Vital Signs:  Vital Signs Last 24 Hrs  T(C): 36.5 (01 Oct 2021 05:45), Max: 37.3 (30 Sep 2021 15:05)  T(F): 97.7 (01 Oct 2021 05:45), Max: 99.2 (30 Sep 2021 15:05)  HR: 99 (01 Oct 2021 05:45) (99 - 110)  BP: 100/68 (01 Oct 2021 05:45) (100/68 - 130/95)  BP(mean): --  RR: 16 (01 Oct 2021 05:45) (16 - 19)  SpO2: 100% (01 Oct 2021 05:45) (99% - 100%)  Daily     Daily Weight in k.1 (30 Sep 2021 21:05)    GENERAL: no acute distress  NEURO: alert  HEENT: anicteric sclera, no conjunctival pallor appreciated  CHEST: no respiratory distress, no accessory muscle use  CARDIAC: regular rate, rhythm  ABDOMEN: soft, non-tender, non-distended, no rebound or guarding  EXTREMITIES: warm, well perfused, no edema  SKIN: no lesions noted    LABS: reviewed                        9.6    7.26  )-----------( 434      ( 01 Oct 2021 07:36 )             30.1     10-01    133<L>  |  102  |  8   ----------------------------<  85  4.1   |  23  |  0.50    Ca    9.2      01 Oct 2021 07:36  Phos  3.3     10-01  Mg     2.20     10-01    TPro  6.3  /  Alb  3.8  /  TBili  0.3  /  DBili  x   /  AST  17  /  ALT  58<H>  /  AlkPhos  78  09-30    LIVER FUNCTIONS - ( 30 Sep 2021 06:06 )  Alb: 3.8 g/dL / Pro: 6.3 g/dL / ALK PHOS: 78 U/L / ALT: 58 U/L / AST: 17 U/L / GGT: x             Interval Diagnostic Studies: see sunrise for full report

## 2021-10-01 NOTE — PROGRESS NOTE ADULT - ATTENDING COMMENTS
I agree with the above history, physical examination, chief complaint/diagnosis, and plan, which I have reviewed and edited where appropriate.  I agree with notes/assessment and detailed interval history of health care providers on my service.  I have seen and examined the patient.  I reviewed the laboratory and available data and agree with the history, physical assessment and plan.  I reviewed and discussed with all consultants, house staff and PA's.  The Nutrition Support Team (NST) discusses on an ongoing basis with the primary team and all consultants, House staff and PA's to have a permanent risk benefit analyses on all decisions and coordinating care.  I was physically present for the key portions of the evaluation and management (E/M) service provided.  26 year old female with PMH Crohn's disease s/p surgery for stricture/ileostomy placement receiving TPN   comfortable in bed   HEAD:  Atraumatic, Normocephalic  CHEST/LUNG: clear  ABDOMEN: soft, mildly tender to palpation, nondistended   labs reviewed - electrolytes adjusted  continue TPN with infusion volume of 2L/1175 kcal/day

## 2021-10-01 NOTE — PROGRESS NOTE ADULT - SUBJECTIVE AND OBJECTIVE BOX
ANESTHESIA POSTOP CHECK    26y Female POSTOP DAY 1     Vital Signs Last 24 Hrs  T(C): 36.7 (01 Oct 2021 11:26), Max: 37.3 (30 Sep 2021 15:05)  T(F): 98 (01 Oct 2021 11:26), Max: 99.2 (30 Sep 2021 15:05)  HR: 100 (01 Oct 2021 11:26) (99 - 110)  BP: 121/98 (01 Oct 2021 11:26) (100/68 - 121/98)  BP(mean): --  RR: 17 (01 Oct 2021 11:26) (16 - 19)  SpO2: 100% (01 Oct 2021 11:26) (99% - 100%)  I&O's Summary    30 Sep 2021 07:01  -  01 Oct 2021 07:00  --------------------------------------------------------  IN: 750 mL / OUT: 0 mL / NET: 750 mL        [x ] NO APPARENT ANESTHESIA COMPLICATIONS      Comments:

## 2021-10-01 NOTE — PROGRESS NOTE ADULT - ASSESSMENT
26 year old female with PMH Crohn's disease s/p surgery for stricture/ileostomy placement in mid August 2021, s/p PICC line in place for TPN 2 weeks ago, presents with epigastric abdominal pain, nausea, and vomiting x 4 days, admitted to medicine for a potential Crohn's Flare up. Inflammatory markers have been normal, suggesting some other possible etiology. GI is following with EGD yesterday to evaluate for other causes of her sx. EGD showed gastric friability and was biopsied. There was erythematous duodenopathy but no esophagitis.

## 2021-10-01 NOTE — PROGRESS NOTE ADULT - PROBLEM SELECTOR PLAN 1
admit to medicine  -s/p IV fluids x2L NS  -c/w maintenance IV NS @ 75cc/hr  -clear liquid diet. (pt tolerating po since was given morphine by ED.)  -House GI consulted  -Likely will not need inpatient Remicade infusion.  -Inflammatory markers wnl, decreasing likelihood of Crohn's flare per GI  -received Dilaudid and Zofran overnight after consuming cheese dooles  -fecal calprotectin wnl admit to medicine  -s/p IV fluids x2L NS  -c/w maintenance IV NS @ 75cc/hr  -clear liquid diet. (pt tolerating po since was given morphine by ED.)  -House GI consulted  -Likely will need inpatient Remicade infusion per GI due to endoscopy findings being suspicious for gastric Crohn's  -Inflammatory markers wnl, decreasing likelihood of Crohn's flare per GI  -received Dilaudid and Zofran overnight after consuming cheese doodles  -fecal calprotectin wnl admit to medicine  -s/p IV fluids x2L NS  -c/w maintenance IV NS @ 75cc/hr  -clear liquid diet. (pt tolerating po since was given morphine by ED.)--> soft mechanical and tolerating   -House GI consulted  -Likely will need inpatient Remicade infusion per GI due to endoscopy findings being suspicious for gastric Crohn's  -Inflammatory markers wnl, decreasing likelihood of Crohn's flare per GI  -received Dilaudid and Zofran overnight after consuming cheese doodles  -fecal calprotectin wnl

## 2021-10-01 NOTE — PROGRESS NOTE ADULT - ATTENDING COMMENTS
26F w recent diagnosis of Crohns Disease c/b ascending colon stricture s/p ileocolic resection at [facility], on remicade (x 3 doses), now here with abdominal pain, odynophagia.   - s/p EGD yesterday (please refer to report for full details), significant for erythema, edema, ulceration in gastric body. Biopsied to rule out UGI Crohns and H pylori.   - Continue PPI BID, carafate suspension  - Diet as tolerated (likely will need to continue soft diet +/- ensure supplementation until abdominal pain improves)   - Due for Remicade this week. Case d/w surgery team at [facility]; no contraindication to re-start at this point in post op period.   - Will give maintenance dose of Remicade today. Will be due in 8 wks. Patient already following in infusion center.   - Will need outpatient GI clinic follow up on d/c   - GI to follow, please call with questions

## 2021-10-01 NOTE — PROGRESS NOTE ADULT - SUBJECTIVE AND OBJECTIVE BOX
NUTRITION NOTE  [id number] MUTAMANDAA  ===============================    Interval events - Patient was seen and examined at bedside, no acute events overnight. Patient denies chest pain, shortness of breath, nausea or vomiting at this time. Patient remains on TPN at this time.     ROS: Except as noted above, all other systems reviewed and are negative     Allergies  No Known Allergies    PAST MEDICAL & SURGICAL HISTORY:  Crohn disease  Peripherally inserted central catheter (PICC) in place LUE PICC place on 2021  History of ileostomy Aug 2021    Vital Signs Last 24 Hrs  T(C): 36.7 (01 Oct 2021 11:26), Max: 37.3 (30 Sep 2021 15:05)  T(F): 98 (01 Oct 2021 11:26), Max: 99.2 (30 Sep 2021 15:05)  HR: 100 (01 Oct 2021 11:26) (99 - 110)  BP: 121/98 (01 Oct 2021 11:26) (100/68 - 121/98)  RR: 17 (01 Oct 2021 11:26) (16 - 19)  SpO2: 100% (01 Oct 2021 11:26) (99% - 100%)    MEDICATIONS  (STANDING):  chlorhexidine 2% Cloths 1 Application(s) Topical daily  fat emulsion (Fish Oil and Plant Based) 20% Infusion 12.5 mL/Hr (12.5 mL/Hr) IV Continuous <Continuous>  inFLIXimab-dyyb (INFLECTRA) IVPB 200 milliGRAM(s) IV Intermittent once  influenza   Vaccine 0.5 milliLiter(s) IntraMuscular once  ondansetron   Disintegrating Tablet 4 milliGRAM(s) Oral once  pantoprazole  Injectable 40 milliGRAM(s) IV Push two times a day  Parenteral Nutrition - Adult 1 Each (83 mL/Hr) TPN Continuous <Continuous>  Parenteral Nutrition - Adult 1 Each (83 mL/Hr) TPN Continuous <Continuous>  sucralfate suspension 1 Gram(s) Oral <User Schedule>    MEDICATIONS  (PRN):  acetaminophen   Tablet .. 650 milliGRAM(s) Oral every 6 hours PRN Temp greater or equal to 38.5C (101.3F), Mild Pain (1 - 3)  diphenhydrAMINE 25 milliGRAM(s) Oral once PRN Rash and/or Itching  HYDROmorphone  Injectable 0.25 milliGRAM(s) IV Push every 8 hours PRN Severe Pain (7 - 10)  lidocaine 2% Viscous 2 milliLiter(s) Swish and Spit three times a day PRN pain with swallowing  ondansetron Injectable 4 milliGRAM(s) IV Push every 6 hours PRN Nausea and/or Vomiting    I&O's Detail    30 Sep 2021 07:01  -  01 Oct 2021 07:00  --------------------------------------------------------  IN:    Fat Emulsion (Fish Oil &amp; Plant Based) 20% Infusion: 150 mL    Oral Fluid: 600 mL  Total IN: 750 mL    OUT:  Total OUT: 0 mL    Total NET: 750 mL    POCT Blood Glucose.: 125 mg/dL (01 Oct 2021 11:57)  POCT Blood Glucose.: 123 mg/dL (01 Oct 2021 09:10)  POCT Blood Glucose.: 95 mg/dL (01 Oct 2021 05:59)  POCT Blood Glucose.: 116 mg/dL (01 Oct 2021 01:14)  POCT Blood Glucose.: 125 mg/dL (30 Sep 2021 22:09)  POCT Blood Glucose.: 95 mg/dL (30 Sep 2021 18:17)    Daily Weight in k.1 (30 Sep 2021 21:05)    Daily Height in cm: 154.9 (30 Sep 2021 07:47)      Drug Dosing Weight  Height (cm): 154.9 (30 Sep 2021 08:11)  Weight (kg): 35.2 (30 Sep 2021 08:11)  BMI (kg/m2): 14.7 (30 Sep 2021 08:11)  BSA (m2): 1.26 (30 Sep 2021 08:11)    PHYSICAL EXAM:  GENERAL: malnourished, resting comfortably in bed   HEAD:  Atraumatic, Normocephalic  CHEST/LUNG: nonlabored respirations, CTAB  ABDOMEN: soft, mildly tender to palpation, nondistended   PSYCH: AAOx3  PICC Site: C/D/I    Diet: soft diet and TPN/lipids     LABORATORY                                 9.6    7.26  )-----------( 434      ( 01 Oct 2021 07:36 )             30.1   10-    133<L>  |  102  |  8   ----------------------------<  85  4.1   |  23  |  0.50    Ca    9.2      01 Oct 2021 07:36  Phos  3.3     10-  Mg     2.20     10-    TPro  6.3  /  Alb  3.8  /  TBili  0.3  /  DBili  x   /  AST  17  /  ALT  58<H>  /  AlkPhos  78  -    LIVER FUNCTIONS - ( 30 Sep 2021 06:06 )  Alb: 3.8 g/dL / Pro: 6.3 g/dL / ALK PHOS: 78 U/L / ALT: 58 U/L / AST: 17 U/L / GGT: x            Chol -- LDL -- HDL -- Trig 71    ASSESSMENT/PLAN:  26 year old female with PMH Crohn's disease s/p surgery for stricture/ileostomy placement in mid 2021, s/p PICC line in place for TPN 2 weeks ago, presents with epigastric abdominal pain, nausea, and vomiting x 4 days. GI is following with possible EGD to evaluate for esophagitis. Nutrition service consulted for resuming parenteral nutrition via PICC line that is in place. Patient states that she feels very weak and is frustrated that she is not able to take in anything by mouth (liquids or food) without nausea or emesis. TPN was initiated on 21.    continue TPN with infusion volume of 2L, TPN will provide 1175 kcal/day    labs reviewed - electrolytes adjusted in TPN bag     monitor fingersticks, obtain daily weights    continue parenteral nutrition at this time, will follow up with primary team on plan - will continue to monitor PO intake and tolerance and plan to wean off TPN     1.  Severe protein calorie malnutrition being optimized with TPN: CHO [175] gm.  AA [70] gm. SMOF Lipids [30] gm.  2.  Hyperglycemia managed with: [0] units of regular insulin    3.  Check fluid balance daily.  Strict I/O  [ ] [ ]   4.  Daily BMP, Ionized Calcium, Magnesium and Phosphorous   5.  Triglycerides at initiation of TPN and monthly [ ] [ ]     Nutrition Support [id number]  NUTRITION NOTE  [id number] MUTMAANDAA  ===============================    Interval events - Patient was seen and examined at bedside, no acute events overnight. Patient denies chest pain or shortness of breath at this time. Patient remains on TPN at this time. Patient states that she was able to eat some food yesterday without vomiting and reports emesis after breakfast this morning.     ROS: Except as noted above, all other systems reviewed and are negative     Allergies  No Known Allergies    PAST MEDICAL & SURGICAL HISTORY:  Crohn disease  Peripherally inserted central catheter (PICC) in place LUE PICC place on 2021  History of ileostomy Aug 2021    Vital Signs Last 24 Hrs  T(C): 36.7 (01 Oct 2021 11:26), Max: 37.3 (30 Sep 2021 15:05)  T(F): 98 (01 Oct 2021 11:26), Max: 99.2 (30 Sep 2021 15:05)  HR: 100 (01 Oct 2021 11:26) (99 - 110)  BP: 121/98 (01 Oct 2021 11:26) (100/68 - 121/98)  RR: 17 (01 Oct 2021 11:26) (16 - 19)  SpO2: 100% (01 Oct 2021 11:26) (99% - 100%)    MEDICATIONS  (STANDING):  chlorhexidine 2% Cloths 1 Application(s) Topical daily  fat emulsion (Fish Oil and Plant Based) 20% Infusion 12.5 mL/Hr (12.5 mL/Hr) IV Continuous <Continuous>  inFLIXimab-dyyb (INFLECTRA) IVPB 200 milliGRAM(s) IV Intermittent once  influenza   Vaccine 0.5 milliLiter(s) IntraMuscular once  ondansetron   Disintegrating Tablet 4 milliGRAM(s) Oral once  pantoprazole  Injectable 40 milliGRAM(s) IV Push two times a day  Parenteral Nutrition - Adult 1 Each (83 mL/Hr) TPN Continuous <Continuous>  Parenteral Nutrition - Adult 1 Each (83 mL/Hr) TPN Continuous <Continuous>  sucralfate suspension 1 Gram(s) Oral <User Schedule>    MEDICATIONS  (PRN):  acetaminophen   Tablet .. 650 milliGRAM(s) Oral every 6 hours PRN Temp greater or equal to 38.5C (101.3F), Mild Pain (1 - 3)  diphenhydrAMINE 25 milliGRAM(s) Oral once PRN Rash and/or Itching  HYDROmorphone  Injectable 0.25 milliGRAM(s) IV Push every 8 hours PRN Severe Pain (7 - 10)  lidocaine 2% Viscous 2 milliLiter(s) Swish and Spit three times a day PRN pain with swallowing  ondansetron Injectable 4 milliGRAM(s) IV Push every 6 hours PRN Nausea and/or Vomiting    I&O's Detail    30 Sep 2021 07:01  -  01 Oct 2021 07:00  --------------------------------------------------------  IN:    Fat Emulsion (Fish Oil &amp; Plant Based) 20% Infusion: 150 mL    Oral Fluid: 600 mL  Total IN: 750 mL    OUT:  Total OUT: 0 mL    Total NET: 750 mL    POCT Blood Glucose.: 125 mg/dL (01 Oct 2021 11:57)  POCT Blood Glucose.: 123 mg/dL (01 Oct 2021 09:10)  POCT Blood Glucose.: 95 mg/dL (01 Oct 2021 05:59)  POCT Blood Glucose.: 116 mg/dL (01 Oct 2021 01:14)  POCT Blood Glucose.: 125 mg/dL (30 Sep 2021 22:09)  POCT Blood Glucose.: 95 mg/dL (30 Sep 2021 18:17)    Daily Weight in k.1 (30 Sep 2021 21:05)    Daily Height in cm: 154.9 (30 Sep 2021 07:47)      Drug Dosing Weight  Height (cm): 154.9 (30 Sep 2021 08:11)  Weight (kg): 35.2 (30 Sep 2021 08:11)  BMI (kg/m2): 14.7 (30 Sep 2021 08:11)  BSA (m2): 1.26 (30 Sep 2021 08:11)    PHYSICAL EXAM:  GENERAL: malnourished, resting comfortably in bed   HEAD:  Atraumatic, Normocephalic  CHEST/LUNG: nonlabored respirations, CTAB  ABDOMEN: soft, mildly tender to palpation, nondistended   PSYCH: AAOx3  PICC Site: C/D/I    Diet: soft diet and TPN/lipids     LABORATORY                                 9.6    7.26  )-----------( 434      ( 01 Oct 2021 07:36 )             30.1   10-    133<L>  |  102  |  8   ----------------------------<  85  4.1   |  23  |  0.50    Ca    9.2      01 Oct 2021 07:36  Phos  3.3     10-  Mg     2.20     10    TPro  6.3  /  Alb  3.8  /  TBili  0.3  /  DBili  x   /  AST  17  /  ALT  58<H>  /  AlkPhos  78      LIVER FUNCTIONS - ( 30 Sep 2021 06:06 )  Alb: 3.8 g/dL / Pro: 6.3 g/dL / ALK PHOS: 78 U/L / ALT: 58 U/L / AST: 17 U/L / GGT: x            Chol -- LDL -- HDL -- Trig 71    ASSESSMENT/PLAN:  26 year old female with PMH Crohn's disease s/p surgery for stricture/ileostomy placement in mid 2021, s/p PICC line in place for TPN 2 weeks ago, presents with epigastric abdominal pain, nausea, and vomiting x 4 days. GI is following with possible EGD to evaluate for esophagitis. Nutrition service consulted for resuming parenteral nutrition via PICC line that is in place. Patient states that she feels very weak and is frustrated that she is not able to take in anything by mouth (liquids or food) without nausea or emesis. TPN was initiated on 21.    continue TPN with infusion volume of 2L, TPN will provide 1175 kcal/day    labs reviewed - electrolytes adjusted in TPN bag     monitor fingersticks, obtain daily weights    continue parenteral nutrition at this time, will follow up with primary team on plan - will continue to monitor PO intake and tolerance and plan to wean off TPN     1.  Severe protein calorie malnutrition being optimized with TPN: CHO [175] gm.  AA [70] gm. SMOF Lipids [30] gm.  2.  Hyperglycemia managed with: [0] units of regular insulin    3.  Check fluid balance daily.  Strict I/O  [ ] [ ]   4.  Daily BMP, Ionized Calcium, Magnesium and Phosphorous   5.  Triglycerides at initiation of TPN and monthly [ ] [ ]     Nutrition Support [id number]

## 2021-10-01 NOTE — PROGRESS NOTE ADULT - ASSESSMENT
26F w/ history of Crohn's disease c/b ascending colon stricture s/p resection and ileostomy at [facility], on remicade since 6/2021, presenting w/ persistent abd pain, n/v.     Impression:  #Nausea, vomiting, abd pain - Likely multifactorial. Contributing factors include esophagitis, severe erosive gastritis (seen on EGD), and contribution from Crohn's disease. Regarding Crohn's disease, inflammatory markers are negative, but EGD findings raise possibility of UGI Crohn's. CT scan read as some enteritis in RLQ as well. Patient is on remicade as an outpatient and overdue by approx 1 week for her remicade dose.   #Crohn's disease - started on remicade at time of diagnosis in 6/2021.  s/p recent ileocolic resection for ascending colon stricture at [facility].   #Elevated liver enzymes - unclear etiology, can consider MRI/MRCP to r/o biliary sludge (though no e/o cholestasis).   #C/f celiac disease - due to increased intraepithelial lymphocytes on biopsies, however negative serologic work-up and symptoms do not correlate.     Recommendations:  - Continue symptomatic management of esophagitis and gastritis w/ IV PPI BID and sucralfate liquid 1g q6h.  - Antiemetics PRN.  - Soft diet as tolerated (details d/w patient today).  - Given that patient has high risk factors for Crohn's (young age, stricture and surgery on initial presentation), enteritis on CT, upper endoscopy findings, will plan for remicade today with tylenol/benadryl premeds.  - GI will continue to follow.    Faustino Freitas  Gastroenterology/Hepatology Fellow  Available via Microsoft Teams    NON-URGENT CONSULTS:  Please email [email or web address]  [email or web address]  AT NIGHT AND ON WEEKENDS:  Contact on-call GI fellow via answering service ([phone]) from 5pm-8am and on weekends/holidays  MONDAY-FRIDAY 8AM-5PM:  Pager# [phone] ([facility]) or [phone] ([facility])  GI Phone# [phone] ([facility])

## 2021-10-02 LAB
ANION GAP SERPL CALC-SCNC: 9 MMOL/L — SIGNIFICANT CHANGE UP (ref 7–14)
BASOPHILS # BLD AUTO: 0.04 K/UL — SIGNIFICANT CHANGE UP (ref 0–0.2)
BASOPHILS NFR BLD AUTO: 0.7 % — SIGNIFICANT CHANGE UP (ref 0–2)
BUN SERPL-MCNC: 14 MG/DL — SIGNIFICANT CHANGE UP (ref 7–23)
CA-I BLD-SCNC: 1.27 MMOL/L — SIGNIFICANT CHANGE UP (ref 1.15–1.29)
CALCIUM SERPL-MCNC: 9.1 MG/DL — SIGNIFICANT CHANGE UP (ref 8.4–10.5)
CHLORIDE SERPL-SCNC: 98 MMOL/L — SIGNIFICANT CHANGE UP (ref 98–107)
CO2 SERPL-SCNC: 26 MMOL/L — SIGNIFICANT CHANGE UP (ref 22–31)
CREAT SERPL-MCNC: 0.47 MG/DL — LOW (ref 0.5–1.3)
EOSINOPHIL # BLD AUTO: 0.35 K/UL — SIGNIFICANT CHANGE UP (ref 0–0.5)
EOSINOPHIL NFR BLD AUTO: 6.2 % — HIGH (ref 0–6)
GLUCOSE BLDC GLUCOMTR-MCNC: 112 MG/DL — HIGH (ref 70–99)
GLUCOSE BLDC GLUCOMTR-MCNC: 115 MG/DL — HIGH (ref 70–99)
GLUCOSE SERPL-MCNC: 100 MG/DL — HIGH (ref 70–99)
HCT VFR BLD CALC: 28.6 % — LOW (ref 34.5–45)
HGB BLD-MCNC: 9.2 G/DL — LOW (ref 11.5–15.5)
IANC: 3.31 K/UL — SIGNIFICANT CHANGE UP (ref 1.5–8.5)
IMM GRANULOCYTES NFR BLD AUTO: 0.7 % — SIGNIFICANT CHANGE UP (ref 0–1.5)
LYMPHOCYTES # BLD AUTO: 1.56 K/UL — SIGNIFICANT CHANGE UP (ref 1–3.3)
LYMPHOCYTES # BLD AUTO: 27.5 % — SIGNIFICANT CHANGE UP (ref 13–44)
MAGNESIUM SERPL-MCNC: 2.2 MG/DL — SIGNIFICANT CHANGE UP (ref 1.6–2.6)
MCHC RBC-ENTMCNC: 27.8 PG — SIGNIFICANT CHANGE UP (ref 27–34)
MCHC RBC-ENTMCNC: 32.2 GM/DL — SIGNIFICANT CHANGE UP (ref 32–36)
MCV RBC AUTO: 86.4 FL — SIGNIFICANT CHANGE UP (ref 80–100)
MONOCYTES # BLD AUTO: 0.38 K/UL — SIGNIFICANT CHANGE UP (ref 0–0.9)
MONOCYTES NFR BLD AUTO: 6.7 % — SIGNIFICANT CHANGE UP (ref 2–14)
NEUTROPHILS # BLD AUTO: 3.31 K/UL — SIGNIFICANT CHANGE UP (ref 1.8–7.4)
NEUTROPHILS NFR BLD AUTO: 58.2 % — SIGNIFICANT CHANGE UP (ref 43–77)
NRBC # BLD: 0 /100 WBCS — SIGNIFICANT CHANGE UP
NRBC # FLD: 0 K/UL — SIGNIFICANT CHANGE UP
PHOSPHATE SERPL-MCNC: 3.5 MG/DL — SIGNIFICANT CHANGE UP (ref 2.5–4.5)
PLATELET # BLD AUTO: 394 K/UL — SIGNIFICANT CHANGE UP (ref 150–400)
POTASSIUM SERPL-MCNC: 4 MMOL/L — SIGNIFICANT CHANGE UP (ref 3.5–5.3)
POTASSIUM SERPL-SCNC: 4 MMOL/L — SIGNIFICANT CHANGE UP (ref 3.5–5.3)
RBC # BLD: 3.31 M/UL — LOW (ref 3.8–5.2)
RBC # FLD: 12.9 % — SIGNIFICANT CHANGE UP (ref 10.3–14.5)
SODIUM SERPL-SCNC: 133 MMOL/L — LOW (ref 135–145)
WBC # BLD: 5.68 K/UL — SIGNIFICANT CHANGE UP (ref 3.8–10.5)
WBC # FLD AUTO: 5.68 K/UL — SIGNIFICANT CHANGE UP (ref 3.8–10.5)

## 2021-10-02 PROCEDURE — 99232 SBSQ HOSP IP/OBS MODERATE 35: CPT | Mod: GC

## 2021-10-02 PROCEDURE — 99232 SBSQ HOSP IP/OBS MODERATE 35: CPT

## 2021-10-02 RX ORDER — ONDANSETRON 8 MG/1
4 TABLET, FILM COATED ORAL ONCE
Refills: 0 | Status: COMPLETED | OUTPATIENT
Start: 2021-10-02 | End: 2021-10-02

## 2021-10-02 RX ORDER — SODIUM CHLORIDE 9 MG/ML
500 INJECTION INTRAMUSCULAR; INTRAVENOUS; SUBCUTANEOUS ONCE
Refills: 0 | Status: COMPLETED | OUTPATIENT
Start: 2021-10-02 | End: 2021-10-02

## 2021-10-02 RX ORDER — HYDROMORPHONE HYDROCHLORIDE 2 MG/ML
0.25 INJECTION INTRAMUSCULAR; INTRAVENOUS; SUBCUTANEOUS ONCE
Refills: 0 | Status: DISCONTINUED | OUTPATIENT
Start: 2021-10-02 | End: 2021-10-02

## 2021-10-02 RX ORDER — ELECTROLYTE SOLUTION,INJ
1 VIAL (ML) INTRAVENOUS
Refills: 0 | Status: DISCONTINUED | OUTPATIENT
Start: 2021-10-02 | End: 2021-10-02

## 2021-10-02 RX ORDER — ACETAMINOPHEN 500 MG
500 TABLET ORAL ONCE
Refills: 0 | Status: COMPLETED | OUTPATIENT
Start: 2021-10-02 | End: 2021-10-02

## 2021-10-02 RX ORDER — I.V. FAT EMULSION 20 G/100ML
12.5 EMULSION INTRAVENOUS
Qty: 30 | Refills: 0 | Status: DISCONTINUED | OUTPATIENT
Start: 2021-10-02 | End: 2021-10-03

## 2021-10-02 RX ADMIN — CHLORHEXIDINE GLUCONATE 1 APPLICATION(S): 213 SOLUTION TOPICAL at 12:12

## 2021-10-02 RX ADMIN — HYDROMORPHONE HYDROCHLORIDE 0.25 MILLIGRAM(S): 2 INJECTION INTRAMUSCULAR; INTRAVENOUS; SUBCUTANEOUS at 08:22

## 2021-10-02 RX ADMIN — HYDROMORPHONE HYDROCHLORIDE 0.25 MILLIGRAM(S): 2 INJECTION INTRAMUSCULAR; INTRAVENOUS; SUBCUTANEOUS at 12:15

## 2021-10-02 RX ADMIN — HYDROMORPHONE HYDROCHLORIDE 0.25 MILLIGRAM(S): 2 INJECTION INTRAMUSCULAR; INTRAVENOUS; SUBCUTANEOUS at 22:15

## 2021-10-02 RX ADMIN — HYDROMORPHONE HYDROCHLORIDE 0.25 MILLIGRAM(S): 2 INJECTION INTRAMUSCULAR; INTRAVENOUS; SUBCUTANEOUS at 16:08

## 2021-10-02 RX ADMIN — ONDANSETRON 4 MILLIGRAM(S): 8 TABLET, FILM COATED ORAL at 16:08

## 2021-10-02 RX ADMIN — ONDANSETRON 4 MILLIGRAM(S): 8 TABLET, FILM COATED ORAL at 22:00

## 2021-10-02 RX ADMIN — Medication 1 EACH: at 18:48

## 2021-10-02 RX ADMIN — PANTOPRAZOLE SODIUM 40 MILLIGRAM(S): 20 TABLET, DELAYED RELEASE ORAL at 18:51

## 2021-10-02 RX ADMIN — HYDROMORPHONE HYDROCHLORIDE 0.25 MILLIGRAM(S): 2 INJECTION INTRAMUSCULAR; INTRAVENOUS; SUBCUTANEOUS at 08:07

## 2021-10-02 RX ADMIN — ONDANSETRON 4 MILLIGRAM(S): 8 TABLET, FILM COATED ORAL at 12:08

## 2021-10-02 RX ADMIN — SODIUM CHLORIDE 250 MILLILITER(S): 9 INJECTION INTRAMUSCULAR; INTRAVENOUS; SUBCUTANEOUS at 07:31

## 2021-10-02 RX ADMIN — HYDROMORPHONE HYDROCHLORIDE 0.25 MILLIGRAM(S): 2 INJECTION INTRAMUSCULAR; INTRAVENOUS; SUBCUTANEOUS at 21:59

## 2021-10-02 RX ADMIN — HYDROMORPHONE HYDROCHLORIDE 0.25 MILLIGRAM(S): 2 INJECTION INTRAMUSCULAR; INTRAVENOUS; SUBCUTANEOUS at 16:28

## 2021-10-02 RX ADMIN — ONDANSETRON 4 MILLIGRAM(S): 8 TABLET, FILM COATED ORAL at 08:07

## 2021-10-02 RX ADMIN — PANTOPRAZOLE SODIUM 40 MILLIGRAM(S): 20 TABLET, DELAYED RELEASE ORAL at 07:17

## 2021-10-02 RX ADMIN — I.V. FAT EMULSION 12.5 ML/HR: 20 EMULSION INTRAVENOUS at 18:48

## 2021-10-02 RX ADMIN — Medication 500 MILLIGRAM(S): at 11:00

## 2021-10-02 RX ADMIN — HYDROMORPHONE HYDROCHLORIDE 0.25 MILLIGRAM(S): 2 INJECTION INTRAMUSCULAR; INTRAVENOUS; SUBCUTANEOUS at 11:48

## 2021-10-02 RX ADMIN — Medication 200 MILLIGRAM(S): at 10:40

## 2021-10-02 NOTE — PROGRESS NOTE ADULT - ATTENDING COMMENTS
I agree with the above history, physical examination, chief complaint/diagnosis, and plan, which I have reviewed and edited where appropriate.  I agree with notes/assessment and detailed interval history of health care providers on my service.  I have seen and examined the patient.  I reviewed the laboratory and available data and agree with the history, physical assessment and plan.  I reviewed and discussed with all consultants, house staff and PA's.  The Nutrition Support Team (NST) discusses on an ongoing basis with the primary team and all consultants, House staff and PA's to have a permanent risk benefit analyses on all decisions and coordinating care.  I was physically present for the key portions of the evaluation and management (E/M) service provided.  26 year old female with PMH Crohn's disease s/p surgery for stricture/ileostomy placement receiving TPN  comfortably in bed   HEAD:  Atraumatic,   CHEST/LUNG: clear  ABDOMEN: soft, mildly tender to palpation, nondistended   labs reviewed - increased NaCl  continue TPN with infusion volume of 2L/1175 kcal/day

## 2021-10-02 NOTE — PROGRESS NOTE ADULT - ASSESSMENT
26F w/ history of Crohn's disease c/b ascending colon stricture s/p resection and ileostomy at [facility], on remicade since 6/2021, presenting w/ persistent abd pain, n/v.     Impression:  #Nausea, vomiting, abd pain - Likely multifactorial. Contributing factors include esophagitis, severe erosive gastritis (seen on EGD), and contribution from Crohn's disease. Regarding Crohn's disease, inflammatory markers are negative, but EGD findings raise possibility of UGI Crohn's. CT scan read as some enteritis in RLQ as well. Patient is on remicade as an outpatient and overdue by approx 1 week for her remicade dose.   #Crohn's disease - started on remicade at time of diagnosis in 6/2021.  s/p recent ileocolic resection for ascending colon stricture at [facility].   #Elevated liver enzymes - unclear etiology, can consider MRI/MRCP to r/o biliary sludge (though no e/o cholestasis).   #C/f celiac disease - due to increased intraepithelial lymphocytes on biopsies, however negative serologic work-up and symptoms do not correlate.     Recommendations:  - Continue symptomatic management of esophagitis and gastritis w/ IV PPI BID and sucralfate liquid 1g q6h.  - Antiemetics PRN.  - Soft diet as tolerated (details d/w patient today).  - Given that patient has high risk factors for Crohn's (young age, stricture and surgery on initial presentation), enteritis on CT, upper endoscopy findings, will plan for remicade today with tylenol/benadryl premeds.  - GI will continue to follow.      Gastroenterology/Hepatology Fellow  Available via Microsoft Teams    NON-URGENT CONSULTS:  Please email [email or web address]  [email or web address]  AT NIGHT AND ON WEEKENDS:  Contact on-call GI fellow via answering service ([phone]) from 5pm-8am and on weekends/holidays  MONDAY-FRIDAY 8AM-5PM:  Pager# [phone] ([facility]) or [phone] ([facility])  GI Phone# [phone] ([facility])   26F w/ history of Crohn's disease c/b ascending colon stricture s/p resection and ileostomy at [facility], on remicade since 6/2021, presenting w/ persistent abd pain, n/v.     Impression:  #Nausea, vomiting, abd pain - Likely multifactorial. Contributing factors include esophagitis, severe erosive gastritis (seen on EGD), and contribution from Crohn's disease. Regarding Crohn's disease, inflammatory markers are negative, but EGD findings raise possibility of UGI Crohn's. CT scan read as some enteritis in RLQ as well. Patient is on remicade as an outpatient and overdue by approx 1 week for her remicade dose.   #Crohn's disease - started on remicade at time of diagnosis in 6/2021.  s/p recent ileocolic resection for ascending colon stricture at [facility].   #Elevated liver enzymes - unclear etiology, can consider MRI/MRCP to r/o biliary sludge (though no e/o cholestasis).   #C/f celiac disease - due to increased intraepithelial lymphocytes on biopsies, however negative serologic work-up and symptoms do not correlate.     Recommendations:  - Antiemetics PRN.  - NPO for now given worsening, on TPN  - Monitor improvement on Remicade  - Pain meds IV  - GI will continue to follow.    Cole Ndiaye MD  Gastroenterology/Hepatology Fellow  1st option: [phone] (text or call), ONLY available from 7:00 am to 5:00 pm.   **Contact on-call GI fellow via answering service ([phone]) from 5pm-7am AND on weekends/holidays**  2nd option: Available via Microsoft Teams  3rd option: Pager: [phone]    NON-URGENT CONSULTS:  Please email [email or web address]  [email or web address]  AT NIGHT AND ON WEEKENDS:  Contact on-call GI fellow via answering service ([phone]) from 5pm-8am AND on weekends/holidays

## 2021-10-02 NOTE — PROGRESS NOTE ADULT - PROBLEM SELECTOR PLAN 1
Hx of Crohn's with recent colonic resection for stricture  -Diet advanced, intermittently tolerating  -GI following, appreciate recs  -S/p EGD 9/30 with gastric inflammation, biopsied to r/o gastric Crohn;s  -S/p infliximab 10/2  -Inflammatory markers wnl, fecal calprotectin wnl

## 2021-10-02 NOTE — PROGRESS NOTE ADULT - PROBLEM SELECTOR PLAN 2
- Possibly related to esophagitis/reflux, other etiologies include active Crohn's (though no objective evidence pointing towards flare)  - Treat symptomatically with IV PPI BID, add sucralfate liquid 1g q6h for esophagitis per GI recs  - avoid opiates in this patient given increased mortality with opiates in IBD patients  - Diet as tolerated for now, trial of advancing to soft diet  - Antiemetics as needed. Check QTc qOD  - obtain [facility] records from recent visit (imaging, EGD, recent GI note)  - TPN restarted, plan to wean if tolerating diet  - added lidocaine swish and swallow before meals to help with pain  - Psychiatry no longer actively following  - EGD showed gastric friability and was biopsied. There was erythematous duodenopathy but no esophagitis.

## 2021-10-02 NOTE — PROGRESS NOTE ADULT - ATTENDING COMMENTS
26F w/ history of Crohn's disease c/b ascending colon stricture s/p resection and ileostomy at [facility], on Remicade since 6/2021, presenting w/ persistent abd pain, n/v, started on Remicade 1 day ago for suspicion of gastric Crohns    - low residue diet  - DVT ppx  - Check sed rate/CRP    Annemarie Acevedo MD   of Medicine, Gastroenterology  [address] [place] [address], [address]  [place] [address]  [phone]

## 2021-10-02 NOTE — PROGRESS NOTE ADULT - SUBJECTIVE AND OBJECTIVE BOX
~~~~~~~~~~~~~~~~~~~~~~~~~~~~~~~~~~  Waqas Carpenter, PGY2  Pager [phone] (NS) [phone] ([facility])  After 7: Night Float Pager  ~~~~~~~~~~~~~~~~~~~~~~~~~~~~~~~~~~    PROGRESS NOTE:     Patient is a 26y old  Female who presents with a chief complaint of crohn's flare up (01 Oct 2021 12:37)      SUBJECTIVE / OVERNIGHT EVENTS:  Soft BP this morning, refusing physical exam.    ADDITIONAL REVIEW OF SYSTEMS:    MEDICATIONS  (STANDING):  chlorhexidine 2% Cloths 1 Application(s) Topical daily  fat emulsion (Fish Oil and Plant Based) 20% Infusion 12.5 mL/Hr (12.5 mL/Hr) IV Continuous <Continuous>  influenza   Vaccine 0.5 milliLiter(s) IntraMuscular once  ondansetron   Disintegrating Tablet 4 milliGRAM(s) Oral once  pantoprazole  Injectable 40 milliGRAM(s) IV Push two times a day  Parenteral Nutrition - Adult 1 Each (83 mL/Hr) TPN Continuous <Continuous>  sodium chloride 0.9% Bolus 500 milliLiter(s) IV Bolus once  sucralfate suspension 1 Gram(s) Oral <User Schedule>    MEDICATIONS  (PRN):  acetaminophen   Tablet .. 650 milliGRAM(s) Oral every 6 hours PRN Temp greater or equal to 38.5C (101.3F), Mild Pain (1 - 3)  HYDROmorphone  Injectable 0.25 milliGRAM(s) IV Push every 8 hours PRN Severe Pain (7 - 10)  lidocaine 2% Viscous 2 milliLiter(s) Swish and Spit three times a day PRN pain with swallowing  ondansetron Injectable 4 milliGRAM(s) IV Push every 6 hours PRN Nausea and/or Vomiting      CAPILLARY BLOOD GLUCOSE      POCT Blood Glucose.: 112 mg/dL (02 Oct 2021 06:17)  POCT Blood Glucose.: 92 mg/dL (01 Oct 2021 17:36)  POCT Blood Glucose.: 125 mg/dL (01 Oct 2021 11:57)  POCT Blood Glucose.: 123 mg/dL (01 Oct 2021 09:10)    I&O's Summary      PHYSICAL EXAM:  Vital Signs Last 24 Hrs  T(C): 36.5 (02 Oct 2021 07:06), Max: 36.8 (01 Oct 2021 14:00)  T(F): 97.7 (02 Oct 2021 07:06), Max: 98.3 (01 Oct 2021 19:30)  HR: 92 (02 Oct 2021 07:06) (92 - 103)  BP: 86/45 (02 Oct 2021 07:06) (86/45 - 123/96)  BP(mean): --  RR: 17 (02 Oct 2021 07:06) (17 - 17)  SpO2: 100% (02 Oct 2021 07:06) (100% - 100%)    CONSTITUTIONAL: No acute distress, remainder of physical exam refused by patient      LABS:                        9.6    7.26  )-----------( 434      ( 01 Oct 2021 07:36 )             30.1     10-01    133<L>  |  102  |  8   ----------------------------<  85  4.1   |  23  |  0.50    Ca    9.2      01 Oct 2021 07:36  Phos  3.3     10-01  Mg     2.20     10-01                  RADIOLOGY & ADDITIONAL TESTS:  Results Reviewed:   Imaging Personally Reviewed:  Electrocardiogram Personally Reviewed:    COORDINATION OF CARE:  Care Discussed with Consultants/Other Providers [Y/N]:  Prior or Outpatient Records Reviewed [Y/N]:   ~~~~~~~~~~~~~~~~~~~~~~~~~~~~~~~~~~  Waqas Carpenter, PGY2  Pager [phone] (NS) [phone] ([facility])  After 7: Night Float Pager  ~~~~~~~~~~~~~~~~~~~~~~~~~~~~~~~~~~    PROGRESS NOTE:     Patient is a 26y old  Female who presents with a chief complaint of crohn's flare up (01 Oct 2021 12:37)      SUBJECTIVE / OVERNIGHT EVENTS:  Soft BP this morning, given 500cc bolus. Reports burning abdominal and retrosternal pain.    ADDITIONAL REVIEW OF SYSTEMS:    MEDICATIONS  (STANDING):  chlorhexidine 2% Cloths 1 Application(s) Topical daily  fat emulsion (Fish Oil and Plant Based) 20% Infusion 12.5 mL/Hr (12.5 mL/Hr) IV Continuous <Continuous>  influenza   Vaccine 0.5 milliLiter(s) IntraMuscular once  ondansetron   Disintegrating Tablet 4 milliGRAM(s) Oral once  pantoprazole  Injectable 40 milliGRAM(s) IV Push two times a day  Parenteral Nutrition - Adult 1 Each (83 mL/Hr) TPN Continuous <Continuous>  sodium chloride 0.9% Bolus 500 milliLiter(s) IV Bolus once  sucralfate suspension 1 Gram(s) Oral <User Schedule>    MEDICATIONS  (PRN):  acetaminophen   Tablet .. 650 milliGRAM(s) Oral every 6 hours PRN Temp greater or equal to 38.5C (101.3F), Mild Pain (1 - 3)  HYDROmorphone  Injectable 0.25 milliGRAM(s) IV Push every 8 hours PRN Severe Pain (7 - 10)  lidocaine 2% Viscous 2 milliLiter(s) Swish and Spit three times a day PRN pain with swallowing  ondansetron Injectable 4 milliGRAM(s) IV Push every 6 hours PRN Nausea and/or Vomiting      CAPILLARY BLOOD GLUCOSE      POCT Blood Glucose.: 112 mg/dL (02 Oct 2021 06:17)  POCT Blood Glucose.: 92 mg/dL (01 Oct 2021 17:36)  POCT Blood Glucose.: 125 mg/dL (01 Oct 2021 11:57)  POCT Blood Glucose.: 123 mg/dL (01 Oct 2021 09:10)    I&O's Summary      VITALS:   T(C): 36.5 (10-02-21 @ 07:06), Max: 36.8 (10-01-21 @ 14:00)  HR: 98 (10-02-21 @ 07:53) (92 - 103)  BP: 106/72 (10-02-21 @ 07:53) (86/45 - 123/96)  RR: 16 (10-02-21 @ 07:53) (16 - 17)  SpO2: 100% (10-02-21 @ 07:53) (100% - 100%)    GENERAL: No acute distress  HEAD:  Atraumatic, Normocephalic  EYES: EOMI, PERRLA, conjunctiva and sclera clear  ENT: Moist mucous membranes  NECK: Supple, No JVD  CHEST/LUNG: Clear to auscultation bilaterally; No rales, rhonchi, wheezing, or rubs. Unlabored respirations  HEART: tachycardic, regular rhythm; No murmurs, rubs, or gallops  ABDOMEN: BSx4; Soft, ostomy present  EXTREMITIES:  2+ Peripheral Pulses, brisk capillary refill. No clubbing, cyanosis, or edema  NERVOUS SYSTEM:  A&Ox3, no focal deficits   SKIN: No rashes or lesions          LABS:                        9.6    7.26  )-----------( 434      ( 01 Oct 2021 07:36 )             30.1     10-01    133<L>  |  102  |  8   ----------------------------<  85  4.1   |  23  |  0.50    Ca    9.2      01 Oct 2021 07:36  Phos  3.3     10-01  Mg     2.20     10-01                  RADIOLOGY & ADDITIONAL TESTS:  Results Reviewed:   Imaging Personally Reviewed:  Electrocardiogram Personally Reviewed:    COORDINATION OF CARE:  Care Discussed with Consultants/Other Providers [Y/N]:  Prior or Outpatient Records Reviewed [Y/N]:

## 2021-10-02 NOTE — PROGRESS NOTE ADULT - SUBJECTIVE AND OBJECTIVE BOX
Interval Events:       Hospital Medications:  acetaminophen   Tablet .. 650 milliGRAM(s) Oral every 6 hours PRN  acetaminophen  IVPB .. 500 milliGRAM(s) IV Intermittent once  chlorhexidine 2% Cloths 1 Application(s) Topical daily  fat emulsion (Fish Oil and Plant Based) 20% Infusion 12.5 mL/Hr IV Continuous <Continuous>  HYDROmorphone  Injectable 0.25 milliGRAM(s) IV Push every 8 hours PRN  influenza   Vaccine 0.5 milliLiter(s) IntraMuscular once  lidocaine 2% Viscous 2 milliLiter(s) Swish and Spit three times a day PRN  ondansetron   Disintegrating Tablet 4 milliGRAM(s) Oral once  ondansetron Injectable 4 milliGRAM(s) IV Push every 6 hours PRN  pantoprazole  Injectable 40 milliGRAM(s) IV Push two times a day  Parenteral Nutrition - Adult 1 Each TPN Continuous <Continuous>  Parenteral Nutrition - Adult 1 Each TPN Continuous <Continuous>  sucralfate suspension 1 Gram(s) Oral <User Schedule>        PHYSICAL EXAM:   Vital Signs:  Vital Signs Last 24 Hrs  T(C): 36.5 (02 Oct 2021 07:06), Max: 36.8 (01 Oct 2021 14:00)  T(F): 97.7 (02 Oct 2021 07:06), Max: 98.3 (01 Oct 2021 19:30)  HR: 98 (02 Oct 2021 07:53) (92 - 103)  BP: 106/72 (02 Oct 2021 07:53) (86/45 - 123/96)  BP(mean): --  RR: 16 (02 Oct 2021 07:53) (16 - 17)  SpO2: 100% (02 Oct 2021 07:53) (100% - 100%)  Daily     Daily Weight in k.4 (02 Oct 2021 07:07)    GENERAL:  NAD, Appears stated age  HEENT:  NC/AT,  conjunctivae clear and pink, sclera -anicteric  CHEST:  Normal Effort, Breath sounds clear  HEART:  RRR, S1 + S2, no murmurs  ABDOMEN:  Soft, non-tender, non-distended, normoactive bowel sounds,  no masses  EXTREMITIES:  no cyanosis or edema  SKIN:  Warm & Dry. No rash or erythema  NEURO:  Alert, oriented, no focal deficit    LABS:                        9.2    5.68  )-----------( 394      ( 02 Oct 2021 07:40 )             28.6     Mean Cell Volume: 86.4 fL (10-02- @ 07:40)    10-    133<L>  |  98  |  14  ----------------------------<  100<H>  4.0   |  26  |  0.47<L>    Ca    9.1      02 Oct 2021 07:40  Phos  3.5     10  Mg     2.20     10-02                                    9.2    5.68  )-----------( 394      ( 02 Oct 2021 07:40 )             28.6                         9.6    7.26  )-----------( 434      ( 01 Oct 2021 07:36 )             30.1                         9.1    5.71  )-----------( 391      ( 30 Sep 2021 06:06 )             27.1       Imaging: Images reviewed no new images appreciated.         Interval Events:   Patient not feeling any better, drank some soda and started vomiting and exacerbated her pain.     Hospital Medications:  acetaminophen   Tablet .. 650 milliGRAM(s) Oral every 6 hours PRN  acetaminophen  IVPB .. 500 milliGRAM(s) IV Intermittent once  chlorhexidine 2% Cloths 1 Application(s) Topical daily  fat emulsion (Fish Oil and Plant Based) 20% Infusion 12.5 mL/Hr IV Continuous <Continuous>  HYDROmorphone  Injectable 0.25 milliGRAM(s) IV Push every 8 hours PRN  influenza   Vaccine 0.5 milliLiter(s) IntraMuscular once  lidocaine 2% Viscous 2 milliLiter(s) Swish and Spit three times a day PRN  ondansetron   Disintegrating Tablet 4 milliGRAM(s) Oral once  ondansetron Injectable 4 milliGRAM(s) IV Push every 6 hours PRN  pantoprazole  Injectable 40 milliGRAM(s) IV Push two times a day  Parenteral Nutrition - Adult 1 Each TPN Continuous <Continuous>  Parenteral Nutrition - Adult 1 Each TPN Continuous <Continuous>  sucralfate suspension 1 Gram(s) Oral <User Schedule>        PHYSICAL EXAM:   Vital Signs:  Vital Signs Last 24 Hrs  T(C): 36.5 (02 Oct 2021 07:06), Max: 36.8 (01 Oct 2021 14:00)  T(F): 97.7 (02 Oct 2021 07:06), Max: 98.3 (01 Oct 2021 19:30)  HR: 98 (02 Oct 2021 07:53) (92 - 103)  BP: 106/72 (02 Oct 2021 07:53) (86/45 - 123/96)  BP(mean): --  RR: 16 (02 Oct 2021 07:53) (16 - 17)  SpO2: 100% (02 Oct 2021 07:53) (100% - 100%)  Daily     Daily Weight in k.4 (02 Oct 2021 07:07)    GENERAL:  NAD, cachectic   HEENT:  NC/AT,  conjunctivae clear and pink, sclera -anicteric  CHEST:  Normal Effort, Breath sounds clear  HEART:  RRR, S1 + S2, no murmurs  ABDOMEN:  ostomy bag in place, tender to palpation  EXTREMITIES:  no cyanosis or edema  SKIN:  Warm & Dry. No rash or erythema  NEURO:  Alert, oriented, no focal deficit    LABS:                        9.2    5.68  )-----------( 394      ( 02 Oct 2021 07:40 )             28.6     Mean Cell Volume: 86.4 fL (10-02- @ 07:40)    10-    133<L>  |  98  |  14  ----------------------------<  100<H>  4.0   |  26  |  0.47<L>    Ca    9.1      02 Oct 2021 07:40  Phos  3.5     10  Mg     2.20     10-02                                    9.2    5.68  )-----------( 394      ( 02 Oct 2021 07:40 )             28.6                         9.6    7.26  )-----------( 434      ( 01 Oct 2021 07:36 )             30.1                         9.1    5.71  )-----------( 391      ( 30 Sep 2021 06:06 )             27.1       Imaging: Images reviewed no new images appreciated.

## 2021-10-02 NOTE — PROGRESS NOTE ADULT - SUBJECTIVE AND OBJECTIVE BOX
NUTRITION NOTE  [id number] [id number]  ===============================    Interval events - Patient was seen and examined at bedside, no acute events overnight. Patient denies chest pain or shortness of breath at this time. Patient remains on TPN at this time. Patient states that she is trying to eat small amounts during the day.     ROS: Except as noted above, all other systems reviewed and are negative     Allergies  No Known Allergies    PAST MEDICAL & SURGICAL HISTORY:  Crohn disease  Peripherally inserted central catheter (PICC) in place LUE PICC place on 2021  History of ileostomy Aug 2021    Vital Signs Last 24 Hrs  T(C): 36.5 (02 Oct 2021 07:06), Max: 36.8 (01 Oct 2021 14:00)  T(F): 97.7 (02 Oct 2021 07:06), Max: 98.3 (01 Oct 2021 19:30)  HR: 98 (02 Oct 2021 07:53) (92 - 103)  BP: 106/72 (02 Oct 2021 07:53) (86/45 - 123/96)  RR: 16 (02 Oct 2021 07:53) (16 - 17)  SpO2: 100% (02 Oct 2021 07:53) (100% - 100%)    MEDICATIONS  (STANDING):  chlorhexidine 2% Cloths 1 Application(s) Topical daily  fat emulsion (Fish Oil and Plant Based) 20% Infusion 12.5 mL/Hr (12.5 mL/Hr) IV Continuous <Continuous>  influenza   Vaccine 0.5 milliLiter(s) IntraMuscular once  ondansetron   Disintegrating Tablet 4 milliGRAM(s) Oral once  pantoprazole  Injectable 40 milliGRAM(s) IV Push two times a day  Parenteral Nutrition - Adult 1 Each (83 mL/Hr) TPN Continuous <Continuous>  Parenteral Nutrition - Adult 1 Each (83 mL/Hr) TPN Continuous <Continuous>  sucralfate suspension 1 Gram(s) Oral <User Schedule>    MEDICATIONS  (PRN):  acetaminophen   Tablet .. 650 milliGRAM(s) Oral every 6 hours PRN Temp greater or equal to 38.5C (101.3F), Mild Pain (1 - 3)  HYDROmorphone  Injectable 0.25 milliGRAM(s) IV Push every 8 hours PRN Severe Pain (7 - 10)  lidocaine 2% Viscous 2 milliLiter(s) Swish and Spit three times a day PRN pain with swallowing  ondansetron Injectable 4 milliGRAM(s) IV Push every 6 hours PRN Nausea and/or Vomiting    I&O's Detail    01 Oct 2021 07:01  -  02 Oct 2021 07:00  --------------------------------------------------------  IN:  Total IN: 0 mL    OUT:    Ileostomy (mL): 200 mL  Total OUT: 200 mL    Total NET: -200 mL    POCT Blood Glucose.: 112 mg/dL (02 Oct 2021 06:17)  POCT Blood Glucose.: 92 mg/dL (01 Oct 2021 17:36)  POCT Blood Glucose.: 125 mg/dL (01 Oct 2021 11:57)    Daily Weight in k.4 (02 Oct 2021 07:07)    Daily Height in cm: 154.9 (30 Sep 2021 07:47)      Drug Dosing Weight  Height (cm): 154.9 (30 Sep 2021 08:11)  Weight (kg): 35.2 (30 Sep 2021 08:11)  BMI (kg/m2): 14.7 (30 Sep 2021 08:11)  BSA (m2): 1.26 (30 Sep 2021 08:11)    PHYSICAL EXAM:  GENERAL: malnourished, resting comfortably in bed   HEAD:  Atraumatic, Normocephalic  CHEST/LUNG: nonlabored respirations, CTAB  ABDOMEN: soft, mildly tender to palpation, nondistended   PSYCH: AAOx3  PICC Site: C/D/I    Diet: soft diet and TPN/lipids     LABORATORY                                         9.2    5.68  )-----------( 394      ( 02 Oct 2021 07:40 )             28.6     10-02    133<L>  |  98  |  14  ----------------------------<  100<H>  4.0   |  26  |  0.47<L>    Ca    9.1      02 Oct 2021 07:40  Phos  3.5     10-02  Mg     2.20     10-02    TPro  6.3  /  Alb  3.8  /  TBili  0.3  /  DBili  x   /  AST  17  /  ALT  58<H>  /  AlkPhos  78      LIVER FUNCTIONS - ( 30 Sep 2021 06:06 )  Alb: 3.8 g/dL / Pro: 6.3 g/dL / ALK PHOS: 78 U/L / ALT: 58 U/L / AST: 17 U/L / GGT: x            Chol -- LDL -- HDL -- Trig 71    ASSESSMENT/PLAN:  26 year old female with PMH Crohn's disease s/p surgery for stricture/ileostomy placement in mid 2021, s/p PICC line in place for TPN 2 weeks ago, presents with epigastric abdominal pain, nausea, and vomiting x 4 days. GI is following with possible EGD to evaluate for esophagitis. Nutrition service consulted for resuming parenteral nutrition via PICC line that is in place. Patient states that she feels very weak and is frustrated that she is not able to take in anything by mouth (liquids or food) without nausea or emesis. TPN was initiated on 21.    continue TPN with infusion volume of 2L, TPN will provide 1175 kcal/day    labs reviewed - increased NaCl in TPN bag     monitor fingersticks, obtain daily weights    continue parenteral nutrition at this time, will follow up with primary team on plan - will continue to monitor PO intake and tolerance and plan to wean off TPN     1.  Severe protein calorie malnutrition being optimized with TPN: CHO [175] gm.  AA [70] gm. SMOF Lipids [30] gm.  2.  Hyperglycemia managed with: [0] units of regular insulin    3.  Check fluid balance daily.  Strict I/O  [ ] [ ]   4.  Daily BMP, Ionized Calcium, Magnesium and Phosphorous   5.  Triglycerides at initiation of TPN and monthly [ ] [ ]     Nutrition Support [id number]

## 2021-10-02 NOTE — PROGRESS NOTE ADULT - PROBLEM SELECTOR PLAN 3
Transaminitis in a setting of hypovolemia/dehydration.  -s/p IV fluids x2L NS, and 2 d of maintenance fluids  -AST/ALT improving  -recent hepatitis panel in 6/2021 was negative.

## 2021-10-02 NOTE — PROGRESS NOTE ADULT - ATTENDING COMMENTS
Pt seen and examined, chart and labs reviewed.   Agree with Dr. Carpenter's note as above.  Pt with perssitent 10/10 pain, reports no respnose to IV tylenol.   S/p Remicade 10/1   Will conitnue to monitor pain

## 2021-10-03 LAB
ANION GAP SERPL CALC-SCNC: 13 MMOL/L — SIGNIFICANT CHANGE UP (ref 7–14)
BASOPHILS # BLD AUTO: 0.02 K/UL — SIGNIFICANT CHANGE UP (ref 0–0.2)
BASOPHILS NFR BLD AUTO: 0.3 % — SIGNIFICANT CHANGE UP (ref 0–2)
BUN SERPL-MCNC: 14 MG/DL — SIGNIFICANT CHANGE UP (ref 7–23)
CALCIUM SERPL-MCNC: 9.1 MG/DL — SIGNIFICANT CHANGE UP (ref 8.4–10.5)
CHLORIDE SERPL-SCNC: 99 MMOL/L — SIGNIFICANT CHANGE UP (ref 98–107)
CO2 SERPL-SCNC: 21 MMOL/L — LOW (ref 22–31)
CREAT SERPL-MCNC: 0.45 MG/DL — LOW (ref 0.5–1.3)
CRP SERPL-MCNC: <4 MG/L — SIGNIFICANT CHANGE UP
EOSINOPHIL # BLD AUTO: 0.27 K/UL — SIGNIFICANT CHANGE UP (ref 0–0.5)
EOSINOPHIL NFR BLD AUTO: 3.9 % — SIGNIFICANT CHANGE UP (ref 0–6)
ERYTHROCYTE [SEDIMENTATION RATE] IN BLOOD: 12 MM/HR — SIGNIFICANT CHANGE UP (ref 4–25)
GLUCOSE BLDC GLUCOMTR-MCNC: 111 MG/DL — HIGH (ref 70–99)
GLUCOSE BLDC GLUCOMTR-MCNC: 112 MG/DL — HIGH (ref 70–99)
GLUCOSE SERPL-MCNC: 110 MG/DL — HIGH (ref 70–99)
HCT VFR BLD CALC: 26.7 % — LOW (ref 34.5–45)
HGB BLD-MCNC: 9 G/DL — LOW (ref 11.5–15.5)
IANC: 4.61 K/UL — SIGNIFICANT CHANGE UP (ref 1.5–8.5)
IMM GRANULOCYTES NFR BLD AUTO: 0.6 % — SIGNIFICANT CHANGE UP (ref 0–1.5)
INFLIXIMAB AB SERPL-MCNC: <22 NG/ML — SIGNIFICANT CHANGE UP
INFLIXIMAB SERPL-MCNC: <0.4 UG/ML — SIGNIFICANT CHANGE UP
LYMPHOCYTES # BLD AUTO: 1.59 K/UL — SIGNIFICANT CHANGE UP (ref 1–3.3)
LYMPHOCYTES # BLD AUTO: 22.7 % — SIGNIFICANT CHANGE UP (ref 13–44)
MAGNESIUM SERPL-MCNC: 2 MG/DL — SIGNIFICANT CHANGE UP (ref 1.6–2.6)
MCHC RBC-ENTMCNC: 28.8 PG — SIGNIFICANT CHANGE UP (ref 27–34)
MCHC RBC-ENTMCNC: 33.7 GM/DL — SIGNIFICANT CHANGE UP (ref 32–36)
MCV RBC AUTO: 85.3 FL — SIGNIFICANT CHANGE UP (ref 80–100)
MONOCYTES # BLD AUTO: 0.48 K/UL — SIGNIFICANT CHANGE UP (ref 0–0.9)
MONOCYTES NFR BLD AUTO: 6.8 % — SIGNIFICANT CHANGE UP (ref 2–14)
NEUTROPHILS # BLD AUTO: 4.61 K/UL — SIGNIFICANT CHANGE UP (ref 1.8–7.4)
NEUTROPHILS NFR BLD AUTO: 65.7 % — SIGNIFICANT CHANGE UP (ref 43–77)
NRBC # BLD: 0 /100 WBCS — SIGNIFICANT CHANGE UP
NRBC # FLD: 0 K/UL — SIGNIFICANT CHANGE UP
PHOSPHATE SERPL-MCNC: 3.8 MG/DL — SIGNIFICANT CHANGE UP (ref 2.5–4.5)
PLATELET # BLD AUTO: 408 K/UL — HIGH (ref 150–400)
POTASSIUM SERPL-MCNC: 3.7 MMOL/L — SIGNIFICANT CHANGE UP (ref 3.5–5.3)
POTASSIUM SERPL-SCNC: 3.7 MMOL/L — SIGNIFICANT CHANGE UP (ref 3.5–5.3)
RBC # BLD: 3.13 M/UL — LOW (ref 3.8–5.2)
RBC # FLD: 13.1 % — SIGNIFICANT CHANGE UP (ref 10.3–14.5)
SODIUM SERPL-SCNC: 133 MMOL/L — LOW (ref 135–145)
WBC # BLD: 7.01 K/UL — SIGNIFICANT CHANGE UP (ref 3.8–10.5)
WBC # FLD AUTO: 7.01 K/UL — SIGNIFICANT CHANGE UP (ref 3.8–10.5)

## 2021-10-03 PROCEDURE — 99232 SBSQ HOSP IP/OBS MODERATE 35: CPT | Mod: GC

## 2021-10-03 PROCEDURE — 99232 SBSQ HOSP IP/OBS MODERATE 35: CPT

## 2021-10-03 RX ORDER — ACETAMINOPHEN 500 MG
500 TABLET ORAL ONCE
Refills: 0 | Status: COMPLETED | OUTPATIENT
Start: 2021-10-03 | End: 2021-10-03

## 2021-10-03 RX ORDER — I.V. FAT EMULSION 20 G/100ML
12.5 EMULSION INTRAVENOUS
Qty: 30 | Refills: 0 | Status: DISCONTINUED | OUTPATIENT
Start: 2021-10-03 | End: 2021-10-04

## 2021-10-03 RX ORDER — HYDROMORPHONE HYDROCHLORIDE 2 MG/ML
0.25 INJECTION INTRAMUSCULAR; INTRAVENOUS; SUBCUTANEOUS ONCE
Refills: 0 | Status: DISCONTINUED | OUTPATIENT
Start: 2021-10-03 | End: 2021-10-03

## 2021-10-03 RX ORDER — ELECTROLYTE SOLUTION,INJ
1 VIAL (ML) INTRAVENOUS
Refills: 0 | Status: DISCONTINUED | OUTPATIENT
Start: 2021-10-03 | End: 2021-10-03

## 2021-10-03 RX ADMIN — HYDROMORPHONE HYDROCHLORIDE 0.25 MILLIGRAM(S): 2 INJECTION INTRAMUSCULAR; INTRAVENOUS; SUBCUTANEOUS at 17:59

## 2021-10-03 RX ADMIN — Medication 1 GRAM(S): at 17:24

## 2021-10-03 RX ADMIN — ONDANSETRON 4 MILLIGRAM(S): 8 TABLET, FILM COATED ORAL at 09:32

## 2021-10-03 RX ADMIN — HYDROMORPHONE HYDROCHLORIDE 0.25 MILLIGRAM(S): 2 INJECTION INTRAMUSCULAR; INTRAVENOUS; SUBCUTANEOUS at 10:30

## 2021-10-03 RX ADMIN — I.V. FAT EMULSION 12.5 ML/HR: 20 EMULSION INTRAVENOUS at 18:10

## 2021-10-03 RX ADMIN — HYDROMORPHONE HYDROCHLORIDE 0.25 MILLIGRAM(S): 2 INJECTION INTRAMUSCULAR; INTRAVENOUS; SUBCUTANEOUS at 23:14

## 2021-10-03 RX ADMIN — HYDROMORPHONE HYDROCHLORIDE 0.25 MILLIGRAM(S): 2 INJECTION INTRAMUSCULAR; INTRAVENOUS; SUBCUTANEOUS at 01:24

## 2021-10-03 RX ADMIN — Medication 500 MILLIGRAM(S): at 14:43

## 2021-10-03 RX ADMIN — Medication 500 MILLIGRAM(S): at 22:29

## 2021-10-03 RX ADMIN — Medication 1 EACH: at 18:10

## 2021-10-03 RX ADMIN — PANTOPRAZOLE SODIUM 40 MILLIGRAM(S): 20 TABLET, DELAYED RELEASE ORAL at 17:24

## 2021-10-03 RX ADMIN — PANTOPRAZOLE SODIUM 40 MILLIGRAM(S): 20 TABLET, DELAYED RELEASE ORAL at 06:33

## 2021-10-03 RX ADMIN — Medication 200 MILLIGRAM(S): at 14:02

## 2021-10-03 RX ADMIN — HYDROMORPHONE HYDROCHLORIDE 0.25 MILLIGRAM(S): 2 INJECTION INTRAMUSCULAR; INTRAVENOUS; SUBCUTANEOUS at 01:09

## 2021-10-03 RX ADMIN — HYDROMORPHONE HYDROCHLORIDE 0.25 MILLIGRAM(S): 2 INJECTION INTRAMUSCULAR; INTRAVENOUS; SUBCUTANEOUS at 14:48

## 2021-10-03 RX ADMIN — HYDROMORPHONE HYDROCHLORIDE 0.25 MILLIGRAM(S): 2 INJECTION INTRAMUSCULAR; INTRAVENOUS; SUBCUTANEOUS at 23:29

## 2021-10-03 RX ADMIN — HYDROMORPHONE HYDROCHLORIDE 0.25 MILLIGRAM(S): 2 INJECTION INTRAMUSCULAR; INTRAVENOUS; SUBCUTANEOUS at 18:40

## 2021-10-03 RX ADMIN — Medication 1 GRAM(S): at 11:57

## 2021-10-03 RX ADMIN — ONDANSETRON 4 MILLIGRAM(S): 8 TABLET, FILM COATED ORAL at 17:31

## 2021-10-03 RX ADMIN — HYDROMORPHONE HYDROCHLORIDE 0.25 MILLIGRAM(S): 2 INJECTION INTRAMUSCULAR; INTRAVENOUS; SUBCUTANEOUS at 09:33

## 2021-10-03 RX ADMIN — Medication 200 MILLIGRAM(S): at 21:59

## 2021-10-03 RX ADMIN — HYDROMORPHONE HYDROCHLORIDE 0.25 MILLIGRAM(S): 2 INJECTION INTRAMUSCULAR; INTRAVENOUS; SUBCUTANEOUS at 15:40

## 2021-10-03 NOTE — PROGRESS NOTE ADULT - ATTENDING COMMENTS
I agree with the above history, physical examination, chief complaint/diagnosis, and plan, which I have reviewed and edited where appropriate.  I agree with notes/assessment and detailed interval history of health care providers on my service.  I have seen and examined the patient.  I reviewed the laboratory and available data and agree with the history, physical assessment and plan.  I reviewed and discussed with all consultants, house staff and PA's.  The Nutrition Support Team (NST) discusses on an ongoing basis with the primary team and all consultants, House staff and PA's to have a permanent risk benefit analyses on all decisions and coordinating care.  I was physically present for the key portions of the evaluation and management (E/M) service provided.  26 year old female with PMH Crohn's disease s/p surgery for stricture/ileostomy receiving TPN   comfortable in bed   HEAD:  Atraumatic,   CHEST/LUNG: clear  ABDOMEN: soft, mildly tender to palpation, nondistended   labs reviewed - increased Na and K  continue TPN with infusion volume of 2L/1175 kcal/day

## 2021-10-03 NOTE — PROGRESS NOTE ADULT - ASSESSMENT
26F w/ history of Crohn's disease c/b ascending colon stricture s/p resection and ileostomy at [facility], on remicade since 6/2021, presenting w/ persistent abd pain, n/v.     Impression:  #Nausea, vomiting, abd pain - Likely multifactorial. Contributing factors include esophagitis, severe erosive gastritis (seen on EGD), and contribution from Crohn's disease. Regarding Crohn's disease, inflammatory markers are negative, but EGD findings raise possibility of UGI Crohn's. CT scan read as some enteritis in RLQ as well. Patient is on remicade as an outpatient and overdue by approx 1 week for her remicade dose.   #Crohn's disease - started on remicade at time of diagnosis in 6/2021.  s/p recent ileocolic resection for ascending colon stricture at [facility].   #Elevated liver enzymes - unclear etiology, can consider MRI/MRCP to r/o biliary sludge (though no e/o cholestasis).   #C/f celiac disease - due to increased intraepithelial lymphocytes on biopsies, however negative serologic work-up and symptoms do not correlate.     Recommendations:  - Antiemetics PRN.  - Repeat ESR and CRP  - Monitor improvement on Remicade  - Pain meds IV  - GI will continue to follow.    Cole Ndiaye MD  Gastroenterology/Hepatology Fellow  1st option: [phone] (text or call), ONLY available from 7:00 am to 5:00 pm.   **Contact on-call GI fellow via answering service ([phone]) from 5pm-7am AND on weekends/holidays**  2nd option: Available via Microsoft Teams  3rd option: Pager: [phone]    NON-URGENT CONSULTS:  Please email [email or web address] OR  [email or web address]  AT NIGHT AND ON WEEKENDS:  Contact on-call GI fellow via answering service ([phone]) from 5pm-8am AND on weekends/holidays       26F w/ history of Crohn's disease c/b ascending colon stricture s/p resection and ileostomy at [facility], on remicade since 6/2021, presenting w/ persistent abd pain, n/v.     Impression:  #Nausea, vomiting, abd pain - Likely multifactorial. Contributing factors include esophagitis, severe erosive gastritis (seen on EGD), and contribution from Crohn's disease. Regarding Crohn's disease, inflammatory markers are negative, but EGD findings raise possibility of UGI Crohn's. CT scan read as some enteritis in RLQ as well. Patient is on remicade as an outpatient and overdue by approx 1 week for her remicade dose.   #Crohn's disease - started on remicade at time of diagnosis in 6/2021.  s/p recent ileocolic resection for ascending colon stricture at [facility].   #Elevated liver enzymes - unclear etiology, can consider MRI/MRCP to r/o biliary sludge (though no e/o cholestasis).   #C/f celiac disease - due to increased intraepithelial lymphocytes on biopsies, however negative serologic work-up and symptoms do not correlate.     Recommendations:  - Antiemetics PRN.  - Repeat ESR and CRP  - Monitor improvement on Remicade  - would consider adding on solumedrol 20 IV Q8 and consider increasing infliximab dosing to 10mg/kg (tomorrow)  - Pain meds IV  - GI will continue to follow.    Cole Ndiaye MD  Gastroenterology/Hepatology Fellow  1st option: [phone] (text or call), ONLY available from 7:00 am to 5:00 pm.   **Contact on-call GI fellow via answering service ([phone]) from 5pm-7am AND on weekends/holidays**  2nd option: Available via Microsoft Teams  3rd option: Pager: [phone]    NON-URGENT CONSULTS:  Please email [email or web address]  [email or web address]  AT NIGHT AND ON WEEKENDS:  Contact on-call GI fellow via answering service ([phone]) from 5pm-8am AND on weekends/holidays

## 2021-10-03 NOTE — PROGRESS NOTE ADULT - ATTENDING COMMENTS
26F w/ history of Crohn's disease c/b ascending colon stricture s/p resection and ileostomy at [facility] 8/2021, on Remicade since 6/2021, presenting w/ persistent abd pain, n/v, started on Remicade 200mg 2 day ago for suspicion of gastric Crohns-    - Pt without significant improvement in abdominal pain, still unable to tolerate PO   s/p gastric bx with suspicion of crohns    -continue TPN  -would consider adding on solumedrol 20 IV Q8 and consider increasing infliximab dosing to 10mg/kg  -DVT ppx    Annemarie Acevedo MD   of Medicine, Gastroenterology  [address] [place] [address], [address]  [place] [address]  [phone]

## 2021-10-03 NOTE — PROGRESS NOTE ADULT - ATTENDING COMMENTS
Pt seen and examined, chart and labs reviewed.  Care discussed with Dr. burrell.  Pt had severe diffuse abd pain all day requiring additional pushes of IV dilaudid 0.25 yesterday.   Today appears more comfortable, reports that she can't tolerate any PO intake today, will resume TPN.   Reviewed GI note re: possible steroid course and increasing infliximab, will reach out and discuss with GI. Pt seen and examined, chart and labs reviewed.  Care discussed with Dr. burrell.  Pt had severe diffuse abd pain all day requiring additional pushes of IV dilaudid 0.25 yesterday.   Today appears more comfortable, reports that she can't tolerate any PO intake today, will resume TPN.   Reviewed GI note re: possible steroid course and increasing infliximab, spoke with GI attenidng Dr. Acevedo, plan to hold off today and reassess tomorrow.

## 2021-10-03 NOTE — PROGRESS NOTE ADULT - SUBJECTIVE AND OBJECTIVE BOX
Jennifer Bruce  Psychiatry Resident PGY1   Pager: [phone]      CITLALI PAZ Patient is a 26y old  Female who presents with a chief complaint of crohn's flare up (03 Oct 2021 07:07)      Overnight events/subjective: No acute overnight events. Patient seen and examined at bedside. No complaints. Denies fever, chills, chest pain, shortness of breath, abdominal pain, nausea, vomiting, changes in bowel habits, or urinary symptoms.    Vital Signs Last 24 Hrs  T(C): 37.1 (03 Oct 2021 06:30), Max: 37.1 (03 Oct 2021 06:30)  T(F): 98.7 (03 Oct 2021 06:30), Max: 98.7 (03 Oct 2021 06:30)  HR: 95 (03 Oct 2021 06:30) (95 - 119)  BP: 100/60 (03 Oct 2021 06:30) (100/60 - 122/78)  BP(mean): --  RR: 18 (03 Oct 2021 06:30) (16 - 19)  SpO2: 100% (03 Oct 2021 06:30) (100% - 100%)      PHYSICAL EXAM:  GENERAL: no acute distress  HEENT - atraumatic, normocephalic, pupils equal and reactive to light; extraocular muscles intact  CV: regular rate and rhythm; normal S1/S2; nor murmurs, rubs, or gallops  PULM: clear to auscultation bilaterally; no rales, rhonchi, wheezing  ABDOMEN: soft, nontender, nondistended; bowel sounds present  MSK: extremities atraumatic; no cyanosis or clubbing  SKIN: warm, dry, no rashes or lesions  NEURO:  no focal deficits, sensory and motor grossly intact  PSYCH: alert and oriented x3; appropriate mood and affect    HOSPITAL MEDICATIONS:  MEDICATIONS  (STANDING):  chlorhexidine 2% Cloths 1 Application(s) Topical daily  fat emulsion (Fish Oil and Plant Based) 20% Infusion 12.5 mL/Hr (12.5 mL/Hr) IV Continuous <Continuous>  influenza   Vaccine 0.5 milliLiter(s) IntraMuscular once  ondansetron   Disintegrating Tablet 4 milliGRAM(s) Oral once  pantoprazole  Injectable 40 milliGRAM(s) IV Push two times a day  Parenteral Nutrition - Adult 1 Each (83 mL/Hr) TPN Continuous <Continuous>  sucralfate suspension 1 Gram(s) Oral <User Schedule>    MEDICATIONS  (PRN):  acetaminophen   Tablet .. 650 milliGRAM(s) Oral every 6 hours PRN Temp greater or equal to 38.5C (101.3F), Mild Pain (1 - 3)  HYDROmorphone  Injectable 0.25 milliGRAM(s) IV Push every 8 hours PRN Severe Pain (7 - 10)  lidocaine 2% Viscous 2 milliLiter(s) Swish and Spit three times a day PRN pain with swallowing  ondansetron Injectable 4 milliGRAM(s) IV Push every 6 hours PRN Nausea and/or Vomiting      LABS:                        9.0    7.01  )-----------( 408      ( 03 Oct 2021 07:12 )             26.7     10-03    133<L>  |  99  |  x   ----------------------------<  x   3.7   |  x   |  x     Ca    9.1      02 Oct 2021 07:40  Phos  3.5     10-02  Mg     2.20     10-02            I&O's Detail    02 Oct 2021 07:01  -  03 Oct 2021 07:00  --------------------------------------------------------  IN:    Fat Emulsion (Fish Oil &amp; Plant Based) 20% Infusion: 150 mL    Fat Emulsion (Fish Oil &amp; Plant Based) 20% Infusion: 137.5 mL    Sodium Chloride 0.9% Bolus: 250 mL    TPN (Total Parenteral Nutrition): 996 mL  Total IN: 1533.5 mL    OUT:    Ileostomy (mL): 200 mL    Voided (mL): 400 mL  Total OUT: 600 mL    Total NET: 933.5 mL         CAPILLARY BLOOD GLUCOSE      POCT Blood Glucose.: 112 mg/dL (03 Oct 2021 06:31)  POCT Blood Glucose.: 115 mg/dL (02 Oct 2021 17:56)       Jennifer Bruce  Psychiatry Resident PGY1   Pager: [phone]      CITLALI PAZ Patient is a 26y old  Female who presents with a chief complaint of crohn's flare up (03 Oct 2021 07:07)      Overnight events/subjective: No acute overnight events. Patient seen and examined at bedside. Reports abdominal and retrosternal pain this AM. Some nausea but no episodes of vomiting today. Functionally NPO, on TPN. Denies fever, chills, chest pain, shortness of breath, vomiting, changes in bowel habits, or urinary symptoms.    Vital Signs Last 24 Hrs  T(C): 37.1 (03 Oct 2021 06:30), Max: 37.1 (03 Oct 2021 06:30)  T(F): 98.7 (03 Oct 2021 06:30), Max: 98.7 (03 Oct 2021 06:30)  HR: 95 (03 Oct 2021 06:30) (95 - 119)  BP: 100/60 (03 Oct 2021 06:30) (100/60 - 122/78)  BP(mean): --  RR: 18 (03 Oct 2021 06:30) (16 - 19)  SpO2: 100% (03 Oct 2021 06:30) (100% - 100%)      PHYSICAL EXAM:  GENERAL: no acute distress  HEENT - atraumatic, normocephalic, pupils equal and reactive to light; extraocular muscles intact  CV: tachycardic, regular rhythm; normal S1/S2; nor murmurs, rubs, or gallops  PULM: clear to auscultation bilaterally; no rales, rhonchi, wheezing  ABDOMEN: soft, ostomy present, nondistended; bowel sounds present  MSK: extremities atraumatic; no cyanosis or clubbing  SKIN: warm, dry, no rashes or lesions  NEURO:  no focal deficits, sensory and motor grossly intact  PSYCH: alert and oriented x3; appropriate mood and affect    HOSPITAL MEDICATIONS:  MEDICATIONS  (STANDING):  chlorhexidine 2% Cloths 1 Application(s) Topical daily  fat emulsion (Fish Oil and Plant Based) 20% Infusion 12.5 mL/Hr (12.5 mL/Hr) IV Continuous <Continuous>  influenza   Vaccine 0.5 milliLiter(s) IntraMuscular once  ondansetron   Disintegrating Tablet 4 milliGRAM(s) Oral once  pantoprazole  Injectable 40 milliGRAM(s) IV Push two times a day  Parenteral Nutrition - Adult 1 Each (83 mL/Hr) TPN Continuous <Continuous>  sucralfate suspension 1 Gram(s) Oral <User Schedule>    MEDICATIONS  (PRN):  acetaminophen   Tablet .. 650 milliGRAM(s) Oral every 6 hours PRN Temp greater or equal to 38.5C (101.3F), Mild Pain (1 - 3)  HYDROmorphone  Injectable 0.25 milliGRAM(s) IV Push every 8 hours PRN Severe Pain (7 - 10)  lidocaine 2% Viscous 2 milliLiter(s) Swish and Spit three times a day PRN pain with swallowing  ondansetron Injectable 4 milliGRAM(s) IV Push every 6 hours PRN Nausea and/or Vomiting      LABS:                        9.0    7.01  )-----------( 408      ( 03 Oct 2021 07:12 )             26.7     10-03    133<L>  |  99  |  x   ----------------------------<  x   3.7   |  x   |  x     Ca    9.1      02 Oct 2021 07:40  Phos  3.5     10-02  Mg     2.20     10-02            I&O's Detail    02 Oct 2021 07:01  -  03 Oct 2021 07:00  --------------------------------------------------------  IN:    Fat Emulsion (Fish Oil &amp; Plant Based) 20% Infusion: 150 mL    Fat Emulsion (Fish Oil &amp; Plant Based) 20% Infusion: 137.5 mL    Sodium Chloride 0.9% Bolus: 250 mL    TPN (Total Parenteral Nutrition): 996 mL  Total IN: 1533.5 mL    OUT:    Ileostomy (mL): 200 mL    Voided (mL): 400 mL  Total OUT: 600 mL    Total NET: 933.5 mL         CAPILLARY BLOOD GLUCOSE      POCT Blood Glucose.: 112 mg/dL (03 Oct 2021 06:31)  POCT Blood Glucose.: 115 mg/dL (02 Oct 2021 17:56)       Jennifer Bruce  Psychiatry Resident PGY1   Pager: [phone]      CITLALI PAZ Patient is a 26y old  Female who presents with a chief complaint of crohn's flare up (03 Oct 2021 07:07)      Overnight events/subjective: No acute overnight events. Patient seen and examined at bedside. Reports abdominal and retrosternal pain this AM. Some nausea but no episodes of vomiting today. Functionally NPO secondary to pain, on TPN. Denies fever, chills, chest pain, shortness of breath, vomiting, changes in bowel habits, or urinary symptoms.    Vital Signs Last 24 Hrs  T(C): 37.1 (03 Oct 2021 06:30), Max: 37.1 (03 Oct 2021 06:30)  T(F): 98.7 (03 Oct 2021 06:30), Max: 98.7 (03 Oct 2021 06:30)  HR: 95 (03 Oct 2021 06:30) (95 - 119)  BP: 100/60 (03 Oct 2021 06:30) (100/60 - 122/78)  BP(mean): --  RR: 18 (03 Oct 2021 06:30) (16 - 19)  SpO2: 100% (03 Oct 2021 06:30) (100% - 100%)      PHYSICAL EXAM:  GENERAL: no acute distress  HEENT - atraumatic, normocephalic, pupils equal and reactive to light; extraocular muscles intact  CV: tachycardic, regular rhythm; normal S1/S2; nor murmurs, rubs, or gallops  PULM: clear to auscultation bilaterally; no rales, rhonchi, wheezing  ABDOMEN: soft, ostomy present, nondistended; bowel sounds present  MSK: extremities atraumatic; no cyanosis or clubbing  SKIN: warm, dry, no rashes or lesions  NEURO:  no focal deficits, sensory and motor grossly intact  PSYCH: alert and oriented x3; appropriate mood and affect    HOSPITAL MEDICATIONS:  MEDICATIONS  (STANDING):  chlorhexidine 2% Cloths 1 Application(s) Topical daily  fat emulsion (Fish Oil and Plant Based) 20% Infusion 12.5 mL/Hr (12.5 mL/Hr) IV Continuous <Continuous>  influenza   Vaccine 0.5 milliLiter(s) IntraMuscular once  ondansetron   Disintegrating Tablet 4 milliGRAM(s) Oral once  pantoprazole  Injectable 40 milliGRAM(s) IV Push two times a day  Parenteral Nutrition - Adult 1 Each (83 mL/Hr) TPN Continuous <Continuous>  sucralfate suspension 1 Gram(s) Oral <User Schedule>    MEDICATIONS  (PRN):  acetaminophen   Tablet .. 650 milliGRAM(s) Oral every 6 hours PRN Temp greater or equal to 38.5C (101.3F), Mild Pain (1 - 3)  HYDROmorphone  Injectable 0.25 milliGRAM(s) IV Push every 8 hours PRN Severe Pain (7 - 10)  lidocaine 2% Viscous 2 milliLiter(s) Swish and Spit three times a day PRN pain with swallowing  ondansetron Injectable 4 milliGRAM(s) IV Push every 6 hours PRN Nausea and/or Vomiting      LABS:                        9.0    7.01  )-----------( 408      ( 03 Oct 2021 07:12 )             26.7     10-03    133<L>  |  99  |  x   ----------------------------<  x   3.7   |  x   |  x     Ca    9.1      02 Oct 2021 07:40  Phos  3.5     10-02  Mg     2.20     10-02            I&O's Detail    02 Oct 2021 07:01  -  03 Oct 2021 07:00  --------------------------------------------------------  IN:    Fat Emulsion (Fish Oil &amp; Plant Based) 20% Infusion: 150 mL    Fat Emulsion (Fish Oil &amp; Plant Based) 20% Infusion: 137.5 mL    Sodium Chloride 0.9% Bolus: 250 mL    TPN (Total Parenteral Nutrition): 996 mL  Total IN: 1533.5 mL    OUT:    Ileostomy (mL): 200 mL    Voided (mL): 400 mL  Total OUT: 600 mL    Total NET: 933.5 mL         CAPILLARY BLOOD GLUCOSE      POCT Blood Glucose.: 112 mg/dL (03 Oct 2021 06:31)  POCT Blood Glucose.: 115 mg/dL (02 Oct 2021 17:56)

## 2021-10-03 NOTE — PROGRESS NOTE ADULT - SUBJECTIVE AND OBJECTIVE BOX
Interval Events:       Hospital Medications:  acetaminophen   Tablet .. 650 milliGRAM(s) Oral every 6 hours PRN  chlorhexidine 2% Cloths 1 Application(s) Topical daily  fat emulsion (Fish Oil and Plant Based) 20% Infusion 12.5 mL/Hr IV Continuous <Continuous>  HYDROmorphone  Injectable 0.25 milliGRAM(s) IV Push every 8 hours PRN  influenza   Vaccine 0.5 milliLiter(s) IntraMuscular once  lidocaine 2% Viscous 2 milliLiter(s) Swish and Spit three times a day PRN  ondansetron   Disintegrating Tablet 4 milliGRAM(s) Oral once  ondansetron Injectable 4 milliGRAM(s) IV Push every 6 hours PRN  pantoprazole  Injectable 40 milliGRAM(s) IV Push two times a day  Parenteral Nutrition - Adult 1 Each TPN Continuous <Continuous>  sucralfate suspension 1 Gram(s) Oral <User Schedule>        PHYSICAL EXAM:   Vital Signs:  Vital Signs Last 24 Hrs  T(C): 37.1 (03 Oct 2021 06:30), Max: 37.1 (03 Oct 2021 06:30)  T(F): 98.7 (03 Oct 2021 06:30), Max: 98.7 (03 Oct 2021 06:30)  HR: 95 (03 Oct 2021 06:30) (95 - 119)  BP: 100/60 (03 Oct 2021 06:30) (100/60 - 122/78)  BP(mean): --  RR: 18 (03 Oct 2021 06:30) (16 - 19)  SpO2: 100% (03 Oct 2021 06:30) (100% - 100%)  Daily     Daily Weight in k.9 (03 Oct 2021 00:55)    GENERAL:  NAD, cachectic   HEENT:  NC/AT,  conjunctivae clear and pink, sclera -anicteric  CHEST:  Normal Effort, Breath sounds clear  HEART:  RRR, S1 + S2, no murmurs  ABDOMEN:  ostomy bag in place, tender to palpation  EXTREMITIES:  no cyanosis or edema  SKIN:  Warm & Dry. No rash or erythema  NEURO:  Alert, oriented, no focal deficit    LABS:                        9.2    5.68  )-----------( 394      ( 02 Oct 2021 07:40 )             28.6     Mean Cell Volume: 86.4 fL (10-02-21 @ 07:40)    10    133<L>  |  98  |  14  ----------------------------<  100<H>  4.0   |  26  |  0.47<L>    Ca    9.1      02 Oct 2021 07:40  Phos  3.5     10  Mg     2.20     10-02                                    9.2    5.68  )-----------( 394      ( 02 Oct 2021 07:40 )             28.6                         9.6    7.26  )-----------( 434      ( 01 Oct 2021 07:36 )             30.1       Imaging: Images reviewed no new images appreciated.         Interval Events:   pain with ingesting liquids    Hospital Medications:  acetaminophen   Tablet .. 650 milliGRAM(s) Oral every 6 hours PRN  chlorhexidine 2% Cloths 1 Application(s) Topical daily  fat emulsion (Fish Oil and Plant Based) 20% Infusion 12.5 mL/Hr IV Continuous <Continuous>  HYDROmorphone  Injectable 0.25 milliGRAM(s) IV Push every 8 hours PRN  influenza   Vaccine 0.5 milliLiter(s) IntraMuscular once  lidocaine 2% Viscous 2 milliLiter(s) Swish and Spit three times a day PRN  ondansetron   Disintegrating Tablet 4 milliGRAM(s) Oral once  ondansetron Injectable 4 milliGRAM(s) IV Push every 6 hours PRN  pantoprazole  Injectable 40 milliGRAM(s) IV Push two times a day  Parenteral Nutrition - Adult 1 Each TPN Continuous <Continuous>  sucralfate suspension 1 Gram(s) Oral <User Schedule>        PHYSICAL EXAM:   Vital Signs:  Vital Signs Last 24 Hrs  T(C): 37.1 (03 Oct 2021 06:30), Max: 37.1 (03 Oct 2021 06:30)  T(F): 98.7 (03 Oct 2021 06:30), Max: 98.7 (03 Oct 2021 06:30)  HR: 95 (03 Oct 2021 06:30) (95 - 119)  BP: 100/60 (03 Oct 2021 06:30) (100/60 - 122/78)  BP(mean): --  RR: 18 (03 Oct 2021 06:30) (16 - 19)  SpO2: 100% (03 Oct 2021 06:30) (100% - 100%)  Daily     Daily Weight in k.9 (03 Oct 2021 00:55)    GENERAL:  NAD, cachectic   HEENT:  NC/AT,  conjunctivae clear and pink, sclera -anicteric  CHEST:  Normal Effort, Breath sounds clear  HEART:  RRR, S1 + S2, no murmurs  ABDOMEN:  ostomy bag in place, tender to palpation  EXTREMITIES:  no cyanosis or edema  SKIN:  Warm & Dry. No rash or erythema  NEURO:  Alert, oriented, no focal deficit    LABS:                        9.2    5.68  )-----------( 394      ( 02 Oct 2021 07:40 )             28.6     Mean Cell Volume: 86.4 fL (10-02-21 @ 07:40)    10    133<L>  |  98  |  14  ----------------------------<  100<H>  4.0   |  26  |  0.47<L>    Ca    9.1      02 Oct 2021 07:40  Phos  3.5     10-02  Mg     2.20     10-02                                    9.2    5.68  )-----------( 394      ( 02 Oct 2021 07:40 )             28.6                         9.6    7.26  )-----------( 434      ( 01 Oct 2021 07:36 )             30.1       Imaging: Images reviewed no new images appreciated.

## 2021-10-03 NOTE — PROGRESS NOTE ADULT - SUBJECTIVE AND OBJECTIVE BOX
NUTRITION NOTE  [id number] MUTAMANDAA  ===============================    Interval events - Patient was seen and examined at bedside, no acute events overnight. Patient denies chest pain or shortness of breath at this time. Patient remains on TPN at this time. Patient reports severe abdominal pain even with water yesterday. Pt states that she has not eaten anything yet this morning for breakfast.     ROS: Except as noted above, all other systems reviewed and are negative     Allergies  No Known Allergies    PAST MEDICAL & SURGICAL HISTORY:  Crohn disease  Peripherally inserted central catheter (PICC) in place LUE PICC place on 2021  History of ileostomy Aug 2021    Vital Signs Last 24 Hrs  T(C): 37.1 (03 Oct 2021 06:30), Max: 37.1 (03 Oct 2021 06:30)  T(F): 98.7 (03 Oct 2021 06:30), Max: 98.7 (03 Oct 2021 06:30)  HR: 95 (03 Oct 2021 06:30) (95 - 119)  BP: 100/60 (03 Oct 2021 06:30) (100/60 - 122/78)  RR: 18 (03 Oct 2021 06:30) (18 - 19)  SpO2: 100% (03 Oct 2021 06:30) (100% - 100%)    MEDICATIONS  (STANDING):  chlorhexidine 2% Cloths 1 Application(s) Topical daily  fat emulsion (Fish Oil and Plant Based) 20% Infusion 12.5 mL/Hr (12.5 mL/Hr) IV Continuous <Continuous>  influenza   Vaccine 0.5 milliLiter(s) IntraMuscular once  ondansetron   Disintegrating Tablet 4 milliGRAM(s) Oral once  pantoprazole  Injectable 40 milliGRAM(s) IV Push two times a day  Parenteral Nutrition - Adult 1 Each (83 mL/Hr) TPN Continuous <Continuous>  Parenteral Nutrition - Adult 1 Each (83 mL/Hr) TPN Continuous <Continuous>  sucralfate suspension 1 Gram(s) Oral <User Schedule>    MEDICATIONS  (PRN):  acetaminophen   Tablet .. 650 milliGRAM(s) Oral every 6 hours PRN Temp greater or equal to 38.5C (101.3F), Mild Pain (1 - 3)  HYDROmorphone  Injectable 0.25 milliGRAM(s) IV Push every 8 hours PRN Severe Pain (7 - 10)  lidocaine 2% Viscous 2 milliLiter(s) Swish and Spit three times a day PRN pain with swallowing  ondansetron Injectable 4 milliGRAM(s) IV Push every 6 hours PRN Nausea and/or Vomiting    I&O's Detail    02 Oct 2021 07:01  -  03 Oct 2021 07:00  --------------------------------------------------------  IN:    Fat Emulsion (Fish Oil &amp; Plant Based) 20% Infusion: 150 mL    Fat Emulsion (Fish Oil &amp; Plant Based) 20% Infusion: 137.5 mL    Sodium Chloride 0.9% Bolus: 250 mL    TPN (Total Parenteral Nutrition): 996 mL  Total IN: 1533.5 mL    OUT:    Ileostomy (mL): 200 mL    Voided (mL): 400 mL  Total OUT: 600 mL    Total NET: 933.5 mL    POCT Blood Glucose.: 112 mg/dL (03 Oct 2021 06:31)  POCT Blood Glucose.: 115 mg/dL (02 Oct 2021 17:56)    Daily Weight in k.9 (03 Oct 2021 00:55)    Drug Dosing Weight  Height (cm): 154.9 (30 Sep 2021 08:11)  Weight (kg): 35.2 (30 Sep 2021 08:11)  BMI (kg/m2): 14.7 (30 Sep 2021 08:11)  BSA (m2): 1.26 (30 Sep 2021 08:11)    PHYSICAL EXAM:  GENERAL: malnourished, resting comfortably in bed   HEAD:  Atraumatic, Normocephalic  CHEST/LUNG: nonlabored respirations, CTAB  ABDOMEN: soft, mildly tender to palpation, nondistended   PSYCH: AAOx3  PICC Site: C/D/I    Diet: soft diet and TPN/lipids     LABORATORY                                                  9.0    7.01  )-----------( 408      ( 03 Oct 2021 07:12 )             26.7   10-03    133<L>  |  99  |  14  ----------------------------<  110<H>  3.7   |  21<L>  |  0.45<L>    Ca    9.1      03 Oct 2021 07:12  Phos  3.8     10-  Mg     2.00     10-    TPro  6.3  /  Alb  3.8  /  TBili  0.3  /  DBili  x   /  AST  17  /  ALT  58<H>  /  AlkPhos  78      LIVER FUNCTIONS - ( 30 Sep 2021 06:06 )  Alb: 3.8 g/dL / Pro: 6.3 g/dL / ALK PHOS: 78 U/L / ALT: 58 U/L / AST: 17 U/L / GGT: x            Chol -- LDL -- HDL -- Trig 71    ASSESSMENT/PLAN:  26 year old female with PMH Crohn's disease s/p surgery for stricture/ileostomy placement in mid 2021, s/p PICC line in place for TPN 2 weeks ago, presents with epigastric abdominal pain, nausea, and vomiting x 4 days. GI is following with possible EGD to evaluate for esophagitis. Nutrition service consulted for resuming parenteral nutrition via PICC line that is in place. Patient states that she feels very weak and is frustrated that she is not able to take in anything by mouth (liquids or food) without nausea or emesis. TPN was initiated on 21.    continue TPN with infusion volume of 2L, TPN will provide 1175 kcal/day    labs reviewed - increased Na and K in TPN bag     monitor fingersticks, obtain daily weights    continue parenteral nutrition at this time, will follow up with primary team on plan - will continue to monitor PO intake and tolerance to diet     1.  Severe protein calorie malnutrition being optimized with TPN: CHO [175] gm.  AA [70] gm. SMOF Lipids [30] gm.  2.  Hyperglycemia managed with: [0] units of regular insulin    3.  Check fluid balance daily.  Strict I/O  [ ] [ ]   4.  Daily BMP, Ionized Calcium, Magnesium and Phosphorous   5.  Triglycerides at initiation of TPN and monthly [ ] [ ]     Nutrition Support [id number]

## 2021-10-04 LAB
ANION GAP SERPL CALC-SCNC: 11 MMOL/L — SIGNIFICANT CHANGE UP (ref 7–14)
BASOPHILS # BLD AUTO: 0.03 K/UL — SIGNIFICANT CHANGE UP (ref 0–0.2)
BASOPHILS NFR BLD AUTO: 0.5 % — SIGNIFICANT CHANGE UP (ref 0–2)
BUN SERPL-MCNC: 15 MG/DL — SIGNIFICANT CHANGE UP (ref 7–23)
CALCIUM SERPL-MCNC: 9.4 MG/DL — SIGNIFICANT CHANGE UP (ref 8.4–10.5)
CHLORIDE SERPL-SCNC: 100 MMOL/L — SIGNIFICANT CHANGE UP (ref 98–107)
CO2 SERPL-SCNC: 24 MMOL/L — SIGNIFICANT CHANGE UP (ref 22–31)
CREAT SERPL-MCNC: 0.45 MG/DL — LOW (ref 0.5–1.3)
EOSINOPHIL # BLD AUTO: 0.2 K/UL — SIGNIFICANT CHANGE UP (ref 0–0.5)
EOSINOPHIL NFR BLD AUTO: 3.5 % — SIGNIFICANT CHANGE UP (ref 0–6)
GLUCOSE BLDC GLUCOMTR-MCNC: 106 MG/DL — HIGH (ref 70–99)
GLUCOSE SERPL-MCNC: 102 MG/DL — HIGH (ref 70–99)
HCT VFR BLD CALC: 29.2 % — LOW (ref 34.5–45)
HGB BLD-MCNC: 9.4 G/DL — LOW (ref 11.5–15.5)
IANC: 3.13 K/UL — SIGNIFICANT CHANGE UP (ref 1.5–8.5)
IMM GRANULOCYTES NFR BLD AUTO: 0.5 % — SIGNIFICANT CHANGE UP (ref 0–1.5)
LYMPHOCYTES # BLD AUTO: 1.95 K/UL — SIGNIFICANT CHANGE UP (ref 1–3.3)
LYMPHOCYTES # BLD AUTO: 33.9 % — SIGNIFICANT CHANGE UP (ref 13–44)
MAGNESIUM SERPL-MCNC: 2 MG/DL — SIGNIFICANT CHANGE UP (ref 1.6–2.6)
MCHC RBC-ENTMCNC: 27.7 PG — SIGNIFICANT CHANGE UP (ref 27–34)
MCHC RBC-ENTMCNC: 32.2 GM/DL — SIGNIFICANT CHANGE UP (ref 32–36)
MCV RBC AUTO: 86.1 FL — SIGNIFICANT CHANGE UP (ref 80–100)
MONOCYTES # BLD AUTO: 0.41 K/UL — SIGNIFICANT CHANGE UP (ref 0–0.9)
MONOCYTES NFR BLD AUTO: 7.1 % — SIGNIFICANT CHANGE UP (ref 2–14)
NEUTROPHILS # BLD AUTO: 3.13 K/UL — SIGNIFICANT CHANGE UP (ref 1.8–7.4)
NEUTROPHILS NFR BLD AUTO: 54.5 % — SIGNIFICANT CHANGE UP (ref 43–77)
NRBC # BLD: 0 /100 WBCS — SIGNIFICANT CHANGE UP
NRBC # FLD: 0 K/UL — SIGNIFICANT CHANGE UP
PHOSPHATE SERPL-MCNC: 3.7 MG/DL — SIGNIFICANT CHANGE UP (ref 2.5–4.5)
PLATELET # BLD AUTO: 462 K/UL — HIGH (ref 150–400)
POTASSIUM SERPL-MCNC: 3.9 MMOL/L — SIGNIFICANT CHANGE UP (ref 3.5–5.3)
POTASSIUM SERPL-SCNC: 3.9 MMOL/L — SIGNIFICANT CHANGE UP (ref 3.5–5.3)
RBC # BLD: 3.39 M/UL — LOW (ref 3.8–5.2)
RBC # FLD: 13 % — SIGNIFICANT CHANGE UP (ref 10.3–14.5)
SODIUM SERPL-SCNC: 135 MMOL/L — SIGNIFICANT CHANGE UP (ref 135–145)
WBC # BLD: 5.75 K/UL — SIGNIFICANT CHANGE UP (ref 3.8–10.5)
WBC # FLD AUTO: 5.75 K/UL — SIGNIFICANT CHANGE UP (ref 3.8–10.5)

## 2021-10-04 PROCEDURE — 99233 SBSQ HOSP IP/OBS HIGH 50: CPT | Mod: GC

## 2021-10-04 PROCEDURE — 99232 SBSQ HOSP IP/OBS MODERATE 35: CPT

## 2021-10-04 PROCEDURE — 93010 ELECTROCARDIOGRAM REPORT: CPT

## 2021-10-04 RX ORDER — ELECTROLYTE SOLUTION,INJ
1 VIAL (ML) INTRAVENOUS
Refills: 0 | Status: DISCONTINUED | OUTPATIENT
Start: 2021-10-04 | End: 2021-10-05

## 2021-10-04 RX ORDER — HYDROMORPHONE HYDROCHLORIDE 2 MG/ML
0.25 INJECTION INTRAMUSCULAR; INTRAVENOUS; SUBCUTANEOUS ONCE
Refills: 0 | Status: DISCONTINUED | OUTPATIENT
Start: 2021-10-04 | End: 2021-10-04

## 2021-10-04 RX ORDER — I.V. FAT EMULSION 20 G/100ML
12.5 EMULSION INTRAVENOUS
Qty: 30 | Refills: 0 | Status: DISCONTINUED | OUTPATIENT
Start: 2021-10-04 | End: 2021-10-05

## 2021-10-04 RX ADMIN — HYDROMORPHONE HYDROCHLORIDE 0.25 MILLIGRAM(S): 2 INJECTION INTRAMUSCULAR; INTRAVENOUS; SUBCUTANEOUS at 22:36

## 2021-10-04 RX ADMIN — HYDROMORPHONE HYDROCHLORIDE 0.25 MILLIGRAM(S): 2 INJECTION INTRAMUSCULAR; INTRAVENOUS; SUBCUTANEOUS at 22:51

## 2021-10-04 RX ADMIN — ONDANSETRON 4 MILLIGRAM(S): 8 TABLET, FILM COATED ORAL at 18:10

## 2021-10-04 RX ADMIN — PANTOPRAZOLE SODIUM 40 MILLIGRAM(S): 20 TABLET, DELAYED RELEASE ORAL at 17:14

## 2021-10-04 RX ADMIN — HYDROMORPHONE HYDROCHLORIDE 0.25 MILLIGRAM(S): 2 INJECTION INTRAMUSCULAR; INTRAVENOUS; SUBCUTANEOUS at 18:10

## 2021-10-04 RX ADMIN — Medication 1 GRAM(S): at 17:13

## 2021-10-04 RX ADMIN — HYDROMORPHONE HYDROCHLORIDE 0.25 MILLIGRAM(S): 2 INJECTION INTRAMUSCULAR; INTRAVENOUS; SUBCUTANEOUS at 06:04

## 2021-10-04 RX ADMIN — HYDROMORPHONE HYDROCHLORIDE 0.25 MILLIGRAM(S): 2 INJECTION INTRAMUSCULAR; INTRAVENOUS; SUBCUTANEOUS at 05:49

## 2021-10-04 RX ADMIN — HYDROMORPHONE HYDROCHLORIDE 0.25 MILLIGRAM(S): 2 INJECTION INTRAMUSCULAR; INTRAVENOUS; SUBCUTANEOUS at 10:30

## 2021-10-04 RX ADMIN — PANTOPRAZOLE SODIUM 40 MILLIGRAM(S): 20 TABLET, DELAYED RELEASE ORAL at 05:50

## 2021-10-04 RX ADMIN — CHLORHEXIDINE GLUCONATE 1 APPLICATION(S): 213 SOLUTION TOPICAL at 11:50

## 2021-10-04 RX ADMIN — I.V. FAT EMULSION 12.5 ML/HR: 20 EMULSION INTRAVENOUS at 17:00

## 2021-10-04 RX ADMIN — HYDROMORPHONE HYDROCHLORIDE 0.25 MILLIGRAM(S): 2 INJECTION INTRAMUSCULAR; INTRAVENOUS; SUBCUTANEOUS at 18:42

## 2021-10-04 RX ADMIN — HYDROMORPHONE HYDROCHLORIDE 0.25 MILLIGRAM(S): 2 INJECTION INTRAMUSCULAR; INTRAVENOUS; SUBCUTANEOUS at 10:59

## 2021-10-04 RX ADMIN — Medication 1 EACH: at 17:02

## 2021-10-04 NOTE — PROGRESS NOTE ADULT - ATTENDING COMMENTS
I agree with the detailed interval history, physical, and plan, which I have reviewed and edited where appropriate'; also agree with notes/assessment with my team on service.  I have personally examined the patient.  I was physically present for the key portions of the evaluation and management (E/M) service provided.  I reviewed all the pertinent data.  The patient is a critical care patient with life threatening hemodynamic and metabolic instability in SICU.  The SICU team has a constant risk benefit analyzes discussion and coordinating care with the primary team and all consultants.   The patient is in SICU with the chief complaint and diagnosis mentioned in the note.   The plan will be specified in the note.  26 year old female with PMH Crohn's disease s/p surgery for stricture/ileostomy receiving TPN .  comfortable in bed   HEAD:  Normocephalic  CHEST/LUNG: clear  ABDOMEN: soft, mildly tender to palpation, nondistended   labs reviewed - increased Na and K  continue TPN with infusion volume of 2L/1175 kcal/day

## 2021-10-04 NOTE — PROGRESS NOTE ADULT - ASSESSMENT
26 year old female with PMH Crohn's disease s/p surgery for stricture/ileostomy placement in mid August 2021, s/p PICC line in place for TPN 2 weeks ago, presents with epigastric abdominal pain, nausea, and vomiting x 4 days, admitted to medicine for a potential Crohn's Flare up. Inflammatory markers have been normal, suggesting some other possible etiology. GI is following with EGD yesterday to evaluate for other causes of her sx. EGD showed gastric friability and was biopsied. There was erythematous duodenopathy but no esophagitis. Pt received infliximab on 10/2 for potential suspected gastric Crohn's disease. 26 year old female with PMH Crohn's disease s/p surgery for stricture/ileostomy placement in mid August 2021, s/p PICC line in place for TPN 2 weeks ago, presents with epigastric abdominal pain, nausea, and vomiting x 4 days, admitted to medicine for a potential Crohn's Flare up. Inflammatory markers have been normal, suggesting some other possible etiology. GI is following s/p EGD on 9/30. EGD showed gastric friability and was biopsied. There was erythematous duodenopathy but no esophagitis. Pt received infliximab on 10/2 for potential suspected gastric Crohn's disease.

## 2021-10-04 NOTE — PROGRESS NOTE ADULT - SUBJECTIVE AND OBJECTIVE BOX
Giovanni Prajapati MS3    Patient is a 26y old  Female who presents with a chief complaint of crohn's flare up (03 Oct 2021 10:26)    SUBJECTIVE / OVERNIGHT EVENTS: Pt examined at bedside in NAD. Pt received Dilaudid and Tylenol overnight for abdominal pain. Pt states that her pain has worsened since yesterday and she has been unable to tolerating anything PO since the weekend. She is experiencing n/v even though she is not eating anything. States that her pain is 7/10 without pain meds but is currently 0/10 with Dilaudid. Pt denies any changes in her stool output in the ostomy bag. Denies any f/c, SOB, chest pain.    MEDICATIONS  (STANDING):  chlorhexidine 2% Cloths 1 Application(s) Topical daily  fat emulsion (Fish Oil and Plant Based) 20% Infusion 12.5 mL/Hr (12.5 mL/Hr) IV Continuous <Continuous>  influenza   Vaccine 0.5 milliLiter(s) IntraMuscular once  ondansetron   Disintegrating Tablet 4 milliGRAM(s) Oral once  pantoprazole  Injectable 40 milliGRAM(s) IV Push two times a day  Parenteral Nutrition - Adult 1 Each (83 mL/Hr) TPN Continuous <Continuous>  sucralfate suspension 1 Gram(s) Oral <User Schedule>    MEDICATIONS  (PRN):  acetaminophen   Tablet .. 650 milliGRAM(s) Oral every 6 hours PRN Temp greater or equal to 38.5C (101.3F), Mild Pain (1 - 3)  HYDROmorphone  Injectable 0.25 milliGRAM(s) IV Push every 8 hours PRN Severe Pain (7 - 10)  lidocaine 2% Viscous 2 milliLiter(s) Swish and Spit three times a day PRN pain with swallowing  ondansetron Injectable 4 milliGRAM(s) IV Push every 6 hours PRN Nausea and/or Vomiting    Vital Signs Last 24 Hrs  T(C): 36.6 (04 Oct 2021 05:49), Max: 37 (03 Oct 2021 15:36)  T(F): 97.8 (04 Oct 2021 05:49), Max: 98.6 (03 Oct 2021 15:36)  HR: 106 (04 Oct 2021 05:49) (86 - 112)  BP: 113/77 (04 Oct 2021 05:49) (109/65 - 126/87)  BP(mean): --  RR: 18 (04 Oct 2021 05:49) (18 - 18)  SpO2: 100% (04 Oct 2021 05:49) (99% - 100%)  CAPILLARY BLOOD GLUCOSE    POCT Blood Glucose.: 106 mg/dL (04 Oct 2021 06:02)  POCT Blood Glucose.: 111 mg/dL (03 Oct 2021 19:29)    I&O's Summary    03 Oct 2021 07:01  -  04 Oct 2021 07:00  --------------------------------------------------------  IN: 0 mL / OUT: 200 mL / NET: -200 mL        PHYSICAL EXAM:  GENERAL: NAD, malnourished  HEAD:  Atraumatic, Normocephalic  EYES: conjunctiva and sclera clear  NECK: No JVD  CHEST/LUNG: unlabored respirations  HEART: Patient declined a physical exam  ABDOMEN: patient declined a physical exam but when she pressed her own abdomen, there was some tenderness to palpation  EXTREMITIES:  moving all extremities spontaneously  PSYCH: AAOx3  NEUROLOGY: non-focal  SKIN: No rashes or lesions    LABS:                        9.0    7.01  )-----------( 408      ( 03 Oct 2021 07:12 )             26.7     10-03    133<L>  |  99  |  14  ----------------------------<  110<H>  3.7   |  21<L>  |  0.45<L>    Ca    9.1      03 Oct 2021 07:12  Phos  3.8     10-03  Mg     2.00     10-03        RADIOLOGY & ADDITIONAL TESTS:    Imaging Personally Reviewed:    Consultant(s) Notes Reviewed:      Care Discussed with Consultants/Other Providers:   Giovanni Prajapati MS3    Patient is a 26y old  Female who presents with a chief complaint of crohn's flare up (03 Oct 2021 10:26)    SUBJECTIVE / OVERNIGHT EVENTS: Pt examined at bedside in NAD. Pt received Dilaudid and Tylenol overnight for abdominal pain. Pt states that her pain has worsened since yesterday and she has been unable to tolerating anything PO since the weekend. She is experiencing n/v even though she is not eating anything. States that her pain is 7/10 without pain meds but is currently 0/10 with Dilaudid. Pt denies any changes in her stool output in the ostomy bag. Denies any f/c, SOB, chest pain.    MEDICATIONS  (STANDING):  chlorhexidine 2% Cloths 1 Application(s) Topical daily  fat emulsion (Fish Oil and Plant Based) 20% Infusion 12.5 mL/Hr (12.5 mL/Hr) IV Continuous <Continuous>  influenza   Vaccine 0.5 milliLiter(s) IntraMuscular once  ondansetron   Disintegrating Tablet 4 milliGRAM(s) Oral once  pantoprazole  Injectable 40 milliGRAM(s) IV Push two times a day  Parenteral Nutrition - Adult 1 Each (83 mL/Hr) TPN Continuous <Continuous>  sucralfate suspension 1 Gram(s) Oral <User Schedule>    MEDICATIONS  (PRN):  acetaminophen   Tablet .. 650 milliGRAM(s) Oral every 6 hours PRN Temp greater or equal to 38.5C (101.3F), Mild Pain (1 - 3)  HYDROmorphone  Injectable 0.25 milliGRAM(s) IV Push every 8 hours PRN Severe Pain (7 - 10)  lidocaine 2% Viscous 2 milliLiter(s) Swish and Spit three times a day PRN pain with swallowing  ondansetron Injectable 4 milliGRAM(s) IV Push every 6 hours PRN Nausea and/or Vomiting    Vital Signs Last 24 Hrs  T(C): 36.6 (04 Oct 2021 05:49), Max: 37 (03 Oct 2021 15:36)  T(F): 97.8 (04 Oct 2021 05:49), Max: 98.6 (03 Oct 2021 15:36)  HR: 106 (04 Oct 2021 05:49) (86 - 112)  BP: 113/77 (04 Oct 2021 05:49) (109/65 - 126/87)  BP(mean): --  RR: 18 (04 Oct 2021 05:49) (18 - 18)  SpO2: 100% (04 Oct 2021 05:49) (99% - 100%)  CAPILLARY BLOOD GLUCOSE    POCT Blood Glucose.: 106 mg/dL (04 Oct 2021 06:02)  POCT Blood Glucose.: 111 mg/dL (03 Oct 2021 19:29)    I&O's Summary    03 Oct 2021 07:01  -  04 Oct 2021 07:00  --------------------------------------------------------  IN: 0 mL / OUT: 200 mL / NET: -200 mL        PHYSICAL EXAM:  GENERAL: NAD, malnourished  HEAD:  Atraumatic, Normocephalic  EYES: conjunctiva and sclera clear  NECK: No JVD  CHEST/LUNG: unlabored respirations  HEART: Patient declined a physical exam  ABDOMEN: patient declined a physical exam but when she pressed her own abdomen, there was some tenderness to palpation  EXTREMITIES:  moving all extremities spontaneously  PSYCH: AAOx3  NEUROLOGY: non-focal  SKIN: No rashes or lesions    LABS:                        9.4    5.75  )-----------( 462      ( 04 Oct 2021 07:48 )             29.2       10-03    133<L>  |  99  |  14  ----------------------------<  110<H>  3.7   |  21<L>  |  0.45<L>    Ca    9.1      03 Oct 2021 07:12  Phos  3.8     10-03  Mg     2.00     10-03        RADIOLOGY & ADDITIONAL TESTS:    Imaging Personally Reviewed:    Consultant(s) Notes Reviewed:      Care Discussed with Consultants/Other Providers:

## 2021-10-04 NOTE — PROGRESS NOTE ADULT - PROBLEM SELECTOR PLAN 1
Hx of Crohn's with recent colonic resection for stricture  -Diet advanced, intermittently tolerating  -GI following, appreciate recs  -S/p EGD 9/30 with gastric inflammation, biopsied to r/o gastric Crohn;s  -S/p infliximab 10/2  -Inflammatory markers wnl, fecal calprotectin wnl Hx of Crohn's with recent colonic resection for stricture  -Diet advanced, intermittently tolerating  -GI following, appreciate recs  -S/p EGD 9/30 with gastric inflammation, biopsied to r/o gastric Crohn;s  -S/p infliximab on 10/1  -Inflammatory markers wnl, fecal calprotectin wnl Hx of Crohn's with recent colonic resection for stricture  -Diet advanced, intermittently tolerating  -GI following, appreciate recs  -S/p EGD 9/30 with gastric inflammation, biopsied to r/o gastric Crohn;s  -S/p infliximab on 10/1  -Inflammatory markers wnl, fecal calprotectin wnl  - Antiemetics PRN.  - Repeat ESR and CRP per GI  - Monitor improvement on Remicade  - would consider adding on solumedrol 20 IV Q8 and consider increasing infliximab dosing to 10mg/kg per GI Hx of Crohn's with recent colonic resection for stricture  -Diet advanced, intermittently tolerating  -GI following, appreciate recs  -S/p EGD 9/30 with gastric inflammation, biopsied to r/o gastric Crohn;s  -S/p infliximab on 10/1  -Inflammatory markers wnl, fecal calprotectin wnl  - Antiemetics PRN.  - Repeat ESR and CRP per GI  - Monitor improvement on Remicade  - no additional treatment at this time per GI, wait for pathology report to determine if pt has gastric Crohn's

## 2021-10-04 NOTE — PROGRESS NOTE ADULT - ASSESSMENT
26F w/ history of Crohn's disease c/b ascending colon stricture s/p resection and ileostomy at [facility], on remicade since 6/2021, presenting w/ persistent abd pain, n/v.     Impression:  #Nausea, vomiting, abd pain - Likely multifactorial. Contributing factors include esophagitis, severe erosive gastritis (seen on EGD), and contribution from Crohn's disease. Regarding Crohn's disease, inflammatory markers are negative, but EGD findings raise possibility of UGI Crohn's. CT scan read as some enteritis in RLQ as well. Patient is on remicade as an outpatient and overdue by approx 1 week for her remicade dose.   #Crohn's disease - started on remicade at time of diagnosis in 6/2021.  s/p recent ileocolic resection for ascending colon stricture at [facility].   #Elevated liver enzymes - unclear etiology, can consider MRI/MRCP to r/o biliary sludge (though no e/o cholestasis).   #C/f celiac disease - due to increased intraepithelial lymphocytes on biopsies, however negative serologic work-up and symptoms do not correlate.     Recommendations:  - Would not proceed with IV steroids or additional remicade without more objective evidence of Crohn's (i.e. on gastric biopsies from recent EGD). We will continue to f/u path and consider steroids/remicade.   - Antiemetics PRN.  - Repeat ESR and CRP  - Monitor improvement on Remicade  - Pain meds IV  - GI will continue to follow.    Faustino Freitas  Gastroenterology/Hepatology Fellow  Available via Microsoft Teams    NON-URGENT CONSULTS:  Please email [email or web address]  [email or web address]  AT NIGHT AND ON WEEKENDS:  Contact on-call GI fellow via answering service ([phone]) from 5pm-8am and on weekends/holidays  MONDAY-FRIDAY 8AM-5PM:  Pager# [phone] ([facility]) or [phone] ([facility])  GI Phone# [phone] ([facility])

## 2021-10-04 NOTE — PROGRESS NOTE ADULT - SUBJECTIVE AND OBJECTIVE BOX
Chief Complaint:  Patient is a 26y old  Female who presents with a chief complaint of crohn's flare up (04 Oct 2021 10:41)    Reason for consult: abd pain, hx Crohn's disease    Interval Events: Pt having trouble tolerating any PO, c/o pain and n/v after every meal.     Hospital Medications:  acetaminophen   Tablet .. 650 milliGRAM(s) Oral every 6 hours PRN  chlorhexidine 2% Cloths 1 Application(s) Topical daily  fat emulsion (Fish Oil and Plant Based) 20% Infusion 12.5 mL/Hr IV Continuous <Continuous>  HYDROmorphone  Injectable 0.25 milliGRAM(s) IV Push every 8 hours PRN  influenza   Vaccine 0.5 milliLiter(s) IntraMuscular once  lidocaine 2% Viscous 2 milliLiter(s) Swish and Spit three times a day PRN  ondansetron   Disintegrating Tablet 4 milliGRAM(s) Oral once  ondansetron Injectable 4 milliGRAM(s) IV Push every 6 hours PRN  pantoprazole  Injectable 40 milliGRAM(s) IV Push two times a day  Parenteral Nutrition - Adult 1 Each TPN Continuous <Continuous>  Parenteral Nutrition - Adult 1 Each TPN Continuous <Continuous>  sucralfate suspension 1 Gram(s) Oral <User Schedule>      ROS:   General:  No  fevers, chills, night sweats, fatigue  Eyes:  Good vision, no reported pain  ENT:  No sore throat, pain, runny nose  CV:  No pain, palpitations  Pulm:  No dyspnea, cough  GI:  See HPI, otherwise negative  :  No  incontinence, nocturia  Muscle:  No pain, weakness  Neuro:  No memory problems  Psych:  No insomnia, mood problems, depression  Endocrine:  No polyuria, polydipsia, cold/heat intolerance  Heme:  No petechiae, ecchymosis, easy bruisability  Skin:  No rash    PHYSICAL EXAM:   Vital Signs:  Vital Signs Last 24 Hrs  T(C): 36.7 (04 Oct 2021 10:28), Max: 37 (03 Oct 2021 15:36)  T(F): 98 (04 Oct 2021 10:28), Max: 98.6 (03 Oct 2021 15:36)  HR: 100 (04 Oct 2021 10:28) (86 - 112)  BP: 122/78 (04 Oct 2021 10:28) (109/65 - 126/87)  BP(mean): --  RR: 18 (04 Oct 2021 10:28) (18 - 18)  SpO2: 100% (04 Oct 2021 10:28) (99% - 100%)  Daily     Daily Weight in k.2 (04 Oct 2021 05:49)    GENERAL: no acute distress  NEURO: alert  HEENT: anicteric sclera, no conjunctival pallor appreciated  CHEST: no respiratory distress, no accessory muscle use  CARDIAC: regular rate, rhythm  ABDOMEN: soft, non-tender, non-distended, no rebound or guarding  EXTREMITIES: warm, well perfused, no edema  SKIN: no lesions noted    LABS: reviewed                        9.4    5.75  )-----------( 462      ( 04 Oct 2021 07:48 )             29.2     10-04    135  |  100  |  15  ----------------------------<  102<H>  3.9   |  24  |  0.45<L>    Ca    9.4      04 Oct 2021 07:48  Phos  3.7     10-04  Mg     2.00     10-04          Interval Diagnostic Studies: see sunrise for full report

## 2021-10-04 NOTE — PROGRESS NOTE ADULT - SUBJECTIVE AND OBJECTIVE BOX
NUTRITION NOTE  [id number] MUTAMANDAA  ===============================    Interval events - Patient was seen and examined at bedside, no acute events overnight. Patient denies chest pain or shortness of breath at this time. Patient remains on TPN at this time. Patient reports severe abdominal pain and nausea/vomiting. Pt states that she has not eaten anything yet this morning for breakfast.     ROS: Except as noted above, all other systems reviewed and are negative     Allergies  No Known Allergies    PAST MEDICAL & SURGICAL HISTORY:  Crohn disease  Peripherally inserted central catheter (PICC) in place LUE PICC place on 2021  History of ileostomy Aug 2021    Vital Signs Last 24 Hrs  T(C): 36.7 (04 Oct 2021 10:28), Max: 37 (03 Oct 2021 15:36)  T(F): 98 (04 Oct 2021 10:28), Max: 98.6 (03 Oct 2021 15:36)  HR: 100 (04 Oct 2021 10:28) (86 - 112)  BP: 122/78 (04 Oct 2021 10:28) (109/65 - 126/87)  RR: 18 (04 Oct 2021 10:28) (18 - 18)  SpO2: 100% (04 Oct 2021 10:28) (99% - 100%)    MEDICATIONS  (STANDING):  chlorhexidine 2% Cloths 1 Application(s) Topical daily  fat emulsion (Fish Oil and Plant Based) 20% Infusion 12.5 mL/Hr (12.5 mL/Hr) IV Continuous <Continuous>  influenza   Vaccine 0.5 milliLiter(s) IntraMuscular once  ondansetron   Disintegrating Tablet 4 milliGRAM(s) Oral once  pantoprazole  Injectable 40 milliGRAM(s) IV Push two times a day  Parenteral Nutrition - Adult 1 Each (83 mL/Hr) TPN Continuous <Continuous>  Parenteral Nutrition - Adult 1 Each (83 mL/Hr) TPN Continuous <Continuous>  sucralfate suspension 1 Gram(s) Oral <User Schedule>    MEDICATIONS  (PRN):  acetaminophen   Tablet .. 650 milliGRAM(s) Oral every 6 hours PRN Temp greater or equal to 38.5C (101.3F), Mild Pain (1 - 3)  HYDROmorphone  Injectable 0.25 milliGRAM(s) IV Push every 8 hours PRN Severe Pain (7 - 10)  lidocaine 2% Viscous 2 milliLiter(s) Swish and Spit three times a day PRN pain with swallowing  ondansetron Injectable 4 milliGRAM(s) IV Push every 6 hours PRN Nausea and/or Vomiting    I&O's Detail    03 Oct 2021 07:01  -  04 Oct 2021 07:00  --------------------------------------------------------  IN:  Total IN: 0 mL    OUT:    Emesis (mL): 100 mL    Ileostomy (mL): 100 mL  Total OUT: 200 mL    Total NET: -200 mL    POCT Blood Glucose.: 106 mg/dL (04 Oct 2021 06:02)  POCT Blood Glucose.: 111 mg/dL (03 Oct 2021 19:29)    Daily Weight in k.2 (04 Oct 2021 05:49)    Drug Dosing Weight  Height (cm): 154.9 (30 Sep 2021 08:11)  Weight (kg): 35.2 (30 Sep 2021 08:11)  BMI (kg/m2): 14.7 (30 Sep 2021 08:11)  BSA (m2): 1.26 (30 Sep 2021 08:11)    PHYSICAL EXAM:  GENERAL: malnourished, resting comfortably in bed   HEAD:  Atraumatic, Normocephalic  CHEST/LUNG: nonlabored respirations, CTAB  ABDOMEN: soft, mildly tender to palpation, nondistended   PSYCH: AAOx3  PICC Site: C/D/I    Diet: soft diet and TPN/lipids     LABORATORY                                                          9.4    5.75  )-----------( 462      ( 04 Oct 2021 07:48 )             29.2   10-04    135  |  100  |  x   ----------------------------<  x   3.9   |  x   |  x     Ca    9.1      03 Oct 2021 07:12  Phos  3.7     10-04  Mg     2.00     10-04    TPro  6.3  /  Alb  3.8  /  TBili  0.3  /  DBili  x   /  AST  17  /  ALT  58<H>  /  AlkPhos  78      LIVER FUNCTIONS - ( 30 Sep 2021 06:06 )  Alb: 3.8 g/dL / Pro: 6.3 g/dL / ALK PHOS: 78 U/L / ALT: 58 U/L / AST: 17 U/L / GGT: x            Chol -- LDL -- HDL -- Trig 71    ASSESSMENT/PLAN:  26 year old female with PMH Crohn's disease s/p surgery for stricture/ileostomy placement in mid 2021, s/p PICC line in place for TPN 2 weeks ago, presents with epigastric abdominal pain, nausea, and vomiting x 4 days. GI is following with possible EGD to evaluate for esophagitis. Nutrition service consulted for resuming parenteral nutrition via PICC line that is in place. Patient states that she feels very weak and is frustrated that she is not able to take in anything by mouth (liquids or food) without nausea or emesis. TPN was initiated on 21.    continue TPN with infusion volume of 2L, TPN will provide 1175 kcal/day    labs reviewed - increased Na and K in TPN bag     monitor fingersticks, obtain daily weights    continue parenteral nutrition at this time, will follow up with primary team on plan - will continue to monitor PO intake and tolerance to diet     1.  Severe protein calorie malnutrition being optimized with TPN: CHO [175] gm.  AA [70] gm. SMOF Lipids [30] gm.  2.  Hyperglycemia managed with: [0] units of regular insulin    3.  Check fluid balance daily.  Strict I/O  [ ] [ ]   4.  Daily BMP, Ionized Calcium, Magnesium and Phosphorous   5.  Triglycerides at initiation of TPN and monthly [ ] [ ]     Nutrition Support [id number]

## 2021-10-04 NOTE — PROGRESS NOTE ADULT - ATTENDING COMMENTS
Ongoing symptoms of abdominal pain and inability to tolerate PO.   Called IBD specialist, Dr. Karol Farr, at [facility] who had performed patient's recent EGD in September; endoscopic findings revealed mild gastropathy with path negative for H. pylori or granulomatous disease. Patient imaging at [facility] did not suggest any active Crohn's disease and dedicated MR showed patent abdominal vasculature. Given patient's persistent inability to tolerate PO, she was ultimately started on TPN for nutrition. No inpatient Remicade doses were given.   Of note, Remicade levels and antibodies had been checked early in hospitalization - she had no antibodies and level 21 (though not true trough based on timing).   At this time, still awaiting path from recent EGD. Reached out to GI pathology team to assist in expediting results. Overall low suspicion for gastric Crohn's, but given recent CT with enteritis, suspect may have uncontrolled disease.   Will await results (or at least prelim results) from pathology. Ultimately, may given trial of IV steroids to assess response.   Plan reviewed with patient.

## 2021-10-04 NOTE — PROGRESS NOTE ADULT - ATTENDING COMMENTS
Pt seen and examined. She reports continued abdominal pain and unable to tolerate PO due to pain and nausea.     Pt amendable to physical exam. Physical Exam notable for:  Constitutional: NAD, laying down in bed with sister and father at bedside   Eyes: EOMI, no scleral icterus or injection  ENMT: supple,  Respiratory: CTA b/l , no wheezing, or ronchi  Cardiovascular: RRR, +S1/S2, no murmur appreciated   Gastrointestinal: Soft, ND, +BS. Mild TTP in all quadrants. No gaurding or rebound.   Neurological: AX0x3. Non focal. Moves all extremities  Psychiatric: Normal mood and affect    27 yo F with Hx of Crohn's disease s/p surgery for stricture/ileostomy placement in 8/ 2021, s/p PICC line for TPN approx 2 weeks prior to admission, p/w abdominal pain, n/v 2/2 potential Crohn's Flare. S/p infliximab on 10/1. Now s/p EGD on 9/30 showing diffuse erythema and friability w/ some areas of superficial ulceration s/p biopsu and Erythematous duodenopathy.    -C/w pain management. Will likely increase frequency of pain meds. Pt states dose helps but she usually requires another dose early.   -serial abdominal exams   -Will continue to appreciate GI recs. Currently, awaiting path from [facility] to determine next steps in management.   -c/w TPN  -antiemetics PRN

## 2021-10-05 ENCOUNTER — APPOINTMENT (OUTPATIENT)
Dept: GASTROENTEROLOGY | Facility: CLINIC | Age: 26
End: 2021-10-05

## 2021-10-05 LAB
ALBUMIN SERPL ELPH-MCNC: 4.2 G/DL — SIGNIFICANT CHANGE UP (ref 3.3–5)
ALP SERPL-CCNC: 83 U/L — SIGNIFICANT CHANGE UP (ref 40–120)
ALT FLD-CCNC: 69 U/L — HIGH (ref 4–33)
ANION GAP SERPL CALC-SCNC: 12 MMOL/L — SIGNIFICANT CHANGE UP (ref 7–14)
AST SERPL-CCNC: 45 U/L — HIGH (ref 4–32)
BASOPHILS # BLD AUTO: 0.04 K/UL — SIGNIFICANT CHANGE UP (ref 0–0.2)
BASOPHILS NFR BLD AUTO: 0.6 % — SIGNIFICANT CHANGE UP (ref 0–2)
BILIRUB SERPL-MCNC: 0.2 MG/DL — SIGNIFICANT CHANGE UP (ref 0.2–1.2)
BUN SERPL-MCNC: 16 MG/DL — SIGNIFICANT CHANGE UP (ref 7–23)
CA-I BLD-SCNC: 1.3 MMOL/L — HIGH (ref 1.15–1.29)
CALCIUM SERPL-MCNC: 9.8 MG/DL — SIGNIFICANT CHANGE UP (ref 8.4–10.5)
CHLORIDE SERPL-SCNC: 101 MMOL/L — SIGNIFICANT CHANGE UP (ref 98–107)
CO2 SERPL-SCNC: 23 MMOL/L — SIGNIFICANT CHANGE UP (ref 22–31)
CREAT SERPL-MCNC: 0.45 MG/DL — LOW (ref 0.5–1.3)
CRP SERPL-MCNC: <4 MG/L — SIGNIFICANT CHANGE UP
EOSINOPHIL # BLD AUTO: 0.25 K/UL — SIGNIFICANT CHANGE UP (ref 0–0.5)
EOSINOPHIL NFR BLD AUTO: 4 % — SIGNIFICANT CHANGE UP (ref 0–6)
ERYTHROCYTE [SEDIMENTATION RATE] IN BLOOD: 12 MM/HR — SIGNIFICANT CHANGE UP (ref 4–25)
GLUCOSE SERPL-MCNC: 80 MG/DL — SIGNIFICANT CHANGE UP (ref 70–99)
HCT VFR BLD CALC: 29.9 % — LOW (ref 34.5–45)
HGB BLD-MCNC: 9.4 G/DL — LOW (ref 11.5–15.5)
IANC: 2.84 K/UL — SIGNIFICANT CHANGE UP (ref 1.5–8.5)
IMM GRANULOCYTES NFR BLD AUTO: 0.3 % — SIGNIFICANT CHANGE UP (ref 0–1.5)
LYMPHOCYTES # BLD AUTO: 2.64 K/UL — SIGNIFICANT CHANGE UP (ref 1–3.3)
LYMPHOCYTES # BLD AUTO: 42.2 % — SIGNIFICANT CHANGE UP (ref 13–44)
MAGNESIUM SERPL-MCNC: 2.2 MG/DL — SIGNIFICANT CHANGE UP (ref 1.6–2.6)
MCHC RBC-ENTMCNC: 27 PG — SIGNIFICANT CHANGE UP (ref 27–34)
MCHC RBC-ENTMCNC: 31.4 GM/DL — LOW (ref 32–36)
MCV RBC AUTO: 85.9 FL — SIGNIFICANT CHANGE UP (ref 80–100)
MONOCYTES # BLD AUTO: 0.47 K/UL — SIGNIFICANT CHANGE UP (ref 0–0.9)
MONOCYTES NFR BLD AUTO: 7.5 % — SIGNIFICANT CHANGE UP (ref 2–14)
NEUTROPHILS # BLD AUTO: 2.84 K/UL — SIGNIFICANT CHANGE UP (ref 1.8–7.4)
NEUTROPHILS NFR BLD AUTO: 45.4 % — SIGNIFICANT CHANGE UP (ref 43–77)
NRBC # BLD: 0 /100 WBCS — SIGNIFICANT CHANGE UP
NRBC # FLD: 0 K/UL — SIGNIFICANT CHANGE UP
PHOSPHATE SERPL-MCNC: 4.6 MG/DL — HIGH (ref 2.5–4.5)
PLATELET # BLD AUTO: 457 K/UL — HIGH (ref 150–400)
POTASSIUM SERPL-MCNC: 4.2 MMOL/L — SIGNIFICANT CHANGE UP (ref 3.5–5.3)
POTASSIUM SERPL-SCNC: 4.2 MMOL/L — SIGNIFICANT CHANGE UP (ref 3.5–5.3)
PROT SERPL-MCNC: 7.1 G/DL — SIGNIFICANT CHANGE UP (ref 6–8.3)
RBC # BLD: 3.48 M/UL — LOW (ref 3.8–5.2)
RBC # FLD: 12.9 % — SIGNIFICANT CHANGE UP (ref 10.3–14.5)
SODIUM SERPL-SCNC: 136 MMOL/L — SIGNIFICANT CHANGE UP (ref 135–145)
TRIGL SERPL-MCNC: 91 MG/DL — SIGNIFICANT CHANGE UP
WBC # BLD: 6.26 K/UL — SIGNIFICANT CHANGE UP (ref 3.8–10.5)
WBC # FLD AUTO: 6.26 K/UL — SIGNIFICANT CHANGE UP (ref 3.8–10.5)

## 2021-10-05 PROCEDURE — 99232 SBSQ HOSP IP/OBS MODERATE 35: CPT | Mod: GC

## 2021-10-05 PROCEDURE — 99232 SBSQ HOSP IP/OBS MODERATE 35: CPT

## 2021-10-05 RX ORDER — HYDROMORPHONE HYDROCHLORIDE 2 MG/ML
0.25 INJECTION INTRAMUSCULAR; INTRAVENOUS; SUBCUTANEOUS ONCE
Refills: 0 | Status: DISCONTINUED | OUTPATIENT
Start: 2021-10-05 | End: 2021-10-05

## 2021-10-05 RX ORDER — ELECTROLYTE SOLUTION,INJ
1 VIAL (ML) INTRAVENOUS
Refills: 0 | Status: DISCONTINUED | OUTPATIENT
Start: 2021-10-05 | End: 2021-10-06

## 2021-10-05 RX ORDER — I.V. FAT EMULSION 20 G/100ML
12.5 EMULSION INTRAVENOUS
Qty: 30 | Refills: 0 | Status: DISCONTINUED | OUTPATIENT
Start: 2021-10-05 | End: 2021-10-06

## 2021-10-05 RX ORDER — HYDROMORPHONE HYDROCHLORIDE 2 MG/ML
0.25 INJECTION INTRAMUSCULAR; INTRAVENOUS; SUBCUTANEOUS EVERY 6 HOURS
Refills: 0 | Status: DISCONTINUED | OUTPATIENT
Start: 2021-10-05 | End: 2021-10-06

## 2021-10-05 RX ADMIN — Medication 1 EACH: at 05:10

## 2021-10-05 RX ADMIN — HYDROMORPHONE HYDROCHLORIDE 0.25 MILLIGRAM(S): 2 INJECTION INTRAMUSCULAR; INTRAVENOUS; SUBCUTANEOUS at 16:00

## 2021-10-05 RX ADMIN — CHLORHEXIDINE GLUCONATE 1 APPLICATION(S): 213 SOLUTION TOPICAL at 11:19

## 2021-10-05 RX ADMIN — PANTOPRAZOLE SODIUM 40 MILLIGRAM(S): 20 TABLET, DELAYED RELEASE ORAL at 05:11

## 2021-10-05 RX ADMIN — HYDROMORPHONE HYDROCHLORIDE 0.25 MILLIGRAM(S): 2 INJECTION INTRAMUSCULAR; INTRAVENOUS; SUBCUTANEOUS at 17:45

## 2021-10-05 RX ADMIN — HYDROMORPHONE HYDROCHLORIDE 0.25 MILLIGRAM(S): 2 INJECTION INTRAMUSCULAR; INTRAVENOUS; SUBCUTANEOUS at 21:07

## 2021-10-05 RX ADMIN — ONDANSETRON 4 MILLIGRAM(S): 8 TABLET, FILM COATED ORAL at 13:11

## 2021-10-05 RX ADMIN — I.V. FAT EMULSION 12.5 ML/HR: 20 EMULSION INTRAVENOUS at 17:31

## 2021-10-05 RX ADMIN — ONDANSETRON 4 MILLIGRAM(S): 8 TABLET, FILM COATED ORAL at 21:06

## 2021-10-05 RX ADMIN — Medication 1 GRAM(S): at 11:18

## 2021-10-05 RX ADMIN — HYDROMORPHONE HYDROCHLORIDE 0.25 MILLIGRAM(S): 2 INJECTION INTRAMUSCULAR; INTRAVENOUS; SUBCUTANEOUS at 13:12

## 2021-10-05 RX ADMIN — PANTOPRAZOLE SODIUM 40 MILLIGRAM(S): 20 TABLET, DELAYED RELEASE ORAL at 17:45

## 2021-10-05 RX ADMIN — Medication 1 EACH: at 17:33

## 2021-10-05 RX ADMIN — HYDROMORPHONE HYDROCHLORIDE 0.25 MILLIGRAM(S): 2 INJECTION INTRAMUSCULAR; INTRAVENOUS; SUBCUTANEOUS at 21:22

## 2021-10-05 RX ADMIN — HYDROMORPHONE HYDROCHLORIDE 0.25 MILLIGRAM(S): 2 INJECTION INTRAMUSCULAR; INTRAVENOUS; SUBCUTANEOUS at 05:11

## 2021-10-05 RX ADMIN — HYDROMORPHONE HYDROCHLORIDE 0.25 MILLIGRAM(S): 2 INJECTION INTRAMUSCULAR; INTRAVENOUS; SUBCUTANEOUS at 18:08

## 2021-10-05 RX ADMIN — HYDROMORPHONE HYDROCHLORIDE 0.25 MILLIGRAM(S): 2 INJECTION INTRAMUSCULAR; INTRAVENOUS; SUBCUTANEOUS at 05:26

## 2021-10-05 RX ADMIN — HYDROMORPHONE HYDROCHLORIDE 0.25 MILLIGRAM(S): 2 INJECTION INTRAMUSCULAR; INTRAVENOUS; SUBCUTANEOUS at 13:48

## 2021-10-05 RX ADMIN — I.V. FAT EMULSION 12.5 ML/HR: 20 EMULSION INTRAVENOUS at 05:09

## 2021-10-05 RX ADMIN — HYDROMORPHONE HYDROCHLORIDE 0.25 MILLIGRAM(S): 2 INJECTION INTRAMUSCULAR; INTRAVENOUS; SUBCUTANEOUS at 15:16

## 2021-10-05 NOTE — PROGRESS NOTE ADULT - SUBJECTIVE AND OBJECTIVE BOX
NUTRITION NOTE  [id number] MUTAMANDAA  ===============================    Interval events - Patient was seen and examined at bedside, no acute events overnight. Patient denies chest pain or shortness of breath at this time. Patient remains on TPN at this time. Patient reports severe abdominal pain and nausea/vomiting with PO intake. Pt states that she has not eaten anything yet this morning for breakfast.     ROS: Except as noted above, all other systems reviewed and are negative     Allergies  No Known Allergies    PAST MEDICAL & SURGICAL HISTORY:  Crohn disease  Peripherally inserted central catheter (PICC) in place LUE PICC place on 2021  History of ileostomy Aug 2021    Vital Signs Last 24 Hrs  T(C): 36.9 (05 Oct 2021 05:05), Max: 37.1 (04 Oct 2021 21:29)  T(F): 98.5 (05 Oct 2021 05:05), Max: 98.8 (04 Oct 2021 21:29)  HR: 108 (05 Oct 2021 05:05) (93 - 109)  BP: 112/76 (05 Oct 2021 05:05) (112/76 - 122/94)  RR: 17 (05 Oct 2021 05:05) (17 - 17)  SpO2: 100% (05 Oct 2021 05:05) (100% - 100%)    MEDICATIONS  (STANDING):  chlorhexidine 2% Cloths 1 Application(s) Topical daily  fat emulsion (Fish Oil and Plant Based) 20% Infusion 12.5 mL/Hr (12.5 mL/Hr) IV Continuous <Continuous>  influenza   Vaccine 0.5 milliLiter(s) IntraMuscular once  ondansetron   Disintegrating Tablet 4 milliGRAM(s) Oral once  pantoprazole  Injectable 40 milliGRAM(s) IV Push two times a day  Parenteral Nutrition - Adult 1 Each (83 mL/Hr) TPN Continuous <Continuous>  Parenteral Nutrition - Adult 1 Each (83 mL/Hr) TPN Continuous <Continuous>  sucralfate suspension 1 Gram(s) Oral <User Schedule>    MEDICATIONS  (PRN):  acetaminophen   Tablet .. 650 milliGRAM(s) Oral every 6 hours PRN Temp greater or equal to 38.5C (101.3F), Mild Pain (1 - 3)  HYDROmorphone  Injectable 0.25 milliGRAM(s) IV Push every 8 hours PRN Severe Pain (7 - 10)  lidocaine 2% Viscous 2 milliLiter(s) Swish and Spit three times a day PRN pain with swallowing  ondansetron Injectable 4 milliGRAM(s) IV Push every 6 hours PRN Nausea and/or Vomiting    POCT Blood Glucose.: 110 mg/dL (04 Oct 2021 22:07)  POCT Blood Glucose.: 112 mg/dL (04 Oct 2021 18:07)    Daily Weight in k.9 (05 Oct 2021 05:05)    Drug Dosing Weight  Height (cm): 154.9 (30 Sep 2021 08:11)  Weight (kg): 35.2 (30 Sep 2021 08:11)  BMI (kg/m2): 14.7 (30 Sep 2021 08:11)  BSA (m2): 1.26 (30 Sep 2021 08:11)    I&O's Detail  PHYSICAL EXAM:  GENERAL: malnourished, resting comfortably in bed   HEAD:  Atraumatic, Normocephalic  CHEST/LUNG: nonlabored respirations, CTAB  ABDOMEN: soft, mildly tender to palpation, nondistended   PSYCH: AAOx3  PICC Site: C/D/I    Diet: soft diet and TPN/lipids     LABORATORY                                                                   9.4    6.26  )-----------( 457      ( 05 Oct 2021 08:00 )             29.9   10-05    136  |  101  |  16  ----------------------------<  80  4.2   |  23  |  0.45<L>    Ca    9.8      05 Oct 2021 07:59  Phos  4.6     10-05  Mg     2.20     10-05    TPro  7.1  /  Alb  4.2  /  TBili  0.2  /  DBili  x   /  AST  45<H>  /  ALT  69<H>  /  AlkPhos  83  10-05    LIVER FUNCTIONS - ( 05 Oct 2021 07:59 )  Alb: 4.2 g/dL / Pro: 7.1 g/dL / ALK PHOS: 83 U/L / ALT: 69 U/L / AST: 45 U/L / GGT: x           10-05 Chol -- LDL -- HDL -- Trig 91, 09-30 Chol -- LDL -- HDL -- Trig 71    ASSESSMENT/PLAN:  26 year old female with PMH Crohn's disease s/p surgery for stricture/ileostomy placement in mid 2021, s/p PICC line in place for TPN 2 weeks ago, presents with epigastric abdominal pain, nausea, and vomiting x 4 days. GI is following with possible EGD to evaluate for esophagitis. Nutrition service consulted for resuming parenteral nutrition via PICC line that is in place. Patient states that she feels very weak and is frustrated that she is not able to take in anything by mouth (liquids or food) without nausea or emesis. TPN was initiated on 21.    continue TPN with infusion volume of 2L, TPN will provide 1175 kcal/day    labs reviewed - electrolytes adjusted in TPN bag     monitor fingersticks, obtain daily weights    continue parenteral nutrition at this time, will follow up with primary team on plan - will continue to monitor PO intake and tolerance to diet     1.  Severe protein calorie malnutrition being optimized with TPN: CHO [175] gm.  AA [70] gm. SMOF Lipids [30] gm.  2.  Hyperglycemia managed with: [0] units of regular insulin    3.  Check fluid balance daily.  Strict I/O  [ ] [ ]   4.  Daily BMP, Ionized Calcium, Magnesium and Phosphorous   5.  Triglycerides at initiation of TPN and monthly [ ] [ ]     Nutrition Support [id number]

## 2021-10-05 NOTE — PROGRESS NOTE ADULT - SUBJECTIVE AND OBJECTIVE BOX
Giovanni Prajapati MS3    Patient is a 26y old  Female who presents with a chief complaint of crohn's flare up (04 Oct 2021 13:16)    SUBJECTIVE / OVERNIGHT EVENTS: Pt examined at bedside in NAD. Patient was sleeping and was woken up for interview. Pt states symptoms are similar to yesterday and required Dilaudid overnight PRN for pain. She states she has around 7/10 pain that is improved with Dilaudid. She states she was able to drink some soup and water yesterday, but experienced pain and nausea. However, she states she got pain meds in time and did not vomit. No changes in ostomy output. Pt denies any f/c, chest pain, SOB.     MEDICATIONS  (STANDING):  chlorhexidine 2% Cloths 1 Application(s) Topical daily  fat emulsion (Fish Oil and Plant Based) 20% Infusion 12.5 mL/Hr (12.5 mL/Hr) IV Continuous <Continuous>  influenza   Vaccine 0.5 milliLiter(s) IntraMuscular once  ondansetron   Disintegrating Tablet 4 milliGRAM(s) Oral once  pantoprazole  Injectable 40 milliGRAM(s) IV Push two times a day  Parenteral Nutrition - Adult 1 Each (83 mL/Hr) TPN Continuous <Continuous>  sucralfate suspension 1 Gram(s) Oral <User Schedule>    MEDICATIONS  (PRN):  acetaminophen   Tablet .. 650 milliGRAM(s) Oral every 6 hours PRN Temp greater or equal to 38.5C (101.3F), Mild Pain (1 - 3)  HYDROmorphone  Injectable 0.25 milliGRAM(s) IV Push every 8 hours PRN Severe Pain (7 - 10)  lidocaine 2% Viscous 2 milliLiter(s) Swish and Spit three times a day PRN pain with swallowing  ondansetron Injectable 4 milliGRAM(s) IV Push every 6 hours PRN Nausea and/or Vomiting    Vital Signs Last 24 Hrs  T(C): 36.9 (05 Oct 2021 05:05), Max: 37.1 (04 Oct 2021 21:29)  T(F): 98.5 (05 Oct 2021 05:05), Max: 98.8 (04 Oct 2021 21:29)  HR: 108 (05 Oct 2021 05:05) (93 - 109)  BP: 112/76 (05 Oct 2021 05:05) (112/76 - 122/94)  BP(mean): --  RR: 17 (05 Oct 2021 05:05) (17 - 18)  SpO2: 100% (05 Oct 2021 05:05) (100% - 100%)  CAPILLARY BLOOD GLUCOSE      POCT Blood Glucose.: 110 mg/dL (04 Oct 2021 22:07)  POCT Blood Glucose.: 112 mg/dL (04 Oct 2021 18:07)    I&O's Summary    PHYSICAL EXAM:  GENERAL: NAD, severely underweight  HEAD: Atraumatic, Normocephalic  EYES: conjunctiva and sclera clear  NECK:  No JVD  CHEST/LUNG: nonlabored respirations  HEART: Patient denied exam  ABDOMEN: Patient denied exam  EXTREMITIES: moving extremities spontaneously  PSYCH: AAOx3  NEUROLOGY: non-focal  SKIN: No rashes or lesions    LABS:                        9.4    5.75  )-----------( 462      ( 04 Oct 2021 07:48 )             29.2     10-04    135  |  100  |  15  ----------------------------<  102<H>  3.9   |  24  |  0.45<L>    Ca    9.4      04 Oct 2021 07:48  Phos  3.7     10-04  Mg     2.00     10-04                RADIOLOGY & ADDITIONAL TESTS:    Imaging Personally Reviewed:    Consultant(s) Notes Reviewed:      Care Discussed with Consultants/Other Providers:   Giovanni Prajapati MS3    Patient is a 26y old  Female who presents with a chief complaint of crohn's flare up (04 Oct 2021 13:16)    SUBJECTIVE / OVERNIGHT EVENTS: Pt examined at bedside in NAD. Patient was sleeping and was woken up for interview. Pt states symptoms are similar to yesterday and required Dilaudid overnight PRN for pain. She states she has around 7/10 pain that is improved with Dilaudid. She states she was able to drink some soup and water yesterday, but experienced pain and nausea. However, she states she got pain meds in time and did not vomit. No changes in ostomy output. Pt denies any f/c, chest pain, SOB.     MEDICATIONS  (STANDING):  chlorhexidine 2% Cloths 1 Application(s) Topical daily  fat emulsion (Fish Oil and Plant Based) 20% Infusion 12.5 mL/Hr (12.5 mL/Hr) IV Continuous <Continuous>  influenza   Vaccine 0.5 milliLiter(s) IntraMuscular once  ondansetron   Disintegrating Tablet 4 milliGRAM(s) Oral once  pantoprazole  Injectable 40 milliGRAM(s) IV Push two times a day  Parenteral Nutrition - Adult 1 Each (83 mL/Hr) TPN Continuous <Continuous>  sucralfate suspension 1 Gram(s) Oral <User Schedule>    MEDICATIONS  (PRN):  acetaminophen   Tablet .. 650 milliGRAM(s) Oral every 6 hours PRN Temp greater or equal to 38.5C (101.3F), Mild Pain (1 - 3)  HYDROmorphone  Injectable 0.25 milliGRAM(s) IV Push every 8 hours PRN Severe Pain (7 - 10)  lidocaine 2% Viscous 2 milliLiter(s) Swish and Spit three times a day PRN pain with swallowing  ondansetron Injectable 4 milliGRAM(s) IV Push every 6 hours PRN Nausea and/or Vomiting    Vital Signs Last 24 Hrs  T(C): 36.9 (05 Oct 2021 05:05), Max: 37.1 (04 Oct 2021 21:29)  T(F): 98.5 (05 Oct 2021 05:05), Max: 98.8 (04 Oct 2021 21:29)  HR: 108 (05 Oct 2021 05:05) (93 - 109)  BP: 112/76 (05 Oct 2021 05:05) (112/76 - 122/94)  BP(mean): --  RR: 17 (05 Oct 2021 05:05) (17 - 18)  SpO2: 100% (05 Oct 2021 05:05) (100% - 100%)  CAPILLARY BLOOD GLUCOSE      POCT Blood Glucose.: 110 mg/dL (04 Oct 2021 22:07)  POCT Blood Glucose.: 112 mg/dL (04 Oct 2021 18:07)    I&O's Summary    PHYSICAL EXAM:  GENERAL: NAD, severely underweight  HEAD: Atraumatic, Normocephalic  EYES: conjunctiva and sclera clear  NECK:  No JVD  CHEST/LUNG: nonlabored respirations  HEART: Patient denied exam  ABDOMEN: Patient denied exam  EXTREMITIES: moving extremities spontaneously  PSYCH: AAOx3  NEUROLOGY: non-focal  SKIN: No rashes or lesions    LABS:                         9.4    6.26  )-----------( 457      ( 05 Oct 2021 08:00 )             29.9       10-04    135  |  100  |  15  ----------------------------<  102<H>  3.9   |  24  |  0.45<L>    Ca    9.4      04 Oct 2021 07:48  Phos  3.7     10-04  Mg     2.00     10-04   Giovanni Prajapati MS3    Patient is a 26y old  Female who presents with a chief complaint of crohn's flare up (04 Oct 2021 13:16)    SUBJECTIVE / OVERNIGHT EVENTS: Pt examined at bedside in NAD. Patient was sleeping and was woken up for interview. Pt states symptoms are similar to yesterday and required Dilaudid overnight PRN for pain. She states she has around 7/10 pain that is improved with Dilaudid. She states she was able to drink some soup and water yesterday, but experienced pain and nausea. However, she states she got pain meds in time and did not vomit. No changes in ostomy output. Pt denies any f/c, chest pain, SOB.     MEDICATIONS  (STANDING):  chlorhexidine 2% Cloths 1 Application(s) Topical daily  fat emulsion (Fish Oil and Plant Based) 20% Infusion 12.5 mL/Hr (12.5 mL/Hr) IV Continuous <Continuous>  influenza   Vaccine 0.5 milliLiter(s) IntraMuscular once  ondansetron   Disintegrating Tablet 4 milliGRAM(s) Oral once  pantoprazole  Injectable 40 milliGRAM(s) IV Push two times a day  Parenteral Nutrition - Adult 1 Each (83 mL/Hr) TPN Continuous <Continuous>  sucralfate suspension 1 Gram(s) Oral <User Schedule>    MEDICATIONS  (PRN):  acetaminophen   Tablet .. 650 milliGRAM(s) Oral every 6 hours PRN Temp greater or equal to 38.5C (101.3F), Mild Pain (1 - 3)  HYDROmorphone  Injectable 0.25 milliGRAM(s) IV Push every 8 hours PRN Severe Pain (7 - 10)  lidocaine 2% Viscous 2 milliLiter(s) Swish and Spit three times a day PRN pain with swallowing  ondansetron Injectable 4 milliGRAM(s) IV Push every 6 hours PRN Nausea and/or Vomiting    Vital Signs Last 24 Hrs  T(C): 36.9 (05 Oct 2021 05:05), Max: 37.1 (04 Oct 2021 21:29)  T(F): 98.5 (05 Oct 2021 05:05), Max: 98.8 (04 Oct 2021 21:29)  HR: 108 (05 Oct 2021 05:05) (93 - 109)  BP: 112/76 (05 Oct 2021 05:05) (112/76 - 122/94)  BP(mean): --  RR: 17 (05 Oct 2021 05:05) (17 - 18)  SpO2: 100% (05 Oct 2021 05:05) (100% - 100%)  CAPILLARY BLOOD GLUCOSE      POCT Blood Glucose.: 110 mg/dL (04 Oct 2021 22:07)  POCT Blood Glucose.: 112 mg/dL (04 Oct 2021 18:07)    I&O's Summary    PHYSICAL EXAM:  GENERAL: NAD, severely underweight  HEAD: Atraumatic, Normocephalic, no ulcers in mouth  EYES: conjunctiva and sclera clear  NECK:  No JVD  CHEST/LUNG: nonlabored respirations  HEART: Patient denied exam  ABDOMEN: nontender to palpation, nondistended, ostomy bag with green output no blood  EXTREMITIES: moving extremities spontaneously, TPN IV site c/d/i no erythema or tenderness to palpation  PSYCH: AAOx3  NEUROLOGY: non-focal  SKIN: No rashes or lesions    LABS:                         9.4    6.26  )-----------( 457      ( 05 Oct 2021 08:00 )             29.9       10-04    135  |  100  |  15  ----------------------------<  102<H>  3.9   |  24  |  0.45<L>    Ca    9.4      04 Oct 2021 07:48  Phos  3.7     10-04  Mg     2.00     10-04

## 2021-10-05 NOTE — PROGRESS NOTE ADULT - ATTENDING COMMENTS
No significant change in symptoms.   Offered ileoscopy for post-op evaluation and to evaluate CT findings from 9/26, but patient currently declined.   Would recommend aggressive therapy for the gastritis recently noted on EGD with high dose PPI and carafate, as above.  If symptoms remain unchanged, can consider trial of steroids as discussed with patient.   Clear liquid diet for now.

## 2021-10-05 NOTE — PROGRESS NOTE ADULT - PROBLEM SELECTOR PLAN 1
Hx of Crohn's with recent colonic resection for stricture  -Diet advanced, intermittently tolerating  -GI following, appreciate recs  -S/p EGD 9/30 with gastric inflammation, biopsied to r/o gastric Crohn;s  -S/p infliximab on 10/1  -Inflammatory markers wnl, fecal calprotectin wnl  - Antiemetics PRN.  - f/u Repeat ESR and CRP   - Monitor improvement on Remicade  - no additional treatment at this time per GI, wait for pathology report to determine if pt has gastric Crohn's Hx of Crohn's with recent colonic resection for stricture  -Diet advanced, intermittently tolerating  -GI following, appreciate recs  -S/p EGD 9/30 with gastric inflammation, biopsied to r/o gastric Crohn's  -S/p infliximab on 10/1  -Inflammatory markers wnl, fecal calprotectin wnl  - Antiemetics PRN.  - Repeat ESR and CRP wnl  - Monitor improvement on Remicade  - no additional treatment at this time per GI, wait for pathology report to determine if pt has gastric Crohn's  - possible ileoscopy per GI Hx of Crohn's with recent colonic resection for stricture. S/p EGD 9/30 with gastric inflammation, biopsied to r/o gastric Crohn's.     Inflammatory markers wnl, fecal calprotectin wnl    -Diet advanced, intermittently tolerating  -GI following, appreciate recs  -S/p infliximab on 10/1, further treatment pending pathology   -Antiemetics PRN.  - no additional treatment at this time per GI, wait for pathology report to determine if pt has gastric Crohn's  - possible ileoscopy per GI

## 2021-10-05 NOTE — PROGRESS NOTE ADULT - PROBLEM SELECTOR PLAN 3
Transaminitis in a setting of hypovolemia/dehydration.  -s/p IV fluids x2L NS, and 2 d of maintenance fluids  -AST/ALT improving  -recent hepatitis panel in 6/2021 was negative. Transaminitis likely in setting of TPN +/- hypovolemia/dehydration/ poor PO. Recent hepatitis panel in 6/2021 was negative.  -s/p IV fluids   -monitor AST/ALT   -encourage PO hydration

## 2021-10-05 NOTE — PROGRESS NOTE ADULT - ASSESSMENT
26F w/ hx of Crohn's colitis c/b ascending colon stricture s/p resection 8/2021 w/ ileostomy (at [facility]) on remicade since 6/2021 (s/p induction and 2 maintenance doses, last 10/1/2021, admitted w/ ongoing severe post prandial epigastric pain and weight loss due to inability to take PO.    Impression:  #Post-prandial abd pain - Unclear etiology. Pt is s/p two extended hospitalizations at [facility] since her surgery, with testing including extensive imaging and upper GI endoscopy. No e/o SMA syndrome, EGD w/ esophagitis only. EGD here shows mild esophagitis and severe gastric inflammation (new finding compared to EGD 6 weeks ago, path pending at this time). It is unclear if her gastric inflammation is related to Crohn's or possibly bile gastritis due to persistent vomiting or some other etiology. She has not responded to IV PPI and sucralfate, though she frequently refuses sucralfate because she eats solid food and is in severe pain when this is due to be administered.  #Crohn's disease - Historical details as above. It is unclear if she has active Crohn's at this time. Her ESR/CRP are negative, but they were never positive even at time of her initial diagnosis. Her fecal calprotectin, however was very positive on diagnosis and is negative here. The only objective e/o active Crohn's is her CT scan on admission, which shows RLQ enteritis, but this is an imperfect test. Pathology results from gastric biopsies are pending.    Recommendations:  - We recommended ileoscopy to try to gather more objective e/o active Crohn's and give us more reason to escalate immunosuppresive therapy (i.e. steroids or additional remicade) but pt refusing.  - At some point, if patient does not improve, she may merit an empiric trial of steroids. But, as above, right now we have no real objective evidence of active Crohn's and steroids can make other causes of gastric inflammation worse.  - Awaiting pathology results from gastric biopsies.  - Continue with IV PPI.  - Would put on strict clear liquid diet and ensure that patient receives all scheduled doses of q6h liquid sucralfate. It appears she refuses this med when she is in pain after eating solids.   - Antiemetics PRN.  - Pain medication as needed.   - GI will continue to follow.     Faustino Freitas  Gastroenterology/Hepatology Fellow  Available via Microsoft Teams    NON-URGENT CONSULTS:  Please email [email or web address]  AT NIGHT AND ON WEEKENDS:  Contact on-call GI fellow via answering service ([phone]) from 5pm-8am and on weekends/holidays  MONDAY-FRIDAY 8AM-5PM:  Pager# [phone] ([facility] or [phone] ([facility])  GI Phone# [phone] ([facility])   26F w/ hx of Crohn's colitis c/b ascending colon stricture s/p resection 8/2021 w/ ileostomy (at [facility]) on remicade since 6/2021 (s/p induction and 2 maintenance doses, last 10/1/2021, admitted w/ ongoing severe post prandial epigastric pain and weight loss due to inability to take PO.    Impression:  #Post-prandial abd pain - Unclear etiology. Pt is s/p two extended hospitalizations at [facility] since her surgery, with testing including extensive imaging and upper GI endoscopy. No e/o SMA syndrome, EGD w/ esophagitis only. EGD here shows mild esophagitis and severe gastric inflammation (new finding compared to EGD 6 weeks ago, path pending at this time). It is unclear if her gastric inflammation is related to Crohn's or possibly bile gastritis due to persistent vomiting or some other etiology. She has not responded to IV PPI and sucralfate, though she frequently refuses sucralfate because she eats solid food and is in severe pain when this is due to be administered.  #Crohn's disease - Historical details as above. It is unclear if she has active Crohn's at this time. Her ESR/CRP are negative, but they were never positive even at time of her initial diagnosis. Her fecal calprotectin, however was very positive on diagnosis and is negative here. The only objective e/o active Crohn's is her CT scan on admission, which shows RLQ enteritis, but this is an imperfect test. Pathology results from gastric biopsies are pending.    Recommendations:  - We recommended ileoscopy to try to gather more objective e/o active Crohn's and give us more reason to escalate immunosuppresive therapy (i.e. steroids or additional remicade) but patient currently declining.  - At some point, if patient does not improve, she may merit an empiric trial of steroids. But, as above, right now we have no objective evidence of active Crohn's other than minor CT findings on 9/26 and steroids can make other causes of gastric inflammation worse.  - Awaiting pathology results from gastric biopsies.  - Continue with IV PPI.  - Would put on strict clear liquid diet and ensure that patient receives all scheduled doses of q6h liquid sucralfate. It appears she refuses this med when she is in pain after eating solids.   - Antiemetics PRN.  - Pain medication as needed.   - GI will continue to follow.     Faustino Freitas  Gastroenterology/Hepatology Fellow  Available via Microsoft Teams    NON-URGENT CONSULTS:  Please email [email or web address]  AT NIGHT AND ON WEEKENDS:  Contact on-call GI fellow via answering service ([phone]) from 5pm-8am and on weekends/holidays  MONDAY-FRIDAY 8AM-5PM:  Pager# [phone] ([facility]) or [phone] ([facility])  GI Phone# [phone] ([facility])

## 2021-10-05 NOTE — PROGRESS NOTE ADULT - ATTENDING COMMENTS
25 yo F with Hx of Crohn's disease s/p surgery for stricture/ileostomy placement in 8/ 2021, s/p PICC line for TPN approx 2 weeks prior to admission, p/w abdominal pain, n/v 2/2 potential Crohn's Flare. S/p infliximab on 10/1. Now s/p EGD on 9/30 showing erythematous duodenopathy and  diffuse erythema and friability w/ some areas of superficial ulceration s/p biopsy.     Pt seen and evaluated at bedside w/ team during team bedside rounds. Pt states that she currently has no abdominal pain and is using pain medication less. Currently denies n/v but states she becomes nauseas intermittently primarily w/ increased pain.     -C/w pain management PRN. Pt reports pain currently controlled on current dose of dilaudid. If pt requesting pain medication early will increase frequency to [id number].   -c/w IV PPI, standing carafate q6hrs, and antiemetics PRN  -serial abdominal exams   -Will continue to appreciate GI recs. Currently, awaiting path from biopsy to determine next steps in management.   -c/w TPN. PO as tolerated w/ clear liquid diet for now.

## 2021-10-05 NOTE — PROGRESS NOTE ADULT - ASSESSMENT
26 year old female with PMH Crohn's disease s/p surgery for stricture/ileostomy placement in mid August 2021, s/p PICC line in place for TPN 2 weeks ago, presents with epigastric abdominal pain, nausea, and vomiting x 4 days, admitted to medicine for a potential Crohn's Flare up. Inflammatory markers have been normal, suggesting some other possible etiology. GI is following s/p EGD on 9/30. EGD showed gastric friability and was biopsied. There was erythematous duodenopathy but no esophagitis. Pt received infliximab on 10/2 for potential suspected gastric Crohn's disease. 26 year old female with PMH Crohn's disease s/p surgery for stricture/ileostomy placement in mid August 2021, s/p PICC line in place for TPN 2 weeks ago, presents with epigastric abdominal pain, nausea, and vomiting x 4 days, admitted to medicine for a potential Crohn's Flare up. Inflammatory markers have been normal, suggesting some other possible etiology. GI is following s/p EGD on 9/30. EGD showed gastric friability and was biopsied. There was erythematous duodenopathy but no esophagitis. Pt received infliximab on 10/2 for potential suspected gastric Crohn's disease. Currently pending EGD biopsy results to determine further management.

## 2021-10-05 NOTE — PROGRESS NOTE ADULT - PROBLEM SELECTOR PLAN 4
DVT ppx- BLACK stockings and ambulate as tolerated. DVT ppx- Improve score low. BLACK stockings and ambulate as tolerated.    Severe protein malnutrition - nutrition recs appreciated. C/w TPN

## 2021-10-05 NOTE — PROGRESS NOTE ADULT - ATTENDING COMMENTS
I agree with the above history, physical examination, chief complaint/diagnosis, and plan, which I have reviewed and edited where appropriate.  I agree with notes/assessment and detailed interval history of health care providers on my service.  I have seen and examined the patient.  I reviewed the laboratory and available data and agree with the history, physical assessment and plan.  I reviewed and discussed with all consultants, house staff and PA's.  The Nutrition Support Team (NST) discusses on an ongoing basis with the primary team and all consultants, House staff and PA's to have a permanent risk benefit analyses on all decisions and coordinating care.  I was physically present for the key portions of the evaluation and management (E/M) service provided.  26 year old female with PMH Crohn's disease s/p surgery for stricture/ileostomy receiving TPN   comfortable in bed   HEAD:  Atraumatic,   CHEST/LUNG: clear  ABDOMEN: soft, mildly tender to palpation, nondistended   labs reviewed - electrolytes adjusted  continue TPN with infusion volume of 2L/ 1175 kcal/day

## 2021-10-05 NOTE — PROGRESS NOTE ADULT - SUBJECTIVE AND OBJECTIVE BOX
Chief Complaint:  Patient is a 26y old  Female who presents with a chief complaint of crohn's flare up (05 Oct 2021 11:17)    Reason for consult: abd pain    Interval Events: Pt continues to have pain after eating solids, is pain free when she doesn't eat.     Hospital Medications:  acetaminophen   Tablet .. 650 milliGRAM(s) Oral every 6 hours PRN  chlorhexidine 2% Cloths 1 Application(s) Topical daily  fat emulsion (Fish Oil and Plant Based) 20% Infusion 12.5 mL/Hr IV Continuous <Continuous>  HYDROmorphone  Injectable 0.25 milliGRAM(s) IV Push every 8 hours PRN  influenza   Vaccine 0.5 milliLiter(s) IntraMuscular once  lidocaine 2% Viscous 2 milliLiter(s) Swish and Spit three times a day PRN  ondansetron   Disintegrating Tablet 4 milliGRAM(s) Oral once  ondansetron Injectable 4 milliGRAM(s) IV Push every 6 hours PRN  pantoprazole  Injectable 40 milliGRAM(s) IV Push two times a day  Parenteral Nutrition - Adult 1 Each TPN Continuous <Continuous>  Parenteral Nutrition - Adult 1 Each TPN Continuous <Continuous>  sucralfate suspension 1 Gram(s) Oral <User Schedule>      ROS:   General:  No  fevers, chills, night sweats, fatigue  Eyes:  Good vision, no reported pain  ENT:  No sore throat, pain, runny nose  CV:  No pain, palpitations  Pulm:  No dyspnea, cough  GI:  See HPI, otherwise negative  :  No  incontinence, nocturia  Muscle:  No pain, weakness  Neuro:  No memory problems  Psych:  No insomnia, mood problems, depression  Endocrine:  No polyuria, polydipsia, cold/heat intolerance  Heme:  No petechiae, ecchymosis, easy bruisability  Skin:  No rash    PHYSICAL EXAM:   Vital Signs:  Vital Signs Last 24 Hrs  T(C): 36.9 (05 Oct 2021 05:05), Max: 37.1 (04 Oct 2021 21:29)  T(F): 98.5 (05 Oct 2021 05:05), Max: 98.8 (04 Oct 2021 21:29)  HR: 108 (05 Oct 2021 05:05) (93 - 109)  BP: 112/76 (05 Oct 2021 05:05) (112/76 - 122/94)  BP(mean): --  RR: 17 (05 Oct 2021 05:05) (17 - 17)  SpO2: 100% (05 Oct 2021 05:05) (100% - 100%)  Daily     Daily Weight in k.9 (05 Oct 2021 05:05)    GENERAL: no acute distress  NEURO: alert  HEENT: anicteric sclera, no conjunctival pallor appreciated  CHEST: no respiratory distress, no accessory muscle use  CARDIAC: regular rate, rhythm  ABDOMEN: soft, non-tender, non-distended, no rebound or guarding  EXTREMITIES: warm, well perfused, no edema  SKIN: no lesions noted    LABS: reviewed                        9.4    6.26  )-----------( 457      ( 05 Oct 2021 08:00 )             29.9     10-05    136  |  101  |  16  ----------------------------<  80  4.2   |  23  |  0.45<L>    Ca    9.8      05 Oct 2021 07:59  Phos  4.6     10-05  Mg     2.20     10-05    TPro  7.1  /  Alb  4.2  /  TBili  0.2  /  DBili  x   /  AST  45<H>  /  ALT  69<H>  /  AlkPhos  83  10-05    LIVER FUNCTIONS - ( 05 Oct 2021 07:59 )  Alb: 4.2 g/dL / Pro: 7.1 g/dL / ALK PHOS: 83 U/L / ALT: 69 U/L / AST: 45 U/L / GGT: x             Interval Diagnostic Studies: see sunrise for full report

## 2021-10-06 LAB
ALBUMIN SERPL ELPH-MCNC: 4.4 G/DL — SIGNIFICANT CHANGE UP (ref 3.3–5)
ALP SERPL-CCNC: 85 U/L — SIGNIFICANT CHANGE UP (ref 40–120)
ALT FLD-CCNC: 58 U/L — HIGH (ref 4–33)
ANION GAP SERPL CALC-SCNC: 16 MMOL/L — HIGH (ref 7–14)
AST SERPL-CCNC: 30 U/L — SIGNIFICANT CHANGE UP (ref 4–32)
BASOPHILS # BLD AUTO: 0.05 K/UL — SIGNIFICANT CHANGE UP (ref 0–0.2)
BASOPHILS NFR BLD AUTO: 0.6 % — SIGNIFICANT CHANGE UP (ref 0–2)
BILIRUB SERPL-MCNC: 0.4 MG/DL — SIGNIFICANT CHANGE UP (ref 0.2–1.2)
BUN SERPL-MCNC: 13 MG/DL — SIGNIFICANT CHANGE UP (ref 7–23)
CA-I BLD-SCNC: 1.18 MMOL/L — SIGNIFICANT CHANGE UP (ref 1.15–1.29)
CALCIUM SERPL-MCNC: 9.9 MG/DL — SIGNIFICANT CHANGE UP (ref 8.4–10.5)
CHLORIDE SERPL-SCNC: 100 MMOL/L — SIGNIFICANT CHANGE UP (ref 98–107)
CO2 SERPL-SCNC: 20 MMOL/L — LOW (ref 22–31)
CREAT SERPL-MCNC: 0.46 MG/DL — LOW (ref 0.5–1.3)
EOSINOPHIL # BLD AUTO: 0.06 K/UL — SIGNIFICANT CHANGE UP (ref 0–0.5)
EOSINOPHIL NFR BLD AUTO: 0.8 % — SIGNIFICANT CHANGE UP (ref 0–6)
GLUCOSE SERPL-MCNC: 112 MG/DL — HIGH (ref 70–99)
HCT VFR BLD CALC: 30.5 % — LOW (ref 34.5–45)
HGB BLD-MCNC: 10.4 G/DL — LOW (ref 11.5–15.5)
IANC: 5.35 K/UL — SIGNIFICANT CHANGE UP (ref 1.5–8.5)
IMM GRANULOCYTES NFR BLD AUTO: 0.4 % — SIGNIFICANT CHANGE UP (ref 0–1.5)
LYMPHOCYTES # BLD AUTO: 2.01 K/UL — SIGNIFICANT CHANGE UP (ref 1–3.3)
LYMPHOCYTES # BLD AUTO: 25.3 % — SIGNIFICANT CHANGE UP (ref 13–44)
MAGNESIUM SERPL-MCNC: 2 MG/DL — SIGNIFICANT CHANGE UP (ref 1.6–2.6)
MCHC RBC-ENTMCNC: 27.9 PG — SIGNIFICANT CHANGE UP (ref 27–34)
MCHC RBC-ENTMCNC: 34.1 GM/DL — SIGNIFICANT CHANGE UP (ref 32–36)
MCV RBC AUTO: 81.8 FL — SIGNIFICANT CHANGE UP (ref 80–100)
MONOCYTES # BLD AUTO: 0.44 K/UL — SIGNIFICANT CHANGE UP (ref 0–0.9)
MONOCYTES NFR BLD AUTO: 5.5 % — SIGNIFICANT CHANGE UP (ref 2–14)
NEUTROPHILS # BLD AUTO: 5.35 K/UL — SIGNIFICANT CHANGE UP (ref 1.8–7.4)
NEUTROPHILS NFR BLD AUTO: 67.4 % — SIGNIFICANT CHANGE UP (ref 43–77)
NRBC # BLD: 0 /100 WBCS — SIGNIFICANT CHANGE UP
NRBC # FLD: 0 K/UL — SIGNIFICANT CHANGE UP
PHOSPHATE SERPL-MCNC: 2.2 MG/DL — LOW (ref 2.5–4.5)
PLATELET # BLD AUTO: 497 K/UL — HIGH (ref 150–400)
POTASSIUM SERPL-MCNC: 3.4 MMOL/L — LOW (ref 3.5–5.3)
POTASSIUM SERPL-SCNC: 3.4 MMOL/L — LOW (ref 3.5–5.3)
PROT SERPL-MCNC: 7 G/DL — SIGNIFICANT CHANGE UP (ref 6–8.3)
RBC # BLD: 3.73 M/UL — LOW (ref 3.8–5.2)
RBC # FLD: 12.8 % — SIGNIFICANT CHANGE UP (ref 10.3–14.5)
SODIUM SERPL-SCNC: 136 MMOL/L — SIGNIFICANT CHANGE UP (ref 135–145)
SURGICAL PATHOLOGY STUDY: SIGNIFICANT CHANGE UP
WBC # BLD: 7.94 K/UL — SIGNIFICANT CHANGE UP (ref 3.8–10.5)
WBC # FLD AUTO: 7.94 K/UL — SIGNIFICANT CHANGE UP (ref 3.8–10.5)

## 2021-10-06 PROCEDURE — 99232 SBSQ HOSP IP/OBS MODERATE 35: CPT

## 2021-10-06 PROCEDURE — 99232 SBSQ HOSP IP/OBS MODERATE 35: CPT | Mod: GC

## 2021-10-06 RX ORDER — HYDROMORPHONE HYDROCHLORIDE 2 MG/ML
0.25 INJECTION INTRAMUSCULAR; INTRAVENOUS; SUBCUTANEOUS ONCE
Refills: 0 | Status: DISCONTINUED | OUTPATIENT
Start: 2021-10-06 | End: 2021-10-06

## 2021-10-06 RX ORDER — ELECTROLYTE SOLUTION,INJ
1 VIAL (ML) INTRAVENOUS
Refills: 0 | Status: DISCONTINUED | OUTPATIENT
Start: 2021-10-06 | End: 2021-10-06

## 2021-10-06 RX ORDER — HYDROMORPHONE HYDROCHLORIDE 2 MG/ML
0.25 INJECTION INTRAMUSCULAR; INTRAVENOUS; SUBCUTANEOUS EVERY 6 HOURS
Refills: 0 | Status: DISCONTINUED | OUTPATIENT
Start: 2021-10-06 | End: 2021-10-13

## 2021-10-06 RX ORDER — HYDROMORPHONE HYDROCHLORIDE 2 MG/ML
0.5 INJECTION INTRAMUSCULAR; INTRAVENOUS; SUBCUTANEOUS EVERY 6 HOURS
Refills: 0 | Status: DISCONTINUED | OUTPATIENT
Start: 2021-10-06 | End: 2021-10-13

## 2021-10-06 RX ORDER — I.V. FAT EMULSION 20 G/100ML
12.5 EMULSION INTRAVENOUS
Qty: 30 | Refills: 0 | Status: DISCONTINUED | OUTPATIENT
Start: 2021-10-06 | End: 2021-10-07

## 2021-10-06 RX ORDER — POTASSIUM CHLORIDE 20 MEQ
10 PACKET (EA) ORAL
Refills: 0 | Status: COMPLETED | OUTPATIENT
Start: 2021-10-06 | End: 2021-10-06

## 2021-10-06 RX ADMIN — I.V. FAT EMULSION 12.5 ML/HR: 20 EMULSION INTRAVENOUS at 05:23

## 2021-10-06 RX ADMIN — ONDANSETRON 4 MILLIGRAM(S): 8 TABLET, FILM COATED ORAL at 05:18

## 2021-10-06 RX ADMIN — HYDROMORPHONE HYDROCHLORIDE 0.25 MILLIGRAM(S): 2 INJECTION INTRAMUSCULAR; INTRAVENOUS; SUBCUTANEOUS at 05:35

## 2021-10-06 RX ADMIN — HYDROMORPHONE HYDROCHLORIDE 0.25 MILLIGRAM(S): 2 INJECTION INTRAMUSCULAR; INTRAVENOUS; SUBCUTANEOUS at 02:12

## 2021-10-06 RX ADMIN — HYDROMORPHONE HYDROCHLORIDE 0.5 MILLIGRAM(S): 2 INJECTION INTRAMUSCULAR; INTRAVENOUS; SUBCUTANEOUS at 15:52

## 2021-10-06 RX ADMIN — HYDROMORPHONE HYDROCHLORIDE 0.25 MILLIGRAM(S): 2 INJECTION INTRAMUSCULAR; INTRAVENOUS; SUBCUTANEOUS at 08:53

## 2021-10-06 RX ADMIN — CHLORHEXIDINE GLUCONATE 1 APPLICATION(S): 213 SOLUTION TOPICAL at 11:34

## 2021-10-06 RX ADMIN — HYDROMORPHONE HYDROCHLORIDE 0.5 MILLIGRAM(S): 2 INJECTION INTRAMUSCULAR; INTRAVENOUS; SUBCUTANEOUS at 21:15

## 2021-10-06 RX ADMIN — Medication 100 MILLIEQUIVALENT(S): at 12:52

## 2021-10-06 RX ADMIN — HYDROMORPHONE HYDROCHLORIDE 0.25 MILLIGRAM(S): 2 INJECTION INTRAMUSCULAR; INTRAVENOUS; SUBCUTANEOUS at 10:08

## 2021-10-06 RX ADMIN — HYDROMORPHONE HYDROCHLORIDE 0.25 MILLIGRAM(S): 2 INJECTION INTRAMUSCULAR; INTRAVENOUS; SUBCUTANEOUS at 12:01

## 2021-10-06 RX ADMIN — HYDROMORPHONE HYDROCHLORIDE 0.5 MILLIGRAM(S): 2 INJECTION INTRAMUSCULAR; INTRAVENOUS; SUBCUTANEOUS at 21:45

## 2021-10-06 RX ADMIN — Medication 100 MILLIEQUIVALENT(S): at 10:09

## 2021-10-06 RX ADMIN — I.V. FAT EMULSION 12.5 ML/HR: 20 EMULSION INTRAVENOUS at 17:56

## 2021-10-06 RX ADMIN — HYDROMORPHONE HYDROCHLORIDE 0.25 MILLIGRAM(S): 2 INJECTION INTRAMUSCULAR; INTRAVENOUS; SUBCUTANEOUS at 05:19

## 2021-10-06 RX ADMIN — Medication 1 GRAM(S): at 07:30

## 2021-10-06 RX ADMIN — Medication 100 MILLIEQUIVALENT(S): at 11:31

## 2021-10-06 RX ADMIN — Medication 1 EACH: at 05:23

## 2021-10-06 RX ADMIN — ONDANSETRON 4 MILLIGRAM(S): 8 TABLET, FILM COATED ORAL at 18:19

## 2021-10-06 RX ADMIN — Medication 1 GRAM(S): at 11:32

## 2021-10-06 RX ADMIN — HYDROMORPHONE HYDROCHLORIDE 0.25 MILLIGRAM(S): 2 INJECTION INTRAMUSCULAR; INTRAVENOUS; SUBCUTANEOUS at 02:27

## 2021-10-06 RX ADMIN — HYDROMORPHONE HYDROCHLORIDE 0.5 MILLIGRAM(S): 2 INJECTION INTRAMUSCULAR; INTRAVENOUS; SUBCUTANEOUS at 16:10

## 2021-10-06 RX ADMIN — HYDROMORPHONE HYDROCHLORIDE 0.25 MILLIGRAM(S): 2 INJECTION INTRAMUSCULAR; INTRAVENOUS; SUBCUTANEOUS at 10:40

## 2021-10-06 RX ADMIN — HYDROMORPHONE HYDROCHLORIDE 0.25 MILLIGRAM(S): 2 INJECTION INTRAMUSCULAR; INTRAVENOUS; SUBCUTANEOUS at 11:31

## 2021-10-06 RX ADMIN — Medication 1 EACH: at 18:02

## 2021-10-06 RX ADMIN — PANTOPRAZOLE SODIUM 40 MILLIGRAM(S): 20 TABLET, DELAYED RELEASE ORAL at 05:20

## 2021-10-06 RX ADMIN — PANTOPRAZOLE SODIUM 40 MILLIGRAM(S): 20 TABLET, DELAYED RELEASE ORAL at 18:19

## 2021-10-06 NOTE — PROGRESS NOTE ADULT - PROBLEM SELECTOR PLAN 2
Possibly related to esophagitis/reflux, other etiologies include active Crohn's (though no objective evidence pointing towards flare).  EGD showed gastric friability and was biopsied. There was erythematous duodenopathy but no esophagitis.    - Treat symptomatically with IV PPI BID, add sucralfate liquid 1g q6h for esophagitis per GI recs  - On low dose dilaudid for pain. Will attempt to avoid opiates in this patient when possible given increased mortality with opiates in IBD patients  - Diet as tolerated for now, currently on liquid diet   - Antiemetics as needed. Check QTc qOD  - obtain [facility] records from recent visit (imaging, EGD, recent GI note)  -C/w TPN, plan to wean if tolerating diet  - c/w lidocaine swish and swallow before meals to help with pain  - Psychiatry no longer actively following Possibly related to esophagitis/reflux, other etiologies include active Crohn's (though no objective evidence pointing towards flare).  EGD showed gastric friability and was biopsied. There was erythematous duodenopathy but no esophagitis.    - Treat symptomatically with IV PPI BID, add sucralfate liquid 1g q6h for esophagitis per GI recs  - On low dose dilaudid for pain. Will attempt to avoid opiates in this patient when possible given increased mortality with opiates in IBD patients  - Diet as tolerated for now, currently on liquid diet   - Antiemetics as needed. Check QTc qOD  -C/w TPN, plan to wean if tolerating diet  - c/w lidocaine swish and swallow before meals to help with pain  - Psychiatry no longer actively following Possibly related to esophagitis/reflux, other etiologies include active Crohn's (though no objective evidence pointing towards flare).  EGD showed gastric friability and was biopsied. There was erythematous duodenopathy but no esophagitis.    - Treat symptomatically with IV PPI BID, add sucralfate liquid 1g q6h for esophagitis per GI recs  - C/w dilaudid for pain. Will attempt to avoid opiates in this patient when possible given increased mortality with opiates in IBD patients  - Diet as tolerated for now, currently on liquid diet   - Antiemetics as needed. Check QTc qOD  -C/w TPN, plan to wean if tolerating diet  - c/w lidocaine swish and swallow before meals to help with pain

## 2021-10-06 NOTE — PROGRESS NOTE ADULT - PROBLEM SELECTOR PLAN 4
DVT ppx- Improve score low. BLACK stockings and ambulate as tolerated.    Severe protein malnutrition - nutrition recs appreciated. C/w TPN DVT ppx- Improve score low. BLACK stockings and ambulate as tolerated.    Severe protein malnutrition - nutrition recs appreciated. C/w TPN    Dispo- pending further medical optimization

## 2021-10-06 NOTE — PROGRESS NOTE ADULT - PROBLEM SELECTOR PLAN 3
Transaminitis likely in setting of TPN +/- hypovolemia/dehydration/ poor PO. Recent hepatitis panel in 6/2021 was negative.  -s/p IV fluids   -monitor AST/ALT   -encourage PO hydration

## 2021-10-06 NOTE — PROGRESS NOTE ADULT - PROBLEM SELECTOR PLAN 1
Hx of Crohn's with recent colonic resection for stricture. S/p EGD 9/30 with gastric inflammation, biopsied to r/o gastric Crohn's.     Inflammatory markers wnl, fecal calprotectin wnl    -Diet advanced, intermittently tolerating  -GI following, appreciate recs  -S/p infliximab on 10/1, further treatment pending pathology   -Antiemetics PRN  - no additional treatment at this time per GI, wait for pathology report to determine if pt has gastric Crohn's  - possible ileoscopy per GI  - consider modifying current pain regimen Hx of Crohn's with recent colonic resection for stricture. S/p EGD 9/30 with gastric inflammation, biopsied to r/o gastric Crohn's.     Inflammatory markers wnl, fecal calprotectin wnl    -Diet advanced, intermittently tolerating  -GI following, appreciate recs  -S/p infliximab on 10/1, further treatment pending pathology   -Antiemetics PRN  - [facility] was unable to be reached for records  - no additional treatment at this time per GI, wait for pathology report to determine if pt has gastric Crohn's  - possible ileoscopy per GI but patient refused  - Dilaudid regimen changed to 0.5mg q6h for severe pain

## 2021-10-06 NOTE — PROGRESS NOTE ADULT - ASSESSMENT
26F w/ hx of Crohn's colitis c/b ascending colon stricture s/p resection 8/2021 w/ ileostomy (at [facility]) on remicade since 6/2021 (s/p induction and 2 maintenance doses, last 10/1/2021, admitted w/ ongoing severe post prandial epigastric pain and weight loss due to inability to take PO.    Impression:  #Post-prandial abd pain - Unclear etiology. Pt is s/p two extended hospitalizations at [facility] since her surgery, with testing including extensive imaging and upper GI endoscopy. No e/o SMA syndrome, EGD w/ esophagitis only. EGD here shows mild esophagitis and severe gastric inflammation (new finding compared to EGD 6 weeks ago, path pending at this time). It is unclear if her gastric inflammation is related to Crohn's or possibly bile gastritis due to persistent vomiting or some other etiology. She has not responded to IV PPI and sucralfate, though she frequently refuses sucralfate because she eats solid food and is in severe pain when this is due to be administered.  #Crohn's disease - Historical details as above. It is unclear if she has active Crohn's at this time. Her ESR/CRP are negative, but they were never positive even at time of her initial diagnosis. Her fecal calprotectin, however was very positive on diagnosis and is negative here. The only objective e/o active Crohn's is her CT scan on admission, which shows RLQ enteritis, but this is an imperfect test. Pathology results from gastric biopsies are pending.    Recommendations:  - We recommended ileoscopy to try to gather more objective e/o active Crohn's and give us more reason to escalate immunosuppresive therapy (i.e. steroids or additional remicade) but patient currently declining.  - At some point, if patient does not improve, she may merit an empiric trial of steroids. But, as above, right now we have no objective evidence of active Crohn's other than minor CT findings on 9/26 and steroids can make other causes of gastric inflammation worse.  - Awaiting pathology results from gastric biopsies.  - Continue with IV PPI.  - Would keep NPO for now, especially as patient is already on TPN and any PO intake is causing severe pain.   - Antiemetics PRN.  - Pain medication as needed.   - GI will continue to follow.     Faustino Freitas  Gastroenterology/Hepatology Fellow  Available via [employer] Teams    NON-URGENT CONSULTS:  Please email [email or web address]  AT NIGHT AND ON WEEKENDS:  Contact on-call GI fellow via answering service ([phone]) from 5pm-8am and on weekends/holidays  MONDAY-FRIDAY 8AM-5PM:  Pager# [phone] ([facility]) or [phone] ([facility])  GI Phone# [phone] ([facility])

## 2021-10-06 NOTE — PROGRESS NOTE ADULT - SUBJECTIVE AND OBJECTIVE BOX
Chief Complaint:  Patient is a 26y old  Female who presents with a chief complaint of crohn's flare up (06 Oct 2021 07:41)    Reason for consult: severe abd pain, hx of Crohn's disease    Interval Events: Patient continues to have severe post-prandial pain, even with clears.     Hospital Medications:  acetaminophen   Tablet .. 650 milliGRAM(s) Oral every 6 hours PRN  chlorhexidine 2% Cloths 1 Application(s) Topical daily  fat emulsion (Fish Oil and Plant Based) 20% Infusion 12.5 mL/Hr IV Continuous <Continuous>  HYDROmorphone  Injectable 0.25 milliGRAM(s) IV Push once  HYDROmorphone  Injectable 0.5 milliGRAM(s) IV Push every 6 hours PRN  HYDROmorphone  Injectable 0.25 milliGRAM(s) IV Push every 6 hours PRN  influenza   Vaccine 0.5 milliLiter(s) IntraMuscular once  lidocaine 2% Viscous 2 milliLiter(s) Swish and Spit three times a day PRN  ondansetron Injectable 4 milliGRAM(s) IV Push every 6 hours PRN  pantoprazole  Injectable 40 milliGRAM(s) IV Push two times a day  Parenteral Nutrition - Adult 1 Each TPN Continuous <Continuous>  Parenteral Nutrition - Adult 1 Each TPN Continuous <Continuous>  potassium chloride  10 mEq/100 mL IVPB 10 milliEquivalent(s) IV Intermittent every 1 hour  sucralfate suspension 1 Gram(s) Oral <User Schedule>      ROS:   General:  No  fevers, chills, night sweats, fatigue  Eyes:  Good vision, no reported pain  ENT:  No sore throat, pain, runny nose  CV:  No pain, palpitations  Pulm:  No dyspnea, cough  GI:  See HPI, otherwise negative  :  No  incontinence, nocturia  Muscle:  No pain, weakness  Neuro:  No memory problems  Psych:  No insomnia, mood problems, depression  Endocrine:  No polyuria, polydipsia, cold/heat intolerance  Heme:  No petechiae, ecchymosis, easy bruisability  Skin:  No rash    PHYSICAL EXAM:   Vital Signs:  Vital Signs Last 24 Hrs  T(C): 37.1 (06 Oct 2021 05:14), Max: 37.1 (06 Oct 2021 05:14)  T(F): 98.8 (06 Oct 2021 05:14), Max: 98.8 (06 Oct 2021 05:14)  HR: 117 (06 Oct 2021 05:14) (101 - 120)  BP: 131/95 (06 Oct 2021 05:14) (110/88 - 134/91)  BP(mean): --  RR: 17 (06 Oct 2021 05:14) (17 - 17)  SpO2: 100% (06 Oct 2021 05:14) (99% - 100%)  Daily     Daily Weight in k.8 (06 Oct 2021 05:14)    GENERAL: no acute distress  NEURO: alert  HEENT: anicteric sclera, no conjunctival pallor appreciated  CHEST: no respiratory distress, no accessory muscle use  CARDIAC: regular rate, rhythm  ABDOMEN: soft, non-tender, non-distended, no rebound or guarding  EXTREMITIES: warm, well perfused, no edema  SKIN: no lesions noted    LABS: reviewed                        10.   7  )-----------( 497      ( 06 Oct 2021 07:22 )             30.5     10-    136  |  100  |  13  ----------------------------<  112<H>  3.4<L>   |  20<L>  |  0.46<L>    Ca    9.9      06 Oct 2021 07:22  Phos  2.2     10-  Mg     2.00     10-    TPro  7.0  /  Alb  4.4  /  TBili  0.4  /  DBili  x   /  AST  30  /  ALT  58<H>  /  AlkPhos  85  10-06    LIVER FUNCTIONS - ( 06 Oct 2021 07:22 )  Alb: 4.4 g/dL / Pro: 7.0 g/dL / ALK PHOS: 85 U/L / ALT: 58 U/L / AST: 30 U/L / GGT: x             Interval Diagnostic Studies: see sunrise for full report   Reason for consult: severe abd pain, hx of Crohn's disease    Interval Events: Patient continues to have severe post-prandial pain, even with clears.     Hospital Medications:  acetaminophen   Tablet .. 650 milliGRAM(s) Oral every 6 hours PRN  chlorhexidine 2% Cloths 1 Application(s) Topical daily  fat emulsion (Fish Oil and Plant Based) 20% Infusion 12.5 mL/Hr IV Continuous <Continuous>  HYDROmorphone  Injectable 0.25 milliGRAM(s) IV Push once  HYDROmorphone  Injectable 0.5 milliGRAM(s) IV Push every 6 hours PRN  HYDROmorphone  Injectable 0.25 milliGRAM(s) IV Push every 6 hours PRN  influenza   Vaccine 0.5 milliLiter(s) IntraMuscular once  lidocaine 2% Viscous 2 milliLiter(s) Swish and Spit three times a day PRN  ondansetron Injectable 4 milliGRAM(s) IV Push every 6 hours PRN  pantoprazole  Injectable 40 milliGRAM(s) IV Push two times a day  Parenteral Nutrition - Adult 1 Each TPN Continuous <Continuous>  Parenteral Nutrition - Adult 1 Each TPN Continuous <Continuous>  potassium chloride  10 mEq/100 mL IVPB 10 milliEquivalent(s) IV Intermittent every 1 hour  sucralfate suspension 1 Gram(s) Oral <User Schedule>      ROS:   General:  No  fevers, chills, night sweats, fatigue  Eyes:  Good vision, no reported pain  ENT:  No sore throat, pain, runny nose  CV:  No pain, palpitations  Pulm:  No dyspnea, cough  GI:  See HPI, otherwise negative  :  No  incontinence, nocturia  Muscle:  No pain, weakness  Neuro:  No memory problems  Psych:  No insomnia, mood problems, depression  Endocrine:  No polyuria, polydipsia, cold/heat intolerance  Heme:  No petechiae, ecchymosis, easy bruisability  Skin:  No rash    PHYSICAL EXAM:   Vital Signs:  Vital Signs Last 24 Hrs  T(C): 37.1 (06 Oct 2021 05:14), Max: 37.1 (06 Oct 2021 05:14)  T(F): 98.8 (06 Oct 2021 05:14), Max: 98.8 (06 Oct 2021 05:14)  HR: 117 (06 Oct 2021 05:14) (101 - 120)  BP: 131/95 (06 Oct 2021 05:14) (110/88 - 134/91)  BP(mean): --  RR: 17 (06 Oct 2021 05:14) (17 - 17)  SpO2: 100% (06 Oct 2021 05:14) (99% - 100%)  Daily     Daily Weight in k.8 (06 Oct 2021 05:14)    GENERAL: no acute distress  NEURO: alert  HEENT: anicteric sclera, no conjunctival pallor appreciated  CHEST: no respiratory distress, no accessory muscle use  CARDIAC: regular rate, rhythm  ABDOMEN: soft, non-tender, non-distended, no rebound or guarding  EXTREMITIES: warm, well perfused, no edema  SKIN: no lesions noted    LABS: reviewed                        10.   7  )-----------( 497      ( 06 Oct 2021 07:22 )             30.5     10-    136  |  100  |  13  ----------------------------<  112<H>  3.4<L>   |  20<L>  |  0.46<L>    Ca    9.9      06 Oct 2021 07:22  Phos  2.2     10-06  Mg     2.00     10-    TPro  7.0  /  Alb  4.4  /  TBili  0.4  /  DBili  x   /  AST  30  /  ALT  58<H>  /  AlkPhos  85  10-06    LIVER FUNCTIONS - ( 06 Oct 2021 07:22 )  Alb: 4.4 g/dL / Pro: 7.0 g/dL / ALK PHOS: 85 U/L / ALT: 58 U/L / AST: 30 U/L / GGT: x             Interval Diagnostic Studies: see sunrise for full report

## 2021-10-06 NOTE — PROGRESS NOTE ADULT - ATTENDING COMMENTS
I agree with the above history, physical examination, chief complaint/diagnosis, and plan, which I have reviewed and edited where appropriate.  I agree with notes/assessment and detailed interval history of health care providers on my service.  I have seen and examined the patient.  I reviewed the laboratory and available data and agree with the history, physical assessment and plan.  I reviewed and discussed with all consultants, house staff and PA's.  The Nutrition Support Team (NST) discusses on an ongoing basis with the primary team and all consultants, House staff and PA's to have a permanent risk benefit analyses on all decisions and coordinating care.  I was physically present for the key portions of the evaluation and management (E/M) service provided.  26 year old female with PMH Crohn's disease s/p surgery for stricture/ileostomy receiving TPN   PSYCH: AAOx3  comfortable in bed   CHEST/LUNG: clear  ABDOMEN: soft, mildly tender to palpation, nondistended   labs reviewed - electrolytes adjusted  continue TPN with infusion volume of 2L/1175 kcal/day

## 2021-10-06 NOTE — PROGRESS NOTE ADULT - ATTENDING COMMENTS
Overall symptoms are relatively unchanged.   Still awaiting path results from last week's EGD (GI has already reached out to pathology team to expedite, if possible).   As patient already on TPN, patient to trial NPO status as her pain is only triggered by PO intake. She should continue high dose PPI and, if able to tolerate, carafate.   Will hold off on steroids for now. However, if symptoms continue, can readdress possible steroid trial for her nausea/vomiting vs less likely active Crohn's.

## 2021-10-06 NOTE — PROGRESS NOTE ADULT - ATTENDING COMMENTS
25 yo F with Hx of Crohn's disease s/p surgery for stricture/ileostomy placement in 8/ 2021, s/p PICC line for TPN approx 2 weeks prior to admission, p/w abdominal pain, n/v w/ concern for possible potential Crohn's Flare. S/p infliximab on 10/1. Now s/p EGD on 9/30 showing erythematous duodenopathy and diffuse erythema and friability w/ some areas of superficial ulceration s/p biopsy. Awaiting pathology results to assist with management plan.     Pt seen and evaluated at bedside w/ team during team bedside rounds. Pt reports increased pain this am and episodes of NBNB emesis.     -C/w pain management PRN. Would c/w dilaudid .25IV q6hrs PRN for moderate pain and add dilaudid .5mg IV q6 PRN for severe pain  -c/w IV PPI, standing carafate q6hrs, and antiemetics PRN  -serial abdominal exams and monitor for BMs while on opiods  -c/w TPN and monitor LFTs  -Will continue to appreciate GI recs. Currently, awaiting path from biopsy to determine next steps in management. Will also make patient NPO since she is receiving TPN and PO is currently worsening her pain.

## 2021-10-06 NOTE — PROGRESS NOTE ADULT - ASSESSMENT
26 year old female with PMH Crohn's disease s/p surgery for stricture/ileostomy placement in mid August 2021, s/p PICC line in place for TPN 2 weeks ago, presents with epigastric abdominal pain, nausea, and vomiting x 4 days, admitted to medicine for a potential Crohn's Flare up. Inflammatory markers have been normal, suggesting some other possible etiology. GI is following s/p EGD on 9/30. EGD showed gastric friability and was biopsied. There was erythematous duodenopathy but no esophagitis. Pt received infliximab on 10/1 for potential suspected gastric Crohn's disease. Currently pending EGD biopsy results to determine further management.

## 2021-10-06 NOTE — PROGRESS NOTE ADULT - SUBJECTIVE AND OBJECTIVE BOX
NUTRITION NOTE  [id number] MUTDOMINGUEZ  ===============================    Interval events - Patient was seen and examined at bedside, no acute events overnight. Patient denies chest pain or shortness of breath at this time. Patient remains on TPN at this time. Diet changed to clear liquids, even small amounts of PO intake is causing severe abdominal pain with intermittent nausea and vomiting.     ROS: Except as noted above, all other systems reviewed and are negative     Allergies  No Known Allergies    PAST MEDICAL & SURGICAL HISTORY:  Crohn disease  Peripherally inserted central catheter (PICC) in place LUE PICC place on 2021  History of ileostomy Aug 2021    Vital Signs Last 24 Hrs  T(C): 37.1 (06 Oct 2021 05:14), Max: 37.1 (06 Oct 2021 05:14)  T(F): 98.8 (06 Oct 2021 05:14), Max: 98.8 (06 Oct 2021 05:14)  HR: 117 (06 Oct 2021 05:14) (101 - 120)  BP: 131/95 (06 Oct 2021 05:14) (110/88 - 134/91)  RR: 17 (06 Oct 2021 05:14) (17 - 17)  SpO2: 100% (06 Oct 2021 05:14) (99% - 100%)    MEDICATIONS  (STANDING):  chlorhexidine 2% Cloths 1 Application(s) Topical daily  fat emulsion (Fish Oil and Plant Based) 20% Infusion 12.5 mL/Hr (12.5 mL/Hr) IV Continuous <Continuous>  influenza   Vaccine 0.5 milliLiter(s) IntraMuscular once  pantoprazole  Injectable 40 milliGRAM(s) IV Push two times a day  Parenteral Nutrition - Adult 1 Each (83 mL/Hr) TPN Continuous <Continuous>  Parenteral Nutrition - Adult 1 Each (83 mL/Hr) TPN Continuous <Continuous>  potassium chloride  10 mEq/100 mL IVPB 10 milliEquivalent(s) IV Intermittent every 1 hour  sucralfate suspension 1 Gram(s) Oral <User Schedule>    MEDICATIONS  (PRN):  acetaminophen   Tablet .. 650 milliGRAM(s) Oral every 6 hours PRN Temp greater or equal to 38.5C (101.3F), Mild Pain (1 - 3)  HYDROmorphone  Injectable 0.5 milliGRAM(s) IV Push every 6 hours PRN Severe Pain (7 - 10)  HYDROmorphone  Injectable 0.25 milliGRAM(s) IV Push every 6 hours PRN Moderate Pain (4 - 6)  lidocaine 2% Viscous 2 milliLiter(s) Swish and Spit three times a day PRN pain with swallowing  ondansetron Injectable 4 milliGRAM(s) IV Push every 6 hours PRN Nausea and/or Vomiting    I&O's Detail    05 Oct 2021 07:01  -  06 Oct 2021 07:00  --------------------------------------------------------  IN:  Total IN: 0 mL    OUT:    Ileostomy (mL): 100 mL  Total OUT: 100 mL    Total NET: -100 mL    I&O's Detail    05 Oct 2021 07:01  -  06 Oct 2021 07:00  --------------------------------------------------------  IN:  Total IN: 0 mL    OUT:    Ileostomy (mL): 100 mL  Total OUT: 100 mL    Total NET: -100 mL    POCT Blood Glucose.: 124 mg/dL (06 Oct 2021 05:28)  POCT Blood Glucose.: 97 mg/dL (05 Oct 2021 17:50)    Daily Weight in k.8 (06 Oct 2021 05:14)    Drug Dosing Weight  Height (cm): 154.9 (30 Sep 2021 08:11)  Weight (kg): 35.2 (30 Sep 2021 08:11)  BMI (kg/m2): 14.7 (30 Sep 2021 08:11)  BSA (m2): 1.26 (30 Sep 2021 08:11)    PHYSICAL EXAM:  GENERAL: malnourished, resting comfortably in bed   HEAD:  Atraumatic, Normocephalic  CHEST/LUNG: nonlabored respirations, CTAB  ABDOMEN: soft, mildly tender to palpation, nondistended   PSYCH: AAOx3  PICC Site: C/D/I    Diet: clear liquids and TPN/lipids     LABORATORY                                                               10.4   7.94  )-----------( 497      ( 06 Oct 2021 07:22 )             30.5   10-06    136  |  100  |  13  ----------------------------<  112<H>  3.4<L>   |  20<L>  |  0.46<L>    Ca    9.9      06 Oct 2021 07:22  Phos  2.2     10-06  Mg     2.00     10-06    TPro  7.0  /  Alb  4.4  /  TBili  0.4  /  DBili  x   /  AST  30  /  ALT  58<H>  /  AlkPhos  85  10-06    LIVER FUNCTIONS - ( 06 Oct 2021 07:22 )  Alb: 4.4 g/dL / Pro: 7.0 g/dL / ALK PHOS: 85 U/L / ALT: 58 U/L / AST: 30 U/L / GGT: x         -  10-05 Chol -- LDL -- HDL -- Trig 91,  Chol -- LDL -- HDL -- Trig 71    ASSESSMENT/PLAN:  26 year old female with PMH Crohn's disease s/p surgery for stricture/ileostomy placement in mid 2021, s/p PICC line in place for TPN 2 weeks ago, presents with epigastric abdominal pain, nausea, and vomiting x 4 days. GI is following with possible EGD to evaluate for esophagitis. Nutrition service consulted for resuming parenteral nutrition via PICC line that is in place. Patient states that she feels very weak and is frustrated that she is not able to take in anything by mouth (liquids or food) without nausea or emesis. TPN was initiated on 21.    continue TPN with infusion volume of 2L, TPN will provide 1175 kcal/day    labs reviewed - electrolytes adjusted in TPN bag     monitor fingersticks, obtain daily weights    continue parenteral nutrition at this time, will follow up with primary team on plan - GI workup in process     1.  Severe protein calorie malnutrition being optimized with TPN: CHO [175] gm.  AA [70] gm. SMOF Lipids [30] gm.  2.  Hyperglycemia managed with: [0] units of regular insulin    3.  Check fluid balance daily.  Strict I/O  [ ] [ ]   4.  Daily BMP, Ionized Calcium, Magnesium and Phosphorous   5.  Triglycerides at initiation of TPN and monthly [ ] [ ]     Nutrition Support [id number]

## 2021-10-06 NOTE — PROGRESS NOTE ADULT - SUBJECTIVE AND OBJECTIVE BOX
Giovanni Prajapati MS4    Patient is a 26y old  Female who presents with a chief complaint of crohn's flare up (05 Oct 2021 11:34)    SUBJECTIVE / OVERNIGHT EVENTS: Pt examined at bedside in NAD. Overnight pt got Dilaudid x3 and Zofran x1 for abdominal pain and n/v. Pt states that her pain yesterday was the worst pain she has ever had. It was a 10/10 epigastric pain that radiated up her esophagus. It all began after she tried drinking some soup earlier in the day yesterday. She states her vomit is nonbloody and has not had any changes in her ostomy output. She states that her current dose of Dilaudid only resolves her pain around 50% and wears off by around 3 hours. Pt is afraid to try PO intake due to the pain it causes. Denies any f/c, chest pain, SOB.    MEDICATIONS  (STANDING):  chlorhexidine 2% Cloths 1 Application(s) Topical daily  fat emulsion (Fish Oil and Plant Based) 20% Infusion 12.5 mL/Hr (12.5 mL/Hr) IV Continuous <Continuous>  influenza   Vaccine 0.5 milliLiter(s) IntraMuscular once  pantoprazole  Injectable 40 milliGRAM(s) IV Push two times a day  Parenteral Nutrition - Adult 1 Each (83 mL/Hr) TPN Continuous <Continuous>  sucralfate suspension 1 Gram(s) Oral <User Schedule>    MEDICATIONS  (PRN):  acetaminophen   Tablet .. 650 milliGRAM(s) Oral every 6 hours PRN Temp greater or equal to 38.5C (101.3F), Mild Pain (1 - 3)  HYDROmorphone  Injectable 0.25 milliGRAM(s) IV Push every 6 hours PRN Severe Pain (7 - 10)  lidocaine 2% Viscous 2 milliLiter(s) Swish and Spit three times a day PRN pain with swallowing  ondansetron Injectable 4 milliGRAM(s) IV Push every 6 hours PRN Nausea and/or Vomiting    Vital Signs Last 24 Hrs  T(C): 37.1 (06 Oct 2021 05:14), Max: 37.1 (06 Oct 2021 05:14)  T(F): 98.8 (06 Oct 2021 05:14), Max: 98.8 (06 Oct 2021 05:14)  HR: 117 (06 Oct 2021 05:14) (101 - 120)  BP: 131/95 (06 Oct 2021 05:14) (110/88 - 134/91)  BP(mean): --  RR: 17 (06 Oct 2021 05:14) (17 - 17)  SpO2: 100% (06 Oct 2021 05:14) (99% - 100%)  CAPILLARY BLOOD GLUCOSE      POCT Blood Glucose.: 124 mg/dL (06 Oct 2021 05:28)  POCT Blood Glucose.: 97 mg/dL (05 Oct 2021 17:50)    I&O's Summary    05 Oct 2021 07:01  -  06 Oct 2021 07:00  --------------------------------------------------------  IN: 0 mL / OUT: 100 mL / NET: -100 mL        PHYSICAL EXAM:  GENERAL: NAD, malnourished  HEAD:  Atraumatic, Normocephalic  EYES: conjunctiva and sclera clear  NECK: No JVD  CHEST/LUNG: Clear to auscultation bilaterally; No wheeze  HEART: Regular rate and rhythm; No murmurs, rubs, or gallops  ABDOMEN: Soft, tender to palpation in the epigastric region, Nondistended; Bowel sounds present; ostomy output green, nonbloody  EXTREMITIES:  2+ Peripheral Pulses, No clubbing, cyanosis, or edema, TPN site c/d/i  PSYCH: AAOx3  NEUROLOGY: non-focal  SKIN: No rashes or lesions    LABS:                        10.4   7.94  )-----------( 497      ( 06 Oct 2021 07:22 )             30.5     10-05    136  |  101  |  16  ----------------------------<  80  4.2   |  23  |  0.45<L>    Ca    9.8      05 Oct 2021 07:59  Phos  4.6     10-05  Mg     2.20     10-05    TPro  7.1  /  Alb  4.2  /  TBili  0.2  /  DBili  x   /  AST  45<H>  /  ALT  69<H>  /  AlkPhos  83  10-05          RADIOLOGY & ADDITIONAL TESTS:    Imaging Personally Reviewed:    Consultant(s) Notes Reviewed:      Care Discussed with Consultants/Other Providers:   Giovanni Prajapati MS4    Patient is a 26y old  Female who presents with a chief complaint of crohn's flare up (05 Oct 2021 11:34)    SUBJECTIVE / OVERNIGHT EVENTS: Pt examined at bedside in NAD. Overnight pt got Dilaudid x3 and Zofran x1 for abdominal pain and n/v. Pt had episodes of tachycardia overnight. Pt states that her pain yesterday was the worst pain she has ever had. It was a 10/10 epigastric pain that radiated up her esophagus. It all began after she tried drinking some soup earlier in the day yesterday. She states her vomit is nonbloody and has not had any changes in her ostomy output. She states that her current dose of Dilaudid only resolves her pain around 50% and wears off by around 3 hours. She only feels no pain when she is sleeping after receiving Dilaudid. Pt is afraid to try PO intake due to the pain it causes. Denies any f/c, chest pain, SOB.    MEDICATIONS  (STANDING):  chlorhexidine 2% Cloths 1 Application(s) Topical daily  fat emulsion (Fish Oil and Plant Based) 20% Infusion 12.5 mL/Hr (12.5 mL/Hr) IV Continuous <Continuous>  influenza   Vaccine 0.5 milliLiter(s) IntraMuscular once  pantoprazole  Injectable 40 milliGRAM(s) IV Push two times a day  Parenteral Nutrition - Adult 1 Each (83 mL/Hr) TPN Continuous <Continuous>  sucralfate suspension 1 Gram(s) Oral <User Schedule>    MEDICATIONS  (PRN):  acetaminophen   Tablet .. 650 milliGRAM(s) Oral every 6 hours PRN Temp greater or equal to 38.5C (101.3F), Mild Pain (1 - 3)  HYDROmorphone  Injectable 0.25 milliGRAM(s) IV Push every 6 hours PRN Severe Pain (7 - 10)  lidocaine 2% Viscous 2 milliLiter(s) Swish and Spit three times a day PRN pain with swallowing  ondansetron Injectable 4 milliGRAM(s) IV Push every 6 hours PRN Nausea and/or Vomiting    Vital Signs Last 24 Hrs  T(C): 37.1 (06 Oct 2021 05:14), Max: 37.1 (06 Oct 2021 05:14)  T(F): 98.8 (06 Oct 2021 05:14), Max: 98.8 (06 Oct 2021 05:14)  HR: 117 (06 Oct 2021 05:14) (101 - 120)  BP: 131/95 (06 Oct 2021 05:14) (110/88 - 134/91)  BP(mean): --  RR: 17 (06 Oct 2021 05:14) (17 - 17)  SpO2: 100% (06 Oct 2021 05:14) (99% - 100%)  CAPILLARY BLOOD GLUCOSE      POCT Blood Glucose.: 124 mg/dL (06 Oct 2021 05:28)  POCT Blood Glucose.: 97 mg/dL (05 Oct 2021 17:50)    I&O's Summary    05 Oct 2021 07:01  -  06 Oct 2021 07:00  --------------------------------------------------------  IN: 0 mL / OUT: 100 mL / NET: -100 mL        PHYSICAL EXAM:  GENERAL: NAD, malnourished  HEAD:  Atraumatic, Normocephalic  EYES: conjunctiva and sclera clear  NECK: No JVD  CHEST/LUNG: Clear to auscultation bilaterally; No wheeze  HEART: Regular rate and rhythm; No murmurs, rubs, or gallops  ABDOMEN: Soft, tender to palpation in the epigastric region, Nondistended; Bowel sounds present; ostomy output green, nonbloody  EXTREMITIES:  2+ Peripheral Pulses, No clubbing, cyanosis, or edema, TPN site c/d/i  PSYCH: AAOx3  NEUROLOGY: non-focal  SKIN: No rashes or lesions    LABS:                        10.4   7.94  )-----------( 497      ( 06 Oct 2021 07:22 )             30.5     10-05    136  |  101  |  16  ----------------------------<  80  4.2   |  23  |  0.45<L>    Ca    9.8      05 Oct 2021 07:59  Phos  4.6     10-05  Mg     2.20     10-05    TPro  7.1  /  Alb  4.2  /  TBili  0.2  /  DBili  x   /  AST  45<H>  /  ALT  69<H>  /  AlkPhos  83  10-05          RADIOLOGY & ADDITIONAL TESTS:    Imaging Personally Reviewed:    Consultant(s) Notes Reviewed:      Care Discussed with Consultants/Other Providers:   Giovanni Prajapati MS4    Patient is a 26y old  Female who presents with a chief complaint of crohn's flare up (05 Oct 2021 11:34)    SUBJECTIVE / OVERNIGHT EVENTS: Pt examined at bedside in NAD. Overnight pt got Dilaudid x3 and Zofran x1 for abdominal pain and n/v. Pt had episodes of tachycardia overnight. Pt states that her pain yesterday was the worst pain she has ever had. It was a 10/10 epigastric pain that radiated up her esophagus. It all began after she tried drinking some soup earlier in the day yesterday. She states her vomit is nonbloody and has not had any changes in her ostomy output. She states that her current dose of Dilaudid only resolves her pain around 50% and wears off by around 3 hours. She only feels no pain when she is sleeping after receiving Dilaudid. Pt is afraid to try PO intake due to the pain it causes. Denies any f/c, chest pain, SOB.    MEDICATIONS  (STANDING):  chlorhexidine 2% Cloths 1 Application(s) Topical daily  fat emulsion (Fish Oil and Plant Based) 20% Infusion 12.5 mL/Hr (12.5 mL/Hr) IV Continuous <Continuous>  influenza   Vaccine 0.5 milliLiter(s) IntraMuscular once  pantoprazole  Injectable 40 milliGRAM(s) IV Push two times a day  Parenteral Nutrition - Adult 1 Each (83 mL/Hr) TPN Continuous <Continuous>  sucralfate suspension 1 Gram(s) Oral <User Schedule>    MEDICATIONS  (PRN):  acetaminophen   Tablet .. 650 milliGRAM(s) Oral every 6 hours PRN Temp greater or equal to 38.5C (101.3F), Mild Pain (1 - 3)  HYDROmorphone  Injectable 0.25 milliGRAM(s) IV Push every 6 hours PRN Severe Pain (7 - 10)  lidocaine 2% Viscous 2 milliLiter(s) Swish and Spit three times a day PRN pain with swallowing  ondansetron Injectable 4 milliGRAM(s) IV Push every 6 hours PRN Nausea and/or Vomiting    Vital Signs Last 24 Hrs  T(C): 37.1 (06 Oct 2021 05:14), Max: 37.1 (06 Oct 2021 05:14)  T(F): 98.8 (06 Oct 2021 05:14), Max: 98.8 (06 Oct 2021 05:14)  HR: 117 (06 Oct 2021 05:14) (101 - 120)  BP: 131/95 (06 Oct 2021 05:14) (110/88 - 134/91)  BP(mean): --  RR: 17 (06 Oct 2021 05:14) (17 - 17)  SpO2: 100% (06 Oct 2021 05:14) (99% - 100%)  CAPILLARY BLOOD GLUCOSE      POCT Blood Glucose.: 124 mg/dL (06 Oct 2021 05:28)  POCT Blood Glucose.: 97 mg/dL (05 Oct 2021 17:50)    I&O's Summary    05 Oct 2021 07:01  -  06 Oct 2021 07:00  --------------------------------------------------------  IN: 0 mL / OUT: 100 mL / NET: -100 mL        PHYSICAL EXAM:  GENERAL: NAD, malnourished  HEAD:  Atraumatic, Normocephalic  EYES: conjunctiva and sclera clear  NECK: No JVD  CHEST/LUNG: Clear to auscultation bilaterally; No wheeze  HEART: Regular rate and rhythm; No murmurs, rubs, or gallops  ABDOMEN: Soft, tender to palpation in the epigastric region, Nondistended; Bowel sounds present; ostomy output green, nonbloody  EXTREMITIES:  2+ Peripheral Pulses, No clubbing, cyanosis, or edema, TPN site c/d/i  PSYCH: AAOx3  NEUROLOGY: non-focal  SKIN: No rashes or lesions    LABS:                        10.4   7.94  )-----------( 497     ( 06 Oct 2021 07:22 )             30.5     10-05    136  |  101  |  16  ----------------------------<  80  4.2   |  23  |  0.45<L>    Ca    9.8      05 Oct 2021 07:59  Phos  4.6     10-05  Mg     2.20     10-05    TPro  7.1  /  Alb  4.2  /  TBili  0.2  /  DBili  x   /  AST  45<H>  /  ALT  69<H>  /  AlkPhos  83  10-05          RADIOLOGY & ADDITIONAL TESTS:    Imaging Personally Reviewed:    Consultant(s) Notes Reviewed:      Care Discussed with Consultants/Other Providers:   Giovanni Prajapati MS4    Patient is a 26y old  Female who presents with a chief complaint of crohn's flare up (05 Oct 2021 11:34)    SUBJECTIVE / OVERNIGHT EVENTS: Pt examined at bedside in NAD. Overnight pt got Dilaudid x3 and Zofran x1 for abdominal pain and n/v. Pt had episodes of tachycardia overnight. Pt states that her pain yesterday was the worst pain she has ever had. It was a 10/10 epigastric pain that radiated up her esophagus. It all began after she tried drinking some soup earlier in the day yesterday. She states her vomit is nonbloody and has not had any changes in her ostomy output. She states that her current dose of Dilaudid only resolves her pain around 50% and wears off by around 3 hours. She only feels no pain when she is sleeping after receiving Dilaudid. Pt is afraid to try PO intake due to the pain it causes. Denies any f/c, chest pain, SOB.    During bedside rounds patient continued to complain of significant abdominal pain and n/v.     MEDICATIONS  (STANDING):  chlorhexidine 2% Cloths 1 Application(s) Topical daily  fat emulsion (Fish Oil and Plant Based) 20% Infusion 12.5 mL/Hr (12.5 mL/Hr) IV Continuous <Continuous>  influenza   Vaccine 0.5 milliLiter(s) IntraMuscular once  pantoprazole  Injectable 40 milliGRAM(s) IV Push two times a day  Parenteral Nutrition - Adult 1 Each (83 mL/Hr) TPN Continuous <Continuous>  sucralfate suspension 1 Gram(s) Oral <User Schedule>    MEDICATIONS  (PRN):  acetaminophen   Tablet .. 650 milliGRAM(s) Oral every 6 hours PRN Temp greater or equal to 38.5C (101.3F), Mild Pain (1 - 3)  HYDROmorphone  Injectable 0.25 milliGRAM(s) IV Push every 6 hours PRN Severe Pain (7 - 10)  lidocaine 2% Viscous 2 milliLiter(s) Swish and Spit three times a day PRN pain with swallowing  ondansetron Injectable 4 milliGRAM(s) IV Push every 6 hours PRN Nausea and/or Vomiting    Vital Signs Last 24 Hrs  T(C): 37.1 (06 Oct 2021 05:14), Max: 37.1 (06 Oct 2021 05:14)  T(F): 98.8 (06 Oct 2021 05:14), Max: 98.8 (06 Oct 2021 05:14)  HR: 117 (06 Oct 2021 05:14) (101 - 120)  BP: 131/95 (06 Oct 2021 05:14) (110/88 - 134/91)  BP(mean): --  RR: 17 (06 Oct 2021 05:14) (17 - 17)  SpO2: 100% (06 Oct 2021 05:14) (99% - 100%)  CAPILLARY BLOOD GLUCOSE      POCT Blood Glucose.: 124 mg/dL (06 Oct 2021 05:28)  POCT Blood Glucose.: 97 mg/dL (05 Oct 2021 17:50)    I&O's Summary    05 Oct 2021 07:01  -  06 Oct 2021 07:00  --------------------------------------------------------  IN: 0 mL / OUT: 100 mL / NET: -100 mL        PHYSICAL EXAM:  GENERAL: NAD, malnourished  HEAD:  Atraumatic, Normocephalic  EYES: conjunctiva and sclera clear  NECK: No JVD  CHEST/LUNG: Clear to auscultation bilaterally; No wheeze  HEART: Regular rate and rhythm; No murmurs, rubs, or gallops  ABDOMEN: Soft, tender to palpation in the epigastric region, Nondistended; Bowel sounds present; ostomy output green, nonbloody  EXTREMITIES:  2+ Peripheral Pulses, No clubbing, cyanosis, or edema, TPN site c/d/i  PSYCH: AAOx3  NEUROLOGY: non-focal  SKIN: No rashes or lesions    LABS:                        10.4   7.94  )-----------( 497     ( 06 Oct 2021 07:22 )             30.5     10-05    136  |  101  |  16  ----------------------------<  80  4.2   |  23  |  0.45<L>    Ca    9.8      05 Oct 2021 07:59  Phos  4.6     10-05  Mg     2.20     10-05    TPro  7.1  /  Alb  4.2  /  TBili  0.2  /  DBili  x   /  AST  45<H>  /  ALT  69<H>  /  AlkPhos  83  10-05          RADIOLOGY & ADDITIONAL TESTS:    Imaging Personally Reviewed:    Consultant(s) Notes Reviewed:      Care Discussed with Consultants/Other Providers:

## 2021-10-07 LAB
ALBUMIN SERPL ELPH-MCNC: 4.2 G/DL — SIGNIFICANT CHANGE UP (ref 3.3–5)
ALP SERPL-CCNC: 82 U/L — SIGNIFICANT CHANGE UP (ref 40–120)
ALT FLD-CCNC: 77 U/L — HIGH (ref 4–33)
ANION GAP SERPL CALC-SCNC: 11 MMOL/L — SIGNIFICANT CHANGE UP (ref 7–14)
AST SERPL-CCNC: 49 U/L — HIGH (ref 4–32)
BASOPHILS # BLD AUTO: 0.05 K/UL — SIGNIFICANT CHANGE UP (ref 0–0.2)
BASOPHILS NFR BLD AUTO: 0.7 % — SIGNIFICANT CHANGE UP (ref 0–2)
BILIRUB SERPL-MCNC: 0.4 MG/DL — SIGNIFICANT CHANGE UP (ref 0.2–1.2)
BUN SERPL-MCNC: 14 MG/DL — SIGNIFICANT CHANGE UP (ref 7–23)
CA-I BLD-SCNC: 1.21 MMOL/L — SIGNIFICANT CHANGE UP (ref 1.15–1.29)
CALCIUM SERPL-MCNC: 9.1 MG/DL — SIGNIFICANT CHANGE UP (ref 8.4–10.5)
CHLORIDE SERPL-SCNC: 103 MMOL/L — SIGNIFICANT CHANGE UP (ref 98–107)
CO2 SERPL-SCNC: 24 MMOL/L — SIGNIFICANT CHANGE UP (ref 22–31)
CREAT SERPL-MCNC: 0.45 MG/DL — LOW (ref 0.5–1.3)
EOSINOPHIL # BLD AUTO: 0.34 K/UL — SIGNIFICANT CHANGE UP (ref 0–0.5)
EOSINOPHIL NFR BLD AUTO: 4.6 % — SIGNIFICANT CHANGE UP (ref 0–6)
GLUCOSE SERPL-MCNC: 117 MG/DL — HIGH (ref 70–99)
HCT VFR BLD CALC: 30 % — LOW (ref 34.5–45)
HGB BLD-MCNC: 9.7 G/DL — LOW (ref 11.5–15.5)
IANC: 4.43 K/UL — SIGNIFICANT CHANGE UP (ref 1.5–8.5)
IMM GRANULOCYTES NFR BLD AUTO: 0.4 % — SIGNIFICANT CHANGE UP (ref 0–1.5)
LYMPHOCYTES # BLD AUTO: 2.13 K/UL — SIGNIFICANT CHANGE UP (ref 1–3.3)
LYMPHOCYTES # BLD AUTO: 28.6 % — SIGNIFICANT CHANGE UP (ref 13–44)
MAGNESIUM SERPL-MCNC: 2.1 MG/DL — SIGNIFICANT CHANGE UP (ref 1.6–2.6)
MCHC RBC-ENTMCNC: 27 PG — SIGNIFICANT CHANGE UP (ref 27–34)
MCHC RBC-ENTMCNC: 32.3 GM/DL — SIGNIFICANT CHANGE UP (ref 32–36)
MCV RBC AUTO: 83.6 FL — SIGNIFICANT CHANGE UP (ref 80–100)
MONOCYTES # BLD AUTO: 0.47 K/UL — SIGNIFICANT CHANGE UP (ref 0–0.9)
MONOCYTES NFR BLD AUTO: 6.3 % — SIGNIFICANT CHANGE UP (ref 2–14)
NEUTROPHILS # BLD AUTO: 4.43 K/UL — SIGNIFICANT CHANGE UP (ref 1.8–7.4)
NEUTROPHILS NFR BLD AUTO: 59.4 % — SIGNIFICANT CHANGE UP (ref 43–77)
NRBC # BLD: 0 /100 WBCS — SIGNIFICANT CHANGE UP
NRBC # FLD: 0 K/UL — SIGNIFICANT CHANGE UP
PHOSPHATE SERPL-MCNC: 4.5 MG/DL — SIGNIFICANT CHANGE UP (ref 2.5–4.5)
PLATELET # BLD AUTO: 422 K/UL — HIGH (ref 150–400)
POTASSIUM SERPL-MCNC: 4.1 MMOL/L — SIGNIFICANT CHANGE UP (ref 3.5–5.3)
POTASSIUM SERPL-SCNC: 4.1 MMOL/L — SIGNIFICANT CHANGE UP (ref 3.5–5.3)
PROT SERPL-MCNC: 6.7 G/DL — SIGNIFICANT CHANGE UP (ref 6–8.3)
RBC # BLD: 3.59 M/UL — LOW (ref 3.8–5.2)
RBC # FLD: 13 % — SIGNIFICANT CHANGE UP (ref 10.3–14.5)
SODIUM SERPL-SCNC: 138 MMOL/L — SIGNIFICANT CHANGE UP (ref 135–145)
WBC # BLD: 7.45 K/UL — SIGNIFICANT CHANGE UP (ref 3.8–10.5)
WBC # FLD AUTO: 7.45 K/UL — SIGNIFICANT CHANGE UP (ref 3.8–10.5)

## 2021-10-07 PROCEDURE — 99232 SBSQ HOSP IP/OBS MODERATE 35: CPT

## 2021-10-07 PROCEDURE — 93010 ELECTROCARDIOGRAM REPORT: CPT

## 2021-10-07 PROCEDURE — 99232 SBSQ HOSP IP/OBS MODERATE 35: CPT | Mod: GC

## 2021-10-07 RX ORDER — ELECTROLYTE SOLUTION,INJ
1 VIAL (ML) INTRAVENOUS
Refills: 0 | Status: DISCONTINUED | OUTPATIENT
Start: 2021-10-07 | End: 2021-10-07

## 2021-10-07 RX ORDER — I.V. FAT EMULSION 20 G/100ML
14.5 EMULSION INTRAVENOUS
Qty: 35 | Refills: 0 | Status: DISCONTINUED | OUTPATIENT
Start: 2021-10-07 | End: 2021-10-08

## 2021-10-07 RX ADMIN — HYDROMORPHONE HYDROCHLORIDE 0.5 MILLIGRAM(S): 2 INJECTION INTRAMUSCULAR; INTRAVENOUS; SUBCUTANEOUS at 22:20

## 2021-10-07 RX ADMIN — I.V. FAT EMULSION 14.5 ML/HR: 20 EMULSION INTRAVENOUS at 18:49

## 2021-10-07 RX ADMIN — Medication 1 GRAM(S): at 07:03

## 2021-10-07 RX ADMIN — CHLORHEXIDINE GLUCONATE 1 APPLICATION(S): 213 SOLUTION TOPICAL at 11:16

## 2021-10-07 RX ADMIN — HYDROMORPHONE HYDROCHLORIDE 0.25 MILLIGRAM(S): 2 INJECTION INTRAMUSCULAR; INTRAVENOUS; SUBCUTANEOUS at 11:42

## 2021-10-07 RX ADMIN — PANTOPRAZOLE SODIUM 40 MILLIGRAM(S): 20 TABLET, DELAYED RELEASE ORAL at 07:03

## 2021-10-07 RX ADMIN — HYDROMORPHONE HYDROCHLORIDE 0.5 MILLIGRAM(S): 2 INJECTION INTRAMUSCULAR; INTRAVENOUS; SUBCUTANEOUS at 21:50

## 2021-10-07 RX ADMIN — Medication 1 GRAM(S): at 11:16

## 2021-10-07 RX ADMIN — PANTOPRAZOLE SODIUM 40 MILLIGRAM(S): 20 TABLET, DELAYED RELEASE ORAL at 17:46

## 2021-10-07 RX ADMIN — Medication 1 GRAM(S): at 17:46

## 2021-10-07 RX ADMIN — HYDROMORPHONE HYDROCHLORIDE 0.5 MILLIGRAM(S): 2 INJECTION INTRAMUSCULAR; INTRAVENOUS; SUBCUTANEOUS at 08:26

## 2021-10-07 RX ADMIN — HYDROMORPHONE HYDROCHLORIDE 0.25 MILLIGRAM(S): 2 INJECTION INTRAMUSCULAR; INTRAVENOUS; SUBCUTANEOUS at 11:17

## 2021-10-07 RX ADMIN — HYDROMORPHONE HYDROCHLORIDE 0.5 MILLIGRAM(S): 2 INJECTION INTRAMUSCULAR; INTRAVENOUS; SUBCUTANEOUS at 02:21

## 2021-10-07 RX ADMIN — HYDROMORPHONE HYDROCHLORIDE 0.5 MILLIGRAM(S): 2 INJECTION INTRAMUSCULAR; INTRAVENOUS; SUBCUTANEOUS at 16:25

## 2021-10-07 RX ADMIN — Medication 1 GRAM(S): at 21:50

## 2021-10-07 RX ADMIN — Medication 1 EACH: at 18:49

## 2021-10-07 RX ADMIN — HYDROMORPHONE HYDROCHLORIDE 0.5 MILLIGRAM(S): 2 INJECTION INTRAMUSCULAR; INTRAVENOUS; SUBCUTANEOUS at 01:51

## 2021-10-07 RX ADMIN — HYDROMORPHONE HYDROCHLORIDE 0.5 MILLIGRAM(S): 2 INJECTION INTRAMUSCULAR; INTRAVENOUS; SUBCUTANEOUS at 16:10

## 2021-10-07 RX ADMIN — HYDROMORPHONE HYDROCHLORIDE 0.5 MILLIGRAM(S): 2 INJECTION INTRAMUSCULAR; INTRAVENOUS; SUBCUTANEOUS at 07:56

## 2021-10-07 NOTE — PROGRESS NOTE ADULT - ATTENDING COMMENTS
I agree with the above history, physical examination, chief complaint/diagnosis, and plan, which I have reviewed and edited where appropriate.  I agree with notes/assessment and detailed interval history of health care providers on my service.  I have seen and examined the patient.  I reviewed the laboratory and available data and agree with the history, physical assessment and plan.  I reviewed and discussed with all consultants, house staff and PA's.  The Nutrition Support Team (NST) discusses on an ongoing basis with the primary team and all consultants, House staff and PA's to have a permanent risk benefit analyses on all decisions and coordinating care.  I was physically present for the key portions of the evaluation and management (E/M) service provided.  26 year old female with PMH Crohn's disease s/p surgery for stricture/ileostomy receiving TPN   comfortable in bed   AO3  CHEST/LUNG: clear  ABDOMEN: soft, mildly tender to palpation, nondistended   labs reviewed - removed K phos  continue TPN with infusion volume of 2L/1325 kcal/day

## 2021-10-07 NOTE — PROGRESS NOTE ADULT - SUBJECTIVE AND OBJECTIVE BOX
Giovanni Prajapati MS3    Patient is a 26y old  Female who presents with a chief complaint of crohn's flare up (06 Oct 2021 11:47)    SUBJECTIVE / OVERNIGHT EVENTS: Pt examined at bedside in NAD. Overnight pt received a dose of Dilaudid earlier than scheduled PRN for her pain. Pt states her pain is currently 5/10 and is better than when she was seen by the team yesterday. Pt states GI spoke with her about her biopsy results which did not show Crohn's or H. pylori in the stomach. Pt had one episode of green emesis yesterday but no n/v overnight or today. Ileostomy output is normal. Pt denies any chest pain, SOB, f/c.    MEDICATIONS  (STANDING):  chlorhexidine 2% Cloths 1 Application(s) Topical daily  fat emulsion (Fish Oil and Plant Based) 20% Infusion 12.5 mL/Hr (12.5 mL/Hr) IV Continuous <Continuous>  influenza   Vaccine 0.5 milliLiter(s) IntraMuscular once  pantoprazole  Injectable 40 milliGRAM(s) IV Push two times a day  Parenteral Nutrition - Adult 1 Each (83 mL/Hr) TPN Continuous <Continuous>  sucralfate suspension 1 Gram(s) Oral <User Schedule>    MEDICATIONS  (PRN):  acetaminophen   Tablet .. 650 milliGRAM(s) Oral every 6 hours PRN Temp greater or equal to 38.5C (101.3F), Mild Pain (1 - 3)  HYDROmorphone  Injectable 0.5 milliGRAM(s) IV Push every 6 hours PRN Severe Pain (7 - 10)  HYDROmorphone  Injectable 0.25 milliGRAM(s) IV Push every 6 hours PRN Moderate Pain (4 - 6)  lidocaine 2% Viscous 2 milliLiter(s) Swish and Spit three times a day PRN pain with swallowing  ondansetron Injectable 4 milliGRAM(s) IV Push every 6 hours PRN Nausea and/or Vomiting      Vital Signs Last 24 Hrs  T(C): 36.5 (07 Oct 2021 07:07), Max: 36.6 (06 Oct 2021 14:09)  T(F): 97.7 (07 Oct 2021 07:07), Max: 97.9 (06 Oct 2021 21:11)  HR: 88 (07 Oct 2021 07:07) (88 - 115)  BP: 104/66 (07 Oct 2021 07:07) (104/66 - 135/73)  BP(mean): --  RR: 18 (07 Oct 2021 07:07) (18 - 18)  SpO2: 100% (07 Oct 2021 07:07) (100% - 100%)  CAPILLARY BLOOD GLUCOSE      POCT Blood Glucose.: 101 mg/dL (06 Oct 2021 18:05)    I&O's Summary      PHYSICAL EXAM:  GENERAL: NAD, malnourished  HEAD:  Atraumatic, Normocephalic  EYES: conjunctiva and sclera clear  NECK: No JVD  CHEST/LUNG: nonlabored respirations  HEART: patient declined  ABDOMEN: Tender to palpation, ostomy output green  EXTREMITIES: moving all 4 extremities spontaneously; TPN IV site c/d/i with no erythema  PSYCH: AAOx3  NEUROLOGY: non-focal  SKIN: No rashes or lesions    LABS:                        10.4   7.94  )-----------( 497      ( 06 Oct 2021 07:22 )             30.5     10-06    136  |  100  |  13  ----------------------------<  112<H>  3.4<L>   |  20<L>  |  0.46<L>    Ca    9.9      06 Oct 2021 07:22  Phos  2.2     10-06  Mg     2.00     10-06    TPro  7.0  /  Alb  4.4  /  TBili  0.4  /  DBili  x   /  AST  30  /  ALT  58<H>  /  AlkPhos  85  10-06        RADIOLOGY & ADDITIONAL TESTS:    Surgical Pathology Report (09.30.21 @ 17:28)    Surgical Pathology Report:   ACCESSION No:  [id number]  Patient:   CITLALI PAZ    Accession:                             [id number]    Collected Date/Time:                   9/30/2021 17:28 EDT  Received Date/Time:                    9/30/2021 17:28 EDT    SurgicalPathology Report - Auth (Verified)    Specimen(s) Submitted  1- Gastric bx r/o crohns r/o h pylori    Final Diagnosis  1. Stomach, biopsy:  - Gastric oxyntic mucosa with chronic inactive gastritis, see note.  -  No morphologic evidence of Helicobacter microorganisms.  Â  Â Â Â  - Negative for intestinal metaplasia and granulomas.    Note: Multiple levels have been examined.    This case was reviewed as an  intradepartmental consultation with staff pathologists, who concur with  the diagnosis on10/05/2021.  Verified by: Dorothea Jimenez MD  (Electronic Signature)  Reported on: 10/06/21 13:32 EDT, [address] [facility] [place] [address]  Phone: [phone]   Fax: [phone]  _________________________________________________________________      Clinical Information  not provided    Gross Description  The specimen is received in formalin and the specimen container is  labeled:  Gastric biopsy .  It consists of three fragments of soft tan  tissue ranging from 0.1 cm to 0.5 cm in greatest dimension. Eosin  staining has been applied to tissue fragments. Entirely submitted.  One  cassette.    In addition to other data that may appear on the specimen container, the  label has been inspected to confirm the presence of the patient's name  and date of birth.  Anncheko Washington 09/30/2021 18:24    Disclaimer  Histological Processing Performed at [facility],  Department of Pathology, [address] [place] [address], [place], [place].     Giovanni Prajapati MS4    Patient is a 26y old  Female who presents with a chief complaint of crohn's flare up (06 Oct 2021 11:47)    SUBJECTIVE / OVERNIGHT EVENTS: Pt examined at bedside in NAD. Overnight pt received a dose of Dilaudid earlier than scheduled PRN for her pain. Pt states her pain is currently 5/10 and is better than when she was seen by the team yesterday. Pt states GI spoke with her about her biopsy results which did not show Crohn's or H. pylori in the stomach. Pt had one episode of green emesis yesterday but no n/v overnight or today. Ileostomy output is normal. Pt denies any chest pain, SOB, f/c.    MEDICATIONS  (STANDING):  chlorhexidine 2% Cloths 1 Application(s) Topical daily  fat emulsion (Fish Oil and Plant Based) 20% Infusion 12.5 mL/Hr (12.5 mL/Hr) IV Continuous <Continuous>  influenza   Vaccine 0.5 milliLiter(s) IntraMuscular once  pantoprazole  Injectable 40 milliGRAM(s) IV Push two times a day  Parenteral Nutrition - Adult 1 Each (83 mL/Hr) TPN Continuous <Continuous>  sucralfate suspension 1 Gram(s) Oral <User Schedule>    MEDICATIONS  (PRN):  acetaminophen   Tablet .. 650 milliGRAM(s) Oral every 6 hours PRN Temp greater or equal to 38.5C (101.3F), Mild Pain (1 - 3)  HYDROmorphone  Injectable 0.5 milliGRAM(s) IV Push every 6 hours PRN Severe Pain (7 - 10)  HYDROmorphone  Injectable 0.25 milliGRAM(s) IV Push every 6 hours PRN Moderate Pain (4 - 6)  lidocaine 2% Viscous 2 milliLiter(s) Swish and Spit three times a day PRN pain with swallowing  ondansetron Injectable 4 milliGRAM(s) IV Push every 6 hours PRN Nausea and/or Vomiting      Vital Signs Last 24 Hrs  T(C): 36.5 (07 Oct 2021 07:07), Max: 36.6 (06 Oct 2021 14:09)  T(F): 97.7 (07 Oct 2021 07:07), Max: 97.9 (06 Oct 2021 21:11)  HR: 88 (07 Oct 2021 07:07) (88 - 115)  BP: 104/66 (07 Oct 2021 07:07) (104/66 - 135/73)  BP(mean): --  RR: 18 (07 Oct 2021 07:07) (18 - 18)  SpO2: 100% (07 Oct 2021 07:07) (100% - 100%)  CAPILLARY BLOOD GLUCOSE      POCT Blood Glucose.: 101 mg/dL (06 Oct 2021 18:05)    I&O's Summary      PHYSICAL EXAM:  GENERAL: NAD, malnourished  HEAD:  Atraumatic, Normocephalic  EYES: conjunctiva and sclera clear  NECK: No JVD  CHEST/LUNG: nonlabored respirations  HEART: patient declined  ABDOMEN: Tender to palpation, ostomy output green  EXTREMITIES: moving all 4 extremities spontaneously; TPN IV site c/d/i with no erythema  PSYCH: AAOx3  NEUROLOGY: non-focal  SKIN: No rashes or lesions    LABS:                                 9.7    7.45  )-----------( 422      ( 07 Oct 2021 07:45 )             30.0     10-07    138  |  103  |  14  ----------------------------<  117<H>  4.1   |  24  |  0.45<L>    Ca    9.1      07 Oct 2021 07:45  Phos  2.2     10-06  Mg     2.10     10-07    TPro  6.7  /  Alb  4.2  /  TBili  0.4  /  DBili  x   /  AST  49<H>  /  ALT  77<H>  /  AlkPhos  82  10-07      RADIOLOGY & ADDITIONAL TESTS:    Surgical Pathology Report (09.30.21 @ 17:28)    Surgical Pathology Report:   ACCESSION No:  [id number]  Patient:   CITLALI PAZ    Accession:                             [id number]    Collected Date/Time:                   9/30/2021 17:28 EDT  Received Date/Time:                    9/30/2021 17:28 EDT    SurgicalPathology Report - Auth (Verified)    Specimen(s) Submitted  1- Gastric bx r/o crohns r/o h pylori    Final Diagnosis  1. Stomach, biopsy:  - Gastric oxyntic mucosa with chronic inactive gastritis, see note.  -  No morphologic evidence of Helicobacter microorganisms.  Â  Â Â Â  - Negative for intestinal metaplasia and granulomas.    Note: Multiple levels have been examined.    This case was reviewed as an  intradepartmental consultation with staff pathologists, who concur with  the diagnosis on10/05/2021.  Verified by: Dorothea Jimenez MD  (Electronic Signature)  Reported on: 10/06/21 13:32 EDT, [address] [facility] [place] [address]  Phone: [phone]   Fax: [phone]  _________________________________________________________________      Clinical Information  not provided    Gross Description  The specimen is received in formalin and the specimen container is  labeled:  Gastric biopsy .  It consists of three fragments of soft tan  tissue ranging from 0.1 cm to 0.5 cm in greatest dimension. Eosin  staining has been applied to tissue fragments. Entirely submitted.  One  cassette.    In addition to other data that may appear on the specimen container, the  label has been inspected to confirm the presence of the patient's name  and date of birth.  Anncheko Washington 09/30/2021 18:24    Disclaimer  Histological Processing Performed at [facility],  Department of Pathology, [address] [place] [address], [place], [place].

## 2021-10-07 NOTE — PROGRESS NOTE ADULT - SUBJECTIVE AND OBJECTIVE BOX
NUTRITION NOTE  [id number] MUTAMANDAA  ===============================    Interval events - Patient was seen and examined at bedside, no acute events overnight. Patient denies chest pain or shortness of breath at this time. Patient remains on TPN at this time. Patient is now NPO; even small amounts of PO intake is causing severe abdominal pain with intermittent nausea and vomiting.     ROS: Except as noted above, all other systems reviewed and are negative     Allergies  No Known Allergies    PAST MEDICAL & SURGICAL HISTORY:  Crohn disease  Peripherally inserted central catheter (PICC) in place LUE PICC place on 2021  History of ileostomy Aug 2021    Vital Signs Last 24 Hrs  T(C): 36.5 (07 Oct 2021 07:07), Max: 36.6 (06 Oct 2021 14:09)  T(F): 97.7 (07 Oct 2021 07:07), Max: 97.9 (06 Oct 2021 21:11)  HR: 88 (07 Oct 2021 07:07) (88 - 115)  BP: 104/66 (07 Oct 2021 07:07) (104/66 - 135/73)  RR: 18 (07 Oct 2021 07:07) (18 - 18)  SpO2: 100% (07 Oct 2021 07:07) (100% - 100%)    MEDICATIONS  (STANDING):  chlorhexidine 2% Cloths 1 Application(s) Topical daily  fat emulsion (Fish Oil and Plant Based) 20% Infusion 14.5 mL/Hr (14.5 mL/Hr) IV Continuous <Continuous>  influenza   Vaccine 0.5 milliLiter(s) IntraMuscular once  pantoprazole  Injectable 40 milliGRAM(s) IV Push two times a day  Parenteral Nutrition - Adult 1 Each (83 mL/Hr) TPN Continuous <Continuous>  Parenteral Nutrition - Adult 1 Each (83 mL/Hr) TPN Continuous <Continuous>  sucralfate suspension 1 Gram(s) Oral <User Schedule>    MEDICATIONS  (PRN):  acetaminophen   Tablet .. 650 milliGRAM(s) Oral every 6 hours PRN Temp greater or equal to 38.5C (101.3F), Mild Pain (1 - 3)  HYDROmorphone  Injectable 0.5 milliGRAM(s) IV Push every 6 hours PRN Severe Pain (7 - 10)  HYDROmorphone  Injectable 0.25 milliGRAM(s) IV Push every 6 hours PRN Moderate Pain (4 - 6)  lidocaine 2% Viscous 2 milliLiter(s) Swish and Spit three times a day PRN pain with swallowing  ondansetron Injectable 4 milliGRAM(s) IV Push every 6 hours PRN Nausea and/or Vomiting    Daily Weight in k.8 (06 Oct 2021 05:14)    Drug Dosing Weight  Height (cm): 154.9 (30 Sep 2021 08:11)  Weight (kg): 35.2 (30 Sep 2021 08:11)  BMI (kg/m2): 14.7 (30 Sep 2021 08:11)  BSA (m2): 1.26 (30 Sep 2021 08:11)    PHYSICAL EXAM:  GENERAL: malnourished, resting comfortably in bed   HEAD:  Atraumatic, Normocephalic  CHEST/LUNG: nonlabored respirations, CTAB  ABDOMEN: soft, mildly tender to palpation, nondistended   PSYCH: AAOx3  PICC Site: C/D/I    Diet: NPO and TPN/lipids     LABORATORY                                      9.7    7.45  )-----------( 422      ( 07 Oct 2021 07:45 )             30.0   10-    138  |  103  |  14  ----------------------------<  117<H>  4.1   |  24  |  0.45<L>    Ca    9.1      07 Oct 2021 07:45  Phos  4.5     10  Mg     2.10     10-07    TPro  6.7  /  Alb  4.2  /  TBili  0.4  /  DBili  x   /  AST  49<H>  /  ALT  77<H>  /  AlkPhos  82  10    LIVER FUNCTIONS - ( 07 Oct 2021 07:45 )  Alb: 4.2 g/dL / Pro: 6.7 g/dL / ALK PHOS: 82 U/L / ALT: 77 U/L / AST: 49 U/L / GGT: x           10 Chol -- LDL -- HDL -- Trig 91,  Chol -- LDL -- HDL -- Trig 71    ASSESSMENT/PLAN:  26 year old female with PMH Crohn's disease s/p surgery for stricture/ileostomy placement in mid 2021, s/p PICC line in place for TPN 2 weeks ago, presents with epigastric abdominal pain, nausea, and vomiting x 4 days. GI is following with possible EGD to evaluate for esophagitis. Nutrition service consulted for resuming parenteral nutrition via PICC line that is in place. Patient states that she feels very weak and is frustrated that she is not able to take in anything by mouth (liquids or food) without nausea or emesis. TPN was initiated on 21.    continue TPN with infusion volume of 2L, TPN will provide 1325 kcal/day - TPN formula adjusted to increase calories since pt is now NPO again     labs reviewed - removed K phos from TPN bag     monitor fingersticks, obtain daily weights    continue parenteral nutrition at this time, will follow up with primary team on plan - GI workup in process     1.  Severe protein calorie malnutrition being optimized with TPN: CHO [195] gm.  AA [78] gm. SMOF Lipids [35] gm.  2.  Hyperglycemia managed with: [0] units of regular insulin    3.  Check fluid balance daily.  Strict I/O  [ ] [ ]   4.  Daily BMP, Ionized Calcium, Magnesium and Phosphorous   5.  Triglycerides at initiation of TPN and monthly [ ] [ ]     Nutrition Support [id number]

## 2021-10-07 NOTE — PROGRESS NOTE ADULT - PROBLEM SELECTOR PLAN 4
DVT ppx- Improve score low. BLACK stockings and ambulate as tolerated.    Severe protein malnutrition - nutrition recs appreciated. C/w TPN    Dispo- pending further medical optimization

## 2021-10-07 NOTE — PROGRESS NOTE ADULT - ASSESSMENT
26F w/ hx of Crohn's colitis c/b ascending colon stricture s/p resection 8/2021 w/ ileostomy (at [facility]) on remicade since 6/2021 (s/p induction and 2 maintenance doses, last 10/1/2021, admitted w/ ongoing severe post prandial epigastric pain and weight loss due to inability to take PO.    Impression:  #Post-prandial abd pain - Unclear etiology. Pt is s/p two extended hospitalizations at [facility] since her surgery, with testing including extensive imaging and upper GI endoscopy. No e/o SMA syndrome, EGD w/ esophagitis only. EGD here shows mild esophagitis and severe gastric inflammation (new finding compared to EGD 6 weeks ago, path negative for active Crohn's and is unchanged from her path results from [facility]). It is possible pain is related to severe gastritis, though having such severe pain from this is unusual. She has been evaluated for SMA syndrome at [facility]. In terms of mesenteric iscehmia, her vasculature is patent and no lactate, pointing away from any ischemic process. Suspect that she may have some underlying pain syndrome or non-GI etiology of her pain, but it is completely unclear what that might be.   #Crohn's disease - Historical details as above. It is unclear if she has active Crohn's at this time. Her ESR/CRP are negative, but they were never positive even at time of her initial diagnosis. Her fecal calprotectin, however, was very positive on diagnosis and is negative here. The only objective e/o active Crohn's is her CT scan on admission, which shows RLQ enteritis, but this is an imperfect test. Biopsies from EGD not c/w upper GI Crohn's. Ileoscopy would be useful in determining this but patient has declined. This would also not change the management of her current symptoms, as this appears to be unrelated to Crohn's.     Recommendations:  - Continue w/ strict NPO w/ meds.  - Continue high dose acid suppression w/ IV PPI.  - Continue w/ sucralfate liquid 1g q6h. Patient is trying to not miss any doses.  - Anti-emetics PRN.  - Pain medication per primary team.  - GI will continue to follow.     Faustino Freitas  Gastroenterology/Hepatology Fellow  Available via [employer] Teams    NON-URGENT CONSULTS:  Please email [email or web address]  AT NIGHT AND ON WEEKENDS:  Contact on-call GI fellow via answering service ([phone]) from 5pm-8am and on weekends/holidays  MONDAY-FRIDAY 8AM-5PM:  Pager# [phone] ([facility]) or [phone] ([facility])  GI Phone# [phone] ([facility])   26F w/ hx of Crohn's colitis c/b ascending colon stricture s/p resection 8/2021 w/ ileostomy (at [facility]) on remicade since 6/2021 (s/p induction and 2 maintenance doses, last 10/1/2021, admitted w/ ongoing severe post prandial epigastric pain and weight loss due to inability to take PO.    Impression:  #Post-prandial abd pain - Unclear etiology. Pt is s/p two extended hospitalizations at [facility] since her surgery, with testing including extensive imaging and upper GI endoscopy. No e/o SMA syndrome, EGD w/ esophagitis only. EGD here shows mild esophagitis and severe gastric inflammation (new finding compared to EGD 6 weeks ago, path negative for active Crohn's and is unchanged from her path results from [facility]). It is possible pain is related to severe gastritis, though having such severe pain from this is unusual. She has been evaluated for SMA syndrome at [facility]. In terms of mesenteric iscehmia, her vasculature is patent and no lactate, pointing away from any ischemic process. Suspect that she may have some underlying pain syndrome or non-GI etiology of her pain, but it is completely unclear what that might be.   #Crohn's disease - Historical details as above. It is unclear if she has active Crohn's at this time. Her ESR/CRP are negative, but they were never positive even at time of her initial diagnosis. Her fecal calprotectin, however, was very positive on diagnosis and is negative here. The only objective e/o active Crohn's is her CT scan on admission, which shows RLQ enteritis, but this is an imperfect test. Biopsies from EGD not c/w upper GI Crohn's. Ileoscopy would be useful in determining this but patient has declined. This would also not change the management of her current symptoms, as this appears to be unrelated to Crohn's.     Recommendations:  - Continue w/ strict NPO w/ meds.  - Continue high dose acid suppression w/ IV PPI.  - Continue w/ sucralfate liquid 1g q6h. Patient is trying to not miss any doses.  - Anti-emetics PRN.  - check IgG4 levels  - Pain medication per primary team.  - GI will continue to follow.     Faustino Freitas  Gastroenterology/Hepatology Fellow  Available via [employer] Teams    NON-URGENT CONSULTS:  Please email [email or web address]  AT NIGHT AND ON WEEKENDS:  Contact on-call GI fellow via answering service ([phone]) from 5pm-8am and on weekends/holidays  MONDAY-FRIDAY 8AM-5PM:  Pager# [phone] ([facility]) or [phone] ([facility])  GI Phone# [phone] ([facility])

## 2021-10-07 NOTE — PROGRESS NOTE ADULT - SUBJECTIVE AND OBJECTIVE BOX
Chief Complaint:  Patient is a 26y old  Female who presents with a chief complaint of crohn's flare up (07 Oct 2021 10:41)    Reason for consult: abd pain, hx of Crohn's    Interval Events: Pt remaining NPO other than meds. Required some pain meds, but less nausea/vomiting.     Hospital Medications:  acetaminophen   Tablet .. 650 milliGRAM(s) Oral every 6 hours PRN  chlorhexidine 2% Cloths 1 Application(s) Topical daily  fat emulsion (Fish Oil and Plant Based) 20% Infusion 14.5 mL/Hr IV Continuous <Continuous>  HYDROmorphone  Injectable 0.5 milliGRAM(s) IV Push every 6 hours PRN  HYDROmorphone  Injectable 0.25 milliGRAM(s) IV Push every 6 hours PRN  influenza   Vaccine 0.5 milliLiter(s) IntraMuscular once  lidocaine 2% Viscous 2 milliLiter(s) Swish and Spit three times a day PRN  ondansetron Injectable 4 milliGRAM(s) IV Push every 6 hours PRN  pantoprazole  Injectable 40 milliGRAM(s) IV Push two times a day  Parenteral Nutrition - Adult 1 Each TPN Continuous <Continuous>  Parenteral Nutrition - Adult 1 Each TPN Continuous <Continuous>  sucralfate suspension 1 Gram(s) Oral <User Schedule>      ROS:   General:  No  fevers, chills, night sweats, fatigue  Eyes:  Good vision, no reported pain  ENT:  No sore throat, pain, runny nose  CV:  No pain, palpitations  Pulm:  No dyspnea, cough  GI:  See HPI, otherwise negative  :  No  incontinence, nocturia  Muscle:  No pain, weakness  Neuro:  No memory problems  Psych:  No insomnia, mood problems, depression  Endocrine:  No polyuria, polydipsia, cold/heat intolerance  Heme:  No petechiae, ecchymosis, easy bruisability  Skin:  No rash    PHYSICAL EXAM:   Vital Signs:  Vital Signs Last 24 Hrs  T(C): 36.7 (07 Oct 2021 11:17), Max: 36.7 (07 Oct 2021 11:17)  T(F): 98.1 (07 Oct 2021 11:17), Max: 98.1 (07 Oct 2021 11:17)  HR: 95 (07 Oct 2021 11:17) (88 - 115)  BP: 100/67 (07 Oct 2021 11:17) (100/67 - 135/73)  BP(mean): --  RR: 18 (07 Oct 2021 11:17) (18 - 18)  SpO2: 100% (07 Oct 2021 11:17) (100% - 100%)  Daily     Daily     GENERAL: no acute distress  NEURO: alert  HEENT: anicteric sclera, no conjunctival pallor appreciated  CHEST: no respiratory distress, no accessory muscle use  CARDIAC: regular rate, rhythm  ABDOMEN: soft, non-tender, non-distended, no rebound or guarding  EXTREMITIES: warm, well perfused, no edema  SKIN: no lesions noted    LABS: reviewed                        9.7    7.45  )-----------( 422      ( 07 Oct 2021 07:45 )             30.0     10-07    138  |  103  |  14  ----------------------------<  117<H>  4.1   |  24  |  0.45<L>    Ca    9.1      07 Oct 2021 07:45  Phos  4.5     10-07  Mg     2.10     10-07    TPro  6.7  /  Alb  4.2  /  TBili  0.4  /  DBili  x   /  AST  49<H>  /  ALT  77<H>  /  AlkPhos  82  10-07    LIVER FUNCTIONS - ( 07 Oct 2021 07:45 )  Alb: 4.2 g/dL / Pro: 6.7 g/dL / ALK PHOS: 82 U/L / ALT: 77 U/L / AST: 49 U/L / GGT: x             Interval Diagnostic Studies: see sunrise for full report     Reason for consult: abd pain, hx of Crohn's    Interval Events: Pt remaining NPO other than meds. Required some pain meds, but less nausea/vomiting.     Hospital Medications:  acetaminophen   Tablet .. 650 milliGRAM(s) Oral every 6 hours PRN  chlorhexidine 2% Cloths 1 Application(s) Topical daily  fat emulsion (Fish Oil and Plant Based) 20% Infusion 14.5 mL/Hr IV Continuous <Continuous>  HYDROmorphone  Injectable 0.5 milliGRAM(s) IV Push every 6 hours PRN  HYDROmorphone  Injectable 0.25 milliGRAM(s) IV Push every 6 hours PRN  influenza   Vaccine 0.5 milliLiter(s) IntraMuscular once  lidocaine 2% Viscous 2 milliLiter(s) Swish and Spit three times a day PRN  ondansetron Injectable 4 milliGRAM(s) IV Push every 6 hours PRN  pantoprazole  Injectable 40 milliGRAM(s) IV Push two times a day  Parenteral Nutrition - Adult 1 Each TPN Continuous <Continuous>  Parenteral Nutrition - Adult 1 Each TPN Continuous <Continuous>  sucralfate suspension 1 Gram(s) Oral <User Schedule>      ROS:   General:  No  fevers, chills, night sweats, fatigue  Eyes:  Good vision, no reported pain  ENT:  No sore throat, pain, runny nose  CV:  No pain, palpitations  Pulm:  No dyspnea, cough  GI:  See HPI, otherwise negative  :  No  incontinence, nocturia  Muscle:  No pain, weakness  Neuro:  No memory problems  Psych:  No insomnia, mood problems, depression  Endocrine:  No polyuria, polydipsia, cold/heat intolerance  Heme:  No petechiae, ecchymosis, easy bruisability  Skin:  No rash    PHYSICAL EXAM:   Vital Signs:  Vital Signs Last 24 Hrs  T(C): 36.7 (07 Oct 2021 11:17), Max: 36.7 (07 Oct 2021 11:17)  T(F): 98.1 (07 Oct 2021 11:17), Max: 98.1 (07 Oct 2021 11:17)  HR: 95 (07 Oct 2021 11:17) (88 - 115)  BP: 100/67 (07 Oct 2021 11:17) (100/67 - 135/73)  BP(mean): --  RR: 18 (07 Oct 2021 11:17) (18 - 18)  SpO2: 100% (07 Oct 2021 11:17) (100% - 100%)  Daily     Daily     GENERAL: no acute distress  NEURO: alert  HEENT: anicteric sclera, no conjunctival pallor appreciated  CHEST: no respiratory distress, no accessory muscle use  CARDIAC: regular rate, rhythm  ABDOMEN: soft, non-tender, non-distended, no rebound or guarding  EXTREMITIES: warm, well perfused, no edema  SKIN: no lesions noted    LABS: reviewed                        9.7    7.45  )-----------( 422      ( 07 Oct 2021 07:45 )             30.0     10-07    138  |  103  |  14  ----------------------------<  117<H>  4.1   |  24  |  0.45<L>    Ca    9.1      07 Oct 2021 07:45  Phos  4.5     10-07  Mg     2.10     10-07    TPro  6.7  /  Alb  4.2  /  TBili  0.4  /  DBili  x   /  AST  49<H>  /  ALT  77<H>  /  AlkPhos  82  10-07    LIVER FUNCTIONS - ( 07 Oct 2021 07:45 )  Alb: 4.2 g/dL / Pro: 6.7 g/dL / ALK PHOS: 82 U/L / ALT: 77 U/L / AST: 49 U/L / GGT: x             Interval Diagnostic Studies: see sunrise for full report

## 2021-10-07 NOTE — PROGRESS NOTE ADULT - PROBLEM SELECTOR PLAN 2
Possibly related to esophagitis/reflux, other etiologies include active Crohn's (though no objective evidence pointing towards flare).  EGD showed gastric friability and was biopsied. There was erythematous duodenopathy but no esophagitis.    - Treat symptomatically with IV PPI BID, add sucralfate liquid 1g q6h for esophagitis per GI recs  - C/w dilaudid for pain. Will attempt to avoid opiates in this patient when possible given increased mortality with opiates in IBD patients  - NPO   - Antiemetics as needed. Check QTc qOD  - C/w TPN Possibly related to esophagitis/reflux, other etiologies include active Crohn's (though no objective evidence pointing towards flare).  EGD showed gastric friability and was biopsied. There was erythematous duodenopathy but no esophagitis.    - Treat symptomatically with IV PPI BID, add sucralfate liquid 1g q6h for esophagitis per GI recs  - C/w dilaudid for pain. Will attempt to avoid opiates in this patient when possible given increased mortality with opiates in IBD patients  - NPO   - Zofran PRN, Checking QTc qOD, last EKG had QTc wnl  - C/w TPN

## 2021-10-07 NOTE — PROGRESS NOTE ADULT - PROBLEM SELECTOR PLAN 1
Hx of Crohn's with recent colonic resection for stricture. S/p EGD 9/30 with gastric inflammation, biopsied to r/o gastric Crohn's.     Inflammatory markers wnl, fecal calprotectin wnl    -Diet advanced, intermittently tolerating  -GI following, appreciate recs  -S/p infliximab on 10/1, further treatment pending pathology   -Antiemetics PRN  - [facility] was unable to be reached for records  - no Crohn's or H. pylori in the gastric biopsy  - possible ileoscopy per GI but patient refused  - Dilaudid regimen changed to 0.5mg q6h for severe pain Hx of Crohn's with recent colonic resection for stricture. S/p EGD 9/30 with gastric inflammation, biopsied to r/o gastric Crohn's.     Inflammatory markers wnl, fecal calprotectin wnl    -Diet advanced, intermittently tolerating  -GI following, appreciate recs  -S/p infliximab on 10/1, further treatment pending pathology   -Antiemetics PRN  - [facility] was unable to be reached for records  - no Crohn's or H. pylori in the gastric biopsy  - possible ileoscopy per GI but patient refused  - Dilaudid regimen changed to 0.5mg q6h for severe pain  - check IgG4 levels Hx of Crohn's with recent colonic resection for stricture. S/p EGD 9/30 with gastric inflammation, biopsied to r/o gastric Crohn's. S/p infliximab on 10/1    Inflammatory markers wnl, fecal calprotectin wnl    -Diet advanced, but was only tolerating intermittently tolerating. Pt NPO for now per GI recs   -GI following, appreciate recs  -Antiemetics PRN  - [facility] was unable to be reached for records  - no Crohn's or H. pylori in the gastric biopsy  - possible ileoscopy per GI but patient refused  - Dilaudid regimen changed to 0.5mg q6h for severe pain  - check IgG4 levels

## 2021-10-07 NOTE — PROGRESS NOTE ADULT - PROBLEM SELECTOR PLAN 3
Transaminitis likely in setting of TPN +/- hypovolemia/dehydration/ poor PO. Recent hepatitis panel in 6/2021 was negative.  -s/p IV fluids   -monitor AST/ALT

## 2021-10-07 NOTE — PROGRESS NOTE ADULT - ATTENDING COMMENTS
Chronic CHF Chronic CHF Chronic CHF Chronic CHF 27 yo F with Hx of Crohn's disease s/p surgery for stricture/ileostomy placement in 8/ 2021, s/p PICC line for TPN approx 2 weeks prior to admission, p/w abdominal pain, n/v w/ concern for possible potential Crohn's Flare. S/p infliximab on 10/1. Now s/p EGD on 9/30 showing erythematous duodenopathy, diffuse erythema and friability w/ some areas of superficial ulceration s/p biopsy. Bx notable for chronic inactive gastritis     -C/w pain management PRN. C/w dilaudid .25IV q6hrs PRN for moderate pain and dilaudid .5mg IV q6 PRN for severe pain  -c/w IV PPI, standing carafate q6hrs, and antiemetics PRN  -serial abdominal exams and monitor for BMs while on opiods  -c/w TPN and monitor LFTs  -GI following and recs appreciated. C/w NPO for now and monitor for improvement. Will continue to follow up w/ GI for any decision on empiric steroids or further w/u or intervention. Discharge planning issues Chronic CHF

## 2021-10-07 NOTE — PROGRESS NOTE ADULT - ASSESSMENT
26 year old female with PMH Crohn's disease s/p surgery for stricture/ileostomy placement in mid August 2021, s/p PICC line in place for TPN 2 weeks ago, presents with epigastric abdominal pain, nausea, and vomiting x 4 days, admitted to medicine for a potential Crohn's Flare up. Inflammatory markers have been normal, suggesting some other possible etiology. GI is following s/p EGD on 9/30. EGD showed gastric friability and was biopsied. There was erythematous duodenopathy but no esophagitis. Pt received infliximab on 10/1 for potential suspected gastric Crohn's disease. EGD biopsy results negative for Crohn's or H. Pylori.

## 2021-10-08 LAB
ALBUMIN SERPL ELPH-MCNC: 4.1 G/DL — SIGNIFICANT CHANGE UP (ref 3.3–5)
ALP SERPL-CCNC: 85 U/L — SIGNIFICANT CHANGE UP (ref 40–120)
ALT FLD-CCNC: 90 U/L — HIGH (ref 4–33)
ANION GAP SERPL CALC-SCNC: 13 MMOL/L — SIGNIFICANT CHANGE UP (ref 7–14)
AST SERPL-CCNC: 44 U/L — HIGH (ref 4–32)
BASOPHILS # BLD AUTO: 0.04 K/UL — SIGNIFICANT CHANGE UP (ref 0–0.2)
BASOPHILS NFR BLD AUTO: 0.8 % — SIGNIFICANT CHANGE UP (ref 0–2)
BILIRUB SERPL-MCNC: 0.3 MG/DL — SIGNIFICANT CHANGE UP (ref 0.2–1.2)
BUN SERPL-MCNC: 18 MG/DL — SIGNIFICANT CHANGE UP (ref 7–23)
CALCIUM SERPL-MCNC: 9.9 MG/DL — SIGNIFICANT CHANGE UP (ref 8.4–10.5)
CHLORIDE SERPL-SCNC: 100 MMOL/L — SIGNIFICANT CHANGE UP (ref 98–107)
CO2 SERPL-SCNC: 24 MMOL/L — SIGNIFICANT CHANGE UP (ref 22–31)
CREAT SERPL-MCNC: 0.46 MG/DL — LOW (ref 0.5–1.3)
EOSINOPHIL # BLD AUTO: 0.4 K/UL — SIGNIFICANT CHANGE UP (ref 0–0.5)
EOSINOPHIL NFR BLD AUTO: 8 % — HIGH (ref 0–6)
GLUCOSE SERPL-MCNC: 94 MG/DL — SIGNIFICANT CHANGE UP (ref 70–99)
HCT VFR BLD CALC: 29.3 % — LOW (ref 34.5–45)
HGB BLD-MCNC: 9.3 G/DL — LOW (ref 11.5–15.5)
IANC: 1.95 K/UL — SIGNIFICANT CHANGE UP (ref 1.5–8.5)
IMM GRANULOCYTES NFR BLD AUTO: 0.4 % — SIGNIFICANT CHANGE UP (ref 0–1.5)
LYMPHOCYTES # BLD AUTO: 2.12 K/UL — SIGNIFICANT CHANGE UP (ref 1–3.3)
LYMPHOCYTES # BLD AUTO: 42.6 % — SIGNIFICANT CHANGE UP (ref 13–44)
MAGNESIUM SERPL-MCNC: 2.2 MG/DL — SIGNIFICANT CHANGE UP (ref 1.6–2.6)
MCHC RBC-ENTMCNC: 27.4 PG — SIGNIFICANT CHANGE UP (ref 27–34)
MCHC RBC-ENTMCNC: 31.7 GM/DL — LOW (ref 32–36)
MCV RBC AUTO: 86.2 FL — SIGNIFICANT CHANGE UP (ref 80–100)
MONOCYTES # BLD AUTO: 0.45 K/UL — SIGNIFICANT CHANGE UP (ref 0–0.9)
MONOCYTES NFR BLD AUTO: 9 % — SIGNIFICANT CHANGE UP (ref 2–14)
NEUTROPHILS # BLD AUTO: 1.95 K/UL — SIGNIFICANT CHANGE UP (ref 1.8–7.4)
NEUTROPHILS NFR BLD AUTO: 39.2 % — LOW (ref 43–77)
NRBC # BLD: 0 /100 WBCS — SIGNIFICANT CHANGE UP
NRBC # FLD: 0 K/UL — SIGNIFICANT CHANGE UP
PHOSPHATE SERPL-MCNC: 5.1 MG/DL — HIGH (ref 2.5–4.5)
PLATELET # BLD AUTO: 408 K/UL — HIGH (ref 150–400)
POTASSIUM SERPL-MCNC: 4.1 MMOL/L — SIGNIFICANT CHANGE UP (ref 3.5–5.3)
POTASSIUM SERPL-SCNC: 4.1 MMOL/L — SIGNIFICANT CHANGE UP (ref 3.5–5.3)
PROT SERPL-MCNC: 6.6 G/DL — SIGNIFICANT CHANGE UP (ref 6–8.3)
RBC # BLD: 3.4 M/UL — LOW (ref 3.8–5.2)
RBC # FLD: 12.9 % — SIGNIFICANT CHANGE UP (ref 10.3–14.5)
SODIUM SERPL-SCNC: 137 MMOL/L — SIGNIFICANT CHANGE UP (ref 135–145)
WBC # BLD: 4.98 K/UL — SIGNIFICANT CHANGE UP (ref 3.8–10.5)
WBC # FLD AUTO: 4.98 K/UL — SIGNIFICANT CHANGE UP (ref 3.8–10.5)

## 2021-10-08 PROCEDURE — 99232 SBSQ HOSP IP/OBS MODERATE 35: CPT | Mod: GC

## 2021-10-08 PROCEDURE — 99232 SBSQ HOSP IP/OBS MODERATE 35: CPT

## 2021-10-08 PROCEDURE — 99231 SBSQ HOSP IP/OBS SF/LOW 25: CPT | Mod: GC

## 2021-10-08 RX ORDER — HYDROMORPHONE HYDROCHLORIDE 2 MG/ML
0.25 INJECTION INTRAMUSCULAR; INTRAVENOUS; SUBCUTANEOUS ONCE
Refills: 0 | Status: DISCONTINUED | OUTPATIENT
Start: 2021-10-08 | End: 2021-10-08

## 2021-10-08 RX ORDER — I.V. FAT EMULSION 20 G/100ML
14.5 EMULSION INTRAVENOUS
Qty: 35 | Refills: 0 | Status: DISCONTINUED | OUTPATIENT
Start: 2021-10-08 | End: 2021-10-09

## 2021-10-08 RX ORDER — ELECTROLYTE SOLUTION,INJ
1 VIAL (ML) INTRAVENOUS
Refills: 0 | Status: DISCONTINUED | OUTPATIENT
Start: 2021-10-08 | End: 2021-10-08

## 2021-10-08 RX ADMIN — Medication 1 GRAM(S): at 13:06

## 2021-10-08 RX ADMIN — HYDROMORPHONE HYDROCHLORIDE 0.25 MILLIGRAM(S): 2 INJECTION INTRAMUSCULAR; INTRAVENOUS; SUBCUTANEOUS at 01:51

## 2021-10-08 RX ADMIN — HYDROMORPHONE HYDROCHLORIDE 0.25 MILLIGRAM(S): 2 INJECTION INTRAMUSCULAR; INTRAVENOUS; SUBCUTANEOUS at 08:18

## 2021-10-08 RX ADMIN — HYDROMORPHONE HYDROCHLORIDE 0.25 MILLIGRAM(S): 2 INJECTION INTRAMUSCULAR; INTRAVENOUS; SUBCUTANEOUS at 21:35

## 2021-10-08 RX ADMIN — Medication 1 EACH: at 18:40

## 2021-10-08 RX ADMIN — HYDROMORPHONE HYDROCHLORIDE 0.25 MILLIGRAM(S): 2 INJECTION INTRAMUSCULAR; INTRAVENOUS; SUBCUTANEOUS at 01:21

## 2021-10-08 RX ADMIN — CHLORHEXIDINE GLUCONATE 1 APPLICATION(S): 213 SOLUTION TOPICAL at 13:06

## 2021-10-08 RX ADMIN — ONDANSETRON 4 MILLIGRAM(S): 8 TABLET, FILM COATED ORAL at 13:11

## 2021-10-08 RX ADMIN — HYDROMORPHONE HYDROCHLORIDE 0.25 MILLIGRAM(S): 2 INJECTION INTRAMUSCULAR; INTRAVENOUS; SUBCUTANEOUS at 21:05

## 2021-10-08 RX ADMIN — Medication 1 GRAM(S): at 17:06

## 2021-10-08 RX ADMIN — HYDROMORPHONE HYDROCHLORIDE 0.25 MILLIGRAM(S): 2 INJECTION INTRAMUSCULAR; INTRAVENOUS; SUBCUTANEOUS at 08:48

## 2021-10-08 RX ADMIN — HYDROMORPHONE HYDROCHLORIDE 0.5 MILLIGRAM(S): 2 INJECTION INTRAMUSCULAR; INTRAVENOUS; SUBCUTANEOUS at 13:38

## 2021-10-08 RX ADMIN — Medication 1 GRAM(S): at 21:06

## 2021-10-08 RX ADMIN — PANTOPRAZOLE SODIUM 40 MILLIGRAM(S): 20 TABLET, DELAYED RELEASE ORAL at 17:06

## 2021-10-08 RX ADMIN — I.V. FAT EMULSION 14.5 ML/HR: 20 EMULSION INTRAVENOUS at 18:41

## 2021-10-08 RX ADMIN — PANTOPRAZOLE SODIUM 40 MILLIGRAM(S): 20 TABLET, DELAYED RELEASE ORAL at 08:01

## 2021-10-08 RX ADMIN — Medication 1 GRAM(S): at 08:04

## 2021-10-08 RX ADMIN — HYDROMORPHONE HYDROCHLORIDE 0.5 MILLIGRAM(S): 2 INJECTION INTRAMUSCULAR; INTRAVENOUS; SUBCUTANEOUS at 13:11

## 2021-10-08 NOTE — PROGRESS NOTE ADULT - ATTENDING COMMENTS
26F with PMH of Crohn's s/p surgery for stricture s/p ileostomy placement, s/p PICC for TPN here w/ abd pain. Workup by EGD revealed erythematous duodenopathy, and friability in the gastric area. NPO currently pending improvement of abdominal sx.    Pt seen at bedside. No o/n events. States her abd pain is better. States she feels sleepy. On exam, pt was laying in bed, she appeared comfortable. Refused rest of physical exam during my assessment. Labs appear stable.      -F/u GI for further recs. Consider PO challenge.  -Continue w/ current supportive meaures. Reassess TPN.    Rest of plan as above.

## 2021-10-08 NOTE — PROGRESS NOTE ADULT - SUBJECTIVE AND OBJECTIVE BOX
Chief Complaint:  Patient is a 26y old  Female who presents with a chief complaint of crohn's flare up (08 Oct 2021 12:23)    Reason for consult: abd pain, hx of Crohn's    Interval Events: Pt feeling better w/ NPO. Pain is better, nausea present but less, no vomiting.     Hospital Medications:  acetaminophen   Tablet .. 650 milliGRAM(s) Oral every 6 hours PRN  chlorhexidine 2% Cloths 1 Application(s) Topical daily  fat emulsion (Fish Oil and Plant Based) 20% Infusion 14.5 mL/Hr IV Continuous <Continuous>  HYDROmorphone  Injectable 0.5 milliGRAM(s) IV Push every 6 hours PRN  HYDROmorphone  Injectable 0.25 milliGRAM(s) IV Push every 6 hours PRN  influenza   Vaccine 0.5 milliLiter(s) IntraMuscular once  lidocaine 2% Viscous 2 milliLiter(s) Swish and Spit three times a day PRN  ondansetron Injectable 4 milliGRAM(s) IV Push every 6 hours PRN  pantoprazole  Injectable 40 milliGRAM(s) IV Push two times a day  Parenteral Nutrition - Adult 1 Each TPN Continuous <Continuous>  Parenteral Nutrition - Adult 1 Each TPN Continuous <Continuous>  sucralfate suspension 1 Gram(s) Oral <User Schedule>      ROS:   General:  No  fevers, chills, night sweats, fatigue  Eyes:  Good vision, no reported pain  ENT:  No sore throat, pain, runny nose  CV:  No pain, palpitations  Pulm:  No dyspnea, cough  GI:  See HPI, otherwise negative  :  No  incontinence, nocturia  Muscle:  No pain, weakness  Neuro:  No memory problems  Psych:  No insomnia, mood problems, depression  Endocrine:  No polyuria, polydipsia, cold/heat intolerance  Heme:  No petechiae, ecchymosis, easy bruisability  Skin:  No rash    PHYSICAL EXAM:   Vital Signs:  Vital Signs Last 24 Hrs  T(C): 36.7 (08 Oct 2021 13:11), Max: 37 (07 Oct 2021 21:43)  T(F): 98.1 (08 Oct 2021 13:11), Max: 98.6 (07 Oct 2021 21:43)  HR: 98 (08 Oct 2021 13:11) (76 - 101)  BP: 115/61 (08 Oct 2021 13:11) (101/61 - 117/62)  BP(mean): --  RR: 17 (08 Oct 2021 13:11) (16 - 17)  SpO2: 100% (08 Oct 2021 13:11) (100% - 100%)  Daily     Daily     GENERAL: no acute distress  NEURO: alert  HEENT: anicteric sclera, no conjunctival pallor appreciated  CHEST: no respiratory distress, no accessory muscle use  CARDIAC: regular rate, rhythm  ABDOMEN: soft, non-tender, non-distended, no rebound or guarding  EXTREMITIES: warm, well perfused, no edema  SKIN: no lesions noted    LABS: reviewed                        9.3    4.98  )-----------( 408      ( 08 Oct 2021 09:04 )             29.3     10-08    137  |  100  |  18  ----------------------------<  94  4.1   |  24  |  0.46<L>    Ca    9.9      08 Oct 2021 09:04  Phos  5.1     10-08  Mg     2.20     10-08    TPro  6.6  /  Alb  4.1  /  TBili  0.3  /  DBili  x   /  AST  44<H>  /  ALT  90<H>  /  AlkPhos  85  10-08    LIVER FUNCTIONS - ( 08 Oct 2021 09:04 )  Alb: 4.1 g/dL / Pro: 6.6 g/dL / ALK PHOS: 85 U/L / ALT: 90 U/L / AST: 44 U/L / GGT: x             Interval Diagnostic Studies: see sunrise for full report   Reason for consult: abd pain, hx of Crohn's    Interval Events: Pt feeling better w/ NPO. Pain is better, nausea present but less, no vomiting.     Hospital Medications:  acetaminophen   Tablet .. 650 milliGRAM(s) Oral every 6 hours PRN  chlorhexidine 2% Cloths 1 Application(s) Topical daily  fat emulsion (Fish Oil and Plant Based) 20% Infusion 14.5 mL/Hr IV Continuous <Continuous>  HYDROmorphone  Injectable 0.5 milliGRAM(s) IV Push every 6 hours PRN  HYDROmorphone  Injectable 0.25 milliGRAM(s) IV Push every 6 hours PRN  influenza   Vaccine 0.5 milliLiter(s) IntraMuscular once  lidocaine 2% Viscous 2 milliLiter(s) Swish and Spit three times a day PRN  ondansetron Injectable 4 milliGRAM(s) IV Push every 6 hours PRN  pantoprazole  Injectable 40 milliGRAM(s) IV Push two times a day  Parenteral Nutrition - Adult 1 Each TPN Continuous <Continuous>  Parenteral Nutrition - Adult 1 Each TPN Continuous <Continuous>  sucralfate suspension 1 Gram(s) Oral <User Schedule>      ROS:   General:  No  fevers, chills, night sweats, fatigue  Eyes:  Good vision, no reported pain  ENT:  No sore throat, pain, runny nose  CV:  No pain, palpitations  Pulm:  No dyspnea, cough  GI:  See HPI, otherwise negative  :  No  incontinence, nocturia  Muscle:  No pain, weakness  Neuro:  No memory problems  Psych:  No insomnia, mood problems, depression  Endocrine:  No polyuria, polydipsia, cold/heat intolerance  Heme:  No petechiae, ecchymosis, easy bruisability  Skin:  No rash    PHYSICAL EXAM:   Vital Signs:  Vital Signs Last 24 Hrs  T(C): 36.7 (08 Oct 2021 13:11), Max: 37 (07 Oct 2021 21:43)  T(F): 98.1 (08 Oct 2021 13:11), Max: 98.6 (07 Oct 2021 21:43)  HR: 98 (08 Oct 2021 13:11) (76 - 101)  BP: 115/61 (08 Oct 2021 13:11) (101/61 - 117/62)  BP(mean): --  RR: 17 (08 Oct 2021 13:11) (16 - 17)  SpO2: 100% (08 Oct 2021 13:11) (100% - 100%)  Daily     Daily     GENERAL: no acute distress  NEURO: alert  HEENT: anicteric sclera, no conjunctival pallor appreciated  CHEST: no respiratory distress, no accessory muscle use  CARDIAC: regular rate, rhythm  ABDOMEN: soft, non-tender, non-distended, no rebound or guarding  EXTREMITIES: warm, well perfused, no edema  SKIN: no lesions noted    LABS: reviewed                        9.3    4.98  )-----------( 408      ( 08 Oct 2021 09:04 )             29.3     10-08    137  |  100  |  18  ----------------------------<  94  4.1   |  24  |  0.46<L>    Ca    9.9      08 Oct 2021 09:04  Phos  5.1     10-08  Mg     2.20     10-08    TPro  6.6  /  Alb  4.1  /  TBili  0.3  /  DBili  x   /  AST  44<H>  /  ALT  90<H>  /  AlkPhos  85  10-08    LIVER FUNCTIONS - ( 08 Oct 2021 09:04 )  Alb: 4.1 g/dL / Pro: 6.6 g/dL / ALK PHOS: 85 U/L / ALT: 90 U/L / AST: 44 U/L / GGT: x             Interval Diagnostic Studies: see sunrise for full report

## 2021-10-08 NOTE — PROGRESS NOTE ADULT - ATTENDING COMMENTS
Reports overall clinical improvement on NPO diet. As just recently improved, would continue with NPO for now prior to trialing reinitiation of PO intake.   GI will continue to follow.

## 2021-10-08 NOTE — PROGRESS NOTE ADULT - PROBLEM SELECTOR PLAN 2
Possibly related to esophagitis/reflux, other etiologies include active Crohn's (though no objective evidence pointing towards flare).  EGD showed gastric friability and was biopsied. There was erythematous duodenopathy but no esophagitis.    - Treat symptomatically with IV PPI BID, add sucralfate liquid 1g q6h for esophagitis per GI recs  - C/w dilaudid for pain. Will attempt to avoid opiates in this patient when possible given increased mortality with opiates in IBD patients  - NPO   - Zofran PRN, Checking QTc qOD, last EKG had QTc wnl  - C/w TPN Possibly related to esophagitis/reflux, other etiologies include active Crohn's (though no objective evidence pointing towards flare).  EGD showed gastric friability and was biopsied, negative for Crohn's. There was erythematous duodenopathy but no esophagitis.    - Treat symptomatically with IV PPI BID, add sucralfate liquid 1g q6h for esophagitis per GI recs  - C/w dilaudid for pain. Will attempt to avoid opiates in this patient when possible given increased mortality with opiates in IBD patients  - NPO, clears starting 10/9   - Zofran PRN, Checking QTc qOD, last EKG had QTc wnl  - C/w TPN

## 2021-10-08 NOTE — PROGRESS NOTE ADULT - REASON FOR ADMISSION
Constance     Refilled Ketone urine test strips.   Please let pharmacy know.     Thanks   Dr. López   crohn's flare up

## 2021-10-08 NOTE — PROGRESS NOTE ADULT - ASSESSMENT
26 year old female with PMH Crohn's disease s/p surgery for stricture/ileostomy placement in mid August 2021, s/p PICC line in place for TPN 2 weeks ago, presents with epigastric abdominal pain, nausea, and vomiting x 4 days, admitted to medicine for a potential Crohn's Flare up. Inflammatory markers have been normal, suggesting some other possible etiology. GI is following s/p EGD on 9/30. EGD showed gastric friability and was biopsied. There was erythematous duodenopathy but no esophagitis. Pt received infliximab on 10/1 for potential suspected gastric Crohn's disease. EGD biopsy results negative for Crohn's or H. Pylori. 26 year old female with PMH Crohn's disease s/p surgery for stricture/ileostomy placement in mid August 2021, s/p PICC line in place for TPN 2 weeks ago, presents with epigastric abdominal pain, nausea, and vomiting x 4 days, admitted to medicine for inability to tolerate PO likely 2/2 severe erosive gastritis.

## 2021-10-08 NOTE — PROGRESS NOTE ADULT - SUBJECTIVE AND OBJECTIVE BOX
NUTRITION NOTE  [id number] MUTAMANDAA  ===============================    Interval events - Patient was seen and examined at bedside, no acute events overnight. Patient denies chest pain or shortness of breath at this time. Patient remains on TPN at this time. Patient is now NPO; even small amounts of PO intake is causing severe abdominal pain with intermittent nausea and vomiting.     ROS: Except as noted above, all other systems reviewed and are negative     Allergies  No Known Allergies    PAST MEDICAL & SURGICAL HISTORY:  Crohn disease  Peripherally inserted central catheter (PICC) in place LUE PICC place on 2021  History of ileostomy Aug 2021    Vital Signs Last 24 Hrs  T(C): 36.7 (08 Oct 2021 08:00), Max: 37 (07 Oct 2021 21:43)  T(F): 98 (08 Oct 2021 08:00), Max: 98.6 (07 Oct 2021 21:43)  HR: 99 (08 Oct 2021 08:00) (76 - 101)  BP: 117/62 (08 Oct 2021 08:00) (101/61 - 117/62)  RR: 16 (08 Oct 2021 08:00) (16 - 17)  SpO2: 100% (08 Oct 2021 08:00) (100% - 100%)    MEDICATIONS  (STANDING):  chlorhexidine 2% Cloths 1 Application(s) Topical daily  fat emulsion (Fish Oil and Plant Based) 20% Infusion 14.5 mL/Hr (14.5 mL/Hr) IV Continuous <Continuous>  influenza   Vaccine 0.5 milliLiter(s) IntraMuscular once  pantoprazole  Injectable 40 milliGRAM(s) IV Push two times a day  Parenteral Nutrition - Adult 1 Each (83 mL/Hr) TPN Continuous <Continuous>  Parenteral Nutrition - Adult 1 Each (83 mL/Hr) TPN Continuous <Continuous>  sucralfate suspension 1 Gram(s) Oral <User Schedule>    MEDICATIONS  (PRN):  acetaminophen   Tablet .. 650 milliGRAM(s) Oral every 6 hours PRN Temp greater or equal to 38.5C (101.3F), Mild Pain (1 - 3)  HYDROmorphone  Injectable 0.5 milliGRAM(s) IV Push every 6 hours PRN Severe Pain (7 - 10)  HYDROmorphone  Injectable 0.25 milliGRAM(s) IV Push every 6 hours PRN Moderate Pain (4 - 6)  lidocaine 2% Viscous 2 milliLiter(s) Swish and Spit three times a day PRN pain with swallowing  ondansetron Injectable 4 milliGRAM(s) IV Push every 6 hours PRN Nausea and/or Vomiting    I&O's Detail    08 Oct 2021 07:01  -  08 Oct 2021 12:24  --------------------------------------------------------  IN:  Total IN: 0 mL    OUT:    Ileostomy (mL): 300 mL  Total OUT: 300 mL    Total NET: -300 mL    POCT Blood Glucose.: 108 mg/dL (08 Oct 2021 09:01)  POCT Blood Glucose.: 107 mg/dL (07 Oct 2021 17:47)    Daily Weight in k.8 (06 Oct 2021 05:14)    Drug Dosing Weight  Height (cm): 154.9 (30 Sep 2021 08:11)  Weight (kg): 35.2 (30 Sep 2021 08:11)  BMI (kg/m2): 14.7 (30 Sep 2021 08:11)  BSA (m2): 1.26 (30 Sep 2021 08:11)    PHYSICAL EXAM:  GENERAL: malnourished, resting comfortably in bed   HEAD:  Atraumatic, Normocephalic  CHEST/LUNG: nonlabored respirations, CTAB  ABDOMEN: soft, mildly tender to palpation, nondistended   PSYCH: AAOx3  PICC Site: C/D/I    Diet: NPO and TPN/lipids     LABORATORY                                               9.3    4.98  )-----------( 408      ( 08 Oct 2021 09:04 )             29.3   10-08    137  |  100  |  18  ----------------------------<  94  4.1   |  24  |  0.46<L>    Ca    9.9      08 Oct 2021 09:04  Phos  5.1     10-08  Mg     2.20     10-08    TPro  6.6  /  Alb  4.1  /  TBili  0.3  /  DBili  x   /  AST  44<H>  /  ALT  90<H>  /  AlkPhos  85  10-08    LIVER FUNCTIONS - ( 08 Oct 2021 09:04 )  Alb: 4.1 g/dL / Pro: 6.6 g/dL / ALK PHOS: 85 U/L / ALT: 90 U/L / AST: 44 U/L / GGT: x           10-05 Chol -- LDL -- HDL -- Trig 91, 09-30 Chol -- LDL -- HDL -- Trig 71    ASSESSMENT/PLAN:  26 year old female with PMH Crohn's disease s/p surgery for stricture/ileostomy placement in mid 2021, s/p PICC line in place for TPN 2 weeks ago, presents with epigastric abdominal pain, nausea, and vomiting x 4 days. GI is following with possible EGD to evaluate for esophagitis. Nutrition service consulted for resuming parenteral nutrition via PICC line that is in place. Patient states that she feels very weak and is frustrated that she is not able to take in anything by mouth (liquids or food) without nausea or emesis. TPN was initiated on 21.    continue TPN with infusion volume of 2L, TPN will provide 1325 kcal/day - TPN formula adjusted to increase calories since pt is now NPO again     labs reviewed - removed phos from TPN bag     monitor fingersticks, obtain daily weights    continue parenteral nutrition at this time, will follow up with primary team on plan - GI workup in process     1.  Severe protein calorie malnutrition being optimized with TPN: CHO [195] gm.  AA [78] gm. SMOF Lipids [35] gm.  2.  Hyperglycemia managed with: [0] units of regular insulin    3.  Check fluid balance daily.  Strict I/O  [ ] [ ]   4.  Daily BMP, Ionized Calcium, Magnesium and Phosphorous   5.  Triglycerides at initiation of TPN and monthly [ ] [ ]     Nutrition Support [id number]

## 2021-10-08 NOTE — PROGRESS NOTE ADULT - ASSESSMENT
26F w/ hx of Crohn's colitis c/b ascending colon stricture s/p resection 8/2021 w/ ileostomy (at [facility]) on remicade since 6/2021 (s/p induction and 2 maintenance doses, last 10/1/2021, admitted w/ ongoing severe post prandial epigastric pain and weight loss due to inability to take PO.    Impression:  #Post-prandial abd pain - Unclear etiology. Pt is s/p two extended hospitalizations at [facility] since her surgery, with testing including extensive imaging and upper GI endoscopy. No e/o SMA syndrome, EGD w/ esophagitis only. EGD here shows mild esophagitis and severe gastric inflammation (new finding compared to EGD 6 weeks ago, path negative for active Crohn's and is unchanged from her path results from [facility]). It is possible pain is related to severe gastritis, though having such severe pain from this is unusual. She has been evaluated for SMA syndrome at [facility]. In terms of mesenteric iscehmia, her vasculature is patent and no lactate, pointing away from any ischemic process. Suspect that she may have some underlying pain syndrome or non-GI etiology of her pain, but it is completely unclear what that might be.   #Crohn's disease - Historical details as above. It is unclear if she has active Crohn's at this time. Her ESR/CRP are negative, but they were never positive even at time of her initial diagnosis. Her fecal calprotectin, however, was very positive on diagnosis and is negative here. The only objective e/o active Crohn's is her CT scan on admission, which shows RLQ enteritis, but this is an imperfect test. Biopsies from EGD not c/w upper GI Crohn's. Ileoscopy would be useful in determining this but patient has declined. This would also not change the management of her current symptoms, as this appears to be unrelated to Crohn's.     Recommendations:  - Continue w/ strict NPO w/ meds for another 24 hours.  - If feeling well, can consider slowly advancing to strict clear liquid diet Saturday or Sunday. Would not advance beyond clears over the weekend.   - Continue high dose acid suppression w/ IV PPI.  - Continue w/ sucralfate liquid 1g q6h. Patient is trying to not miss any doses and is doing better while NPO.  - Anti-emetics PRN.  - check IgG4 levels to r/o autoimmune pancreatitis, though lower suspicion for this.   - Pain medication per primary team.  - GI will continue to follow.     Faustino Freitas  Gastroenterology/Hepatology Fellow  Available via [employer] Teams    NON-URGENT CONSULTS:  Please email [email or web address]  AT NIGHT AND ON WEEKENDS:  Contact on-call GI fellow via answering service ([phone]) from 5pm-8am and on weekends/holidays  MONDAY-FRIDAY 8AM-5PM:  Pager# [phone] ([facility]) or [phone] ([facility])  GI Phone# [phone] ([facility])   26F w/ hx of Crohn's colitis c/b ascending colon stricture s/p resection 8/2021 w/ ileostomy (at [facility]) on remicade since 6/2021 (s/p induction and 2 maintenance doses, last 10/1/2021, admitted w/ ongoing severe post prandial epigastric pain and weight loss due to inability to take PO.    Impression:  #Post-prandial abd pain - Unclear etiology. Pt is s/p two extended hospitalizations at [facility] since her surgery, with testing including extensive imaging and upper GI endoscopy. No e/o SMA syndrome, EGD w/ esophagitis only. EGD here shows mild esophagitis and severe gastric inflammation (new finding compared to EGD 6 weeks ago, path negative for active Crohn's and is unchanged from her path results from [facility]). It is possible pain is related to severe gastritis, though having such severe pain from this is unusual. She has been evaluated for SMA syndrome at [facility]. In terms of mesenteric iscehmia, her vasculature is patent and no lactate, pointing away from any ischemic process. Suspect that she may have some underlying pain syndrome or non-GI etiology of her pain, but it is completely unclear what that might be.   #Crohn's disease - Historical details as above. It is unclear if she has active Crohn's at this time. Her ESR/CRP are negative, but they were never positive even at time of her initial diagnosis. Her fecal calprotectin, however, was very positive on diagnosis and is negative here. The only objective e/o active Crohn's is her CT scan on admission, which shows RLQ enteritis, but this is an imperfect test. Biopsies from EGD not c/w upper GI Crohn's. Ileoscopy would be useful in determining this but patient has declined. This would also not change the management of her current symptoms, as this appears to be unrelated to Crohn's.     Recommendations:  - Continue w/ strict NPO w/ meds for another 24 hours.  - If feeling well, can consider slowly advancing to strict clear liquid diet Saturday or Sunday. Would not advance beyond clears over the weekend.   - Continue high dose acid suppression w/ IV PPI.  - Continue w/ sucralfate liquid 1g q6h. Patient is trying to not miss any doses and is doing better while NPO.  - Anti-emetics PRN.  - check IgG4 levels to r/o autoimmune pancreatitis or atypical presentation of IgG4 disease, though lower suspicion for this.   - Pain medication per primary team.  - GI will continue to follow.     Faustino Freitas  Gastroenterology/Hepatology Fellow  Available via [employer] Teams    NON-URGENT CONSULTS:  Please email [email or web address]  [email or web address]  AT NIGHT AND ON WEEKENDS:  Contact on-call GI fellow via answering service ([phone]) from 5pm-8am and on weekends/holidays  MONDAY-FRIDAY 8AM-5PM:  Pager# [phone] ([facility]) or [phone] ([facility])  GI Phone# [phone] ([facility])

## 2021-10-08 NOTE — PROGRESS NOTE ADULT - PROBLEM SELECTOR PLAN 1
Hx of Crohn's with recent colonic resection for stricture. S/p EGD 9/30 with gastric inflammation, biopsied to r/o gastric Crohn's. S/p infliximab on 10/1    Inflammatory markers wnl, fecal calprotectin wnl    -Diet advanced, but was only tolerating intermittently tolerating. Pt NPO for now per GI recs   -GI following, appreciate recs  -Antiemetics PRN  - [facility] was unable to be reached for records  - no Crohn's or H. pylori in the gastric biopsy  - possible ileoscopy per GI but patient refused  - Dilaudid regimen changed to 0.5mg q6h for severe pain  - check IgG4 levels

## 2021-10-08 NOTE — PROGRESS NOTE ADULT - SUBJECTIVE AND OBJECTIVE BOX
PROGRESS NOTE:     CONTACT INFO:  Herni Meier MD  Internal Medicine PGY1  Pager: [phone]    Patient is a 26y old  Female who presents with a chief complaint of abdominal pain, h/o Crohn's (07 Oct 2021 11:32)      SUBJECTIVE / OVERNIGHT EVENTS:  No acute events overnight.     MEDICATIONS  (STANDING):  chlorhexidine 2% Cloths 1 Application(s) Topical daily  fat emulsion (Fish Oil and Plant Based) 20% Infusion 14.5 mL/Hr (14.5 mL/Hr) IV Continuous <Continuous>  influenza   Vaccine 0.5 milliLiter(s) IntraMuscular once  pantoprazole  Injectable 40 milliGRAM(s) IV Push two times a day  Parenteral Nutrition - Adult 1 Each (83 mL/Hr) TPN Continuous <Continuous>  sucralfate suspension 1 Gram(s) Oral <User Schedule>    MEDICATIONS  (PRN):  acetaminophen   Tablet .. 650 milliGRAM(s) Oral every 6 hours PRN Temp greater or equal to 38.5C (101.3F), Mild Pain (1 - 3)  HYDROmorphone  Injectable 0.5 milliGRAM(s) IV Push every 6 hours PRN Severe Pain (7 - 10)  HYDROmorphone  Injectable 0.25 milliGRAM(s) IV Push every 6 hours PRN Moderate Pain (4 - 6)  lidocaine 2% Viscous 2 milliLiter(s) Swish and Spit three times a day PRN pain with swallowing  ondansetron Injectable 4 milliGRAM(s) IV Push every 6 hours PRN Nausea and/or Vomiting      CAPILLARY BLOOD GLUCOSE      POCT Blood Glucose.: 107 mg/dL (07 Oct 2021 17:47)  POCT Blood Glucose.: 125 mg/dL (07 Oct 2021 09:20)    I&O's Summary      PHYSICAL EXAM:  Vital Signs Last 24 Hrs  T(C): 37 (07 Oct 2021 21:43), Max: 37 (07 Oct 2021 21:43)  T(F): 98.6 (07 Oct 2021 21:43), Max: 98.6 (07 Oct 2021 21:43)  HR: 101 (08 Oct 2021 01:18) (76 - 101)  BP: 107/69 (08 Oct 2021 01:18) (100/67 - 111/76)  BP(mean): --  RR: 16 (07 Oct 2021 21:43) (16 - 18)  SpO2: 100% (07 Oct 2021 21:43) (100% - 100%)    GENERAL: NAD, malnourished  HEAD:  Atraumatic, Normocephalic  EYES: conjunctiva and sclera clear  NECK: No JVD  CHEST/LUNG: nonlabored respirations  HEART: patient declined  ABDOMEN: Tender to palpation, ostomy output green  EXTREMITIES: moving all 4 extremities spontaneously; TPN IV site c/d/i with no erythema  PSYCH: AAOx3  NEUROLOGY: non-focal  SKIN: No rashes or lesions    LABS:                        9.7    7.45  )-----------( 422      ( 07 Oct 2021 07:45 )             30.0     10-07    138  |  103  |  14  ----------------------------<  117<H>  4.1   |  24  |  0.45<L>    Ca    9.1      07 Oct 2021 07:45  Phos  4.5     10-07  Mg     2.10     10-07    TPro  6.7  /  Alb  4.2  /  TBili  0.4  /  DBili  x   /  AST  49<H>  /  ALT  77<H>  /  AlkPhos  82  10-07 PROGRESS NOTE:     CONTACT INFO:  Henri Meier MD  Internal Medicine PGY2  Pager: [phone]    Patient is a 26y old  Female who presents with a chief complaint of abdominal pain, h/o Crohn's (07 Oct 2021 11:32)      SUBJECTIVE / OVERNIGHT EVENTS:  No acute events overnight. Feels well today, improved abdominal pain. Slight nausea, no vomiting. NPO for 2 days.    MEDICATIONS  (STANDING):  chlorhexidine 2% Cloths 1 Application(s) Topical daily  fat emulsion (Fish Oil and Plant Based) 20% Infusion 14.5 mL/Hr (14.5 mL/Hr) IV Continuous <Continuous>  influenza   Vaccine 0.5 milliLiter(s) IntraMuscular once  pantoprazole  Injectable 40 milliGRAM(s) IV Push two times a day  Parenteral Nutrition - Adult 1 Each (83 mL/Hr) TPN Continuous <Continuous>  sucralfate suspension 1 Gram(s) Oral <User Schedule>    MEDICATIONS  (PRN):  acetaminophen   Tablet .. 650 milliGRAM(s) Oral every 6 hours PRN Temp greater or equal to 38.5C (101.3F), Mild Pain (1 - 3)  HYDROmorphone  Injectable 0.5 milliGRAM(s) IV Push every 6 hours PRN Severe Pain (7 - 10)  HYDROmorphone  Injectable 0.25 milliGRAM(s) IV Push every 6 hours PRN Moderate Pain (4 - 6)  lidocaine 2% Viscous 2 milliLiter(s) Swish and Spit three times a day PRN pain with swallowing  ondansetron Injectable 4 milliGRAM(s) IV Push every 6 hours PRN Nausea and/or Vomiting      CAPILLARY BLOOD GLUCOSE      POCT Blood Glucose.: 107 mg/dL (07 Oct 2021 17:47)  POCT Blood Glucose.: 125 mg/dL (07 Oct 2021 09:20)    I&O's Summary      PHYSICAL EXAM:  Vital Signs Last 24 Hrs  T(C): 37 (07 Oct 2021 21:43), Max: 37 (07 Oct 2021 21:43)  T(F): 98.6 (07 Oct 2021 21:43), Max: 98.6 (07 Oct 2021 21:43)  HR: 101 (08 Oct 2021 01:18) (76 - 101)  BP: 107/69 (08 Oct 2021 01:18) (100/67 - 111/76)  BP(mean): --  RR: 16 (07 Oct 2021 21:43) (16 - 18)  SpO2: 100% (07 Oct 2021 21:43) (100% - 100%)    GENERAL: NAD, malnourished  HEAD:  Atraumatic, Normocephalic  EYES: conjunctiva and sclera clear  NECK: No JVD  CHEST/LUNG: nonlabored respirations  HEART: patient declined  ABDOMEN: NTP, NTD, ostomy output green  EXTREMITIES: moving all 4 extremities spontaneously; TPN IV site c/d/i with no erythema  PSYCH: AAOx3  NEUROLOGY: non-focal  SKIN: No rashes or lesions    LABS:                        9.7    7.45  )-----------( 422      ( 07 Oct 2021 07:45 )             30.0     10-07    138  |  103  |  14  ----------------------------<  117<H>  4.1   |  24  |  0.45<L>    Ca    9.1      07 Oct 2021 07:45  Phos  4.5     10-07  Mg     2.10     10-07    TPro  6.7  /  Alb  4.2  /  TBili  0.4  /  DBili  x   /  AST  49<H>  /  ALT  77<H>  /  AlkPhos  82  10-07

## 2021-10-09 LAB
ALBUMIN SERPL ELPH-MCNC: 4.1 G/DL — SIGNIFICANT CHANGE UP (ref 3.3–5)
ALP SERPL-CCNC: 81 U/L — SIGNIFICANT CHANGE UP (ref 40–120)
ALT FLD-CCNC: 65 U/L — HIGH (ref 4–33)
ANION GAP SERPL CALC-SCNC: 12 MMOL/L — SIGNIFICANT CHANGE UP (ref 7–14)
AST SERPL-CCNC: 27 U/L — SIGNIFICANT CHANGE UP (ref 4–32)
BILIRUB SERPL-MCNC: 0.3 MG/DL — SIGNIFICANT CHANGE UP (ref 0.2–1.2)
BUN SERPL-MCNC: 17 MG/DL — SIGNIFICANT CHANGE UP (ref 7–23)
CA-I BLD-SCNC: 1.24 MMOL/L — SIGNIFICANT CHANGE UP (ref 1.15–1.29)
CALCIUM SERPL-MCNC: 9.1 MG/DL — SIGNIFICANT CHANGE UP (ref 8.4–10.5)
CHLORIDE SERPL-SCNC: 103 MMOL/L — SIGNIFICANT CHANGE UP (ref 98–107)
CO2 SERPL-SCNC: 23 MMOL/L — SIGNIFICANT CHANGE UP (ref 22–31)
CREAT SERPL-MCNC: 0.49 MG/DL — LOW (ref 0.5–1.3)
GLUCOSE SERPL-MCNC: 95 MG/DL — SIGNIFICANT CHANGE UP (ref 70–99)
HCT VFR BLD CALC: 29.4 % — LOW (ref 34.5–45)
HGB BLD-MCNC: 9.5 G/DL — LOW (ref 11.5–15.5)
MAGNESIUM SERPL-MCNC: 2.1 MG/DL — SIGNIFICANT CHANGE UP (ref 1.6–2.6)
MCHC RBC-ENTMCNC: 27.6 PG — SIGNIFICANT CHANGE UP (ref 27–34)
MCHC RBC-ENTMCNC: 32.3 GM/DL — SIGNIFICANT CHANGE UP (ref 32–36)
MCV RBC AUTO: 85.5 FL — SIGNIFICANT CHANGE UP (ref 80–100)
NRBC # BLD: 0 /100 WBCS — SIGNIFICANT CHANGE UP
NRBC # FLD: 0 K/UL — SIGNIFICANT CHANGE UP
PHOSPHATE SERPL-MCNC: 3.9 MG/DL — SIGNIFICANT CHANGE UP (ref 2.5–4.5)
PLATELET # BLD AUTO: 379 K/UL — SIGNIFICANT CHANGE UP (ref 150–400)
POTASSIUM SERPL-MCNC: 4.3 MMOL/L — SIGNIFICANT CHANGE UP (ref 3.5–5.3)
POTASSIUM SERPL-SCNC: 4.3 MMOL/L — SIGNIFICANT CHANGE UP (ref 3.5–5.3)
PROT SERPL-MCNC: 6.4 G/DL — SIGNIFICANT CHANGE UP (ref 6–8.3)
RBC # BLD: 3.44 M/UL — LOW (ref 3.8–5.2)
RBC # FLD: 12.5 % — SIGNIFICANT CHANGE UP (ref 10.3–14.5)
SODIUM SERPL-SCNC: 138 MMOL/L — SIGNIFICANT CHANGE UP (ref 135–145)
WBC # BLD: 4.98 K/UL — SIGNIFICANT CHANGE UP (ref 3.8–10.5)
WBC # FLD AUTO: 4.98 K/UL — SIGNIFICANT CHANGE UP (ref 3.8–10.5)

## 2021-10-09 PROCEDURE — 99232 SBSQ HOSP IP/OBS MODERATE 35: CPT | Mod: GC

## 2021-10-09 PROCEDURE — 99232 SBSQ HOSP IP/OBS MODERATE 35: CPT

## 2021-10-09 RX ORDER — ELECTROLYTE SOLUTION,INJ
1 VIAL (ML) INTRAVENOUS
Refills: 0 | Status: DISCONTINUED | OUTPATIENT
Start: 2021-10-09 | End: 2021-10-09

## 2021-10-09 RX ORDER — HYDROMORPHONE HYDROCHLORIDE 2 MG/ML
0.25 INJECTION INTRAMUSCULAR; INTRAVENOUS; SUBCUTANEOUS ONCE
Refills: 0 | Status: DISCONTINUED | OUTPATIENT
Start: 2021-10-09 | End: 2021-10-09

## 2021-10-09 RX ORDER — I.V. FAT EMULSION 20 G/100ML
14.5 EMULSION INTRAVENOUS
Qty: 35 | Refills: 0 | Status: DISCONTINUED | OUTPATIENT
Start: 2021-10-09 | End: 2021-10-10

## 2021-10-09 RX ADMIN — HYDROMORPHONE HYDROCHLORIDE 0.25 MILLIGRAM(S): 2 INJECTION INTRAMUSCULAR; INTRAVENOUS; SUBCUTANEOUS at 17:21

## 2021-10-09 RX ADMIN — HYDROMORPHONE HYDROCHLORIDE 0.25 MILLIGRAM(S): 2 INJECTION INTRAMUSCULAR; INTRAVENOUS; SUBCUTANEOUS at 17:38

## 2021-10-09 RX ADMIN — Medication 1 GRAM(S): at 12:03

## 2021-10-09 RX ADMIN — HYDROMORPHONE HYDROCHLORIDE 0.25 MILLIGRAM(S): 2 INJECTION INTRAMUSCULAR; INTRAVENOUS; SUBCUTANEOUS at 01:25

## 2021-10-09 RX ADMIN — CHLORHEXIDINE GLUCONATE 1 APPLICATION(S): 213 SOLUTION TOPICAL at 12:03

## 2021-10-09 RX ADMIN — PANTOPRAZOLE SODIUM 40 MILLIGRAM(S): 20 TABLET, DELAYED RELEASE ORAL at 07:21

## 2021-10-09 RX ADMIN — HYDROMORPHONE HYDROCHLORIDE 0.5 MILLIGRAM(S): 2 INJECTION INTRAMUSCULAR; INTRAVENOUS; SUBCUTANEOUS at 07:21

## 2021-10-09 RX ADMIN — ONDANSETRON 4 MILLIGRAM(S): 8 TABLET, FILM COATED ORAL at 07:20

## 2021-10-09 RX ADMIN — HYDROMORPHONE HYDROCHLORIDE 0.25 MILLIGRAM(S): 2 INJECTION INTRAMUSCULAR; INTRAVENOUS; SUBCUTANEOUS at 18:25

## 2021-10-09 RX ADMIN — HYDROMORPHONE HYDROCHLORIDE 0.5 MILLIGRAM(S): 2 INJECTION INTRAMUSCULAR; INTRAVENOUS; SUBCUTANEOUS at 20:33

## 2021-10-09 RX ADMIN — Medication 1 GRAM(S): at 07:19

## 2021-10-09 RX ADMIN — ONDANSETRON 4 MILLIGRAM(S): 8 TABLET, FILM COATED ORAL at 17:19

## 2021-10-09 RX ADMIN — HYDROMORPHONE HYDROCHLORIDE 0.5 MILLIGRAM(S): 2 INJECTION INTRAMUSCULAR; INTRAVENOUS; SUBCUTANEOUS at 20:48

## 2021-10-09 RX ADMIN — PANTOPRAZOLE SODIUM 40 MILLIGRAM(S): 20 TABLET, DELAYED RELEASE ORAL at 17:21

## 2021-10-09 RX ADMIN — HYDROMORPHONE HYDROCHLORIDE 0.5 MILLIGRAM(S): 2 INJECTION INTRAMUSCULAR; INTRAVENOUS; SUBCUTANEOUS at 07:51

## 2021-10-09 RX ADMIN — Medication 1 EACH: at 18:39

## 2021-10-09 RX ADMIN — ONDANSETRON 4 MILLIGRAM(S): 8 TABLET, FILM COATED ORAL at 00:54

## 2021-10-09 RX ADMIN — HYDROMORPHONE HYDROCHLORIDE 0.25 MILLIGRAM(S): 2 INJECTION INTRAMUSCULAR; INTRAVENOUS; SUBCUTANEOUS at 00:55

## 2021-10-09 RX ADMIN — HYDROMORPHONE HYDROCHLORIDE 0.25 MILLIGRAM(S): 2 INJECTION INTRAMUSCULAR; INTRAVENOUS; SUBCUTANEOUS at 19:09

## 2021-10-09 RX ADMIN — I.V. FAT EMULSION 14.5 ML/HR: 20 EMULSION INTRAVENOUS at 18:39

## 2021-10-09 NOTE — PROGRESS NOTE ADULT - ASSESSMENT
26 year old female with PMH Crohn's disease s/p surgery for stricture/ileostomy placement in mid August 2021, s/p PICC line in place for TPN 2 weeks ago, presents with epigastric abdominal pain, nausea, and vomiting x 4 days, admitted to medicine for inability to tolerate PO likely 2/2 severe erosive gastritis.

## 2021-10-09 NOTE — PROGRESS NOTE ADULT - SUBJECTIVE AND OBJECTIVE BOX
NUTRITION NOTE  [id number] MUTAMANDAA  ===============================    Interval events - Patient was seen and examined at bedside, no acute events overnight. Patient denies chest pain or shortness of breath at this time. Patient remains on TPN at this time. Patient is now NPO; even small amounts of PO intake is causing severe abdominal pain with intermittent nausea and vomiting.     ROS: Except as noted above, all other systems reviewed and are negative     Allergies  No Known Allergies    PAST MEDICAL & SURGICAL HISTORY:  Crohn disease  Peripherally inserted central catheter (PICC) in place LUE PICC place on 2021  History of ileostomy Aug 2021    Vital Signs Last 24 Hrs  T(C): 37.1 (09 Oct 2021 07:17), Max: 37.1 (09 Oct 2021 07:17)  T(F): 98.7 (09 Oct 2021 07:17), Max: 98.7 (09 Oct 2021 07:17)  HR: 107 (09 Oct 2021 07:17) (96 - 107)  BP: 106/68 (09 Oct 2021 07:17) (100/65 - 117/65)  RR: 18 (09 Oct 2021 07:17) (17 - 18)  SpO2: 100% (09 Oct 2021 07:17) (100% - 100%)    MEDICATIONS  (STANDING):  chlorhexidine 2% Cloths 1 Application(s) Topical daily  fat emulsion (Fish Oil and Plant Based) 20% Infusion 14.5 mL/Hr (14.5 mL/Hr) IV Continuous <Continuous>  influenza   Vaccine 0.5 milliLiter(s) IntraMuscular once  pantoprazole  Injectable 40 milliGRAM(s) IV Push two times a day  Parenteral Nutrition - Adult 1 Each (83 mL/Hr) TPN Continuous <Continuous>  Parenteral Nutrition - Adult 1 Each (83 mL/Hr) TPN Continuous <Continuous>  sucralfate suspension 1 Gram(s) Oral <User Schedule>    MEDICATIONS  (PRN):  acetaminophen   Tablet .. 650 milliGRAM(s) Oral every 6 hours PRN Temp greater or equal to 38.5C (101.3F), Mild Pain (1 - 3)  HYDROmorphone  Injectable 0.5 milliGRAM(s) IV Push every 6 hours PRN Severe Pain (7 - 10)  HYDROmorphone  Injectable 0.25 milliGRAM(s) IV Push every 6 hours PRN Moderate Pain (4 - 6)  lidocaine 2% Viscous 2 milliLiter(s) Swish and Spit three times a day PRN pain with swallowing  ondansetron Injectable 4 milliGRAM(s) IV Push every 6 hours PRN Nausea and/or Vomiting    I&O's Detail    08 Oct 2021 07:01  -  09 Oct 2021 07:00  --------------------------------------------------------  IN:  Total IN: 0 mL    OUT:    Ileostomy (mL): 750 mL  Total OUT: 750 mL    Total NET: -750 mL    POCT Blood Glucose.: 96 mg/dL (09 Oct 2021 07:32)  POCT Blood Glucose.: 107 mg/dL (08 Oct 2021 17:58)    Daily Weight in k.8 (06 Oct 2021 05:14)    Drug Dosing Weight  Height (cm): 154.9 (30 Sep 2021 08:11)  Weight (kg): 35.2 (30 Sep 2021 08:11)  BMI (kg/m2): 14.7 (30 Sep 2021 08:11)  BSA (m2): 1.26 (30 Sep 2021 08:11)    PHYSICAL EXAM:  GENERAL: malnourished, resting comfortably in bed   HEAD:  Atraumatic, Normocephalic  CHEST/LUNG: nonlabored respirations, CTAB  ABDOMEN: soft, mildly tender to palpation, nondistended   PSYCH: AAOx3  PICC Site: C/D/I    Diet: NPO and TPN/lipids     LABORATORY                                              9.5    4.98  )-----------( 379      ( 09 Oct 2021 07:50 )             29.4   10-    138  |  103  |  17  ----------------------------<  95  4.3   |  23  |  0.49<L>    Ca    9.1      09 Oct 2021 07:50  Phos  3.9     10-09  Mg     2.10     10-    TPro  6.4  /  Alb  4.1  /  TBili  0.3  /  DBili  x   /  AST  27  /  ALT  65<H>  /  AlkPhos  81  10-09    LIVER FUNCTIONS - ( 09 Oct 2021 07:50 )  Alb: 4.1 g/dL / Pro: 6.4 g/dL / ALK PHOS: 81 U/L / ALT: 65 U/L / AST: 27 U/L / GGT: x           10-05 Chol -- LDL -- HDL -- Trig 91,  Chol -- LDL -- HDL -- Trig 71    ASSESSMENT/PLAN:  26 year old female with PMH Crohn's disease s/p surgery for stricture/ileostomy placement in mid 2021, s/p PICC line in place for TPN 2 weeks ago, presents with epigastric abdominal pain, nausea, and vomiting x 4 days. GI is following with possible EGD to evaluate for esophagitis. Nutrition service consulted for resuming parenteral nutrition via PICC line that is in place. Patient states that she feels very weak and is frustrated that she is not able to take in anything by mouth (liquids or food) without nausea or emesis. TPN was initiated on 21.    continue TPN with infusion volume of 2L, TPN will provide 1325 kcal/day - TPN formula adjusted to increase calories since pt is now NPO again     labs reviewed - stable, continue same TPN formula today     monitor fingersticks, obtain daily weights    continue parenteral nutrition at this time, will follow up with primary team on plan - GI workup in process     1.  Severe protein calorie malnutrition being optimized with TPN: CHO [195] gm.  AA [78] gm. SMOF Lipids [35] gm.  2.  Hyperglycemia managed with: [0] units of regular insulin    3.  Check fluid balance daily.  Strict I/O  [ ] [ ]   4.  Daily BMP, Ionized Calcium, Magnesium and Phosphorous   5.  Triglycerides at initiation of TPN and monthly [ ] [ ]     Nutrition Support [id number]

## 2021-10-09 NOTE — PROGRESS NOTE ADULT - PROBLEM SELECTOR PLAN 2
Possibly related to esophagitis/reflux, other etiologies include active Crohn's (though no objective evidence pointing towards flare).  EGD showed gastric friability and was biopsied, negative for Crohn's. There was erythematous duodenopathy but no esophagitis.    - Treat symptomatically with IV PPI BID, add sucralfate liquid 1g q6h for esophagitis per GI recs  - C/w dilaudid for pain. Will attempt to avoid opiates in this patient when possible given increased mortality with opiates in IBD patients  - NPO, clears starting 10/9   - Zofran PRN, Checking QTc qOD, last EKG had QTc wnl  - C/w TPN

## 2021-10-09 NOTE — PROGRESS NOTE ADULT - ATTENDING COMMENTS
26F with PMH of Crohn's s/p surgery for stricture s/p ileostomy placement, s/p PICC for TPN here w/ abd pain. Workup by EGD revealed erythematous duodenopathy, and friability in the gastric area. NPO currently pending improvement of abdominal sx.    Pt seen at bedside. No o/n events. No significant abd pain. Wants to try clears today. Labs stable. On exam, comfortable/NAD. Abd soft NT. Ostomy w/ green liquid stool output.    -F/u GI for further recs. CLD over weekend if she can tolerate.   -Continue w/ current supportive meaures. Cont TPN.    Rest of plan as above.

## 2021-10-09 NOTE — PROGRESS NOTE ADULT - SUBJECTIVE AND OBJECTIVE BOX
PROGRESS NOTE:     CONTACT INFO:  Henri Meier MD  Internal Medicine PGY2  Pager: [phone]    Patient is a 26y old  Female who presents with a chief complaint of abdominal pain, crohn's disease (08 Oct 2021 14:58)      SUBJECTIVE / OVERNIGHT EVENTS:  No acute events overnight.    MEDICATIONS  (STANDING):  chlorhexidine 2% Cloths 1 Application(s) Topical daily  fat emulsion (Fish Oil and Plant Based) 20% Infusion 14.5 mL/Hr (14.5 mL/Hr) IV Continuous <Continuous>  influenza   Vaccine 0.5 milliLiter(s) IntraMuscular once  pantoprazole  Injectable 40 milliGRAM(s) IV Push two times a day  Parenteral Nutrition - Adult 1 Each (83 mL/Hr) TPN Continuous <Continuous>  sucralfate suspension 1 Gram(s) Oral <User Schedule>    MEDICATIONS  (PRN):  acetaminophen   Tablet .. 650 milliGRAM(s) Oral every 6 hours PRN Temp greater or equal to 38.5C (101.3F), Mild Pain (1 - 3)  HYDROmorphone  Injectable 0.5 milliGRAM(s) IV Push every 6 hours PRN Severe Pain (7 - 10)  HYDROmorphone  Injectable 0.25 milliGRAM(s) IV Push every 6 hours PRN Moderate Pain (4 - 6)  lidocaine 2% Viscous 2 milliLiter(s) Swish and Spit three times a day PRN pain with swallowing  ondansetron Injectable 4 milliGRAM(s) IV Push every 6 hours PRN Nausea and/or Vomiting      CAPILLARY BLOOD GLUCOSE      POCT Blood Glucose.: 96 mg/dL (09 Oct 2021 07:32)  POCT Blood Glucose.: 107 mg/dL (08 Oct 2021 17:58)  POCT Blood Glucose.: 108 mg/dL (08 Oct 2021 09:01)    I&O's Summary    08 Oct 2021 07:01  -  09 Oct 2021 07:00  --------------------------------------------------------  IN: 0 mL / OUT: 750 mL / NET: -750 mL        PHYSICAL EXAM:  Vital Signs Last 24 Hrs  T(C): 37.1 (09 Oct 2021 07:17), Max: 37.1 (09 Oct 2021 07:17)  T(F): 98.7 (09 Oct 2021 07:17), Max: 98.7 (09 Oct 2021 07:17)  HR: 107 (09 Oct 2021 07:17) (96 - 107)  BP: 106/68 (09 Oct 2021 07:17) (100/65 - 117/65)  BP(mean): --  RR: 18 (09 Oct 2021 07:17) (16 - 18)  SpO2: 100% (09 Oct 2021 07:17) (100% - 100%)    GENERAL: NAD, malnourished  HEAD:  Atraumatic, Normocephalic  EYES: conjunctiva and sclera clear  NECK: No JVD  CHEST/LUNG: nonlabored respirations  HEART: patient declined  ABDOMEN: NTP, NTD, ostomy output green  EXTREMITIES: moving all 4 extremities spontaneously; TPN IV site c/d/i with no erythema  PSYCH: AAOx3  NEUROLOGY: non-focal  SKIN: No rashes or lesions    LABS:                        9.3    4.98  )-----------( 408      ( 08 Oct 2021 09:04 )             29.3     10-08    137  |  100  |  18  ----------------------------<  94  4.1   |  24  |  0.46<L>    Ca    9.9      08 Oct 2021 09:04  Phos  5.1     10-08  Mg     2.20     10-08    TPro  6.6  /  Alb  4.1  /  TBili  0.3  /  DBili  x   /  AST  44<H>  /  ALT  90<H>  /  AlkPhos  85  10-08

## 2021-10-10 LAB — CA-I BLD-SCNC: 1.22 MMOL/L — SIGNIFICANT CHANGE UP (ref 1.15–1.29)

## 2021-10-10 PROCEDURE — 99232 SBSQ HOSP IP/OBS MODERATE 35: CPT | Mod: GC

## 2021-10-10 PROCEDURE — 99232 SBSQ HOSP IP/OBS MODERATE 35: CPT

## 2021-10-10 RX ORDER — ELECTROLYTE SOLUTION,INJ
1 VIAL (ML) INTRAVENOUS
Refills: 0 | Status: DISCONTINUED | OUTPATIENT
Start: 2021-10-10 | End: 2021-10-10

## 2021-10-10 RX ORDER — I.V. FAT EMULSION 20 G/100ML
14.5 EMULSION INTRAVENOUS
Qty: 35 | Refills: 0 | Status: DISCONTINUED | OUTPATIENT
Start: 2021-10-10 | End: 2021-10-11

## 2021-10-10 RX ADMIN — I.V. FAT EMULSION 14.5 ML/HR: 20 EMULSION INTRAVENOUS at 19:06

## 2021-10-10 RX ADMIN — ONDANSETRON 4 MILLIGRAM(S): 8 TABLET, FILM COATED ORAL at 05:41

## 2021-10-10 RX ADMIN — CHLORHEXIDINE GLUCONATE 1 APPLICATION(S): 213 SOLUTION TOPICAL at 13:53

## 2021-10-10 RX ADMIN — HYDROMORPHONE HYDROCHLORIDE 0.5 MILLIGRAM(S): 2 INJECTION INTRAMUSCULAR; INTRAVENOUS; SUBCUTANEOUS at 12:10

## 2021-10-10 RX ADMIN — HYDROMORPHONE HYDROCHLORIDE 0.5 MILLIGRAM(S): 2 INJECTION INTRAMUSCULAR; INTRAVENOUS; SUBCUTANEOUS at 19:04

## 2021-10-10 RX ADMIN — Medication 1 GRAM(S): at 19:04

## 2021-10-10 RX ADMIN — PANTOPRAZOLE SODIUM 40 MILLIGRAM(S): 20 TABLET, DELAYED RELEASE ORAL at 19:04

## 2021-10-10 RX ADMIN — Medication 1 GRAM(S): at 12:10

## 2021-10-10 RX ADMIN — HYDROMORPHONE HYDROCHLORIDE 0.5 MILLIGRAM(S): 2 INJECTION INTRAMUSCULAR; INTRAVENOUS; SUBCUTANEOUS at 12:40

## 2021-10-10 RX ADMIN — ONDANSETRON 4 MILLIGRAM(S): 8 TABLET, FILM COATED ORAL at 19:17

## 2021-10-10 RX ADMIN — HYDROMORPHONE HYDROCHLORIDE 0.5 MILLIGRAM(S): 2 INJECTION INTRAMUSCULAR; INTRAVENOUS; SUBCUTANEOUS at 19:34

## 2021-10-10 RX ADMIN — HYDROMORPHONE HYDROCHLORIDE 0.5 MILLIGRAM(S): 2 INJECTION INTRAMUSCULAR; INTRAVENOUS; SUBCUTANEOUS at 05:53

## 2021-10-10 RX ADMIN — PANTOPRAZOLE SODIUM 40 MILLIGRAM(S): 20 TABLET, DELAYED RELEASE ORAL at 06:24

## 2021-10-10 RX ADMIN — ONDANSETRON 4 MILLIGRAM(S): 8 TABLET, FILM COATED ORAL at 12:10

## 2021-10-10 RX ADMIN — HYDROMORPHONE HYDROCHLORIDE 0.5 MILLIGRAM(S): 2 INJECTION INTRAMUSCULAR; INTRAVENOUS; SUBCUTANEOUS at 05:38

## 2021-10-10 RX ADMIN — Medication 1 EACH: at 19:04

## 2021-10-10 NOTE — PROGRESS NOTE ADULT - ATTENDING COMMENTS
26F with PMH of Crohn's s/p surgery for stricture s/p ileostomy placement, s/p PICC for TPN here w/ abd pain. Workup by EGD revealed erythematous duodenopathy, and friability in the gastric area. After NPO status --- symptomatically improved. She was restarted on CLD over weekend to assess tolerance.    Pt seen at bedside. Since restarting liquids -- she has had 2 episodes of vomiting and continues to be nauseated. On exam, abdomen was soft. Ostomy function at baseline level. No new changes on exam. Labs are pending.    -F/u GI for further recs. Spoke w/ covering fellow -- given sx -- will make pt NPO.  -Continue TPN.  -Pending blood draw for today's labs.     Rest of plan as above.

## 2021-10-10 NOTE — PROGRESS NOTE ADULT - SUBJECTIVE AND OBJECTIVE BOX
PROGRESS NOTE:   Authored by Dr. Ella López MD Pager [phone] [facility], [id number] [address]     Patient is a 26y old  Female who presents with a chief complaint of crohn's flare up (09 Oct 2021 09:31)      SUBJECTIVE / OVERNIGHT EVENTS:  No acute events overnight. Tolerating clear liquid diet.     ADDITIONAL REVIEW OF SYSTEMS: No F/C, chest pain, dyspnea     MEDICATIONS  (STANDING):  chlorhexidine 2% Cloths 1 Application(s) Topical daily  fat emulsion (Fish Oil and Plant Based) 20% Infusion 14.5 mL/Hr (14.5 mL/Hr) IV Continuous <Continuous>  influenza   Vaccine 0.5 milliLiter(s) IntraMuscular once  pantoprazole  Injectable 40 milliGRAM(s) IV Push two times a day  Parenteral Nutrition - Adult 1 Each (83 mL/Hr) TPN Continuous <Continuous>  sucralfate suspension 1 Gram(s) Oral <User Schedule>    MEDICATIONS  (PRN):  acetaminophen   Tablet .. 650 milliGRAM(s) Oral every 6 hours PRN Temp greater or equal to 38.5C (101.3F), Mild Pain (1 - 3)  HYDROmorphone  Injectable 0.5 milliGRAM(s) IV Push every 6 hours PRN Severe Pain (7 - 10)  HYDROmorphone  Injectable 0.25 milliGRAM(s) IV Push every 6 hours PRN Moderate Pain (4 - 6)  lidocaine 2% Viscous 2 milliLiter(s) Swish and Spit three times a day PRN pain with swallowing  ondansetron Injectable 4 milliGRAM(s) IV Push every 6 hours PRN Nausea and/or Vomiting      CAPILLARY BLOOD GLUCOSE      POCT Blood Glucose.: 120 mg/dL (09 Oct 2021 21:45)  POCT Blood Glucose.: 91 mg/dL (09 Oct 2021 18:21)  POCT Blood Glucose.: 96 mg/dL (09 Oct 2021 07:32)    I&O's Summary    08 Oct 2021 07:01  -  09 Oct 2021 07:00  --------------------------------------------------------  IN: 0 mL / OUT: 750 mL / NET: -750 mL        PHYSICAL EXAM:  Vital Signs Last 24 Hrs  T(C): 36.8 (09 Oct 2021 22:51), Max: 37.1 (09 Oct 2021 07:17)  T(F): 98.3 (09 Oct 2021 22:51), Max: 98.7 (09 Oct 2021 07:17)  HR: 98 (09 Oct 2021 22:51) (95 - 107)  BP: 117/76 (09 Oct 2021 22:51) (97/60 - 128/89)  BP(mean): --  RR: 18 (09 Oct 2021 22:51) (18 - 18)  SpO2: 99% (09 Oct 2021 22:51) (99% - 100%)    GENERAL: NAD, malnourished  HEAD:  Atraumatic, Normocephalic  EYES: conjunctiva and sclera clear  NECK: No JVD  CHEST/LUNG: nonlabored respirations  HEART: patient declined  ABDOMEN: NTP, NTD, ostomy output green  EXTREMITIES: moving all 4 extremities spontaneously; TPN IV site c/d/i with no erythema  PSYCH: AAOx3  NEUROLOGY: non-focal  SKIN: No rashes or lesions    LABS:                        9.5    4.98  )-----------( 379      ( 09 Oct 2021 07:50 )             29.4     10-09    138  |  103  |  17  ----------------------------<  95  4.3   |  23  |  0.49<L>    Ca    9.1      09 Oct 2021 07:50  Phos  3.9     10-09  Mg     2.10     10-09    TPro  6.4  /  Alb  4.1  /  TBili  0.3  /  DBili  x   /  AST  27  /  ALT  65<H>  /  AlkPhos  81  10-09           PROGRESS NOTE:   Authored by Dr. Ella López MD Pager [phone] [facility], [id number] [address]     Patient is a 26y old  Female who presents with a chief complaint of crohn's flare up (09 Oct 2021 09:31)      SUBJECTIVE / OVERNIGHT EVENTS:  No acute events overnight. Had one episode of vomiting after eating yesterday. Wants to continue with clear liquid diet.     ADDITIONAL REVIEW OF SYSTEMS: No F/C, chest pain, dyspnea     MEDICATIONS  (STANDING):  chlorhexidine 2% Cloths 1 Application(s) Topical daily  fat emulsion (Fish Oil and Plant Based) 20% Infusion 14.5 mL/Hr (14.5 mL/Hr) IV Continuous <Continuous>  influenza   Vaccine 0.5 milliLiter(s) IntraMuscular once  pantoprazole  Injectable 40 milliGRAM(s) IV Push two times a day  Parenteral Nutrition - Adult 1 Each (83 mL/Hr) TPN Continuous <Continuous>  sucralfate suspension 1 Gram(s) Oral <User Schedule>    MEDICATIONS  (PRN):  acetaminophen   Tablet .. 650 milliGRAM(s) Oral every 6 hours PRN Temp greater or equal to 38.5C (101.3F), Mild Pain (1 - 3)  HYDROmorphone  Injectable 0.5 milliGRAM(s) IV Push every 6 hours PRN Severe Pain (7 - 10)  HYDROmorphone  Injectable 0.25 milliGRAM(s) IV Push every 6 hours PRN Moderate Pain (4 - 6)  lidocaine 2% Viscous 2 milliLiter(s) Swish and Spit three times a day PRN pain with swallowing  ondansetron Injectable 4 milliGRAM(s) IV Push every 6 hours PRN Nausea and/or Vomiting      CAPILLARY BLOOD GLUCOSE      POCT Blood Glucose.: 120 mg/dL (09 Oct 2021 21:45)  POCT Blood Glucose.: 91 mg/dL (09 Oct 2021 18:21)  POCT Blood Glucose.: 96 mg/dL (09 Oct 2021 07:32)    I&O's Summary    08 Oct 2021 07:01  -  09 Oct 2021 07:00  --------------------------------------------------------  IN: 0 mL / OUT: 750 mL / NET: -750 mL        PHYSICAL EXAM:  Vital Signs Last 24 Hrs  T(C): 36.8 (09 Oct 2021 22:51), Max: 37.1 (09 Oct 2021 07:17)  T(F): 98.3 (09 Oct 2021 22:51), Max: 98.7 (09 Oct 2021 07:17)  HR: 98 (09 Oct 2021 22:51) (95 - 107)  BP: 117/76 (09 Oct 2021 22:51) (97/60 - 128/89)  BP(mean): --  RR: 18 (09 Oct 2021 22:51) (18 - 18)  SpO2: 99% (09 Oct 2021 22:51) (99% - 100%)    GENERAL: NAD, malnourished  HEAD:  Atraumatic, Normocephalic  EYES: conjunctiva and sclera clear  NECK: No JVD  CHEST/LUNG: nonlabored respirations  HEART: patient declined  ABDOMEN: NTP, NTD, ostomy output green  EXTREMITIES: moving all 4 extremities spontaneously; TPN IV site c/d/i with no erythema  PSYCH: AAOx3  NEUROLOGY: non-focal  SKIN: No rashes or lesions    LABS:                        9.5    4.98  )-----------( 379      ( 09 Oct 2021 07:50 )             29.4     10-09    138  |  103  |  17  ----------------------------<  95  4.3   |  23  |  0.49<L>    Ca    9.1      09 Oct 2021 07:50  Phos  3.9     10-09  Mg     2.10     10-09    TPro  6.4  /  Alb  4.1  /  TBili  0.3  /  DBili  x   /  AST  27  /  ALT  65<H>  /  AlkPhos  81  10-09

## 2021-10-10 NOTE — PROGRESS NOTE ADULT - SUBJECTIVE AND OBJECTIVE BOX
NUTRITION NOTE  [id number] MUTAMANDAA  ===============================    Interval events - Patient was seen and examined at bedside, no acute events overnight. Patient denies chest pain or shortness of breath at this time. Patient remains on TPN at this time. Diet was advanced to clear liquids yesterday, tolerating small amounts of liquids.     ROS: Except as noted above, all other systems reviewed and are negative     Allergies  No Known Allergies    PAST MEDICAL & SURGICAL HISTORY:  Crohn disease  Peripherally inserted central catheter (PICC) in place LUE PICC place on 2021  History of ileostomy Aug 2021    Vital Signs Last 24 Hrs  T(C): 37.1 (10 Oct 2021 05:52), Max: 37.1 (10 Oct 2021 05:52)  T(F): 98.7 (10 Oct 2021 05:52), Max: 98.7 (10 Oct 2021 05:52)  HR: 92 (10 Oct 2021 05:52) (92 - 103)  BP: 110/67 (10 Oct 2021 05:52) (97/60 - 128/89)  RR: 18 (10 Oct 2021 05:52) (18 - 18)  SpO2: 100% (10 Oct 2021 05:52) (99% - 100%)    MEDICATIONS  (STANDING):  chlorhexidine 2% Cloths 1 Application(s) Topical daily  fat emulsion (Fish Oil and Plant Based) 20% Infusion 14.5 mL/Hr (14.5 mL/Hr) IV Continuous <Continuous>  influenza   Vaccine 0.5 milliLiter(s) IntraMuscular once  pantoprazole  Injectable 40 milliGRAM(s) IV Push two times a day  Parenteral Nutrition - Adult 1 Each (83 mL/Hr) TPN Continuous <Continuous>  Parenteral Nutrition - Adult 1 Each (83 mL/Hr) TPN Continuous <Continuous>  sucralfate suspension 1 Gram(s) Oral <User Schedule>    MEDICATIONS  (PRN):  acetaminophen   Tablet .. 650 milliGRAM(s) Oral every 6 hours PRN Temp greater or equal to 38.5C (101.3F), Mild Pain (1 - 3)  HYDROmorphone  Injectable 0.5 milliGRAM(s) IV Push every 6 hours PRN Severe Pain (7 - 10)  HYDROmorphone  Injectable 0.25 milliGRAM(s) IV Push every 6 hours PRN Moderate Pain (4 - 6)  lidocaine 2% Viscous 2 milliLiter(s) Swish and Spit three times a day PRN pain with swallowing  ondansetron Injectable 4 milliGRAM(s) IV Push every 6 hours PRN Nausea and/or Vomiting    POCT Blood Glucose.: 120 mg/dL (09 Oct 2021 21:45)  POCT Blood Glucose.: 91 mg/dL (09 Oct 2021 18:21)    Daily Weight in k.8 (06 Oct 2021 05:14)    Drug Dosing Weight  Height (cm): 154.9 (30 Sep 2021 08:11)  Weight (kg): 35.2 (30 Sep 2021 08:11)  BMI (kg/m2): 14.7 (30 Sep 2021 08:11)  BSA (m2): 1.26 (30 Sep 2021 08:11)    PHYSICAL EXAM:  GENERAL: malnourished, resting comfortably in bed   HEAD:  Atraumatic, Normocephalic  CHEST/LUNG: nonlabored respirations, CTAB  ABDOMEN: soft, mildly tender to palpation, nondistended   PSYCH: AAOx3  PICC Site: C/D/I    Diet: clear liquids and TPN/lipids     LABORATORY                                                 9.5    4.98  )-----------( 379      ( 09 Oct 2021 07:50 )             29.4   10-09    138  |  103  |  17  ----------------------------<  95  4.3   |  23  |  0.49<L>    Ca    9.1      09 Oct 2021 07:50  Phos  3.9     10  Mg     2.10     10-09    TPro  6.4  /  Alb  4.1  /  TBili  0.3  /  DBili  x   /  AST  27  /  ALT  65<H>  /  AlkPhos  81  10    LIVER FUNCTIONS - ( 09 Oct 2021 07:50 )  Alb: 4.1 g/dL / Pro: 6.4 g/dL / ALK PHOS: 81 U/L / ALT: 65 U/L / AST: 27 U/L / GGT: x           10-05 Chol -- LDL -- HDL -- Trig 91, 09-30 Chol -- LDL -- HDL -- Trig 71    ASSESSMENT/PLAN:  26 year old female with PMH Crohn's disease s/p surgery for stricture/ileostomy placement in mid 2021, s/p PICC line in place for TPN 2 weeks ago, presents with epigastric abdominal pain, nausea, and vomiting x 4 days. GI is following with possible EGD to evaluate for esophagitis. Nutrition service consulted for resuming parenteral nutrition via PICC line that is in place. Patient states that she feels very weak and is frustrated that she is not able to take in anything by mouth (liquids or food) without nausea or emesis. TPN was initiated on 21.    continue TPN with infusion volume of 2L, TPN will provide 1325 kcal/day     labs reviewed - stable, continue same TPN formula today (no AM labs from this morning)     monitor fingersticks, obtain daily weights    continue parenteral nutrition at this time, will follow up with primary team on plan - GI workup in process     1.  Severe protein calorie malnutrition being optimized with TPN: CHO [195] gm.  AA [78] gm. SMOF Lipids [35] gm.  2.  Hyperglycemia managed with: [0] units of regular insulin    3.  Check fluid balance daily.  Strict I/O  [ ] [ ]   4.  Daily BMP, Ionized Calcium, Magnesium and Phosphorous   5.  Triglycerides at initiation of TPN and monthly [ ] [ ]     Nutrition Support [id number]

## 2021-10-11 LAB — IGG4 SER-MCNC: 3 MG/DL — SIGNIFICANT CHANGE UP (ref 2–96)

## 2021-10-11 PROCEDURE — 99233 SBSQ HOSP IP/OBS HIGH 50: CPT | Mod: GC

## 2021-10-11 PROCEDURE — 99232 SBSQ HOSP IP/OBS MODERATE 35: CPT

## 2021-10-11 RX ORDER — I.V. FAT EMULSION 20 G/100ML
14.5 EMULSION INTRAVENOUS
Qty: 35 | Refills: 0 | Status: DISCONTINUED | OUTPATIENT
Start: 2021-10-11 | End: 2021-10-12

## 2021-10-11 RX ORDER — ELECTROLYTE SOLUTION,INJ
1 VIAL (ML) INTRAVENOUS
Refills: 0 | Status: DISCONTINUED | OUTPATIENT
Start: 2021-10-11 | End: 2021-10-11

## 2021-10-11 RX ADMIN — ONDANSETRON 4 MILLIGRAM(S): 8 TABLET, FILM COATED ORAL at 13:41

## 2021-10-11 RX ADMIN — Medication 1 EACH: at 19:13

## 2021-10-11 RX ADMIN — ONDANSETRON 4 MILLIGRAM(S): 8 TABLET, FILM COATED ORAL at 01:15

## 2021-10-11 RX ADMIN — HYDROMORPHONE HYDROCHLORIDE 0.5 MILLIGRAM(S): 2 INJECTION INTRAMUSCULAR; INTRAVENOUS; SUBCUTANEOUS at 01:37

## 2021-10-11 RX ADMIN — ONDANSETRON 4 MILLIGRAM(S): 8 TABLET, FILM COATED ORAL at 07:29

## 2021-10-11 RX ADMIN — Medication 1 GRAM(S): at 12:25

## 2021-10-11 RX ADMIN — HYDROMORPHONE HYDROCHLORIDE 0.5 MILLIGRAM(S): 2 INJECTION INTRAMUSCULAR; INTRAVENOUS; SUBCUTANEOUS at 14:14

## 2021-10-11 RX ADMIN — CHLORHEXIDINE GLUCONATE 1 APPLICATION(S): 213 SOLUTION TOPICAL at 12:24

## 2021-10-11 RX ADMIN — PANTOPRAZOLE SODIUM 40 MILLIGRAM(S): 20 TABLET, DELAYED RELEASE ORAL at 19:12

## 2021-10-11 RX ADMIN — PANTOPRAZOLE SODIUM 40 MILLIGRAM(S): 20 TABLET, DELAYED RELEASE ORAL at 05:32

## 2021-10-11 RX ADMIN — HYDROMORPHONE HYDROCHLORIDE 0.5 MILLIGRAM(S): 2 INJECTION INTRAMUSCULAR; INTRAVENOUS; SUBCUTANEOUS at 19:43

## 2021-10-11 RX ADMIN — HYDROMORPHONE HYDROCHLORIDE 0.5 MILLIGRAM(S): 2 INJECTION INTRAMUSCULAR; INTRAVENOUS; SUBCUTANEOUS at 07:11

## 2021-10-11 RX ADMIN — HYDROMORPHONE HYDROCHLORIDE 0.5 MILLIGRAM(S): 2 INJECTION INTRAMUSCULAR; INTRAVENOUS; SUBCUTANEOUS at 19:59

## 2021-10-11 RX ADMIN — HYDROMORPHONE HYDROCHLORIDE 0.5 MILLIGRAM(S): 2 INJECTION INTRAMUSCULAR; INTRAVENOUS; SUBCUTANEOUS at 13:41

## 2021-10-11 RX ADMIN — HYDROMORPHONE HYDROCHLORIDE 0.5 MILLIGRAM(S): 2 INJECTION INTRAMUSCULAR; INTRAVENOUS; SUBCUTANEOUS at 01:52

## 2021-10-11 RX ADMIN — I.V. FAT EMULSION 14.5 ML/HR: 20 EMULSION INTRAVENOUS at 19:15

## 2021-10-11 RX ADMIN — Medication 1 GRAM(S): at 06:40

## 2021-10-11 RX ADMIN — ONDANSETRON 4 MILLIGRAM(S): 8 TABLET, FILM COATED ORAL at 19:43

## 2021-10-11 RX ADMIN — HYDROMORPHONE HYDROCHLORIDE 0.5 MILLIGRAM(S): 2 INJECTION INTRAMUSCULAR; INTRAVENOUS; SUBCUTANEOUS at 07:26

## 2021-10-11 NOTE — PROGRESS NOTE ADULT - PROBLEM SELECTOR PLAN 1
Hx of Crohn's with recent colonic resection for stricture. S/p EGD 9/30 with gastric inflammation, biopsied to r/o gastric Crohn's. S/p infliximab on 10/1    Inflammatory markers wnl, fecal calprotectin wnl    -Diet advanced, but was only tolerating intermittently tolerating. Pt NPO for now   -GI following, appreciate recs  -Antiemetics PRN  - [facility] was unable to be reached for records  - no Crohn's or H. pylori in the gastric biopsy  - possible ileoscopy per GI but patient refused  - Dilaudid regimen changed to 0.5mg q6h for severe pain  - check IgG4 levels

## 2021-10-11 NOTE — PROGRESS NOTE ADULT - ATTENDING COMMENTS
26 Female h/o Crohn's colitis with ascending colon stricture status post right hemicolectomy and ileostomy on Remicade p/w postprandial pain and nausea.  EGD with esophagitis/ gastritis.  Currently on TPN for bowel rest.  Trial of clear liquids today.  Minimize narcotics which may be playing a role in opioid induced gastroparesis.

## 2021-10-11 NOTE — PROGRESS NOTE ADULT - SUBJECTIVE AND OBJECTIVE BOX
Chief Complaint:  Patient is a 26y old  Female who presents with a chief complaint of crohn's flare up (11 Oct 2021 09:49)    Reason for consult: abd pain, hx of Crohn's    Interval Events: Pt had some nausea overnight but overall feeling better.     Hospital Medications:  acetaminophen   Tablet .. 650 milliGRAM(s) Oral every 6 hours PRN  chlorhexidine 2% Cloths 1 Application(s) Topical daily  fat emulsion (Fish Oil and Plant Based) 20% Infusion 14.5 mL/Hr IV Continuous <Continuous>  HYDROmorphone  Injectable 0.5 milliGRAM(s) IV Push every 6 hours PRN  HYDROmorphone  Injectable 0.25 milliGRAM(s) IV Push every 6 hours PRN  influenza   Vaccine 0.5 milliLiter(s) IntraMuscular once  lidocaine 2% Viscous 2 milliLiter(s) Swish and Spit three times a day PRN  ondansetron Injectable 4 milliGRAM(s) IV Push every 6 hours PRN  pantoprazole  Injectable 40 milliGRAM(s) IV Push two times a day  Parenteral Nutrition - Adult 1 Each TPN Continuous <Continuous>  Parenteral Nutrition - Adult 1 Each TPN Continuous <Continuous>  sucralfate suspension 1 Gram(s) Oral <User Schedule>      ROS:   General:  No  fevers, chills, night sweats, fatigue  Eyes:  Good vision, no reported pain  ENT:  No sore throat, pain, runny nose  CV:  No pain, palpitations  Pulm:  No dyspnea, cough  GI:  See HPI, otherwise negative  :  No  incontinence, nocturia  Muscle:  No pain, weakness  Neuro:  No memory problems  Psych:  No insomnia, mood problems, depression  Endocrine:  No polyuria, polydipsia, cold/heat intolerance  Heme:  No petechiae, ecchymosis, easy bruisability  Skin:  No rash    PHYSICAL EXAM:   Vital Signs:  Vital Signs Last 24 Hrs  T(C): 36.6 (11 Oct 2021 13:40), Max: 36.8 (10 Oct 2021 19:00)  T(F): 97.8 (11 Oct 2021 13:40), Max: 98.2 (10 Oct 2021 19:00)  HR: 88 (11 Oct 2021 13:40) (88 - 92)  BP: 105/65 (11 Oct 2021 13:40) (104/60 - 109/66)  BP(mean): --  RR: 18 (11 Oct 2021 13:40) (18 - 19)  SpO2: 99% (11 Oct 2021 13:40) (98% - 100%)  Daily     Daily     GENERAL: no acute distress  NEURO: alert  HEENT: anicteric sclera, no conjunctival pallor appreciated  CHEST: no respiratory distress, no accessory muscle use  CARDIAC: regular rate, rhythm  ABDOMEN: soft, non-tender, non-distended, no rebound or guarding  EXTREMITIES: warm, well perfused, no edema  SKIN: no lesions noted    LABS: reviewed              Interval Diagnostic Studies: see sunrise for full report   Chief Complaint:  Patient is a 26y old  Female who presents with a chief complaint of crohn's flare up (11 Oct 2021 09:49)    Reason for consult: abd pain, hx of Crohn's    Interval Events: Pt had some nausea overnight but overall feeling better.     Hospital Medications:  acetaminophen   Tablet .. 650 milliGRAM(s) Oral every 6 hours PRN  chlorhexidine 2% Cloths 1 Application(s) Topical daily  fat emulsion (Fish Oil and Plant Based) 20% Infusion 14.5 mL/Hr IV Continuous <Continuous>  HYDROmorphone  Injectable 0.5 milliGRAM(s) IV Push every 6 hours PRN  HYDROmorphone  Injectable 0.25 milliGRAM(s) IV Push every 6 hours PRN  influenza   Vaccine 0.5 milliLiter(s) IntraMuscular once  lidocaine 2% Viscous 2 milliLiter(s) Swish and Spit three times a day PRN  ondansetron Injectable 4 milliGRAM(s) IV Push every 6 hours PRN  pantoprazole  Injectable 40 milliGRAM(s) IV Push two times a day  Parenteral Nutrition - Adult 1 Each TPN Continuous <Continuous>  Parenteral Nutrition - Adult 1 Each TPN Continuous <Continuous>  sucralfate suspension 1 Gram(s) Oral <User Schedule>      ROS:   General:  No  fevers, chills, night sweats, fatigue  Eyes:  Good vision, no reported pain  ENT:  No sore throat, pain, runny nose  CV:  No pain, palpitations  Pulm:  No dyspnea, cough  GI:  See HPI, otherwise negative  :  No  incontinence, nocturia  Muscle:  No pain, weakness  Neuro:  No memory problems  Psych:  No insomnia, mood problems, depression  Endocrine:  No polyuria, polydipsia, cold/heat intolerance  Heme:  No petechiae, ecchymosis, easy bruisability  Skin:  No rash    PHYSICAL EXAM:   Vital Signs:  Vital Signs Last 24 Hrs  T(C): 36.6 (11 Oct 2021 13:40), Max: 36.8 (10 Oct 2021 19:00)  T(F): 97.8 (11 Oct 2021 13:40), Max: 98.2 (10 Oct 2021 19:00)  HR: 88 (11 Oct 2021 13:40) (88 - 92)  BP: 105/65 (11 Oct 2021 13:40) (104/60 - 109/66)  BP(mean): --  RR: 18 (11 Oct 2021 13:40) (18 - 19)  SpO2: 99% (11 Oct 2021 13:40) (98% - 100%)  Daily     Daily     GENERAL: no acute distress  NEURO: alert  HEENT: anicteric sclera, no conjunctival pallor appreciated  CHEST: no respiratory distress, no accessory muscle use  CARDIAC: regular rate, rhythm  ABDOMEN: soft, non-tender, + ostomy, non-distended, no rebound or guarding  EXTREMITIES: warm, well perfused, no edema  SKIN: no lesions noted    LABS: reviewed              Interval Diagnostic Studies: see sunrise for full report

## 2021-10-11 NOTE — PROGRESS NOTE ADULT - ATTENDING COMMENTS
I agree with the above history, physical examination, chief complaint/diagnosis, and plan, which I have reviewed and edited where appropriate.  I agree with notes/assessment and detailed interval history of health care providers on my service.  I have seen and examined the patient.  I reviewed the laboratory and available data and agree with the history, physical assessment and plan.  I reviewed and discussed with all consultants, house staff and PA's.  The Nutrition Support Team (NST) discusses on an ongoing basis with the primary team and all consultants, House staff and PA's to have a permanent risk benefit analyses on all decisions and coordinating care.  I was physically present for the key portions of the evaluation and management (E/M) service provided.  26 year old female with PMH Crohn's disease s/p surgery for stricture/ileostomy receiving TPN   PSYCH: AAOx3  comfortable in bed   HEAD:  Atraumatic,   CHEST/LUNG: clear  ABDOMEN: soft, mildly tender to palpation, nondistended   no AM labs,  continue TPN with infusion volume of 2L/1325 kcal/day

## 2021-10-11 NOTE — PROGRESS NOTE ADULT - ATTENDING COMMENTS
25 yo F with Hx of Crohn's disease s/p surgery for stricture/ileostomy placement in 8/ 2021, s/p PICC line for TPN approx 2 weeks prior to admission, p/w abdominal pain, n/v w/ concern for possible potential Crohn's Flare. S/p infliximab on 10/1. S/p EGD on 9/30 showing erythematous duodenoplasty, diffuse erythema and friability w/ some areas of superficial ulceration s/p biopsy. Bx notable for chronic inactive gastritis     -C/w pain management PRN. C/w dilaudid .25IV q6hrs PRN for moderate pain and dilaudid .5mg IV q6 PRN for severe pain  -c/w IV PPI, standing carafate q6hrs, and antiemetics PRN  -serial abdominal exams and monitor for BMs while on opiods  -Pt is intermittently tolerating clears. C/w clears as tolerated. C/w TPN and monitor LFTs.  -F/u IGg4 levels   -GI following and recs appreciated.

## 2021-10-11 NOTE — PROGRESS NOTE ADULT - SUBJECTIVE AND OBJECTIVE BOX
Giovanni Prajapati MS3    Patient is a 26y old  Female who presents with a chief complaint of crohn's flare up (10 Oct 2021 09:01)    SUBJECTIVE / OVERNIGHT EVENTS: Pt examined at bedside in NAD. Pt states she went back to NPO yesterday after experiencing some n/v on clears and felt no pain afterwards until this morning when she woke up with 6/10 pain and some nausea that was significantly improved with dilaudid. Pt is eager to try oral intake again. Ostomy output is stable. Pt also wants to try walking more since she believes some of her abdominal discomfort may be related to bowel gas. Pt denies any chest pain, SOB, f/c.     MEDICATIONS  (STANDING):  chlorhexidine 2% Cloths 1 Application(s) Topical daily  fat emulsion (Fish Oil and Plant Based) 20% Infusion 14.5 mL/Hr (14.5 mL/Hr) IV Continuous <Continuous>  influenza   Vaccine 0.5 milliLiter(s) IntraMuscular once  pantoprazole  Injectable 40 milliGRAM(s) IV Push two times a day  Parenteral Nutrition - Adult 1 Each (83 mL/Hr) TPN Continuous <Continuous>  sucralfate suspension 1 Gram(s) Oral <User Schedule>    MEDICATIONS  (PRN):  acetaminophen   Tablet .. 650 milliGRAM(s) Oral every 6 hours PRN Temp greater or equal to 38.5C (101.3F), Mild Pain (1 - 3)  HYDROmorphone  Injectable 0.5 milliGRAM(s) IV Push every 6 hours PRN Severe Pain (7 - 10)  HYDROmorphone  Injectable 0.25 milliGRAM(s) IV Push every 6 hours PRN Moderate Pain (4 - 6)  lidocaine 2% Viscous 2 milliLiter(s) Swish and Spit three times a day PRN pain with swallowing  ondansetron Injectable 4 milliGRAM(s) IV Push every 6 hours PRN Nausea and/or Vomiting    Vital Signs Last 24 Hrs  T(C): 36.8 (11 Oct 2021 06:23), Max: 36.8 (10 Oct 2021 19:00)  T(F): 98.2 (11 Oct 2021 06:23), Max: 98.2 (10 Oct 2021 19:00)  HR: 90 (11 Oct 2021 06:23) (90 - 92)  BP: 104/60 (11 Oct 2021 06:23) (104/60 - 109/66)  BP(mean): --  RR: 19 (11 Oct 2021 06:23) (18 - 19)  SpO2: 98% (11 Oct 2021 06:23) (98% - 100%)  CAPILLARY BLOOD GLUCOSE    POCT Blood Glucose.: 113 mg/dL (10 Oct 2021 23:43)  POCT Blood Glucose.: 118 mg/dL (10 Oct 2021 11:38)    I&O's Summary    PHYSICAL EXAM:  GENERAL: NAD, malnourished  HEAD:  Atraumatic, Normocephalic, no ulcers in mouth  EYES: EOMI, PERRLA, conjunctiva and sclera clear  NECK: No JVD  CHEST/LUNG: Clear to auscultation bilaterally; No wheeze  HEART: Regular rate and rhythm; No murmurs, rubs, or gallops  ABDOMEN: Soft, mildly tender to palpation in the epigastric region, Nondistended; Bowel sounds present, ostomy output stable  EXTREMITIES:  2+ Peripheral Pulses, No clubbing, cyanosis, or edema, TPN site c/d/i, no erythema  PSYCH: AAOx3  NEUROLOGY: non-focal  SKIN: No rashes or lesions    LABS:                        9.5    4.98  )-----------( 379      ( 09 Oct 2021 07:50 )             29.4     10-09    138  |  103  |  17  ----------------------------<  95  4.3   |  23  |  0.49<L>    Ca    9.1      09 Oct 2021 07:50  Phos  3.9     10-09  Mg     2.10     10-09    TPro  6.4  /  Alb  4.1  /  TBili  0.3  /  DBili  x   /  AST  27  /  ALT  65<H>  /  AlkPhos  81  10-09          RADIOLOGY & ADDITIONAL TESTS:    Imaging Personally Reviewed:    Consultant(s) Notes Reviewed:      Care Discussed with Consultants/Other Providers:

## 2021-10-11 NOTE — PROGRESS NOTE ADULT - ASSESSMENT
26F w/ hx of Crohn's colitis c/b ascending colon stricture s/p resection 8/2021 w/ ileostomy (at [facility]) on remicade since 6/2021 (s/p induction and 2 maintenance doses, last 10/1/2021, admitted w/ ongoing severe post prandial epigastric pain and weight loss due to inability to take PO.    Impression:  #Post-prandial abd pain - Unclear etiology. Pt is s/p two extended hospitalizations at [facility] since her surgery, with testing including extensive imaging and upper GI endoscopy. No e/o SMA syndrome, EGD w/ esophagitis only. EGD here shows mild esophagitis and severe gastric inflammation (new finding compared to EGD 6 weeks ago, path negative for active Crohn's and is unchanged from her path results from [facility]). It is possible pain is related to severe gastritis, though having such severe pain from this is unusual. She has been evaluated for SMA syndrome at [facility]. In terms of mesenteric iscehmia, her vasculature is patent and no lactate, pointing away from any ischemic process. Suspect that she may have some underlying pain syndrome or non-GI etiology of her pain, but it is completely unclear what that might be.   #Crohn's disease - Historical details as above. It is unclear if she has active Crohn's at this time. Her ESR/CRP are negative, but they were never positive even at time of her initial diagnosis. Her fecal calprotectin, however, was very positive on diagnosis and is negative here. The only objective e/o active Crohn's is her CT scan on admission, which shows RLQ enteritis, but this is an imperfect test. Biopsies from EGD not c/w upper GI Crohn's. Ileoscopy would be useful in determining this but patient has declined. This would also not change the management of her current symptoms, as this appears to be unrelated to Crohn's.     Recommendations:  - Can c/w trial of clears for now.  - Continue high dose acid suppression w/ IV PPI.  - Continue w/ sucralfate liquid 1g q6h. Patient is trying to not miss any doses  - Anti-emetics PRN.  - F/u IgG 4 levels (in lab)  - Pain medication per primary team. Discussed w/ patient the role opiates may be playing in her nausea and that we are recommending decreasing reliance on opiates. She may benefit from pain management consultation.   - GI will continue to follow.     Faustino Freitas  Gastroenterology/Hepatology Fellow  Available via [employer] Teams    NON-URGENT CONSULTS:  Please email [email or web address]  AT NIGHT AND ON WEEKENDS:  Contact on-call GI fellow via answering service ([phone]) from 5pm-8am and on weekends/holidays  MONDAY-FRIDAY 8AM-5PM:  Pager# [phone] ([facility]) or [phone] ([facility])  GI Phone# [phone] ([facility])

## 2021-10-11 NOTE — CHART NOTE - NSCHARTNOTEFT_GEN_A_CORE
Patient last seen by this RDN on 10/5, now for malnutrition follow up. Spoke with pt, RN and obtained subjective information from extensive chart review.     Current Diet : Diet, Clear Liquid (10-11-21 @ 13:12)    Parenteral Nutrition: TPN infusing @ 2 Liters/day providing  gms (663 kcal), AA 78 gm (312 kcal), SMOF lipids 35 gm (350 kcal) - 1325 kcal/day  Current Weight Trend:     Weight:                                      Height: 154.9 cm                      IBW: 51.6 kg  Dosing  35.2 kg  9/30  38.1 kg  10/2  38.4 kg  10/3  38.9 kg  10/4  39.2 kg  10/5  37.9 kg  10/6  36.8 kg    Nutrition Interval Events: Pt had been NPO for bowel rest, now ordered Clear Liquid diet today to assess for tolerance. Pt hasn't had any liquids yet today. She hopes that her bowel rest will enable her to tolerate PO. Pt has ileostomy with positive output on 10/9 of 200 ml. Spoke with RN who said that she has to give pt anti-emetic medication before trying clear liquids with pt. Consider Ensure Clear 1x daily (180 carrie and 8 gm protein) if pt able to tolerate Clear Liquid diet. TPN infusing well and being tolerated; meeting estimated nutrition needs at this time. Nutrition Support Team evaluating daily for adjustments needed. Recommend continuing nutrition plan of care as ordered. RDN services to to follow and remain available as needed.           __________________ Pertinent Medications__________________   MEDICATIONS  (STANDING):  chlorhexidine 2% Cloths 1 Application(s) Topical daily  fat emulsion (Fish Oil and Plant Based) 20% Infusion 14.5 mL/Hr (14.5 mL/Hr) IV Continuous <Continuous>  influenza   Vaccine 0.5 milliLiter(s) IntraMuscular once  pantoprazole  Injectable 40 milliGRAM(s) IV Push two times a day  Parenteral Nutrition - Adult 1 Each (83 mL/Hr) TPN Continuous <Continuous>  Parenteral Nutrition - Adult 1 Each (83 mL/Hr) TPN Continuous <Continuous>  sucralfate suspension 1 Gram(s) Oral <User Schedule>    MEDICATIONS  (PRN):  acetaminophen   Tablet .. 650 milliGRAM(s) Oral every 6 hours PRN Temp greater or equal to 38.5C (101.3F), Mild Pain (1 - 3)  HYDROmorphone  Injectable 0.5 milliGRAM(s) IV Push every 6 hours PRN Severe Pain (7 - 10)  HYDROmorphone  Injectable 0.25 milliGRAM(s) IV Push every 6 hours PRN Moderate Pain (4 - 6)  lidocaine 2% Viscous 2 milliLiter(s) Swish and Spit three times a day PRN pain with swallowing  ondansetron Injectable 4 milliGRAM(s) IV Push every 6 hours PRN Nausea and/or Vomiting      __________________ Pertinent Labs__________________   10-09 Na138 mmol/L Glu 95 mg/dL K+ 4.3 mmol/L Cr  0.49 mg/dL<L> BUN 17 mg/dL 10-09 Phos 3.9 mg/dL 10-09 Alb 4.1 g/dL 10-05 Chol --    LDL --    HDL --    Trig 91 mg/dL      Edema: none  Skin: intact    Estimated Needs:     1104 - 1288 kcal/day (30-35 kcal/current wt)  52 - 77 gms/day (1.0-1.5 gms/IBW)        Nutrition Diagnosis: Severe malnutrition  [x] ongoing      Goal(s):  1. Patient to meet > 75% estimated energy needs    Recommendations:   1. Continue with nutrition plan of care as ordered.    Monitoring and Evaluation:   1. Monitor weights, labs, BMs, skin integrity, PO intake/tolerance, TPN tolerance and edema.  2. RD services to remain available. Request frequent weights to assess trend and determine adequacy of PO intake/TPN. Consider oral supplementation for additional calories and protein.

## 2021-10-11 NOTE — PROGRESS NOTE ADULT - SUBJECTIVE AND OBJECTIVE BOX
NUTRITION NOTE  [id number] MUTDOMINGUEZ  ===============================    Interval events - Patient was seen and examined at bedside, no acute events overnight. Patient denies chest pain or shortness of breath at this time. Patient remains on TPN at this time. Pt is NPO at this time; per primary team, plan to advance to clear liquids today. Patient is eager to try clear liquids.     ROS: Except as noted above, all other systems reviewed and are negative     Allergies  No Known Allergies    PAST MEDICAL & SURGICAL HISTORY:  Crohn disease  Peripherally inserted central catheter (PICC) in place LUE PICC place on 2021  History of ileostomy Aug 2021    Vital Signs Last 24 Hrs  T(C): 36.8 (11 Oct 2021 06:23), Max: 36.8 (10 Oct 2021 19:00)  T(F): 98.2 (11 Oct 2021 06:23), Max: 98.2 (10 Oct 2021 19:00)  HR: 90 (11 Oct 2021 06:23) (90 - 92)  BP: 104/60 (11 Oct 2021 06:23) (104/60 - 109/66)  RR: 19 (11 Oct 2021 06:23) (18 - 19)  SpO2: 98% (11 Oct 2021 06:23) (98% - 100%)    MEDICATIONS  (STANDING):  chlorhexidine 2% Cloths 1 Application(s) Topical daily  fat emulsion (Fish Oil and Plant Based) 20% Infusion 14.5 mL/Hr (14.5 mL/Hr) IV Continuous <Continuous>  influenza   Vaccine 0.5 milliLiter(s) IntraMuscular once  pantoprazole  Injectable 40 milliGRAM(s) IV Push two times a day  Parenteral Nutrition - Adult 1 Each (83 mL/Hr) TPN Continuous <Continuous>  Parenteral Nutrition - Adult 1 Each (83 mL/Hr) TPN Continuous <Continuous>  sucralfate suspension 1 Gram(s) Oral <User Schedule>    MEDICATIONS  (PRN):  acetaminophen   Tablet .. 650 milliGRAM(s) Oral every 6 hours PRN Temp greater or equal to 38.5C (101.3F), Mild Pain (1 - 3)  HYDROmorphone  Injectable 0.5 milliGRAM(s) IV Push every 6 hours PRN Severe Pain (7 - 10)  HYDROmorphone  Injectable 0.25 milliGRAM(s) IV Push every 6 hours PRN Moderate Pain (4 - 6)  lidocaine 2% Viscous 2 milliLiter(s) Swish and Spit three times a day PRN pain with swallowing  ondansetron Injectable 4 milliGRAM(s) IV Push every 6 hours PRN Nausea and/or Vomiting    POCT Blood Glucose.: 113 mg/dL (10 Oct 2021 23:43)  POCT Blood Glucose.: 118 mg/dL (10 Oct 2021 11:38)    Daily Weight in k.8 (06 Oct 2021 05:14)    Drug Dosing Weight  Height (cm): 154.9 (30 Sep 2021 08:11)  Weight (kg): 35.2 (30 Sep 2021 08:11)  BMI (kg/m2): 14.7 (30 Sep 2021 08:11)  BSA (m2): 1.26 (30 Sep 2021 08:11)    PHYSICAL EXAM:  GENERAL: malnourished, resting comfortably in bed   HEAD:  Atraumatic, Normocephalic  CHEST/LUNG: nonlabored respirations, CTAB  ABDOMEN: soft, mildly tender to palpation, nondistended   PSYCH: AAOx3  PICC Site: C/D/I    Diet: NPO but clear liquids to resume today and TPN/lipids     LABORATORY                                                 9.5    4.98  )-----------( 379      ( 09 Oct 2021 07:50 )             29.4   10-09    138  |  103  |  17  ----------------------------<  95  4.3   |  23  |  0.49<L>    Ca    9.1      09 Oct 2021 07:50  Phos  3.9     10-09  Mg     2.10     10-09    TPro  6.4  /  Alb  4.1  /  TBili  0.3  /  DBili  x   /  AST  27  /  ALT  65<H>  /  AlkPhos  81  10-09    LIVER FUNCTIONS - ( 09 Oct 2021 07:50 )  Alb: 4.1 g/dL / Pro: 6.4 g/dL / ALK PHOS: 81 U/L / ALT: 65 U/L / AST: 27 U/L / GGT: x           10-05 Chol -- LDL -- HDL -- Trig 91, 09-30 Chol -- LDL -- HDL -- Trig 71    ASSESSMENT/PLAN:  26 year old female with PMH Crohn's disease s/p surgery for stricture/ileostomy placement in mid 2021, s/p PICC line in place for TPN 2 weeks ago, presents with epigastric abdominal pain, nausea, and vomiting x 4 days. GI is following with possible EGD to evaluate for esophagitis. Nutrition service consulted for resuming parenteral nutrition via PICC line that is in place. Patient states that she feels very weak and is frustrated that she is not able to take in anything by mouth (liquids or food) without nausea or emesis. TPN was initiated on 21.    continue TPN with infusion volume of 2L, TPN will provide 1325 kcal/day     no AM labs, continue same TPN formula today     monitor fingersticks, obtain daily weights    continue parenteral nutrition at this time, will follow up with primary team on plan - GI workup in process     1.  Severe protein calorie malnutrition being optimized with TPN: CHO [195] gm.  AA [78] gm. SMOF Lipids [35] gm.  2.  Hyperglycemia managed with: [0] units of regular insulin    3.  Check fluid balance daily.  Strict I/O  [ ] [ ]   4.  Daily BMP, Ionized Calcium, Magnesium and Phosphorous   5.  Triglycerides at initiation of TPN and monthly [ ] [ ]     Nutrition Support [id number]

## 2021-10-12 PROCEDURE — 99232 SBSQ HOSP IP/OBS MODERATE 35: CPT | Mod: GC

## 2021-10-12 PROCEDURE — 99232 SBSQ HOSP IP/OBS MODERATE 35: CPT

## 2021-10-12 RX ORDER — METOCLOPRAMIDE HCL 10 MG
5 TABLET ORAL EVERY 8 HOURS
Refills: 0 | Status: DISCONTINUED | OUTPATIENT
Start: 2021-10-12 | End: 2021-10-13

## 2021-10-12 RX ORDER — I.V. FAT EMULSION 20 G/100ML
14.5 EMULSION INTRAVENOUS
Qty: 35 | Refills: 0 | Status: DISCONTINUED | OUTPATIENT
Start: 2021-10-12 | End: 2021-10-13

## 2021-10-12 RX ORDER — ELECTROLYTE SOLUTION,INJ
1 VIAL (ML) INTRAVENOUS
Refills: 0 | Status: DISCONTINUED | OUTPATIENT
Start: 2021-10-12 | End: 2021-10-12

## 2021-10-12 RX ADMIN — I.V. FAT EMULSION 14.5 ML/HR: 20 EMULSION INTRAVENOUS at 19:37

## 2021-10-12 RX ADMIN — Medication 1 GRAM(S): at 22:26

## 2021-10-12 RX ADMIN — Medication 1 GRAM(S): at 12:54

## 2021-10-12 RX ADMIN — HYDROMORPHONE HYDROCHLORIDE 0.5 MILLIGRAM(S): 2 INJECTION INTRAMUSCULAR; INTRAVENOUS; SUBCUTANEOUS at 09:34

## 2021-10-12 RX ADMIN — Medication 1 EACH: at 19:35

## 2021-10-12 RX ADMIN — HYDROMORPHONE HYDROCHLORIDE 0.5 MILLIGRAM(S): 2 INJECTION INTRAMUSCULAR; INTRAVENOUS; SUBCUTANEOUS at 02:00

## 2021-10-12 RX ADMIN — PANTOPRAZOLE SODIUM 40 MILLIGRAM(S): 20 TABLET, DELAYED RELEASE ORAL at 17:01

## 2021-10-12 RX ADMIN — PANTOPRAZOLE SODIUM 40 MILLIGRAM(S): 20 TABLET, DELAYED RELEASE ORAL at 05:43

## 2021-10-12 RX ADMIN — HYDROMORPHONE HYDROCHLORIDE 0.5 MILLIGRAM(S): 2 INJECTION INTRAMUSCULAR; INTRAVENOUS; SUBCUTANEOUS at 17:01

## 2021-10-12 RX ADMIN — Medication 1 GRAM(S): at 00:58

## 2021-10-12 RX ADMIN — HYDROMORPHONE HYDROCHLORIDE 0.5 MILLIGRAM(S): 2 INJECTION INTRAMUSCULAR; INTRAVENOUS; SUBCUTANEOUS at 17:30

## 2021-10-12 RX ADMIN — Medication 5 MILLIGRAM(S): at 17:01

## 2021-10-12 RX ADMIN — Medication 1 GRAM(S): at 17:01

## 2021-10-12 RX ADMIN — HYDROMORPHONE HYDROCHLORIDE 0.5 MILLIGRAM(S): 2 INJECTION INTRAMUSCULAR; INTRAVENOUS; SUBCUTANEOUS at 02:15

## 2021-10-12 RX ADMIN — CHLORHEXIDINE GLUCONATE 1 APPLICATION(S): 213 SOLUTION TOPICAL at 12:54

## 2021-10-12 RX ADMIN — ONDANSETRON 4 MILLIGRAM(S): 8 TABLET, FILM COATED ORAL at 09:33

## 2021-10-12 RX ADMIN — HYDROMORPHONE HYDROCHLORIDE 0.5 MILLIGRAM(S): 2 INJECTION INTRAMUSCULAR; INTRAVENOUS; SUBCUTANEOUS at 10:04

## 2021-10-12 RX ADMIN — ONDANSETRON 4 MILLIGRAM(S): 8 TABLET, FILM COATED ORAL at 01:55

## 2021-10-12 NOTE — PROGRESS NOTE ADULT - SUBJECTIVE AND OBJECTIVE BOX
NUTRITION NOTE  [id number] JACKSON  ===============================    Interval events - Patient was seen and examined at bedside, no acute events overnight. Patient denies chest pain or shortness of breath at this time. Patient remains on TPN at this time. Patient reports drinking some green ta and had emesis shortly after, she states that she will try aloe vera juice from home today. Pt states that she feels nauseous at this time,     ROS: Except as noted above, all other systems reviewed and are negative     Allergies  No Known Allergies    PAST MEDICAL & SURGICAL HISTORY:  Crohn disease  Peripherally inserted central catheter (PICC) in place LUE PICC place on 2021  History of ileostomy Aug 2021    Vital Signs Last 24 Hrs  T(C): 36.9 (12 Oct 2021 05:19), Max: 37.2 (11 Oct 2021 21:59)  T(F): 98.5 (12 Oct 2021 05:19), Max: 98.9 (11 Oct 2021 21:59)  HR: 91 (12 Oct 2021 09:30) (80 - 91)  BP: 102/66 (12 Oct 2021 09:30) (102/66 - 107/66)  RR: 18 (12 Oct 2021 05:19) (18 - 18)  SpO2: 99% (12 Oct 2021 05:19) (99% - 100%)    MEDICATIONS  (STANDING):  chlorhexidine 2% Cloths 1 Application(s) Topical daily  fat emulsion (Fish Oil and Plant Based) 20% Infusion 14.5 mL/Hr (14.5 mL/Hr) IV Continuous <Continuous>  influenza   Vaccine 0.5 milliLiter(s) IntraMuscular once  pantoprazole  Injectable 40 milliGRAM(s) IV Push two times a day  Parenteral Nutrition - Adult 1 Each (83 mL/Hr) TPN Continuous <Continuous>  Parenteral Nutrition - Adult 1 Each (83 mL/Hr) TPN Continuous <Continuous>  sucralfate suspension 1 Gram(s) Oral <User Schedule>    MEDICATIONS  (PRN):  acetaminophen   Tablet .. 650 milliGRAM(s) Oral every 6 hours PRN Temp greater or equal to 38.5C (101.3F), Mild Pain (1 - 3)  HYDROmorphone  Injectable 0.5 milliGRAM(s) IV Push every 6 hours PRN Severe Pain (7 - 10)  HYDROmorphone  Injectable 0.25 milliGRAM(s) IV Push every 6 hours PRN Moderate Pain (4 - 6)  lidocaine 2% Viscous 2 milliLiter(s) Swish and Spit three times a day PRN pain with swallowing  ondansetron Injectable 4 milliGRAM(s) IV Push every 6 hours PRN Nausea and/or Vomiting    Daily Weight in k.8 (06 Oct 2021 05:14)    Drug Dosing Weight  Height (cm): 154.9 (30 Sep 2021 08:11)  Weight (kg): 35.2 (30 Sep 2021 08:11)  BMI (kg/m2): 14.7 (30 Sep 2021 08:11)  BSA (m2): 1.26 (30 Sep 2021 08:11)    PHYSICAL EXAM:  GENERAL: malnourished, resting comfortably in bed   HEAD:  Atraumatic, Normocephalic  CHEST/LUNG: nonlabored respirations, CTAB  ABDOMEN: soft, mildly tender to palpation, nondistended   PSYCH: AAOx3  PICC Site: C/D/I    Diet: clear liquids and TPN/lipids     LABORATORY                                                 9.5    4.98  )-----------( 379      ( 09 Oct 2021 07:50 )             29.4   10-09    138  |  103  |  17  ----------------------------<  95  4.3   |  23  |  0.49<L>    Ca    9.1      09 Oct 2021 07:50  Phos  3.9     10-09  Mg     2.10     10-09    TPro  6.4  /  Alb  4.1  /  TBili  0.3  /  DBili  x   /  AST  27  /  ALT  65<H>  /  AlkPhos  81  10-09    LIVER FUNCTIONS - ( 09 Oct 2021 07:50 )  Alb: 4.1 g/dL / Pro: 6.4 g/dL / ALK PHOS: 81 U/L / ALT: 65 U/L / AST: 27 U/L / GGT: x           10-05 Chol -- LDL -- HDL -- Trig 91,  Chol -- LDL -- HDL -- Trig 71    ASSESSMENT/PLAN:  26 year old female with PMH Crohn's disease s/p surgery for stricture/ileostomy placement in mid 2021, s/p PICC line in place for TPN 2 weeks ago, presents with epigastric abdominal pain, nausea, and vomiting x 4 days. GI is following with possible EGD to evaluate for esophagitis. Nutrition service consulted for resuming parenteral nutrition via PICC line that is in place. Patient states that she feels very weak and is frustrated that she is not able to take in anything by mouth (liquids or food) without nausea or emesis. TPN was initiated on 21.    continue TPN with infusion volume of 2L, TPN will provide 1325 kcal/day     no AM labs, continue same TPN formula today; all labs ordered for tomorrow morning      monitor fingersticks, obtain daily weights    continue parenteral nutrition at this time, will follow up with primary team on plan - GI workup in process     1.  Severe protein calorie malnutrition being optimized with TPN: CHO [195] gm.  AA [78] gm. SMOF Lipids [35] gm.  2.  Hyperglycemia managed with: [0] units of regular insulin    3.  Check fluid balance daily.  Strict I/O  [ ] [ ]   4.  Daily BMP, Ionized Calcium, Magnesium and Phosphorous   5.  Triglycerides at initiation of TPN and monthly [ ] [ ]     Nutrition Support [id number]

## 2021-10-12 NOTE — PROGRESS NOTE ADULT - ASSESSMENT
26F w/ hx of Crohn's colitis c/b ascending colon stricture s/p resection 8/2021 w/ ileostomy (at [facility]) on remicade since 6/2021 (s/p induction and 2 maintenance doses, last 10/1/2021, admitted w/ ongoing severe post prandial epigastric pain and weight loss due to inability to take PO.    Impression:  #Post-prandial abd pain - Unclear etiology. Pt is s/p two extended hospitalizations at [facility] since her surgery, with testing including extensive imaging and upper GI endoscopy. No e/o SMA syndrome, EGD w/ esophagitis only. EGD here shows mild esophagitis and severe gastric inflammation (new finding compared to EGD 6 weeks ago, path negative for active Crohn's and is unchanged from her path results from [facility]). It is possible pain is related to severe gastritis, though having such severe pain from this is unusual. She has been evaluated for SMA syndrome at [facility]. In terms of mesenteric iscehmia, her vasculature is patent and no lactate, pointing away from any ischemic process. Suspect that she may have some underlying pain syndrome or non-GI etiology of her pain, but it is completely unclear what that might be.   #Crohn's disease - Historical details as above. It is unclear if she has active Crohn's at this time. Her ESR/CRP are negative, but they were never positive even at time of her initial diagnosis. Her fecal calprotectin, however, was very positive on diagnosis and is negative here. The only objective e/o active Crohn's is her CT scan on admission, which shows RLQ enteritis, but this is an imperfect test. Biopsies from EGD not c/w upper GI Crohn's. Ileoscopy would be useful in determining this but patient has declined. This would also not change the management of her current symptoms, as this appears to be unrelated to Crohn's.     Recommendations:  - Can c/w trial of clears for now.  - Continue high dose acid suppression w/ IV PPI.  - Continue w/ sucralfate liquid 1g q6h. Patient is trying to not miss any doses  - Anti-emetics timed prior to taking clears.   - Pain medication per primary team. Discussed w/ patient the role opiates may be playing in her nausea and that we are recommending decreasing reliance on opiates. She may benefit from pain management consultation.   - GI will continue to follow.     Faustino Freitas  Gastroenterology/Hepatology Fellow  Available via Microsoft Teams    NON-URGENT CONSULTS:  Please email [email or web address]  AT NIGHT AND ON WEEKENDS:  Contact on-call GI fellow via answering service ([phone]) from 5pm-8am and on weekends/holidays  MONDAY-FRIDAY 8AM-5PM:  Pager# [phone] ([facility]) or [phone] ([facility])  GI Phone# [phone] ([facility])   26F w/ hx of Crohn's colitis c/b ascending colon stricture s/p resection 8/2021 w/ ileostomy (at [facility] [place]) on remicade since 6/2021 (s/p induction and 2 maintenance doses, last 10/1/2021, admitted w/ ongoing severe post prandial epigastric pain and weight loss due to inability to take PO.    Impression:  #Post-prandial abd pain - Unclear etiology. Pt is s/p two extended hospitalizations at [facility] [place] since her surgery, with testing including extensive imaging and upper GI endoscopy. No e/o SMA syndrome, EGD w/ esophagitis only. EGD here shows mild esophagitis and severe gastric inflammation (new finding compared to EGD 6 weeks ago, path negative for active Crohn's and is unchanged from her path results from [facility] [place]). It is possible pain is related to severe gastritis, though having such severe pain from this is unusual. She has been evaluated for SMA syndrome at [facility] [place]. In terms of mesenteric iscehmia, her vasculature is patent and no lactate, pointing away from any ischemic process. Suspect that she may have some underlying pain syndrome or non-GI etiology of her pain, but it is completely unclear what that might be.   #Crohn's disease - Historical details as above. It is unclear if she has active Crohn's at this time. Her ESR/CRP are negative, but they were never positive even at time of her initial diagnosis. Her fecal calprotectin, however, was very positive on diagnosis and is negative here. The only objective e/o active Crohn's is her CT scan on admission, which shows RLQ enteritis, but this is an imperfect test. Biopsies from EGD not c/w upper GI Crohn's. Ileoscopy would be useful in determining this but patient has declined. This would also not change the management of her current symptoms, as this appears to be unrelated to Crohn's.     Recommendations:  - Can c/w trial of clears for now.  - Continue high dose acid suppression w/ IV PPI.  - Continue w/ sucralfate liquid 1g q6h. Patient is trying to not miss any doses  - Would change anti-emetics to reglan and give prior to clears.   - Pain medication per primary team. Discussed w/ patient the role opiates may be playing in her nausea and that we are recommending decreasing reliance on opiates. She may benefit from pain management consultation.   - GI will continue to follow.     Faustino Freitas  Gastroenterology/Hepatology Fellow  Available via Microsoft Teams    NON-URGENT CONSULTS:  Please email [email or web address]  AT NIGHT AND ON WEEKENDS:  Contact on-call GI fellow via answering service ([phone]) from 5pm-8am and on weekends/holidays  MONDAY-FRIDAY 8AM-5PM:  Pager# [phone] ([facility]) or [phone] ([facility])  GI Phone# [phone] ([facility])

## 2021-10-12 NOTE — PROGRESS NOTE ADULT - ATTENDING COMMENTS
27 yo F with Hx of Crohn's disease s/p surgery for stricture/ileostomy placement in 8/ 2021, s/p PICC line for TPN approx 2 weeks prior to admission, p/w abdominal pain, n/v w/ concern for possible potential Crohn's Flare. S/p infliximab on 10/1. S/p EGD on 9/30 showing erythematous duodenoplasty, diffuse erythema and friability w/ some areas of superficial ulceration s/p biopsy. Bx notable for chronic inactive gastritis.     Pt w/ continued intermittent abdominal pain and n/v w/ clear liquid diet.       -C/w pain management PRN. C/w dilaudid .25IV q6hrs PRN for moderate pain and dilaudid .5mg IV q6 PRN for severe pain. Will consult pain management  -c/w IV PPI, standing carafate q6hrs, and antiemetics PRN. Switching zofran to reglan   -serial abdominal exams and monitor for BMs while on opiods  -Pt is intermittently tolerating clears. C/w clears as tolerated. C/w TPN and monitor LFTs.  -GI following and recs appreciated.

## 2021-10-12 NOTE — PROGRESS NOTE ADULT - ATTENDING COMMENTS
I agree with the above history, physical examination, chief complaint/diagnosis, and plan, which I have reviewed and edited where appropriate.  I agree with notes/assessment and detailed interval history of health care providers on my service.  I have seen and examined the patient.  I reviewed the laboratory and available data and agree with the history, physical assessment and plan.  I reviewed and discussed with all consultants, house staff and PA's.  The Nutrition Support Team (NST) discusses on an ongoing basis with the primary team and all consultants, House staff and PA's to have a permanent risk benefit analyses on all decisions and coordinating care.  I was physically present for the key portions of the evaluation and management (E/M) service provided.  26 year old female with PMH Crohn's disease s/p surgery for stricture/ileostomy receiving TPN   comfortable in bed   CHEST/LUNG: clear  HEART RR  ABDOMEN: soft, mildly tender to palpation, nondistended   no AM labs,  continue TPN with infusion volume of 2L/1325 kcal/day

## 2021-10-12 NOTE — PROGRESS NOTE ADULT - PROBLEM SELECTOR PLAN 1
Hx of Crohn's with recent colonic resection for stricture. S/p EGD 9/30 with gastric inflammation, biopsied to r/o gastric Crohn's. S/p infliximab on 10/1    Inflammatory markers wnl, fecal calprotectin wnl    -Diet advanced, but was only tolerating intermittently tolerating, clears for now  -GI following, appreciate recs  -Antiemetics PRN  - [facility] was unable to be reached for records  - no Crohn's or H. pylori in the gastric biopsy  - possible ileoscopy per GI but patient refused  - Dilaudid regimen changed to 0.5mg q6h for severe pain  - IgG4 levels wnl

## 2021-10-12 NOTE — PROGRESS NOTE ADULT - SUBJECTIVE AND OBJECTIVE BOX
Chief Complaint:  Patient is a 26y old  Female who presents with a chief complaint of crohn's flare up (12 Oct 2021 07:12)    Reason for consult: abd pain, n/v    Interval Events: Pt had some more nausea and abd pain overnight w/ trial of clears.     Hospital Medications:  acetaminophen   Tablet .. 650 milliGRAM(s) Oral every 6 hours PRN  chlorhexidine 2% Cloths 1 Application(s) Topical daily  fat emulsion (Fish Oil and Plant Based) 20% Infusion 14.5 mL/Hr IV Continuous <Continuous>  HYDROmorphone  Injectable 0.5 milliGRAM(s) IV Push every 6 hours PRN  HYDROmorphone  Injectable 0.25 milliGRAM(s) IV Push every 6 hours PRN  influenza   Vaccine 0.5 milliLiter(s) IntraMuscular once  lidocaine 2% Viscous 2 milliLiter(s) Swish and Spit three times a day PRN  ondansetron Injectable 4 milliGRAM(s) IV Push every 6 hours PRN  pantoprazole  Injectable 40 milliGRAM(s) IV Push two times a day  Parenteral Nutrition - Adult 1 Each TPN Continuous <Continuous>  sucralfate suspension 1 Gram(s) Oral <User Schedule>      ROS:   General:  No  fevers, chills, night sweats, fatigue  Eyes:  Good vision, no reported pain  ENT:  No sore throat, pain, runny nose  CV:  No pain, palpitations  Pulm:  No dyspnea, cough  GI:  See HPI, otherwise negative  :  No  incontinence, nocturia  Muscle:  No pain, weakness  Neuro:  No memory problems  Psych:  No insomnia, mood problems, depression  Endocrine:  No polyuria, polydipsia, cold/heat intolerance  Heme:  No petechiae, ecchymosis, easy bruisability  Skin:  No rash    PHYSICAL EXAM:   Vital Signs:  Vital Signs Last 24 Hrs  T(C): 36.9 (12 Oct 2021 05:19), Max: 37.2 (11 Oct 2021 21:59)  T(F): 98.5 (12 Oct 2021 05:19), Max: 98.9 (11 Oct 2021 21:59)  HR: 90 (12 Oct 2021 05:19) (80 - 90)  BP: 106/64 (12 Oct 2021 05:19) (102/66 - 107/66)  BP(mean): --  RR: 18 (12 Oct 2021 05:19) (18 - 18)  SpO2: 99% (12 Oct 2021 05:19) (99% - 100%)  Daily     Daily     GENERAL: no acute distress  NEURO: alert  HEENT: anicteric sclera, no conjunctival pallor appreciated  CHEST: no respiratory distress, no accessory muscle use  CARDIAC: regular rate, rhythm  ABDOMEN: soft, non-tender, non-distended, no rebound or guarding, ostomy in place w/ normal output  EXTREMITIES: warm, well perfused, no edema  SKIN: no lesions noted    LABS: reviewed              Interval Diagnostic Studies: see sunrise for full report

## 2021-10-12 NOTE — PROGRESS NOTE ADULT - ATTENDING COMMENTS
26 Female h/o Crohn's colitis with ascending colon stricture status post right hemicolectomy and ileostomy on Remicade p/w postprandial pain and nausea.  EGD with esophagitis/ gastritis.  Currently on TPN for bowel rest. Continue clear liquids today but would give metoclopramide every 8 hours.  Minimize narcotics which may be playing a role in opioid-induced gastroparesis.

## 2021-10-13 LAB
ALBUMIN SERPL ELPH-MCNC: 3.8 G/DL — SIGNIFICANT CHANGE UP (ref 3.3–5)
ALP SERPL-CCNC: 69 U/L — SIGNIFICANT CHANGE UP (ref 40–120)
ALT FLD-CCNC: 34 U/L — HIGH (ref 4–33)
ANION GAP SERPL CALC-SCNC: 9 MMOL/L — SIGNIFICANT CHANGE UP (ref 7–14)
AST SERPL-CCNC: 22 U/L — SIGNIFICANT CHANGE UP (ref 4–32)
BASOPHILS # BLD AUTO: 0.02 K/UL — SIGNIFICANT CHANGE UP (ref 0–0.2)
BASOPHILS NFR BLD AUTO: 0.3 % — SIGNIFICANT CHANGE UP (ref 0–2)
BILIRUB SERPL-MCNC: 0.3 MG/DL — SIGNIFICANT CHANGE UP (ref 0.2–1.2)
BUN SERPL-MCNC: 12 MG/DL — SIGNIFICANT CHANGE UP (ref 7–23)
CALCIUM SERPL-MCNC: 7.7 MG/DL — LOW (ref 8.4–10.5)
CHLORIDE SERPL-SCNC: 111 MMOL/L — HIGH (ref 98–107)
CO2 SERPL-SCNC: 20 MMOL/L — LOW (ref 22–31)
CREAT SERPL-MCNC: 0.4 MG/DL — LOW (ref 0.5–1.3)
EOSINOPHIL # BLD AUTO: 0.03 K/UL — SIGNIFICANT CHANGE UP (ref 0–0.5)
EOSINOPHIL NFR BLD AUTO: 0.5 % — SIGNIFICANT CHANGE UP (ref 0–6)
GLUCOSE SERPL-MCNC: 110 MG/DL — HIGH (ref 70–99)
HCT VFR BLD CALC: 25.6 % — LOW (ref 34.5–45)
HGB BLD-MCNC: 8.2 G/DL — LOW (ref 11.5–15.5)
IANC: 4.36 K/UL — SIGNIFICANT CHANGE UP (ref 1.5–8.5)
IMM GRANULOCYTES NFR BLD AUTO: 0.5 % — SIGNIFICANT CHANGE UP (ref 0–1.5)
LYMPHOCYTES # BLD AUTO: 0.96 K/UL — LOW (ref 1–3.3)
LYMPHOCYTES # BLD AUTO: 16.5 % — SIGNIFICANT CHANGE UP (ref 13–44)
MAGNESIUM SERPL-MCNC: 1.6 MG/DL — SIGNIFICANT CHANGE UP (ref 1.6–2.6)
MCHC RBC-ENTMCNC: 26.9 PG — LOW (ref 27–34)
MCHC RBC-ENTMCNC: 32 GM/DL — SIGNIFICANT CHANGE UP (ref 32–36)
MCV RBC AUTO: 83.9 FL — SIGNIFICANT CHANGE UP (ref 80–100)
MONOCYTES # BLD AUTO: 0.42 K/UL — SIGNIFICANT CHANGE UP (ref 0–0.9)
MONOCYTES NFR BLD AUTO: 7.2 % — SIGNIFICANT CHANGE UP (ref 2–14)
NEUTROPHILS # BLD AUTO: 4.36 K/UL — SIGNIFICANT CHANGE UP (ref 1.8–7.4)
NEUTROPHILS NFR BLD AUTO: 75 % — SIGNIFICANT CHANGE UP (ref 43–77)
NRBC # BLD: 0 /100 WBCS — SIGNIFICANT CHANGE UP
NRBC # FLD: 0 K/UL — SIGNIFICANT CHANGE UP
PHOSPHATE SERPL-MCNC: 1.9 MG/DL — LOW (ref 2.5–4.5)
PLATELET # BLD AUTO: 289 K/UL — SIGNIFICANT CHANGE UP (ref 150–400)
POTASSIUM SERPL-MCNC: 3.5 MMOL/L — SIGNIFICANT CHANGE UP (ref 3.5–5.3)
POTASSIUM SERPL-SCNC: 3.5 MMOL/L — SIGNIFICANT CHANGE UP (ref 3.5–5.3)
PROT SERPL-MCNC: 6 G/DL — SIGNIFICANT CHANGE UP (ref 6–8.3)
RBC # BLD: 3.05 M/UL — LOW (ref 3.8–5.2)
RBC # FLD: 12.7 % — SIGNIFICANT CHANGE UP (ref 10.3–14.5)
SARS-COV-2 RNA SPEC QL NAA+PROBE: SIGNIFICANT CHANGE UP
SODIUM SERPL-SCNC: 140 MMOL/L — SIGNIFICANT CHANGE UP (ref 135–145)
WBC # BLD: 5.82 K/UL — SIGNIFICANT CHANGE UP (ref 3.8–10.5)
WBC # FLD AUTO: 5.82 K/UL — SIGNIFICANT CHANGE UP (ref 3.8–10.5)

## 2021-10-13 PROCEDURE — 99232 SBSQ HOSP IP/OBS MODERATE 35: CPT | Mod: GC

## 2021-10-13 PROCEDURE — 99232 SBSQ HOSP IP/OBS MODERATE 35: CPT

## 2021-10-13 RX ORDER — ONDANSETRON 8 MG/1
4 TABLET, FILM COATED ORAL EVERY 6 HOURS
Refills: 0 | Status: DISCONTINUED | OUTPATIENT
Start: 2021-10-13 | End: 2021-10-20

## 2021-10-13 RX ORDER — I.V. FAT EMULSION 20 G/100ML
14.5 EMULSION INTRAVENOUS
Qty: 35 | Refills: 0 | Status: DISCONTINUED | OUTPATIENT
Start: 2021-10-13 | End: 2021-10-14

## 2021-10-13 RX ORDER — POTASSIUM CHLORIDE 20 MEQ
10 PACKET (EA) ORAL
Refills: 0 | Status: COMPLETED | OUTPATIENT
Start: 2021-10-13 | End: 2021-10-13

## 2021-10-13 RX ORDER — ELECTROLYTE SOLUTION,INJ
1 VIAL (ML) INTRAVENOUS
Refills: 0 | Status: DISCONTINUED | OUTPATIENT
Start: 2021-10-13 | End: 2021-10-13

## 2021-10-13 RX ORDER — HYDROMORPHONE HYDROCHLORIDE 2 MG/ML
0.5 INJECTION INTRAMUSCULAR; INTRAVENOUS; SUBCUTANEOUS ONCE
Refills: 0 | Status: DISCONTINUED | OUTPATIENT
Start: 2021-10-13 | End: 2021-10-13

## 2021-10-13 RX ORDER — SODIUM CHLORIDE 9 MG/ML
500 INJECTION INTRAMUSCULAR; INTRAVENOUS; SUBCUTANEOUS ONCE
Refills: 0 | Status: COMPLETED | OUTPATIENT
Start: 2021-10-13 | End: 2021-10-13

## 2021-10-13 RX ORDER — HYDROMORPHONE HYDROCHLORIDE 2 MG/ML
0.5 INJECTION INTRAMUSCULAR; INTRAVENOUS; SUBCUTANEOUS EVERY 4 HOURS
Refills: 0 | Status: DISCONTINUED | OUTPATIENT
Start: 2021-10-13 | End: 2021-10-18

## 2021-10-13 RX ADMIN — HYDROMORPHONE HYDROCHLORIDE 0.5 MILLIGRAM(S): 2 INJECTION INTRAMUSCULAR; INTRAVENOUS; SUBCUTANEOUS at 23:50

## 2021-10-13 RX ADMIN — Medication 650 MILLIGRAM(S): at 19:11

## 2021-10-13 RX ADMIN — HYDROMORPHONE HYDROCHLORIDE 0.5 MILLIGRAM(S): 2 INJECTION INTRAMUSCULAR; INTRAVENOUS; SUBCUTANEOUS at 13:30

## 2021-10-13 RX ADMIN — HYDROMORPHONE HYDROCHLORIDE 0.5 MILLIGRAM(S): 2 INJECTION INTRAMUSCULAR; INTRAVENOUS; SUBCUTANEOUS at 13:00

## 2021-10-13 RX ADMIN — PANTOPRAZOLE SODIUM 40 MILLIGRAM(S): 20 TABLET, DELAYED RELEASE ORAL at 07:00

## 2021-10-13 RX ADMIN — HYDROMORPHONE HYDROCHLORIDE 0.25 MILLIGRAM(S): 2 INJECTION INTRAMUSCULAR; INTRAVENOUS; SUBCUTANEOUS at 03:10

## 2021-10-13 RX ADMIN — HYDROMORPHONE HYDROCHLORIDE 0.25 MILLIGRAM(S): 2 INJECTION INTRAMUSCULAR; INTRAVENOUS; SUBCUTANEOUS at 02:43

## 2021-10-13 RX ADMIN — Medication 100 MILLIEQUIVALENT(S): at 16:54

## 2021-10-13 RX ADMIN — Medication 100 MILLIEQUIVALENT(S): at 13:48

## 2021-10-13 RX ADMIN — SODIUM CHLORIDE 500 MILLILITER(S): 9 INJECTION INTRAMUSCULAR; INTRAVENOUS; SUBCUTANEOUS at 08:39

## 2021-10-13 RX ADMIN — HYDROMORPHONE HYDROCHLORIDE 0.5 MILLIGRAM(S): 2 INJECTION INTRAMUSCULAR; INTRAVENOUS; SUBCUTANEOUS at 08:39

## 2021-10-13 RX ADMIN — ONDANSETRON 4 MILLIGRAM(S): 8 TABLET, FILM COATED ORAL at 18:58

## 2021-10-13 RX ADMIN — I.V. FAT EMULSION 14.5 ML/HR: 20 EMULSION INTRAVENOUS at 18:15

## 2021-10-13 RX ADMIN — Medication 1 EACH: at 18:16

## 2021-10-13 RX ADMIN — Medication 100 MILLIEQUIVALENT(S): at 15:13

## 2021-10-13 RX ADMIN — Medication 85 MILLIMOLE(S): at 15:16

## 2021-10-13 RX ADMIN — HYDROMORPHONE HYDROCHLORIDE 0.5 MILLIGRAM(S): 2 INJECTION INTRAMUSCULAR; INTRAVENOUS; SUBCUTANEOUS at 23:21

## 2021-10-13 RX ADMIN — PANTOPRAZOLE SODIUM 40 MILLIGRAM(S): 20 TABLET, DELAYED RELEASE ORAL at 18:17

## 2021-10-13 RX ADMIN — HYDROMORPHONE HYDROCHLORIDE 0.5 MILLIGRAM(S): 2 INJECTION INTRAMUSCULAR; INTRAVENOUS; SUBCUTANEOUS at 18:58

## 2021-10-13 RX ADMIN — HYDROMORPHONE HYDROCHLORIDE 0.5 MILLIGRAM(S): 2 INJECTION INTRAMUSCULAR; INTRAVENOUS; SUBCUTANEOUS at 04:27

## 2021-10-13 RX ADMIN — CHLORHEXIDINE GLUCONATE 1 APPLICATION(S): 213 SOLUTION TOPICAL at 11:20

## 2021-10-13 RX ADMIN — ONDANSETRON 4 MILLIGRAM(S): 8 TABLET, FILM COATED ORAL at 08:39

## 2021-10-13 RX ADMIN — Medication 650 MILLIGRAM(S): at 18:58

## 2021-10-13 RX ADMIN — HYDROMORPHONE HYDROCHLORIDE 0.5 MILLIGRAM(S): 2 INJECTION INTRAMUSCULAR; INTRAVENOUS; SUBCUTANEOUS at 09:00

## 2021-10-13 RX ADMIN — HYDROMORPHONE HYDROCHLORIDE 0.5 MILLIGRAM(S): 2 INJECTION INTRAMUSCULAR; INTRAVENOUS; SUBCUTANEOUS at 04:55

## 2021-10-13 NOTE — PROGRESS NOTE ADULT - SUBJECTIVE AND OBJECTIVE BOX
Chief Complaint:  Patient is a 26y old  Female who presents with a chief complaint of crohn's flare up (13 Oct 2021 16:41)    Reason for consult: abd pain, n/v, hx Crohn's    Interval Events: Pt continuing to have significant nausea, occasional vomiting, severe pain requiring dilaudid despite being NPO for over 24 hours.     Hospital Medications:  acetaminophen   Tablet .. 650 milliGRAM(s) Oral every 6 hours PRN  chlorhexidine 2% Cloths 1 Application(s) Topical daily  fat emulsion (Fish Oil and Plant Based) 20% Infusion 14.5 mL/Hr IV Continuous <Continuous>  HYDROmorphone  Injectable 0.5 milliGRAM(s) IV Push every 6 hours PRN  HYDROmorphone  Injectable 0.25 milliGRAM(s) IV Push every 6 hours PRN  influenza   Vaccine 0.5 milliLiter(s) IntraMuscular once  lidocaine 2% Viscous 2 milliLiter(s) Swish and Spit three times a day PRN  ondansetron Injectable 4 milliGRAM(s) IV Push every 6 hours PRN  pantoprazole  Injectable 40 milliGRAM(s) IV Push two times a day  Parenteral Nutrition - Adult 1 Each TPN Continuous <Continuous>  Parenteral Nutrition - Adult 1 Each TPN Continuous <Continuous>  sucralfate suspension 1 Gram(s) Oral <User Schedule>      ROS:   General:  No  fevers, chills, night sweats, fatigue  Eyes:  Good vision, no reported pain  ENT:  No sore throat, pain, runny nose  CV:  No pain, palpitations  Pulm:  No dyspnea, cough  GI:  See HPI, otherwise negative  :  No  incontinence, nocturia  Muscle:  No pain, weakness  Neuro:  No memory problems  Psych:  No insomnia, mood problems, depression  Endocrine:  No polyuria, polydipsia, cold/heat intolerance  Heme:  No petechiae, ecchymosis, easy bruisability  Skin:  No rash    PHYSICAL EXAM:   Vital Signs:  Vital Signs Last 24 Hrs  T(C): 36.8 (13 Oct 2021 10:06), Max: 37.1 (13 Oct 2021 06:45)  T(F): 98.2 (13 Oct 2021 10:06), Max: 98.7 (13 Oct 2021 06:45)  HR: 84 (13 Oct 2021 10:06) (84 - 99)  BP: 107/58 (13 Oct 2021 10:06) (87/37 - 107/58)  BP(mean): --  RR: 18 (13 Oct 2021 10:06) (18 - 20)  SpO2: 100% (13 Oct 2021 10:06) (100% - 100%)  Daily     Daily Weight in k.1 (13 Oct 2021 06:45)    GENERAL: no acute distress  NEURO: alert  HEENT: anicteric sclera, no conjunctival pallor appreciated  CHEST: no respiratory distress, no accessory muscle use  CARDIAC: regular rate, rhythm  ABDOMEN: soft, non-tender, non-distended, no rebound or guarding  EXTREMITIES: warm, well perfused, no edema  SKIN: no lesions noted    LABS: reviewed                        8.2    5.82  )-----------( 289      ( 13 Oct 2021 11:08 )             25.6     10-13    140  |  111<H>  |  12  ----------------------------<  110<H>  3.5   |  20<L>  |  0.40<L>    Ca    7.7<L>      13 Oct 2021 11:08  Phos  1.9     10-13  Mg     1.60     10-13    TPro  6.0  /  Alb  3.8  /  TBili  0.3  /  DBili  x   /  AST  22  /  ALT  34<H>  /  AlkPhos  69  10-13    LIVER FUNCTIONS - ( 13 Oct 2021 11:08 )  Alb: 3.8 g/dL / Pro: 6.0 g/dL / ALK PHOS: 69 U/L / ALT: 34 U/L / AST: 22 U/L / GGT: x             Interval Diagnostic Studies: see sunrise for full report

## 2021-10-13 NOTE — PROGRESS NOTE ADULT - SUBJECTIVE AND OBJECTIVE BOX
Giovanni Prajapati MS4    Patient is a 26y old  Female who presents with a chief complaint of crohn's flare up (12 Oct 2021 10:01)    SUBJECTIVE / OVERNIGHT EVENTS: Pt received Dilaudid overnight ahead of schedule as well as PRN Reglan for nausea. Pt had episodes of hypotension overnight as well. Pt was examined at bedside crying complaining of severe pain and n/v. pt states she had n/v x3 overnight and this morning. Pt stated it was bilious and nonbloody. This all began after she drank some water yesterday. She has not tried any other oral intake. Pt denies any f/c, SOB, chest pain. COVID PCR came back negative.    MEDICATIONS  (STANDING):  chlorhexidine 2% Cloths 1 Application(s) Topical daily  fat emulsion (Fish Oil and Plant Based) 20% Infusion 14.5 mL/Hr (14.5 mL/Hr) IV Continuous <Continuous>  HYDROmorphone  Injectable 0.5 milliGRAM(s) IV Push once  influenza   Vaccine 0.5 milliLiter(s) IntraMuscular once  pantoprazole  Injectable 40 milliGRAM(s) IV Push two times a day  Parenteral Nutrition - Adult 1 Each (83 mL/Hr) TPN Continuous <Continuous>  sodium chloride 0.9% Bolus 500 milliLiter(s) IV Bolus once  sucralfate suspension 1 Gram(s) Oral <User Schedule>    MEDICATIONS  (PRN):  acetaminophen   Tablet .. 650 milliGRAM(s) Oral every 6 hours PRN Temp greater or equal to 38.5C (101.3F), Mild Pain (1 - 3)  HYDROmorphone  Injectable 0.5 milliGRAM(s) IV Push every 6 hours PRN Severe Pain (7 - 10)  HYDROmorphone  Injectable 0.25 milliGRAM(s) IV Push every 6 hours PRN Moderate Pain (4 - 6)  lidocaine 2% Viscous 2 milliLiter(s) Swish and Spit three times a day PRN pain with swallowing  ondansetron Injectable 4 milliGRAM(s) IV Push every 6 hours PRN Nausea and/or Vomiting      Vital Signs Last 24 Hrs  T(C): 37.1 (13 Oct 2021 06:45), Max: 37.1 (13 Oct 2021 06:45)  T(F): 98.7 (13 Oct 2021 06:45), Max: 98.7 (13 Oct 2021 06:45)  HR: 99 (13 Oct 2021 06:45) (84 - 99)  BP: 87/37 (13 Oct 2021 06:45) (87/37 - 109/73)  BP(mean): --  RR: 20 (13 Oct 2021 06:45) (18 - 20)  SpO2: 100% (13 Oct 2021 06:45) (99% - 100%)  CAPILLARY BLOOD GLUCOSE    POCT Blood Glucose.: 100 mg/dL (13 Oct 2021 07:16)  POCT Blood Glucose.: 106 mg/dL (12 Oct 2021 18:00)  POCT Blood Glucose.: 103 mg/dL (12 Oct 2021 09:27)    I&O's Summary      PHYSICAL EXAM:  GENERAL: crying, moderate distress  HEAD:  Atraumatic, Normocephalic  EYES:  conjunctiva and sclera clear  NECK: No JVD  CHEST/LUNG: nonlabored respirations  HEART: patient deferred exam  ABDOMEN: patient deferred exam  EXTREMITIES:  moving all extremities spontaneously  PSYCH: AAOx3  NEUROLOGY: non-focal  SKIN: No rashes or lesions    LABS:                RADIOLOGY & ADDITIONAL TESTS:    COVID-19 PCR: NotDetec (12 Oct 2021 20:33)     Giovanni Prajapati MS4    Patient is a 26y old  Female who presents with a chief complaint of crohn's flare up (12 Oct 2021 10:01)    SUBJECTIVE / OVERNIGHT EVENTS: Pt received Dilaudid overnight ahead of schedule as well as PRN Reglan for nausea. Pt had episodes of hypotension overnight as well. Pt was examined at bedside crying complaining of severe pain and n/v. pt states she had n/v x3 overnight and this morning. Pt stated it was bilious and nonbloody. This all began after she drank some water yesterday. She has not tried any other oral intake. Pt denies any f/c, SOB, chest pain. COVID PCR came back negative.    MEDICATIONS  (STANDING):  chlorhexidine 2% Cloths 1 Application(s) Topical daily  fat emulsion (Fish Oil and Plant Based) 20% Infusion 14.5 mL/Hr (14.5 mL/Hr) IV Continuous <Continuous>  HYDROmorphone  Injectable 0.5 milliGRAM(s) IV Push once  influenza   Vaccine 0.5 milliLiter(s) IntraMuscular once  pantoprazole  Injectable 40 milliGRAM(s) IV Push two times a day  Parenteral Nutrition - Adult 1 Each (83 mL/Hr) TPN Continuous <Continuous>  sodium chloride 0.9% Bolus 500 milliLiter(s) IV Bolus once  sucralfate suspension 1 Gram(s) Oral <User Schedule>    MEDICATIONS  (PRN):  acetaminophen   Tablet .. 650 milliGRAM(s) Oral every 6 hours PRN Temp greater or equal to 38.5C (101.3F), Mild Pain (1 - 3)  HYDROmorphone  Injectable 0.5 milliGRAM(s) IV Push every 6 hours PRN Severe Pain (7 - 10)  HYDROmorphone  Injectable 0.25 milliGRAM(s) IV Push every 6 hours PRN Moderate Pain (4 - 6)  lidocaine 2% Viscous 2 milliLiter(s) Swish and Spit three times a day PRN pain with swallowing  ondansetron Injectable 4 milliGRAM(s) IV Push every 6 hours PRN Nausea and/or Vomiting      Vital Signs Last 24 Hrs  T(C): 37.1 (13 Oct 2021 06:45), Max: 37.1 (13 Oct 2021 06:45)  T(F): 98.7 (13 Oct 2021 06:45), Max: 98.7 (13 Oct 2021 06:45)  HR: 99 (13 Oct 2021 06:45) (84 - 99)  BP: 87/37 (13 Oct 2021 06:45) (87/37 - 109/73)  BP(mean): --  RR: 20 (13 Oct 2021 06:45) (18 - 20)  SpO2: 100% (13 Oct 2021 06:45) (99% - 100%)  CAPILLARY BLOOD GLUCOSE    POCT Blood Glucose.: 100 mg/dL (13 Oct 2021 07:16)  POCT Blood Glucose.: 106 mg/dL (12 Oct 2021 18:00)  POCT Blood Glucose.: 103 mg/dL (12 Oct 2021 09:27)    I&O's Summary      PHYSICAL EXAM:  GENERAL: crying, moderate distress  HEAD:  Atraumatic, Normocephalic  EYES:  conjunctiva and sclera clear, EOMI  NECK: supple, intact ROM  CHEST/LUNG: nonlabored respirations  HEART: patient deferred exam  ABDOMEN: patient deferred exam  EXTREMITIES:  no visible edema, cyanosis or clubbing   PSYCH: AAOx3  NEUROLOGY: non-focal, moving all extremities   SKIN: No rashes or lesions    LABS:                RADIOLOGY & ADDITIONAL TESTS:    COVID-19 PCR: NotDetec (12 Oct 2021 20:33)

## 2021-10-13 NOTE — PROGRESS NOTE ADULT - PROBLEM SELECTOR PLAN 2
Possibly related to esophagitis/reflux, other etiologies include active Crohn's (though no objective evidence pointing towards flare).  EGD showed gastric friability and was biopsied, negative for Crohn's. There was erythematous duodenopathy but no esophagitis.    - Treat symptomatically with IV PPI BID, add sucralfate liquid 1g q6h for esophagitis per GI recs  - C/w dilaudid for pain. Will attempt to avoid opiates in this patient when possible given increased mortality with opiates in IBD patients  - clears starting 10/9   - Zofran PRN, Checking QTc qOD, last EKG had QTc wnl  - C/w TPN  - consider possible pain management consult Possibly related to esophagitis/reflux, other etiologies include active Crohn's (though no objective evidence pointing towards flare).  EGD showed gastric friability and was biopsied, negative for Crohn's. There was erythematous duodenopathy but no esophagitis.    - Treat symptomatically with IV PPI BID, add sucralfate liquid 1g q6h for esophagitis per GI recs  - C/w dilaudid for pain. Will attempt to avoid opiates in this patient when possible given increased mortality with opiates in IBD patients  - clears starting 10/9   - Pt tried on reglan but refuses bc she states it makes her anxious. Zofran PRN, Checking QTc qOD, last EKG had QTc wnl  - C/w TPN  - consider possible pain management consult

## 2021-10-13 NOTE — PROGRESS NOTE ADULT - ASSESSMENT
26F w/ hx of Crohn's colitis c/b ascending colon stricture s/p resection 8/2021 w/ ileostomy (at [facility]) on remicade since 6/2021 (s/p induction and 2 maintenance doses, last 10/1/2021, admitted w/ ongoing severe post prandial epigastric pain and weight loss due to inability to take PO.    Impression:  #Post-prandial abd pain - Unclear etiology. Pt is s/p two extended hospitalizations at [facility] since her surgery, with testing including extensive imaging and upper GI endoscopy. No e/o SMA syndrome, EGD w/ esophagitis only. EGD here shows mild esophagitis and severe gastric inflammation (new finding compared to EGD 6 weeks ago, path negative for active Crohn's and is unchanged from her path results from [facility]). It is possible pain is related to severe gastritis, though having such severe pain from this is unusual. She has been evaluated for SMA syndrome at [facility]. In terms of mesenteric iscehmia, her vasculature is patent and no lactate, pointing away from any ischemic process. Suspect that she may have some underlying pain syndrome or non-GI etiology of her pain, but it is completely unclear what that might be.   #Crohn's disease - Historical details as above. It is unclear if she has active Crohn's at this time. Her ESR/CRP are negative, but they were never positive even at time of her initial diagnosis. Her fecal calprotectin, however, was very positive on diagnosis and is negative here. The only objective e/o active Crohn's is her CT scan on admission, which shows RLQ enteritis, but this is an imperfect test. Biopsies from EGD not c/w upper GI Crohn's. Ileoscopy would be useful in determining this but patient has declined. This would also not change the management of her current symptoms, as this appears to be unrelated to Crohn's.     Recommendations:  - At this point, given lack of improvement will trial steroids. This should also help her nausea. Recommend starting methylprednisolone 20mg IV BID (i.e. 8 am and 4 pm) starting tomorrow.  - Recommend trialing sleep medication and do not disturb/no routine vitals overnight to allow patient to sleep.  - Can c/w trial of clears for now.  - Continue high dose acid suppression w/ IV PPI.  - Continue w/ sucralfate liquid 1g q6h. Patient is trying to not miss any doses  - Agree w/ pain management consult. If responds to steroids and is able to take PO would transition away from dilaudid and start oxycodone.    This plan was discussed w/ patient and her sister at length.    Faustino Freitas  Gastroenterology/Hepatology Fellow  Available via Microsoft Teams    NON-URGENT CONSULTS:  Please email [email or web address]  AT NIGHT AND ON WEEKENDS:  Contact on-call GI fellow via answering service ([phone]) from 5pm-8am and on weekends/holidays  MONDAY-FRIDAY 8AM-5PM:  Pager# [phone] ([facility]) or [phone] ([facility])  GI Phone# [phone] ([facility])

## 2021-10-13 NOTE — PROGRESS NOTE ADULT - ATTENDING COMMENTS
25 yo F with Hx of Crohn's disease s/p surgery for stricture/ileostomy placement in 8/ 2021, s/p PICC line for TPN approx 2 weeks prior to admission, p/w abdominal pain, n/v w/ concern for possible potential Crohn's Flare. S/p infliximab on 10/1. S/p EGD on 9/30 showing erythematous duodenoplasty, diffuse erythema and friability w/ some areas of superficial ulceration s/p biopsy. Bx notable for chronic inactive gastritis.     Pt w/ continued intermittent abdominal pain and n/v w/ clear liquid diet.       -C/w pain management PRN. C/w dilaudid .25IV q6hrs PRN for moderate pain and dilaudid .5mg IV q6 PRN for severe pain. Pain management consulted. Will appreciate recs.  -c/w IV PPI, standing carafate q6hrs, and antiemetics PRN.   -serial abdominal exams and monitor for BMs while on opiods  -C/w clears as tolerated. C/w TPN and monitor LFTs.  -Will f/u GI recs

## 2021-10-13 NOTE — PROGRESS NOTE ADULT - ATTENDING COMMENTS
I agree with the above history, physical examination, chief complaint/diagnosis, and plan, which I have reviewed and edited where appropriate.  I agree with notes/assessment and detailed interval history of health care providers on my service.  I have seen and examined the patient.  I reviewed the laboratory and available data and agree with the history, physical assessment and plan.  I reviewed and discussed with all consultants, house staff and PA's.  The Nutrition Support Team (NST) discusses on an ongoing basis with the primary team and all consultants, House staff and PA's to have a permanent risk benefit analyses on all decisions and coordinating care.  I was physically present for the key portions of the evaluation and management (E/M) service provided.  26 year old female with PMH Crohn's disease s/p surgery for stricture/ileostomy receiving TPN   labs reviewed - electrolytes adjusted  continue TPN with infusion volume of 2L/325 kcal/day

## 2021-10-13 NOTE — PROGRESS NOTE ADULT - SUBJECTIVE AND OBJECTIVE BOX
NUTRITION NOTE  [id number] MUTAMANDAA  ===============================    Interval events - Patient was seen and examined at bedside, no acute events overnight. Patient denies chest pain or shortness of breath at this time. Patient remains on TPN at this time. Patient reports nausea and vomiting throughout the time which started after drinking water yesterday.     ROS: Except as noted above, all other systems reviewed and are negative     Allergies  No Known Allergies    PAST MEDICAL & SURGICAL HISTORY:  Crohn disease  Peripherally inserted central catheter (PICC) in place LUE PICC place on 2021  History of ileostomy Aug 2021    Vital Signs Last 24 Hrs  T(C): 37.1 (13 Oct 2021 06:45), Max: 37.1 (13 Oct 2021 06:45)  T(F): 98.7 (13 Oct 2021 06:45), Max: 98.7 (13 Oct 2021 06:45)  HR: 99 (13 Oct 2021 06:45) (84 - 99)  BP: 87/37 (13 Oct 2021 06:45) (87/37 - 109/73)  RR: 20 (13 Oct 2021 06:45) (18 - 20)  SpO2: 100% (13 Oct 2021 06:45) (99% - 100%)    MEDICATIONS  (STANDING):  chlorhexidine 2% Cloths 1 Application(s) Topical daily  fat emulsion (Fish Oil and Plant Based) 20% Infusion 14.5 mL/Hr (14.5 mL/Hr) IV Continuous <Continuous>  influenza   Vaccine 0.5 milliLiter(s) IntraMuscular once  pantoprazole  Injectable 40 milliGRAM(s) IV Push two times a day  Parenteral Nutrition - Adult 1 Each (83 mL/Hr) TPN Continuous <Continuous>  Parenteral Nutrition - Adult 1 Each (83 mL/Hr) TPN Continuous <Continuous>  sucralfate suspension 1 Gram(s) Oral <User Schedule>    MEDICATIONS  (PRN):  acetaminophen   Tablet .. 650 milliGRAM(s) Oral every 6 hours PRN Temp greater or equal to 38.5C (101.3F), Mild Pain (1 - 3)  HYDROmorphone  Injectable 0.5 milliGRAM(s) IV Push every 6 hours PRN Severe Pain (7 - 10)  HYDROmorphone  Injectable 0.25 milliGRAM(s) IV Push every 6 hours PRN Moderate Pain (4 - 6)  lidocaine 2% Viscous 2 milliLiter(s) Swish and Spit three times a day PRN pain with swallowing  ondansetron Injectable 4 milliGRAM(s) IV Push every 6 hours PRN Nausea and/or Vomiting    Daily Weight in k.1 (13 Oct 2021 06:45)    Drug Dosing Weight  Height (cm): 154.9 (30 Sep 2021 08:11)  Weight (kg): 35.2 (30 Sep 2021 08:11)  BMI (kg/m2): 14.7 (30 Sep 2021 08:11)  BSA (m2): 1.26 (30 Sep 2021 08:11)    PHYSICAL EXAM:  GENERAL: malnourished, resting comfortably in bed   HEAD:  Atraumatic, Normocephalic  CHEST/LUNG: nonlabored respirations, CTAB  ABDOMEN: soft, mildly tender to palpation, nondistended   PSYCH: AAOx3  PICC Site: C/D/I    Diet: clear liquids and TPN/lipids     LABORATORY                                                          8.2    5.82  )-----------( 289      ( 13 Oct 2021 11:08 )             25.6   10-13    140  |  111<H>  |  12  ----------------------------<  110<H>  3.5   |  20<L>  |  0.40<L>    Ca    7.7<L>      13 Oct 2021 11:08  Phos  1.9     10-13  Mg     1.60     10-13    TPro  6.0  /  Alb  3.8  /  TBili  0.3  /  DBili  x   /  AST  22  /  ALT  34<H>  /  AlkPhos  69  10-13    LIVER FUNCTIONS - ( 13 Oct 2021 11:08 )  Alb: 3.8 g/dL / Pro: 6.0 g/dL / ALK PHOS: 69 U/L / ALT: 34 U/L / AST: 22 U/L / GGT: x           10-05 Chol -- LDL -- HDL -- Trig 91, 09-30 Chol -- LDL -- HDL -- Trig 71    COVID-19 PCR: NotDetec (12 Oct 2021 20:33)  COVID-19 PCR: NotDetec (29 Sep 2021 17:26)  COVID-19 PCR: NotDetec (25 Sep 2021 23:42)  COVID-19 PCR: NotDetec (2021 03:16)  COVID-19 PCR: NotDetec (2021 04:25)  COVID-19 PCR: NotDetec (2021 21:40)    ASSESSMENT/PLAN:  26 year old female with PMH Crohn's disease s/p surgery for stricture/ileostomy placement in mid 2021, s/p PICC line in place for TPN 2 weeks ago, presents with epigastric abdominal pain, nausea, and vomiting x 4 days. GI is following with possible EGD to evaluate for esophagitis. Nutrition service consulted for resuming parenteral nutrition via PICC line that is in place. Patient states that she feels very weak and is frustrated that she is not able to take in anything by mouth (liquids or food) without nausea or emesis. TPN was initiated on 21.    continue TPN with infusion volume of 2L, TPN will provide 1325 kcal/day     labs reviewed - electrolytes adjusted in TPN bag     monitor fingersticks, obtain daily weights    continue parenteral nutrition at this time, will follow up with primary team on plan - GI workup in process     1.  Severe protein calorie malnutrition being optimized with TPN: CHO [195] gm.  AA [78] gm. SMOF Lipids [35] gm.  2.  Hyperglycemia managed with: [0] units of regular insulin    3.  Check fluid balance daily.  Strict I/O  [ ] [ ]   4.  Daily BMP, Ionized Calcium, Magnesium and Phosphorous   5.  Triglycerides at initiation of TPN and monthly [ ] [ ]     Nutrition Support [id number]

## 2021-10-13 NOTE — PROGRESS NOTE ADULT - ATTENDING COMMENTS
26 Female h/o Crohn's colitis with ascending colon stricture status post right hemicolectomy and ileostomy on Remicade p/w postprandial pain and nausea.  EGD with esophagitis/ gastritis.  Currently on TPN for bowel rest.  Appreciate pain management recs: Try IV acetaminophen for pain.  Would try and limit opiates given effects on gastric dysmotility and increased nausea.  Trial of steroids IV BID which will help with both Crohn's and nausea.

## 2021-10-13 NOTE — CONSULT NOTE ADULT - SUBJECTIVE AND OBJECTIVE BOX
Chief Complaint: Abdominal pain    HPI:    26 year old female with PMH Crohn's disease s/p surgery for stricture/ileostomy placement in mid August 2021, s/p PICC line in place for TPN 2 weeks ago, presents with epigastric abdominal pain, nausea, and vomiting x 4 days. Pt c/o multiple episodes of nausea and vomiting, food consistency emesis. Unable to retain any PO intake and wasn't eating nor drinking for past 3 days. Epigastric pain described as aching, 9/10, radiating to her esophagus. Admits to good ileostomy output of yellow-green watery stool with no blood. Pt was admitted at [facility] early september for nausea and vomiting and 2 weeks ago had PICC line placed for TPN infusion at home. Pt on Remicade infusion: last infusion was in July 2021 and was supposed to receive the infusion again Sept 16. However, since pt was hospitalized at [facility], she missed the infusion. Pt denies chest pain, sob, palpitations. hematochezia, melena, dysuria, dizziness, lightheadedness or sick contacts. (26 Sep 2021 12:05)      PAST MEDICAL & SURGICAL HISTORY:  Crohn disease    No pertinent past surgical history    Peripherally inserted central catheter (PICC) in place  LUE PICC place on 9/12/2021    History of ileostomy  Aug 2021        FAMILY HISTORY:  FH: type 1 diabetes (Sibling)    FH: hypertension (Father)        SOCIAL HISTORY:  [ ] Denies Smoking, Alcohol, or Drug Use    Allergies    No Known Allergies    Intolerances        PAIN MEDICATIONS:  acetaminophen   Tablet .. 650 milliGRAM(s) Oral every 6 hours PRN  HYDROmorphone  Injectable 0.5 milliGRAM(s) IV Push every 6 hours PRN  HYDROmorphone  Injectable 0.25 milliGRAM(s) IV Push every 6 hours PRN  ondansetron Injectable 4 milliGRAM(s) IV Push every 6 hours PRN      Heme:    Antibiotics:    Cardiovascular:    GI:  pantoprazole  Injectable 40 milliGRAM(s) IV Push two times a day  sucralfate suspension 1 Gram(s) Oral <User Schedule>    Endocrine:    All Other Medications:  chlorhexidine 2% Cloths 1 Application(s) Topical daily  fat emulsion (Fish Oil and Plant Based) 20% Infusion 14.5 mL/Hr IV Continuous <Continuous>  influenza   Vaccine 0.5 milliLiter(s) IntraMuscular once  lidocaine 2% Viscous 2 milliLiter(s) Swish and Spit three times a day PRN  Parenteral Nutrition - Adult 1 Each TPN Continuous <Continuous>  Parenteral Nutrition - Adult 1 Each TPN Continuous <Continuous>  potassium chloride  10 mEq/100 mL IVPB 10 milliEquivalent(s) IV Intermittent every 1 hour        Vital Signs Last 24 Hrs  T(C): 36.8 (13 Oct 2021 10:06), Max: 37.1 (13 Oct 2021 06:45)  T(F): 98.2 (13 Oct 2021 10:06), Max: 98.7 (13 Oct 2021 06:45)  HR: 84 (13 Oct 2021 10:06) (84 - 99)  BP: 107/58 (13 Oct 2021 10:06) (87/37 - 109/73)  BP(mean): --  RR: 18 (13 Oct 2021 10:06) (18 - 20)  SpO2: 100% (13 Oct 2021 10:06) (99% - 100%)    PAIN SCORE:   9/10     SCALE USED: (1-10 VNRS)             PHYSICAL EXAM:    GENERAL: NAD, well-groomed, well-developed    LABS:                          8.2    5.82  )-----------( 289      ( 13 Oct 2021 11:08 )             25.6     10-13    140  |  111<H>  |  12  ----------------------------<  110<H>  3.5   |  20<L>  |  0.40<L>    Ca    7.7<L>      13 Oct 2021 11:08  Phos  1.9     10-13  Mg     1.60     10-13    TPro  6.0  /  Alb  3.8  /  TBili  0.3  /  DBili  x   /  AST  22  /  ALT  34<H>  /  AlkPhos  69  10-13              Patient seen at bedside. Patient states she has a lot of pain from her epigastric pain going up. Patient states she has surgery in [facility] and was on an PCA pump but does not recollect the medication. Patient states she was asked to take Remicade by her GI doctor, but she missed a dose because she was hospitalized for gastritis. Patient states she received Remicade here in the hospital 3 weeks ago. Patient states the pain is constant and achy. Patient reports she has twitching/spasms on her epigastric area. Patient states she is on a diet but food is making her pain worse. Patient states she stopped eating yesterday morning because of the pain. Patient also reports nausea and vomiting. Patient states her last episode of vomiting was today and it is bilious. Patient states the IV Dilaudid is the only medication that has been helping her. Patient states the IV Dilaudid 0.25mg does not help her 0.5mg helps. Patient states steroids have helped her in the past with her pain and she is thinking of asking her GI team here about considering steroids for her pain now. Patient currently NPO with TPN.           Patient alert and orientedx4. Patient answers questions appropriately. Maintains eye contact. Not in any apparent distress. Patient denies alcohol use, smoking and use of illicit drugs.       Discussed patient with chronic pain management MD Dr. Epps and his recommendations are as follows:  Consider IV Acetaminophen 500mg Q6H standing x 4 doses. Not to be given within 6 hours of last dose of Acetaminophen.  Consider discontinuing current orders for IV Dilaudid instead order:  IV Dialudid 0.25mg Q4H PRN for moderate pain. Hold for over sedation. Not to be given within one hour of any other immediate acting opioid.  IV Dialudid 0.5mg Q4H PRN for severe pain. Hold for over sedation. Not to be given within one hour of any other immediate acting opioid.  3)	Consider PO Flexeril 5mg Q8H PRN for muscle spasms.  4)	Recommend surgery consult for abdominal pain.  5)	Recommend patient seeing a chronic pain management MD outpatient upon discharge.  6)	Recommend Holistic RN consult.  7)	Continuous pulse oximetry while patient on opioids.    [x ]  [place]  Reviewed and no medications found    Pain service to sign off on patient. Please call pain service if further assistance is needed with pain management.

## 2021-10-14 PROCEDURE — 93010 ELECTROCARDIOGRAM REPORT: CPT

## 2021-10-14 PROCEDURE — 99232 SBSQ HOSP IP/OBS MODERATE 35: CPT

## 2021-10-14 RX ORDER — SODIUM CHLORIDE 9 MG/ML
1000 INJECTION, SOLUTION INTRAVENOUS
Refills: 0 | Status: DISCONTINUED | OUTPATIENT
Start: 2021-10-14 | End: 2021-10-21

## 2021-10-14 RX ORDER — HYDROMORPHONE HYDROCHLORIDE 2 MG/ML
0.25 INJECTION INTRAMUSCULAR; INTRAVENOUS; SUBCUTANEOUS EVERY 4 HOURS
Refills: 0 | Status: DISCONTINUED | OUTPATIENT
Start: 2021-10-14 | End: 2021-10-18

## 2021-10-14 RX ORDER — HYDROMORPHONE HYDROCHLORIDE 2 MG/ML
0.25 INJECTION INTRAMUSCULAR; INTRAVENOUS; SUBCUTANEOUS EVERY 4 HOURS
Refills: 0 | Status: DISCONTINUED | OUTPATIENT
Start: 2021-10-14 | End: 2021-10-14

## 2021-10-14 RX ORDER — HYDROMORPHONE HYDROCHLORIDE 2 MG/ML
0.25 INJECTION INTRAMUSCULAR; INTRAVENOUS; SUBCUTANEOUS ONCE
Refills: 0 | Status: DISCONTINUED | OUTPATIENT
Start: 2021-10-14 | End: 2021-10-14

## 2021-10-14 RX ORDER — CYCLOBENZAPRINE HYDROCHLORIDE 10 MG/1
5 TABLET, FILM COATED ORAL THREE TIMES A DAY
Refills: 0 | Status: DISCONTINUED | OUTPATIENT
Start: 2021-10-14 | End: 2021-10-22

## 2021-10-14 RX ORDER — ELECTROLYTE SOLUTION,INJ
1 VIAL (ML) INTRAVENOUS
Refills: 0 | Status: DISCONTINUED | OUTPATIENT
Start: 2021-10-14 | End: 2021-10-14

## 2021-10-14 RX ORDER — ACETAMINOPHEN 500 MG
500 TABLET ORAL ONCE
Refills: 0 | Status: COMPLETED | OUTPATIENT
Start: 2021-10-15 | End: 2021-10-15

## 2021-10-14 RX ORDER — ACETAMINOPHEN 500 MG
500 TABLET ORAL EVERY 6 HOURS
Refills: 0 | Status: COMPLETED | OUTPATIENT
Start: 2021-10-14 | End: 2021-10-14

## 2021-10-14 RX ORDER — I.V. FAT EMULSION 20 G/100ML
14.5 EMULSION INTRAVENOUS
Qty: 35 | Refills: 0 | Status: DISCONTINUED | OUTPATIENT
Start: 2021-10-14 | End: 2021-10-15

## 2021-10-14 RX ORDER — ACETAMINOPHEN 500 MG
500 TABLET ORAL ONCE
Refills: 0 | Status: COMPLETED | OUTPATIENT
Start: 2021-10-14 | End: 2021-10-14

## 2021-10-14 RX ADMIN — I.V. FAT EMULSION 14.5 ML/HR: 20 EMULSION INTRAVENOUS at 18:45

## 2021-10-14 RX ADMIN — ONDANSETRON 4 MILLIGRAM(S): 8 TABLET, FILM COATED ORAL at 11:48

## 2021-10-14 RX ADMIN — PANTOPRAZOLE SODIUM 40 MILLIGRAM(S): 20 TABLET, DELAYED RELEASE ORAL at 06:45

## 2021-10-14 RX ADMIN — Medication 20 MILLIGRAM(S): at 11:47

## 2021-10-14 RX ADMIN — HYDROMORPHONE HYDROCHLORIDE 0.25 MILLIGRAM(S): 2 INJECTION INTRAMUSCULAR; INTRAVENOUS; SUBCUTANEOUS at 20:47

## 2021-10-14 RX ADMIN — Medication 20 MILLIGRAM(S): at 18:39

## 2021-10-14 RX ADMIN — HYDROMORPHONE HYDROCHLORIDE 0.5 MILLIGRAM(S): 2 INJECTION INTRAMUSCULAR; INTRAVENOUS; SUBCUTANEOUS at 11:46

## 2021-10-14 RX ADMIN — HYDROMORPHONE HYDROCHLORIDE 0.25 MILLIGRAM(S): 2 INJECTION INTRAMUSCULAR; INTRAVENOUS; SUBCUTANEOUS at 21:02

## 2021-10-14 RX ADMIN — HYDROMORPHONE HYDROCHLORIDE 0.5 MILLIGRAM(S): 2 INJECTION INTRAMUSCULAR; INTRAVENOUS; SUBCUTANEOUS at 12:24

## 2021-10-14 RX ADMIN — Medication 1 EACH: at 18:40

## 2021-10-14 RX ADMIN — HYDROMORPHONE HYDROCHLORIDE 0.5 MILLIGRAM(S): 2 INJECTION INTRAMUSCULAR; INTRAVENOUS; SUBCUTANEOUS at 18:56

## 2021-10-14 RX ADMIN — PANTOPRAZOLE SODIUM 40 MILLIGRAM(S): 20 TABLET, DELAYED RELEASE ORAL at 18:39

## 2021-10-14 RX ADMIN — HYDROMORPHONE HYDROCHLORIDE 0.5 MILLIGRAM(S): 2 INJECTION INTRAMUSCULAR; INTRAVENOUS; SUBCUTANEOUS at 03:21

## 2021-10-14 RX ADMIN — HYDROMORPHONE HYDROCHLORIDE 0.5 MILLIGRAM(S): 2 INJECTION INTRAMUSCULAR; INTRAVENOUS; SUBCUTANEOUS at 23:01

## 2021-10-14 RX ADMIN — HYDROMORPHONE HYDROCHLORIDE 0.5 MILLIGRAM(S): 2 INJECTION INTRAMUSCULAR; INTRAVENOUS; SUBCUTANEOUS at 23:15

## 2021-10-14 RX ADMIN — HYDROMORPHONE HYDROCHLORIDE 0.5 MILLIGRAM(S): 2 INJECTION INTRAMUSCULAR; INTRAVENOUS; SUBCUTANEOUS at 07:22

## 2021-10-14 RX ADMIN — Medication 1 GRAM(S): at 18:40

## 2021-10-14 RX ADMIN — Medication 500 MILLIGRAM(S): at 21:15

## 2021-10-14 RX ADMIN — Medication 1 GRAM(S): at 13:20

## 2021-10-14 RX ADMIN — ONDANSETRON 4 MILLIGRAM(S): 8 TABLET, FILM COATED ORAL at 18:57

## 2021-10-14 RX ADMIN — ONDANSETRON 4 MILLIGRAM(S): 8 TABLET, FILM COATED ORAL at 03:21

## 2021-10-14 RX ADMIN — HYDROMORPHONE HYDROCHLORIDE 0.5 MILLIGRAM(S): 2 INJECTION INTRAMUSCULAR; INTRAVENOUS; SUBCUTANEOUS at 07:50

## 2021-10-14 RX ADMIN — Medication 200 MILLIGRAM(S): at 21:00

## 2021-10-14 RX ADMIN — SODIUM CHLORIDE 100 MILLILITER(S): 9 INJECTION, SOLUTION INTRAVENOUS at 13:21

## 2021-10-14 RX ADMIN — CHLORHEXIDINE GLUCONATE 1 APPLICATION(S): 213 SOLUTION TOPICAL at 13:21

## 2021-10-14 RX ADMIN — HYDROMORPHONE HYDROCHLORIDE 0.5 MILLIGRAM(S): 2 INJECTION INTRAMUSCULAR; INTRAVENOUS; SUBCUTANEOUS at 03:50

## 2021-10-14 RX ADMIN — HYDROMORPHONE HYDROCHLORIDE 0.5 MILLIGRAM(S): 2 INJECTION INTRAMUSCULAR; INTRAVENOUS; SUBCUTANEOUS at 19:31

## 2021-10-14 NOTE — PROGRESS NOTE ADULT - ATTENDING COMMENTS
25 yo F with Hx of Crohn's disease s/p surgery for stricture/ileostomy placement in 8/ 2021, s/p PICC line for TPN approx 2 weeks prior to admission, p/w abdominal pain, n/v w/ concern for possible potential Crohn's Flare. S/p infliximab on 10/1. S/p EGD on 9/30 showing erythematous duodenoplasty, diffuse erythema and friability w/ some areas of superficial ulceration s/p biopsy. Bx notable for chronic inactive gastritis.     Pt w/ continued intermittent abdominal pain and n/v w/ clear liquid diet.       -C/w pain management PRN as stated above. Pain management consulted and recs appreciated.   -c/w IV PPI, standing carafate q6hrs, and antiemetics PRN.   -serial abdominal exams and monitor for BMs while on opiods  -C/w clears as tolerated. C/w TPN and monitor LFTs.  -Per GI trial of steroids now being started today. C/w solumedrol 20mg BID for now

## 2021-10-14 NOTE — PROGRESS NOTE ADULT - PROBLEM SELECTOR PLAN 2
Possibly related to esophagitis/reflux, other etiologies include active Crohn's (though no objective evidence pointing towards flare).  EGD showed gastric friability and was biopsied, negative for Crohn's. There was erythematous duodenopathy but no esophagitis.    - Treat symptomatically with IV PPI BID, add sucralfate liquid 1g q6h for esophagitis per GI recs  - C/w dilaudid for pain. Will attempt to avoid opiates in this patient when possible given increased mortality with opiates in IBD patients  - clears starting 10/9   - Pt tried on reglan but refuses bc she states it makes her anxious. Zofran PRN, Checking QTc qOD, last EKG had QTc wnl  - C/w TPN  - consider possible pain management consult Possibly related to esophagitis/reflux, other etiologies include active Crohn's (though no objective evidence pointing towards flare).  EGD showed gastric friability and was biopsied, negative for Crohn's. There was erythematous duodenopathy but no esophagitis.    - Treat symptomatically with IV PPI BID, add sucralfate liquid 1g q6h for esophagitis per GI recs  - C/w dilaudid for pain. Will attempt to avoid opiates in this patient when possible given increased mortality with opiates in IBD patients  - Pt tried on reglan but refuses bc she states it makes her anxious. Zofran PRN, Checking QTc qOD, last EKG had QTc wnl  - C/w TPN  - pain management recs appreciated

## 2021-10-14 NOTE — PROGRESS NOTE ADULT - ATTENDING COMMENTS
I agree with the above history, physical examination, chief complaint/diagnosis, and plan, which I have reviewed and edited where appropriate.  I agree with notes/assessment and detailed interval history of health care providers on my service.  I have seen and examined the patient.  I reviewed the laboratory and available data and agree with the history, physical assessment and plan.  I reviewed and discussed with all consultants, house staff and PA's.  The Nutrition Support Team (NST) discusses on an ongoing basis with the primary team and all consultants, House staff and PA's to have a permanent risk benefit analyses on all decisions and coordinating care.  I was physically present for the key portions of the evaluation and management (E/M) service provided.  26 year old female with PMH Crohn's disease s/p surgery for stricture/ileostomy receiving TPN   comfortable in bed   HEAD:  Atraumatic,   CHEST/LUNG: clear  ABDOMEN: soft, mildly tender to palpation, nondistended   labs reviewed - electrolytes adjusted  continue TPN with infusion volume of 2L/1325 kcal/day

## 2021-10-14 NOTE — PROGRESS NOTE ADULT - SUBJECTIVE AND OBJECTIVE BOX
Giovanni Prajapati MS3    Patient is a 26y old  Female who presents with a chief complaint of Crohn's, abdominal pain (13 Oct 2021 17:11)    SUBJECTIVE / OVERNIGHT EVENTS: Pt examined at bedside in distress, gagging. Pt states she has been experiencing significant pain and n/v overnight. She states this is the worst pain she has ever felt and that the dilaudid only lasts for 3 hours. She states that she wants a higher dose of pain medications. The pain radiation up her chest and she is having SOB from her pain. Pt denies any blood in her vomit or any changes in ostomy output. She denies any fever/chills.    MEDICATIONS  (STANDING):  chlorhexidine 2% Cloths 1 Application(s) Topical daily  fat emulsion (Fish Oil and Plant Based) 20% Infusion 14.5 mL/Hr (14.5 mL/Hr) IV Continuous <Continuous>  influenza   Vaccine 0.5 milliLiter(s) IntraMuscular once  pantoprazole  Injectable 40 milliGRAM(s) IV Push two times a day  Parenteral Nutrition - Adult 1 Each (83 mL/Hr) TPN Continuous <Continuous>  sucralfate suspension 1 Gram(s) Oral <User Schedule>    MEDICATIONS  (PRN):  acetaminophen   Tablet .. 650 milliGRAM(s) Oral every 6 hours PRN Temp greater or equal to 38.5C (101.3F), Mild Pain (1 - 3)  HYDROmorphone  Injectable 0.5 milliGRAM(s) IV Push every 4 hours PRN Severe Pain (7 - 10)  lidocaine 2% Viscous 2 milliLiter(s) Swish and Spit three times a day PRN pain with swallowing  ondansetron Injectable 4 milliGRAM(s) IV Push every 6 hours PRN Nausea and/or Vomiting      Vital Signs Last 24 Hrs  T(C): 37.1 (14 Oct 2021 03:21), Max: 37.1 (14 Oct 2021 03:21)  T(F): 98.7 (14 Oct 2021 03:21), Max: 98.7 (14 Oct 2021 03:21)  HR: 108 (14 Oct 2021 03:21) (78 - 134)  BP: 127/89 (14 Oct 2021 03:21) (107/58 - 132/70)  BP(mean): --  RR: 16 (14 Oct 2021 03:21) (16 - 18)  SpO2: 100% (14 Oct 2021 03:21) (99% - 100%)  CAPILLARY BLOOD GLUCOSE      POCT Blood Glucose.: 122 mg/dL (14 Oct 2021 06:34)    I&O's Summary    PHYSICAL EXAM:  GENERAL: NAD, well-developed  HEAD:  Atraumatic, Normocephalic  EYES: EOMI, PERRLA, conjunctiva and sclera clear  NECK: Supple, No JVD  CHEST/LUNG: Clear to auscultation bilaterally; No wheeze  HEART: Regular rate and rhythm; No murmurs, rubs, or gallops  ABDOMEN: Soft, Nontender, Nondistended; Bowel sounds present  EXTREMITIES:  2+ Peripheral Pulses, No clubbing, cyanosis, or edema  PSYCH: AAOx3  NEUROLOGY: non-focal  SKIN: No rashes or lesions    LABS:                        8.2    5.82  )-----------( 289      ( 13 Oct 2021 11:08 )             25.6     10-13    140  |  111<H>  |  12  ----------------------------<  110<H>  3.5   |  20<L>  |  0.40<L>    Ca    7.7<L>      13 Oct 2021 11:08  Phos  1.9     10-13  Mg     1.60     10-13    TPro  6.0  /  Alb  3.8  /  TBili  0.3  /  DBili  x   /  AST  22  /  ALT  34<H>  /  AlkPhos  69  10-13              RADIOLOGY & ADDITIONAL TESTS:    Imaging Personally Reviewed:    Consultant(s) Notes Reviewed:      Care Discussed with Consultants/Other Providers:   Giovanni Prajapati MS3    Patient is a 26y old  Female who presents with a chief complaint of Crohn's, abdominal pain (13 Oct 2021 17:11)    SUBJECTIVE / OVERNIGHT EVENTS: Pt examined at bedside in distress, gagging. Pt states she has been experiencing significant pain and n/v overnight. She states this is the worst pain she has ever felt and that the dilaudid only lasts for 3 hours. She states that she wants a higher dose of pain medications. The pain radiation up her chest and she is having SOB from her pain. Pt denies any blood in her vomit or any changes in ostomy output. She denies any fever/chills.    MEDICATIONS  (STANDING):  chlorhexidine 2% Cloths 1 Application(s) Topical daily  fat emulsion (Fish Oil and Plant Based) 20% Infusion 14.5 mL/Hr (14.5 mL/Hr) IV Continuous <Continuous>  influenza   Vaccine 0.5 milliLiter(s) IntraMuscular once  pantoprazole  Injectable 40 milliGRAM(s) IV Push two times a day  Parenteral Nutrition - Adult 1 Each (83 mL/Hr) TPN Continuous <Continuous>  sucralfate suspension 1 Gram(s) Oral <User Schedule>    MEDICATIONS  (PRN):  acetaminophen   Tablet .. 650 milliGRAM(s) Oral every 6 hours PRN Temp greater or equal to 38.5C (101.3F), Mild Pain (1 - 3)  HYDROmorphone  Injectable 0.5 milliGRAM(s) IV Push every 4 hours PRN Severe Pain (7 - 10)  lidocaine 2% Viscous 2 milliLiter(s) Swish and Spit three times a day PRN pain with swallowing  ondansetron Injectable 4 milliGRAM(s) IV Push every 6 hours PRN Nausea and/or Vomiting    Vital Signs Last 24 Hrs  T(C): 37.1 (14 Oct 2021 03:21), Max: 37.1 (14 Oct 2021 03:21)  T(F): 98.7 (14 Oct 2021 03:21), Max: 98.7 (14 Oct 2021 03:21)  HR: 108 (14 Oct 2021 03:21) (78 - 134)  BP: 127/89 (14 Oct 2021 03:21) (107/58 - 132/70)  BP(mean): --  RR: 16 (14 Oct 2021 03:21) (16 - 18)  SpO2: 100% (14 Oct 2021 03:21) (99% - 100%)  CAPILLARY BLOOD GLUCOSE      POCT Blood Glucose.: 122 mg/dL (14 Oct 2021 06:34)    I&O's Summary    PHYSICAL EXAM:  GENERAL: NAD, well-developed  HEAD:  Atraumatic, Normocephalic  EYES: EOMI, PERRLA, conjunctiva and sclera clear  NECK: Supple, No JVD  CHEST/LUNG: Clear to auscultation bilaterally; No wheeze  HEART: Regular rate and rhythm; No murmurs, rubs, or gallops  ABDOMEN: Soft, Nontender, Nondistended; Bowel sounds present  EXTREMITIES:  2+ Peripheral Pulses, No clubbing, cyanosis, or edema  PSYCH: AAOx3  NEUROLOGY: non-focal  SKIN: No rashes or lesions    LABS:                        8.2    5.82  )-----------( 289      ( 13 Oct 2021 11:08 )             25.6     10-13    140  |  111<H>  |  12  ----------------------------<  110<H>  3.5   |  20<L>  |  0.40<L>    Ca    7.7<L>      13 Oct 2021 11:08  Phos  1.9     10-13  Mg     1.60     10-13    TPro  6.0  /  Alb  3.8  /  TBili  0.3  /  DBili  x   /  AST  22  /  ALT  34<H>  /  AlkPhos  69  10-13          RADIOLOGY & ADDITIONAL TESTS:    Imaging Personally Reviewed:    Consultant(s) Notes Reviewed:      Care Discussed with Consultants/Other Providers:   Giovanni Prajapati MS3    Patient is a 26y old  Female who presents with a chief complaint of Crohn's, abdominal pain (13 Oct 2021 17:11)    SUBJECTIVE / OVERNIGHT EVENTS: Pt examined at bedside in distress, gagging. Pt states she has been experiencing significant pain and n/v overnight. She states this is the worst pain she has ever felt and that the dilaudid only lasts for 3 hours. She states that she wants a higher dose of pain medications. The pain radiation up her chest and she is having SOB from her pain. Pt denies any blood in her vomit or any changes in ostomy output. She denies any fever/chills.    MEDICATIONS  (STANDING):  chlorhexidine 2% Cloths 1 Application(s) Topical daily  fat emulsion (Fish Oil and Plant Based) 20% Infusion 14.5 mL/Hr (14.5 mL/Hr) IV Continuous <Continuous>  influenza   Vaccine 0.5 milliLiter(s) IntraMuscular once  pantoprazole  Injectable 40 milliGRAM(s) IV Push two times a day  Parenteral Nutrition - Adult 1 Each (83 mL/Hr) TPN Continuous <Continuous>  sucralfate suspension 1 Gram(s) Oral <User Schedule>    MEDICATIONS  (PRN):  acetaminophen   Tablet .. 650 milliGRAM(s) Oral every 6 hours PRN Temp greater or equal to 38.5C (101.3F), Mild Pain (1 - 3)  HYDROmorphone  Injectable 0.5 milliGRAM(s) IV Push every 4 hours PRN Severe Pain (7 - 10)  lidocaine 2% Viscous 2 milliLiter(s) Swish and Spit three times a day PRN pain with swallowing  ondansetron Injectable 4 milliGRAM(s) IV Push every 6 hours PRN Nausea and/or Vomiting    Vital Signs Last 24 Hrs  T(C): 37.1 (14 Oct 2021 03:21), Max: 37.1 (14 Oct 2021 03:21)  T(F): 98.7 (14 Oct 2021 03:21), Max: 98.7 (14 Oct 2021 03:21)  HR: 108 (14 Oct 2021 03:21) (78 - 134)  BP: 127/89 (14 Oct 2021 03:21) (107/58 - 132/70)  BP(mean): --  RR: 16 (14 Oct 2021 03:21) (16 - 18)  SpO2: 100% (14 Oct 2021 03:21) (99% - 100%)  CAPILLARY BLOOD GLUCOSE      POCT Blood Glucose.: 122 mg/dL (14 Oct 2021 06:34)    I&O's Summary    PHYSICAL EXAM:  GENERAL: moderate distress, gagging, malnourished  HEAD:  Atraumatic, Normocephalic  EYES: EOMI, conjunctiva and sclera clear  NECK: No JVD  CHEST/LUNG: patient deferred exam  HEART: patient deferred exam  ABDOMEN: patient deferred exam  EXTREMITIES: moving spontaneously  PSYCH: AAOx3  NEUROLOGY: non-focal  SKIN: No rashes or lesions    LABS:                        8.2    5.82  )-----------( 289      ( 13 Oct 2021 11:08 )             25.6     10-13    140  |  111<H>  |  12  ----------------------------<  110<H>  3.5   |  20<L>  |  0.40<L>    Ca    7.7<L>      13 Oct 2021 11:08  Phos  1.9     10-13  Mg     1.60     10-13    TPro  6.0  /  Alb  3.8  /  TBili  0.3  /  DBili  x   /  AST  22  /  ALT  34<H>  /  AlkPhos  69  10-13          RADIOLOGY & ADDITIONAL TESTS:    Imaging Personally Reviewed:    Consultant(s) Notes Reviewed:      Care Discussed with Consultants/Other Providers:   Giovanni Prajapati MS3    Patient is a 26y old  Female who presents with a chief complaint of Crohn's, abdominal pain (13 Oct 2021 17:11)    SUBJECTIVE / OVERNIGHT EVENTS: Overnight pt had tachycardia of 134. Pt examined at bedside in distress, gagging. Pt states she has been experiencing significant pain and n/v overnight. She states this is the worst pain she has ever felt and that the dilaudid only lasts for 3 hours. She states that she wants a higher dose of pain medications. The pain radiation up her chest and she is having SOB from her pain. Pt denies any blood in her vomit or any changes in ostomy output. She denies any fever/chills.    MEDICATIONS  (STANDING):  chlorhexidine 2% Cloths 1 Application(s) Topical daily  fat emulsion (Fish Oil and Plant Based) 20% Infusion 14.5 mL/Hr (14.5 mL/Hr) IV Continuous <Continuous>  influenza   Vaccine 0.5 milliLiter(s) IntraMuscular once  pantoprazole  Injectable 40 milliGRAM(s) IV Push two times a day  Parenteral Nutrition - Adult 1 Each (83 mL/Hr) TPN Continuous <Continuous>  sucralfate suspension 1 Gram(s) Oral <User Schedule>    MEDICATIONS  (PRN):  acetaminophen   Tablet .. 650 milliGRAM(s) Oral every 6 hours PRN Temp greater or equal to 38.5C (101.3F), Mild Pain (1 - 3)  HYDROmorphone  Injectable 0.5 milliGRAM(s) IV Push every 4 hours PRN Severe Pain (7 - 10)  lidocaine 2% Viscous 2 milliLiter(s) Swish and Spit three times a day PRN pain with swallowing  ondansetron Injectable 4 milliGRAM(s) IV Push every 6 hours PRN Nausea and/or Vomiting    Vital Signs Last 24 Hrs  T(C): 37.1 (14 Oct 2021 03:21), Max: 37.1 (14 Oct 2021 03:21)  T(F): 98.7 (14 Oct 2021 03:21), Max: 98.7 (14 Oct 2021 03:21)  HR: 108 (14 Oct 2021 03:21) (78 - 134)  BP: 127/89 (14 Oct 2021 03:21) (107/58 - 132/70)  BP(mean): --  RR: 16 (14 Oct 2021 03:21) (16 - 18)  SpO2: 100% (14 Oct 2021 03:21) (99% - 100%)  CAPILLARY BLOOD GLUCOSE      POCT Blood Glucose.: 122 mg/dL (14 Oct 2021 06:34)    I&O's Summary    PHYSICAL EXAM:  GENERAL: moderate distress, gagging, malnourished  HEAD:  Atraumatic, Normocephalic  EYES: EOMI, conjunctiva and sclera clear  NECK: No JVD  CHEST/LUNG: patient deferred exam  HEART: patient deferred exam  ABDOMEN: patient deferred exam  EXTREMITIES: moving spontaneously  PSYCH: AAOx3  NEUROLOGY: non-focal  SKIN: No rashes or lesions    LABS:                        8.2    5.82  )-----------( 289      ( 13 Oct 2021 11:08 )             25.6     10-13    140  |  111<H>  |  12  ----------------------------<  110<H>  3.5   |  20<L>  |  0.40<L>    Ca    7.7<L>      13 Oct 2021 11:08  Phos  1.9     10-13  Mg     1.60     10-13    TPro  6.0  /  Alb  3.8  /  TBili  0.3  /  DBili  x   /  AST  22  /  ALT  34<H>  /  AlkPhos  69  10-13          RADIOLOGY & ADDITIONAL TESTS:    Imaging Personally Reviewed:    Consultant(s) Notes Reviewed:      Care Discussed with Consultants/Other Providers:   Giovanni Prajapati MS3    Patient is a 26y old  Female who presents with a chief complaint of Crohn's, abdominal pain (13 Oct 2021 17:11)    SUBJECTIVE / OVERNIGHT EVENTS: Overnight pt had tachycardia of 134. Pt examined at bedside in distress, gagging. Pt states she has been experiencing significant pain and n/v overnight. She states this is the worst pain she has ever felt and that the dilaudid only lasts for 3 hours. She states that she wants a higher dose of pain medications. The pain radiation up her chest and she is having SOB from her pain. Pt denies any blood in her vomit or any changes in ostomy output. She denies any fever/chills.    MEDICATIONS  (STANDING):  chlorhexidine 2% Cloths 1 Application(s) Topical daily  fat emulsion (Fish Oil and Plant Based) 20% Infusion 14.5 mL/Hr (14.5 mL/Hr) IV Continuous <Continuous>  influenza   Vaccine 0.5 milliLiter(s) IntraMuscular once  pantoprazole  Injectable 40 milliGRAM(s) IV Push two times a day  Parenteral Nutrition - Adult 1 Each (83 mL/Hr) TPN Continuous <Continuous>  sucralfate suspension 1 Gram(s) Oral <User Schedule>    MEDICATIONS  (PRN):  acetaminophen   Tablet .. 650 milliGRAM(s) Oral every 6 hours PRN Temp greater or equal to 38.5C (101.3F), Mild Pain (1 - 3)  HYDROmorphone  Injectable 0.5 milliGRAM(s) IV Push every 4 hours PRN Severe Pain (7 - 10)  lidocaine 2% Viscous 2 milliLiter(s) Swish and Spit three times a day PRN pain with swallowing  ondansetron Injectable 4 milliGRAM(s) IV Push every 6 hours PRN Nausea and/or Vomiting    Vital Signs Last 24 Hrs  T(C): 37.1 (14 Oct 2021 03:21), Max: 37.1 (14 Oct 2021 03:21)  T(F): 98.7 (14 Oct 2021 03:21), Max: 98.7 (14 Oct 2021 03:21)  HR: 108 (14 Oct 2021 03:21) (78 - 134)  BP: 127/89 (14 Oct 2021 03:21) (107/58 - 132/70)  BP(mean): --  RR: 16 (14 Oct 2021 03:21) (16 - 18)  SpO2: 100% (14 Oct 2021 03:21) (99% - 100%)  CAPILLARY BLOOD GLUCOSE      POCT Blood Glucose.: 122 mg/dL (14 Oct 2021 06:34)    I&O's Summary    PHYSICAL EXAM:  GENERAL: moderate distress, gagging, malnourished  HEAD:  Atraumatic, Normocephalic  EYES: EOMI, conjunctiva and sclera clear  NECK: No JVD  CHEST/LUNG: patient deferred exam  HEART: patient deferred exam  ABDOMEN: soft, nondistended, tender to palpation  EXTREMITIES: moving spontaneously  PSYCH: AAOx3  NEUROLOGY: non-focal  SKIN: No rashes or lesions    LABS:                        8.2    5.82  )-----------( 289      ( 13 Oct 2021 11:08 )             25.6     10-13    140  |  111<H>  |  12  ----------------------------<  110<H>  3.5   |  20<L>  |  0.40<L>    Ca    7.7<L>      13 Oct 2021 11:08  Phos  1.9     10-13  Mg     1.60     10-13    TPro  6.0  /  Alb  3.8  /  TBili  0.3  /  DBili  x   /  AST  22  /  ALT  34<H>  /  AlkPhos  69  10-13          RADIOLOGY & ADDITIONAL TESTS:    Imaging Personally Reviewed:    Consultant(s) Notes Reviewed:      Care Discussed with Consultants/Other Providers:

## 2021-10-14 NOTE — PROGRESS NOTE ADULT - PROBLEM SELECTOR PLAN 1
Hx of Crohn's with recent colonic resection for stricture. S/p EGD 9/30 with gastric inflammation, biopsied to r/o gastric Crohn's. S/p infliximab on 10/1    Inflammatory markers wnl, fecal calprotectin wnl    -Diet advanced, but was only tolerating intermittently tolerating, clears for now  -GI following, appreciate recs  -Antiemetics PRN  - [facility] was unable to be reached for records  - no Crohn's or H. pylori in the gastric biopsy  - possible ileoscopy per GI but patient refused  - Dilaudid regimen changed to 0.5mg q6h for severe pain  - IgG4 levels wnl Hx of Crohn's with recent colonic resection for stricture. S/p EGD 9/30 with gastric inflammation, biopsied to r/o gastric Crohn's. S/p infliximab on 10/1. Inflammatory markers wnl, fecal calprotectin wnl    -GI following, appreciate recs  -Antiemetics PRN  - [facility] was unable to be reached for records  - no Crohn's or H. pylori in the gastric biopsy  - possible ileoscopy per GI but patient refused  - consider IV methylprednisone per GI    - Consider IV Acetaminophen 500mg Q6H standing x 4 doses  - IV Dialudid 0.25mg Q4H PRN for moderate pain and IV Dialudid 0.5mg Q4H PRN for severe pain. Hold for over sedation. Not to be given within one hour of any other immediate acting opioid.  - Consider PO Flexeril 5mg Q8H PRN for muscle spasms.  - Consider surgery consult for abdominal pain  - Recommend patient seeing a chronic pain management MD outpatient upon discharge.  - Recommend Hol[facility] RN consult.  - Continuous pulse oximetry while patient on opioids.

## 2021-10-14 NOTE — PROGRESS NOTE ADULT - SUBJECTIVE AND OBJECTIVE BOX
NUTRITION NOTE  [id number] JACKSON  ===============================    Interval events - Patient was seen and examined at bedside, no acute events overnight. Patient denies chest pain or shortness of breath at this time. Patient remains on TPN at this time. Patient was NPO yesterday, started on clear liquids this morning.     ROS: Except as noted above, all other systems reviewed and are negative     Allergies  No Known Allergies    PAST MEDICAL & SURGICAL HISTORY:  Crohn disease  Peripherally inserted central catheter (PICC) in place LUE PICC place on 2021  History of ileostomy Aug 2021    Vital Signs Last 24 Hrs  T(C): 36.9 (14 Oct 2021 06:40), Max: 37.1 (14 Oct 2021 03:21)  T(F): 98.4 (14 Oct 2021 06:40), Max: 98.7 (14 Oct 2021 03:21)  HR: 109 (14 Oct 2021 06:40) (78 - 134)  BP: 114/68 (14 Oct 2021 06:40) (114/56 - 132/70)  RR: 16 (14 Oct 2021 06:40) (16 - 18)  SpO2: 100% (14 Oct 2021 06:40) (99% - 100%)    MEDICATIONS  (STANDING):  chlorhexidine 2% Cloths 1 Application(s) Topical daily  fat emulsion (Fish Oil and Plant Based) 20% Infusion 14.5 mL/Hr (14.5 mL/Hr) IV Continuous <Continuous>  influenza   Vaccine 0.5 milliLiter(s) IntraMuscular once  methylPREDNISolone sodium succinate Injectable 20 milliGRAM(s) IV Push two times a day  pantoprazole  Injectable 40 milliGRAM(s) IV Push two times a day  Parenteral Nutrition - Adult 1 Each (83 mL/Hr) TPN Continuous <Continuous>  Parenteral Nutrition - Adult 1 Each (83 mL/Hr) TPN Continuous <Continuous>  sucralfate suspension 1 Gram(s) Oral <User Schedule>    MEDICATIONS  (PRN):  acetaminophen   Tablet .. 650 milliGRAM(s) Oral every 6 hours PRN Temp greater or equal to 38.5C (101.3F), Mild Pain (1 - 3)  cyclobenzaprine 5 milliGRAM(s) Oral three times a day PRN Muscle Spasm  HYDROmorphone  Injectable 0.5 milliGRAM(s) IV Push every 4 hours PRN Severe Pain (7 - 10)  HYDROmorphone  Injectable 0.25 milliGRAM(s) IV Push every 4 hours PRN Moderate Pain (4 - 6)  lidocaine 2% Viscous 2 milliLiter(s) Swish and Spit three times a day PRN pain with swallowing  ondansetron Injectable 4 milliGRAM(s) IV Push every 6 hours PRN Nausea and/or Vomiting    POCT Blood Glucose.: 122 mg/dL (14 Oct 2021 06:34)    Daily Weight in k.6 (14 Oct 2021 06:40)    Drug Dosing Weight  Height (cm): 154.9 (30 Sep 2021 08:11)  Weight (kg): 35.2 (30 Sep 2021 08:11)  BMI (kg/m2): 14.7 (30 Sep 2021 08:11)  BSA (m2): 1.26 (30 Sep 2021 08:11)    PHYSICAL EXAM:  GENERAL: malnourished, resting comfortably in bed   HEAD:  Atraumatic, Normocephalic  CHEST/LUNG: nonlabored respirations, CTAB  ABDOMEN: soft, mildly tender to palpation, nondistended   PSYCH: AAOx3  PICC Site: C/D/I    Diet: clear liquids and TPN/lipids     LABORATORY                                                            8.2    5.82  )-----------( 289      ( 13 Oct 2021 11:08 )             25.6   10-13    140  |  111<H>  |  12  ----------------------------<  110<H>  3.5   |  20<L>  |  0.40<L>    Ca    7.7<L>      13 Oct 2021 11:08  Phos  1.9     10-13  Mg     1.60     10-13    TPro  6.0  /  Alb  3.8  /  TBili  0.3  /  DBili  x   /  AST  22  /  ALT  34<H>  /  AlkPhos  69  10-13    LIVER FUNCTIONS - ( 13 Oct 2021 11:08 )  Alb: 3.8 g/dL / Pro: 6.0 g/dL / ALK PHOS: 69 U/L / ALT: 34 U/L / AST: 22 U/L / GGT: x           10-05 Chol -- LDL -- HDL -- Trig 91, 09-30 Chol -- LDL -- HDL -- Trig 71    COVID-19 PCR: NotDetec (12 Oct 2021 20:33)  COVID-19 PCR: NotDetec (29 Sep 2021 17:26)  COVID-19 PCR: NotDetec (25 Sep 2021 23:42)  COVID-19 PCR: NotDetec (2021 03:16)  COVID-19 PCR: NotDetec (2021 04:25)  COVID-19 PCR: NotDetec (2021 21:40)    ASSESSMENT/PLAN:  26 year old female with PMH Crohn's disease s/p surgery for stricture/ileostomy placement in mid 2021, s/p PICC line in place for TPN 2 weeks ago, presents with epigastric abdominal pain, nausea, and vomiting x 4 days. GI is following with possible EGD to evaluate for esophagitis. Nutrition service consulted for resuming parenteral nutrition via PICC line that is in place. Patient states that she feels very weak and is frustrated that she is not able to take in anything by mouth (liquids or food) without nausea or emesis. TPN was initiated on 21.    continue TPN with infusion volume of 2L, TPN will provide 1325 kcal/day     labs reviewed - electrolytes adjusted in TPN bag     monitor fingersticks, obtain daily weights    continue parenteral nutrition at this time, will follow up with primary team on plan - GI workup in process     1.  Severe protein calorie malnutrition being optimized with TPN: CHO [195] gm.  AA [78] gm. SMOF Lipids [35] gm.  2.  Hyperglycemia managed with: [0] units of regular insulin    3.  Check fluid balance daily.  Strict I/O  [ ] [ ]   4.  Daily BMP, Ionized Calcium, Magnesium and Phosphorous   5.  Triglycerides at initiation of TPN and monthly [ ] [ ]     Nutrition Support [id number]  NUTRITION NOTE  [id number] JACKSON  ===============================    Interval events - Patient was seen and examined at bedside, no acute events overnight. Patient denies chest pain or shortness of breath at this time. Patient remains on TPN at this time. Patient was NPO yesterday, started on clear liquids this morning.     ROS: Except as noted above, all other systems reviewed and are negative     Allergies  No Known Allergies    PAST MEDICAL & SURGICAL HISTORY:  Crohn disease  Peripherally inserted central catheter (PICC) in place LUE PICC place on 2021  History of ileostomy Aug 2021    Vital Signs Last 24 Hrs  T(C): 36.9 (14 Oct 2021 06:40), Max: 37.1 (14 Oct 2021 03:21)  T(F): 98.4 (14 Oct 2021 06:40), Max: 98.7 (14 Oct 2021 03:21)  HR: 109 (14 Oct 2021 06:40) (78 - 134)  BP: 114/68 (14 Oct 2021 06:40) (114/56 - 132/70)  RR: 16 (14 Oct 2021 06:40) (16 - 18)  SpO2: 100% (14 Oct 2021 06:40) (99% - 100%)    MEDICATIONS  (STANDING):  chlorhexidine 2% Cloths 1 Application(s) Topical daily  fat emulsion (Fish Oil and Plant Based) 20% Infusion 14.5 mL/Hr (14.5 mL/Hr) IV Continuous <Continuous>  influenza   Vaccine 0.5 milliLiter(s) IntraMuscular once  methylPREDNISolone sodium succinate Injectable 20 milliGRAM(s) IV Push two times a day  pantoprazole  Injectable 40 milliGRAM(s) IV Push two times a day  Parenteral Nutrition - Adult 1 Each (83 mL/Hr) TPN Continuous <Continuous>  Parenteral Nutrition - Adult 1 Each (83 mL/Hr) TPN Continuous <Continuous>  sucralfate suspension 1 Gram(s) Oral <User Schedule>    MEDICATIONS  (PRN):  acetaminophen   Tablet .. 650 milliGRAM(s) Oral every 6 hours PRN Temp greater or equal to 38.5C (101.3F), Mild Pain (1 - 3)  cyclobenzaprine 5 milliGRAM(s) Oral three times a day PRN Muscle Spasm  HYDROmorphone  Injectable 0.5 milliGRAM(s) IV Push every 4 hours PRN Severe Pain (7 - 10)  HYDROmorphone  Injectable 0.25 milliGRAM(s) IV Push every 4 hours PRN Moderate Pain (4 - 6)  lidocaine 2% Viscous 2 milliLiter(s) Swish and Spit three times a day PRN pain with swallowing  ondansetron Injectable 4 milliGRAM(s) IV Push every 6 hours PRN Nausea and/or Vomiting    POCT Blood Glucose.: 122 mg/dL (14 Oct 2021 06:34)    Daily Weight in k.6 (14 Oct 2021 06:40)    Drug Dosing Weight  Height (cm): 154.9 (30 Sep 2021 08:11)  Weight (kg): 35.2 (30 Sep 2021 08:11)  BMI (kg/m2): 14.7 (30 Sep 2021 08:11)  BSA (m2): 1.26 (30 Sep 2021 08:11)    PHYSICAL EXAM:  GENERAL: malnourished, resting PSYCH: AAOx3  comfortably in bed   HEAD:  Atraumatic, Normocephalic  CHEST/LUNG: nonlabored respirations, CTAB  ABDOMEN: soft, mildly tender to palpation, nondistended     PICC Site: C/D/I    Diet: clear liquids and TPN/lipids     LABORATORY                                                            8.2    5.82  )-----------( 289      ( 13 Oct 2021 11:08 )             25.6   10-13    140  |  111<H>  |  12  ----------------------------<  110<H>  3.5   |  20<L>  |  0.40<L>    Ca    7.7<L>      13 Oct 2021 11:08  Phos  1.9     10-13  Mg     1.60     10-13    TPro  6.0  /  Alb  3.8  /  TBili  0.3  /  DBili  x   /  AST  22  /  ALT  34<H>  /  AlkPhos  69  10-13    LIVER FUNCTIONS - ( 13 Oct 2021 11:08 )  Alb: 3.8 g/dL / Pro: 6.0 g/dL / ALK PHOS: 69 U/L / ALT: 34 U/L / AST: 22 U/L / GGT: x           10-05 Chol -- LDL -- HDL -- Trig 91, 09-30 Chol -- LDL -- HDL -- Trig 71    COVID-19 PCR: NotDetec (12 Oct 2021 20:33)  COVID-19 PCR: NotDetec (29 Sep 2021 17:26)  COVID-19 PCR: NotDetec (25 Sep 2021 23:42)  COVID-19 PCR: NotDetec (2021 03:16)  COVID-19 PCR: NotDetec (2021 04:25)  COVID-19 PCR: NotDetec (2021 21:40)    ASSESSMENT/PLAN:  26 year old female with PMH Crohn's disease s/p surgery for stricture/ileostomy placement in mid 2021, s/p PICC line in place for TPN 2 weeks ago, presents with epigastric abdominal pain, nausea, and vomiting x 4 days. GI is following with possible EGD to evaluate for esophagitis. Nutrition service consulted for resuming parenteral nutrition via PICC line that is in place. Patient states that she feels very weak and is frustrated that she is not able to take in anything by mouth (liquids or food) without nausea or emesis. TPN was initiated on 21.    continue TPN with infusion volume of 2L, TPN will provide 1325 kcal/day     labs reviewed - electrolytes adjusted in TPN bag     monitor fingersticks, obtain daily weights    continue parenteral nutrition at this time, will follow up with primary team on plan - GI workup in process     1.  Severe protein calorie malnutrition being optimized with TPN: CHO [195] gm.  AA [78] gm. SMOF Lipids [35] gm.  2.  Hyperglycemia managed with: [0] units of regular insulin    3.  Check fluid balance daily.  Strict I/O  [ ] [ ]   4.  Daily BMP, Ionized Calcium, Magnesium and Phosphorous   5.  Triglycerides at initiation of TPN and monthly [ ] [ ]     Nutrition Support [id number]  NUTRITION NOTE  [id number] JACKSON  ===============================    Interval events - Patient was seen and examined at bedside, no acute events overnight. Patient denies chest pain or shortness of breath at this time. Patient remains on TPN at this time. Patient was NPO yesterday, started on clear liquids this morning.     ROS: Except as noted above, all other systems reviewed and are negative     Allergies  No Known Allergies    PAST MEDICAL & SURGICAL HISTORY:  Crohn disease  Peripherally inserted central catheter (PICC) in place LUE PICC place on 2021  History of ileostomy Aug 2021    Vital Signs Last 24 Hrs  T(C): 36.9 (14 Oct 2021 06:40), Max: 37.1 (14 Oct 2021 03:21)  T(F): 98.4 (14 Oct 2021 06:40), Max: 98.7 (14 Oct 2021 03:21)  HR: 109 (14 Oct 2021 06:40) (78 - 134)  BP: 114/68 (14 Oct 2021 06:40) (114/56 - 132/70)  RR: 16 (14 Oct 2021 06:40) (16 - 18)  SpO2: 100% (14 Oct 2021 06:40) (99% - 100%)    MEDICATIONS  (STANDING):  chlorhexidine 2% Cloths 1 Application(s) Topical daily  fat emulsion (Fish Oil and Plant Based) 20% Infusion 14.5 mL/Hr (14.5 mL/Hr) IV Continuous <Continuous>  influenza   Vaccine 0.5 milliLiter(s) IntraMuscular once  methylPREDNISolone sodium succinate Injectable 20 milliGRAM(s) IV Push two times a day  pantoprazole  Injectable 40 milliGRAM(s) IV Push two times a day  Parenteral Nutrition - Adult 1 Each (83 mL/Hr) TPN Continuous <Continuous>  Parenteral Nutrition - Adult 1 Each (83 mL/Hr) TPN Continuous <Continuous>  sucralfate suspension 1 Gram(s) Oral <User Schedule>    MEDICATIONS  (PRN):  acetaminophen   Tablet .. 650 milliGRAM(s) Oral every 6 hours PRN Temp greater or equal to 38.5C (101.3F), Mild Pain (1 - 3)  cyclobenzaprine 5 milliGRAM(s) Oral three times a day PRN Muscle Spasm  HYDROmorphone  Injectable 0.5 milliGRAM(s) IV Push every 4 hours PRN Severe Pain (7 - 10)  HYDROmorphone  Injectable 0.25 milliGRAM(s) IV Push every 4 hours PRN Moderate Pain (4 - 6)  lidocaine 2% Viscous 2 milliLiter(s) Swish and Spit three times a day PRN pain with swallowing  ondansetron Injectable 4 milliGRAM(s) IV Push every 6 hours PRN Nausea and/or Vomiting    POCT Blood Glucose.: 122 mg/dL (14 Oct 2021 06:34)    Daily Weight in k.6 (14 Oct 2021 06:40)    Drug Dosing Weight  Height (cm): 154.9 (30 Sep 2021 08:11)  Weight (kg): 35.2 (30 Sep 2021 08:11)  BMI (kg/m2): 14.7 (30 Sep 2021 08:11)  BSA (m2): 1.26 (30 Sep 2021 08:11)    PHYSICAL EXAM:  GENERAL: malnourished, resting comfortably in bed   HEAD:  Atraumatic, Normocephalic  CHEST/LUNG: nonlabored respirations, CTAB  ABDOMEN: soft, mildly tender to palpation, nondistended   PSYCH: AAOx3  PICC Site: C/D/I    Diet: clear liquids and TPN/lipids     LABORATORY                                                            8.2    5.82  )-----------( 289      ( 13 Oct 2021 11:08 )             25.6   10-13    140  |  111<H>  |  12  ----------------------------<  110<H>  3.5   |  20<L>  |  0.40<L>    Ca    7.7<L>      13 Oct 2021 11:08  Phos  1.9     10-13  Mg     1.60     10-13    TPro  6.0  /  Alb  3.8  /  TBili  0.3  /  DBili  x   /  AST  22  /  ALT  34<H>  /  AlkPhos  69  10-13    LIVER FUNCTIONS - ( 13 Oct 2021 11:08 )  Alb: 3.8 g/dL / Pro: 6.0 g/dL / ALK PHOS: 69 U/L / ALT: 34 U/L / AST: 22 U/L / GGT: x           10-05 Chol -- LDL -- HDL -- Trig 91, 09-30 Chol -- LDL -- HDL -- Trig 71    COVID-19 PCR: NotDetec (12 Oct 2021 20:33)  COVID-19 PCR: NotDetec (29 Sep 2021 17:26)  COVID-19 PCR: NotDetec (25 Sep 2021 23:42)  COVID-19 PCR: NotDetec (2021 03:16)  COVID-19 PCR: NotDetec (2021 04:25)  COVID-19 PCR: NotDetec (2021 21:40)    ASSESSMENT/PLAN:  26 year old female with PMH Crohn's disease s/p surgery for stricture/ileostomy placement in mid 2021, s/p PICC line in place for TPN 2 weeks ago, presents with epigastric abdominal pain, nausea, and vomiting x 4 days. GI is following with possible EGD to evaluate for esophagitis. Nutrition service consulted for resuming parenteral nutrition via PICC line that is in place. Patient states that she feels very weak and is frustrated that she is not able to take in anything by mouth (liquids or food) without nausea or emesis. TPN was initiated on 21.    continue TPN with infusion volume of 2L, TPN will provide 1325 kcal/day     labs reviewed - electrolytes adjusted in TPN bag     monitor fingersticks, obtain daily weights    continue parenteral nutrition at this time, will follow up with primary team on plan - GI workup in process     1.  Severe protein calorie malnutrition being optimized with TPN: CHO [195] gm.  AA [78] gm. SMOF Lipids [35] gm.  2.  Hyperglycemia managed with: [0] units of regular insulin    3.  Check fluid balance daily.  Strict I/O  [ ] [ ]   4.  Daily BMP, Ionized Calcium, Magnesium and Phosphorous   5.  Triglycerides at initiation of TPN and monthly [ ] [ ]     Nutrition Support [id number]

## 2021-10-15 LAB
ANION GAP SERPL CALC-SCNC: 11 MMOL/L — SIGNIFICANT CHANGE UP (ref 7–14)
BASOPHILS # BLD AUTO: 0.01 K/UL — SIGNIFICANT CHANGE UP (ref 0–0.2)
BASOPHILS NFR BLD AUTO: 0.1 % — SIGNIFICANT CHANGE UP (ref 0–2)
BUN SERPL-MCNC: 11 MG/DL — SIGNIFICANT CHANGE UP (ref 7–23)
CA-I BLD-SCNC: 1.22 MMOL/L — SIGNIFICANT CHANGE UP (ref 1.15–1.29)
CALCIUM SERPL-MCNC: 9.6 MG/DL — SIGNIFICANT CHANGE UP (ref 8.4–10.5)
CHLORIDE SERPL-SCNC: 103 MMOL/L — SIGNIFICANT CHANGE UP (ref 98–107)
CO2 SERPL-SCNC: 23 MMOL/L — SIGNIFICANT CHANGE UP (ref 22–31)
CREAT SERPL-MCNC: 0.4 MG/DL — LOW (ref 0.5–1.3)
EOSINOPHIL # BLD AUTO: 0 K/UL — SIGNIFICANT CHANGE UP (ref 0–0.5)
EOSINOPHIL NFR BLD AUTO: 0 % — SIGNIFICANT CHANGE UP (ref 0–6)
GLUCOSE SERPL-MCNC: 142 MG/DL — HIGH (ref 70–99)
HCT VFR BLD CALC: 30.6 % — LOW (ref 34.5–45)
HGB BLD-MCNC: 10.1 G/DL — LOW (ref 11.5–15.5)
IANC: 7.34 K/UL — SIGNIFICANT CHANGE UP (ref 1.5–8.5)
IMM GRANULOCYTES NFR BLD AUTO: 0.4 % — SIGNIFICANT CHANGE UP (ref 0–1.5)
LYMPHOCYTES # BLD AUTO: 0.92 K/UL — LOW (ref 1–3.3)
LYMPHOCYTES # BLD AUTO: 10.9 % — LOW (ref 13–44)
MAGNESIUM SERPL-MCNC: 2 MG/DL — SIGNIFICANT CHANGE UP (ref 1.6–2.6)
MCHC RBC-ENTMCNC: 27.4 PG — SIGNIFICANT CHANGE UP (ref 27–34)
MCHC RBC-ENTMCNC: 33 GM/DL — SIGNIFICANT CHANGE UP (ref 32–36)
MCV RBC AUTO: 82.9 FL — SIGNIFICANT CHANGE UP (ref 80–100)
MONOCYTES # BLD AUTO: 0.14 K/UL — SIGNIFICANT CHANGE UP (ref 0–0.9)
MONOCYTES NFR BLD AUTO: 1.7 % — LOW (ref 2–14)
NEUTROPHILS # BLD AUTO: 7.34 K/UL — SIGNIFICANT CHANGE UP (ref 1.8–7.4)
NEUTROPHILS NFR BLD AUTO: 86.9 % — HIGH (ref 43–77)
NRBC # BLD: 0 /100 WBCS — SIGNIFICANT CHANGE UP
NRBC # FLD: 0 K/UL — SIGNIFICANT CHANGE UP
PHOSPHATE SERPL-MCNC: 3.2 MG/DL — SIGNIFICANT CHANGE UP (ref 2.5–4.5)
PLATELET # BLD AUTO: 372 K/UL — SIGNIFICANT CHANGE UP (ref 150–400)
POTASSIUM SERPL-MCNC: 4.3 MMOL/L — SIGNIFICANT CHANGE UP (ref 3.5–5.3)
POTASSIUM SERPL-SCNC: 4.3 MMOL/L — SIGNIFICANT CHANGE UP (ref 3.5–5.3)
RBC # BLD: 3.69 M/UL — LOW (ref 3.8–5.2)
RBC # FLD: 12.9 % — SIGNIFICANT CHANGE UP (ref 10.3–14.5)
SODIUM SERPL-SCNC: 137 MMOL/L — SIGNIFICANT CHANGE UP (ref 135–145)
WBC # BLD: 8.44 K/UL — SIGNIFICANT CHANGE UP (ref 3.8–10.5)
WBC # FLD AUTO: 8.44 K/UL — SIGNIFICANT CHANGE UP (ref 3.8–10.5)

## 2021-10-15 PROCEDURE — 99233 SBSQ HOSP IP/OBS HIGH 50: CPT | Mod: GC

## 2021-10-15 PROCEDURE — 99232 SBSQ HOSP IP/OBS MODERATE 35: CPT

## 2021-10-15 RX ORDER — I.V. FAT EMULSION 20 G/100ML
14.5 EMULSION INTRAVENOUS
Qty: 35 | Refills: 0 | Status: DISCONTINUED | OUTPATIENT
Start: 2021-10-15 | End: 2021-10-16

## 2021-10-15 RX ORDER — ELECTROLYTE SOLUTION,INJ
1 VIAL (ML) INTRAVENOUS
Refills: 0 | Status: DISCONTINUED | OUTPATIENT
Start: 2021-10-15 | End: 2021-10-15

## 2021-10-15 RX ORDER — ACETAMINOPHEN 500 MG
500 TABLET ORAL EVERY 8 HOURS
Refills: 0 | Status: COMPLETED | OUTPATIENT
Start: 2021-10-15 | End: 2021-10-15

## 2021-10-15 RX ORDER — AMITRIPTYLINE HCL 25 MG
10 TABLET ORAL AT BEDTIME
Refills: 0 | Status: DISCONTINUED | OUTPATIENT
Start: 2021-10-15 | End: 2021-10-22

## 2021-10-15 RX ADMIN — Medication 10 MILLIGRAM(S): at 22:55

## 2021-10-15 RX ADMIN — HYDROMORPHONE HYDROCHLORIDE 0.25 MILLIGRAM(S): 2 INJECTION INTRAMUSCULAR; INTRAVENOUS; SUBCUTANEOUS at 01:12

## 2021-10-15 RX ADMIN — HYDROMORPHONE HYDROCHLORIDE 0.5 MILLIGRAM(S): 2 INJECTION INTRAMUSCULAR; INTRAVENOUS; SUBCUTANEOUS at 15:31

## 2021-10-15 RX ADMIN — HYDROMORPHONE HYDROCHLORIDE 0.25 MILLIGRAM(S): 2 INJECTION INTRAMUSCULAR; INTRAVENOUS; SUBCUTANEOUS at 00:57

## 2021-10-15 RX ADMIN — Medication 200 MILLIGRAM(S): at 03:04

## 2021-10-15 RX ADMIN — Medication 20 MILLIGRAM(S): at 18:33

## 2021-10-15 RX ADMIN — Medication 500 MILLIGRAM(S): at 15:31

## 2021-10-15 RX ADMIN — ONDANSETRON 4 MILLIGRAM(S): 8 TABLET, FILM COATED ORAL at 15:55

## 2021-10-15 RX ADMIN — HYDROMORPHONE HYDROCHLORIDE 0.5 MILLIGRAM(S): 2 INJECTION INTRAMUSCULAR; INTRAVENOUS; SUBCUTANEOUS at 23:08

## 2021-10-15 RX ADMIN — PANTOPRAZOLE SODIUM 40 MILLIGRAM(S): 20 TABLET, DELAYED RELEASE ORAL at 18:32

## 2021-10-15 RX ADMIN — HYDROMORPHONE HYDROCHLORIDE 0.5 MILLIGRAM(S): 2 INJECTION INTRAMUSCULAR; INTRAVENOUS; SUBCUTANEOUS at 07:01

## 2021-10-15 RX ADMIN — HYDROMORPHONE HYDROCHLORIDE 0.5 MILLIGRAM(S): 2 INJECTION INTRAMUSCULAR; INTRAVENOUS; SUBCUTANEOUS at 19:07

## 2021-10-15 RX ADMIN — Medication 200 MILLIGRAM(S): at 15:16

## 2021-10-15 RX ADMIN — HYDROMORPHONE HYDROCHLORIDE 0.5 MILLIGRAM(S): 2 INJECTION INTRAMUSCULAR; INTRAVENOUS; SUBCUTANEOUS at 07:16

## 2021-10-15 RX ADMIN — PANTOPRAZOLE SODIUM 40 MILLIGRAM(S): 20 TABLET, DELAYED RELEASE ORAL at 06:25

## 2021-10-15 RX ADMIN — HYDROMORPHONE HYDROCHLORIDE 0.5 MILLIGRAM(S): 2 INJECTION INTRAMUSCULAR; INTRAVENOUS; SUBCUTANEOUS at 15:16

## 2021-10-15 RX ADMIN — HYDROMORPHONE HYDROCHLORIDE 0.5 MILLIGRAM(S): 2 INJECTION INTRAMUSCULAR; INTRAVENOUS; SUBCUTANEOUS at 23:38

## 2021-10-15 RX ADMIN — Medication 1 EACH: at 18:34

## 2021-10-15 RX ADMIN — ONDANSETRON 4 MILLIGRAM(S): 8 TABLET, FILM COATED ORAL at 07:00

## 2021-10-15 RX ADMIN — SODIUM CHLORIDE 100 MILLILITER(S): 9 INJECTION, SOLUTION INTRAVENOUS at 00:21

## 2021-10-15 RX ADMIN — HYDROMORPHONE HYDROCHLORIDE 0.5 MILLIGRAM(S): 2 INJECTION INTRAMUSCULAR; INTRAVENOUS; SUBCUTANEOUS at 11:22

## 2021-10-15 RX ADMIN — HYDROMORPHONE HYDROCHLORIDE 0.5 MILLIGRAM(S): 2 INJECTION INTRAMUSCULAR; INTRAVENOUS; SUBCUTANEOUS at 11:07

## 2021-10-15 RX ADMIN — Medication 20 MILLIGRAM(S): at 06:24

## 2021-10-15 RX ADMIN — CHLORHEXIDINE GLUCONATE 1 APPLICATION(S): 213 SOLUTION TOPICAL at 18:48

## 2021-10-15 RX ADMIN — HYDROMORPHONE HYDROCHLORIDE 0.5 MILLIGRAM(S): 2 INJECTION INTRAMUSCULAR; INTRAVENOUS; SUBCUTANEOUS at 03:15

## 2021-10-15 RX ADMIN — HYDROMORPHONE HYDROCHLORIDE 0.5 MILLIGRAM(S): 2 INJECTION INTRAMUSCULAR; INTRAVENOUS; SUBCUTANEOUS at 03:00

## 2021-10-15 RX ADMIN — I.V. FAT EMULSION 14.5 ML/HR: 20 EMULSION INTRAVENOUS at 18:35

## 2021-10-15 RX ADMIN — Medication 500 MILLIGRAM(S): at 03:19

## 2021-10-15 NOTE — PROGRESS NOTE ADULT - SUBJECTIVE AND OBJECTIVE BOX
Chief Complaint:  Patient is a 26y old  Female who presents with a chief complaint of crohn's flare up (15 Oct 2021 11:39)    Reason for consult: n/v abd pain, hx Crohn's    Interval Events: Pt w/ pain again overnight despite not eating. Pain is gnawing in epigastrium, travelling up chest.     Hospital Medications:  acetaminophen  IVPB .. 500 milliGRAM(s) IV Intermittent every 8 hours PRN  chlorhexidine 2% Cloths 1 Application(s) Topical daily  cyclobenzaprine 5 milliGRAM(s) Oral three times a day PRN  fat emulsion (Fish Oil and Plant Based) 20% Infusion 14.5 mL/Hr IV Continuous <Continuous>  HYDROmorphone  Injectable 0.5 milliGRAM(s) IV Push every 4 hours PRN  HYDROmorphone  Injectable 0.25 milliGRAM(s) IV Push every 4 hours PRN  influenza   Vaccine 0.5 milliLiter(s) IntraMuscular once  lactated ringers. 1000 milliLiter(s) IV Continuous <Continuous>  lidocaine 2% Viscous 2 milliLiter(s) Swish and Spit three times a day PRN  methylPREDNISolone sodium succinate Injectable 20 milliGRAM(s) IV Push two times a day  ondansetron Injectable 4 milliGRAM(s) IV Push every 6 hours PRN  pantoprazole  Injectable 40 milliGRAM(s) IV Push two times a day  Parenteral Nutrition - Adult 1 Each TPN Continuous <Continuous>  Parenteral Nutrition - Adult 1 Each TPN Continuous <Continuous>  sucralfate suspension 1 Gram(s) Oral <User Schedule>      ROS:   General:  No  fevers, chills, night sweats, fatigue  Eyes:  Good vision, no reported pain  ENT:  No sore throat, pain, runny nose  CV:  No pain, palpitations  Pulm:  No dyspnea, cough  GI:  See HPI, otherwise negative  :  No  incontinence, nocturia  Muscle:  No pain, weakness  Neuro:  No memory problems  Psych:  No insomnia, mood problems, depression  Endocrine:  No polyuria, polydipsia, cold/heat intolerance  Heme:  No petechiae, ecchymosis, easy bruisability  Skin:  No rash    PHYSICAL EXAM:   Vital Signs:  Vital Signs Last 24 Hrs  T(C): 36.9 (15 Oct 2021 11:06), Max: 36.9 (15 Oct 2021 11:06)  T(F): 98.5 (15 Oct 2021 11:06), Max: 98.5 (15 Oct 2021 11:06)  HR: 95 (15 Oct 2021 11:06) (94 - 110)  BP: 107/76 (15 Oct 2021 11:06) (105/59 - 132/86)  BP(mean): --  RR: 17 (15 Oct 2021 11:06) (17 - 18)  SpO2: 100% (15 Oct 2021 11:06) (98% - 100%)  Daily     Daily Weight in k.4 (15 Oct 2021 06:08)    GENERAL: no acute distress  NEURO: alert  HEENT: anicteric sclera, no conjunctival pallor appreciated  CHEST: no respiratory distress, no accessory muscle use  CARDIAC: regular rate, rhythm  ABDOMEN: soft, non-tender, non-distended, no rebound or guarding  EXTREMITIES: warm, well perfused, no edema  SKIN: no lesions noted    LABS: reviewed                        10.1   8.44  )-----------( 372      ( 15 Oct 2021 11:36 )             30.6               Interval Diagnostic Studies: see sunrise for full report

## 2021-10-15 NOTE — PROGRESS NOTE ADULT - PROBLEM SELECTOR PLAN 2
Possibly related to esophagitis/reflux, other etiologies include active Crohn's (though no objective evidence pointing towards flare).  EGD showed gastric friability and was biopsied, negative for Crohn's. There was erythematous duodenopathy but no esophagitis.    - Treat symptomatically with IV PPI BID, add sucralfate liquid 1g q6h for esophagitis per GI recs  - C/w dilaudid for pain. Will attempt to avoid opiates in this patient when possible given increased mortality with opiates in IBD patients  - Pt tried on reglan but refuses bc she states it makes her anxious. Zofran PRN, Checking QTc qOD, last EKG had QTc wnl  - C/w TPN  - pain management recs appreciated Possibly related to esophagitis/reflux, other etiologies include active Crohn's (though no objective evidence pointing towards flare).  EGD showed gastric friability and was biopsied, negative for Crohn's. There was erythematous duodenopathy but no esophagitis.    - Treat symptomatically with IV PPI BID, c/w sucralfate liquid 1g q6h for esophagitis per GI recs  - C/w dilaudid for pain. Will attempt to avoid opiates in this patient when possible given increased mortality with opiates in IBD patients  - Pt tried on reglan but refuses bc she states it makes her anxious. Zofran PRN, Checking QTc qOD, last EKG had QTc wnl  - C/w TPN  - pain management recs appreciated

## 2021-10-15 NOTE — CHART NOTE - NSCHARTNOTEFT_GEN_A_CORE
Patient last seen by this RDN on 10/11, now for malnutrition follow up. Spoke with pt, RN and obtained subjective information from extensive chart review.     Current Diet : Diet, Clear Liquid (10-14-21 @ 10:34)    Parenteral Nutrition: TPN infusing @ 2 Liters/day providing  gms (663 kcal), AA 78 gm (312 kcal), SMOF lipids 35 gm (350 kcal) - 1325 kcal/day  Current Weight Trend:     Weight:                                      Height: 154.9 cm                      IBW: 51.6 kg  Dosing  35.2 kg  9/30  38.1 kg          10/13  39.1 kg  10/2  38.4 kg          10/14  38.6 kg  10/3  38.9 kg          10/15  38.4 kg  10/4  39.2 kg  10/5  37.9 kg  10/6  36.8 kg    Nutrition Interval Events: Pt again ordered Clear Liquid diet yesterday to assess for tolerance. Pt said she just cannot tolerate any type of PO and is angry because she doesn't know why. Pt has ileostomy with positive output. If pt able to tolerate Clear Liquids, consider Ensure Clear 1x daily (180 carrie and 8 gm protein). TPN infusing well and being tolerated; meeting estimated nutrition needs at this time. Nutrition Support Team evaluating daily for adjustments needed. Recommend continuing nutrition plan of care as ordered. RDN services to follow and remain available as needed.       __________________ Pertinent Medications__________________   MEDICATIONS  (STANDING):  amitriptyline 10 milliGRAM(s) Oral at bedtime  chlorhexidine 2% Cloths 1 Application(s) Topical daily  fat emulsion (Fish Oil and Plant Based) 20% Infusion 14.5 mL/Hr (14.5 mL/Hr) IV Continuous <Continuous>  influenza   Vaccine 0.5 milliLiter(s) IntraMuscular once  lactated ringers. 1000 milliLiter(s) (100 mL/Hr) IV Continuous <Continuous>  methylPREDNISolone sodium succinate Injectable 20 milliGRAM(s) IV Push two times a day  pantoprazole  Injectable 40 milliGRAM(s) IV Push two times a day  Parenteral Nutrition - Adult 1 Each (83 mL/Hr) TPN Continuous <Continuous>  Parenteral Nutrition - Adult 1 Each (83 mL/Hr) TPN Continuous <Continuous>  sucralfate suspension 1 Gram(s) Oral <User Schedule>    MEDICATIONS  (PRN):  cyclobenzaprine 5 milliGRAM(s) Oral three times a day PRN Muscle Spasm  HYDROmorphone  Injectable 0.5 milliGRAM(s) IV Push every 4 hours PRN Severe Pain (7 - 10)  HYDROmorphone  Injectable 0.25 milliGRAM(s) IV Push every 4 hours PRN Moderate Pain (4 - 6)  lidocaine 2% Viscous 2 milliLiter(s) Swish and Spit three times a day PRN pain with swallowing  ondansetron Injectable 4 milliGRAM(s) IV Push every 6 hours PRN Nausea and/or Vomiting      __________________ Pertinent Labs__________________   10-15 Na137 mmol/L Glu 142 mg/dL<H> K+ 4.3 mmol/L Cr  0.40 mg/dL<L> BUN 11 mg/dL 10-15 Phos 3.2 mg/dL 10-13 Alb 3.8 g/dL 10-05 Chol --    LDL --    HDL --    Trig 91 mg/dL        Edema: none  Skin: intact    Estimated Needs:     1152 - 1344 kcal/day (30-35 kcal/current wt)  52 - 77 gms/day (1.0-1.5 gms/IBW)        Nutrition Diagnosis: Severe malnutrition  [x] ongoing      Goal(s):  1. Patient to meet > 75% estimated energy needs    Recommendations:   1. Continue with nutrition plan of care as ordered.    Monitoring and Evaluation:   1. Monitor weights, labs, BMs, skin integrity, PO intake/tolerance, TPN tolerance and edema.  2. RD services to remain available. Request frequent weights to assess trend and determine adequacy of PO intake/TPN. Consider oral supplementation for additional calories and protein.

## 2021-10-15 NOTE — PROGRESS NOTE ADULT - SUBJECTIVE AND OBJECTIVE BOX
NUTRITION NOTE  [id number] MUTDOMINGUEZ  ===============================    Interval events - Patient was seen and examined at bedside, no acute events overnight. Patient denies chest pain or shortness of breath at this time. Patient remains on TPN at this time. Patient is now on clear liquids.     ROS: Except as noted above, all other systems reviewed and are negative     Allergies  No Known Allergies    PAST MEDICAL & SURGICAL HISTORY:  Crohn disease  Peripherally inserted central catheter (PICC) in place LUE PICC place on 2021  History of ileostomy Aug 2021    Vital Signs Last 24 Hrs  T(C): 36.9 (15 Oct 2021 11:06), Max: 36.9 (15 Oct 2021 11:06)  T(F): 98.5 (15 Oct 2021 11:06), Max: 98.5 (15 Oct 2021 11:06)  HR: 95 (15 Oct 2021 11:06) (94 - 110)  BP: 107/76 (15 Oct 2021 11:06) (105/59 - 132/86)  RR: 17 (15 Oct 2021 11:06) (17 - 18)  SpO2: 100% (15 Oct 2021 11:06) (98% - 100%)    MEDICATIONS  (STANDING):  chlorhexidine 2% Cloths 1 Application(s) Topical daily  fat emulsion (Fish Oil and Plant Based) 20% Infusion 14.5 mL/Hr (14.5 mL/Hr) IV Continuous <Continuous>  influenza   Vaccine 0.5 milliLiter(s) IntraMuscular once  lactated ringers. 1000 milliLiter(s) (100 mL/Hr) IV Continuous <Continuous>  methylPREDNISolone sodium succinate Injectable 20 milliGRAM(s) IV Push two times a day  pantoprazole  Injectable 40 milliGRAM(s) IV Push two times a day  Parenteral Nutrition - Adult 1 Each (83 mL/Hr) TPN Continuous <Continuous>  Parenteral Nutrition - Adult 1 Each (83 mL/Hr) TPN Continuous <Continuous>  sucralfate suspension 1 Gram(s) Oral <User Schedule>    MEDICATIONS  (PRN):  cyclobenzaprine 5 milliGRAM(s) Oral three times a day PRN Muscle Spasm  HYDROmorphone  Injectable 0.25 milliGRAM(s) IV Push every 4 hours PRN Moderate Pain (4 - 6)  HYDROmorphone  Injectable 0.5 milliGRAM(s) IV Push every 4 hours PRN Severe Pain (7 - 10)  lidocaine 2% Viscous 2 milliLiter(s) Swish and Spit three times a day PRN pain with swallowing  ondansetron Injectable 4 milliGRAM(s) IV Push every 6 hours PRN Nausea and/or Vomiting    POCT Blood Glucose.: 148 mg/dL (14 Oct 2021 17:49)    Daily Weight in k.4 (15 Oct 2021 06:08)    Drug Dosing Weight  Height (cm): 154.9 (30 Sep 2021 08:11)  Weight (kg): 35.2 (30 Sep 2021 08:11)  BMI (kg/m2): 14.7 (30 Sep 2021 08:11)  BSA (m2): 1.26 (30 Sep 2021 08:11)    PHYSICAL EXAM:  GENERAL: malnourished, resting comfortably in bed   HEAD:  Atraumatic, Normocephalic  CHEST/LUNG: nonlabored respirations, CTAB  ABDOMEN: soft, mildly tender to palpation, nondistended   PSYCH: AAOx3  PICC Site: C/D/I    Diet: clear liquids and TPN/lipids     LABORATORY                                                            8.2    5.82  )-----------( 289      ( 13 Oct 2021 11:08 )             25.6   10-13    140  |  111<H>  |  12  ----------------------------<  110<H>  3.5   |  20<L>  |  0.40<L>    Ca    7.7<L>      13 Oct 2021 11:08  Phos  1.9     10-13  Mg     1.60     10-13    TPro  6.0  /  Alb  3.8  /  TBili  0.3  /  DBili  x   /  AST  22  /  ALT  34<H>  /  AlkPhos  69  10-13    LIVER FUNCTIONS - ( 13 Oct 2021 11:08 )  Alb: 3.8 g/dL / Pro: 6.0 g/dL / ALK PHOS: 69 U/L / ALT: 34 U/L / AST: 22 U/L / GGT: x           10-05 Chol -- LDL -- HDL -- Trig 91, 09-30 Chol -- LDL -- HDL -- Trig 71    ASSESSMENT/PLAN:  26 year old female with PMH Crohn's disease s/p surgery for stricture/ileostomy placement in mid 2021, s/p PICC line in place for TPN 2 weeks ago, presents with epigastric abdominal pain, nausea, and vomiting x 4 days. GI is following with possible EGD to evaluate for esophagitis. Nutrition service consulted for resuming parenteral nutrition via PICC line that is in place. Patient states that she feels very weak and is frustrated that she is not able to take in anything by mouth (liquids or food) without nausea or emesis. TPN was initiated on 21.    continue TPN with infusion volume of 2L, TPN will provide 1325 kcal/day     labs reviewed - continue same TPN formula today    monitor fingersticks, obtain daily weights    continue parenteral nutrition at this time, will follow up with primary team on plan - GI workup in process     1.  Severe protein calorie malnutrition being optimized with TPN: CHO [195] gm.  AA [78] gm. SMOF Lipids [35] gm.  2.  Hyperglycemia managed with: [0] units of regular insulin    3.  Check fluid balance daily.  Strict I/O  [ ] [ ]   4.  Daily BMP, Ionized Calcium, Magnesium and Phosphorous   5.  Triglycerides at initiation of TPN and monthly [ ] [ ]     Nutrition Support [id number]

## 2021-10-15 NOTE — PROGRESS NOTE ADULT - ATTENDING COMMENTS
26 Female h/o Crohn's colitis with ascending colon stricture status post right hemicolectomy and ileostomy on Remicade p/w postprandial pain and nausea.  EGD with esophagitis/ gastritis.  Currently on TPN for bowel rest.  Continue IV steroids.  Start amitriptyline 10 mg in the evening.  If no improvement on steroids, will consider MR enterography.

## 2021-10-15 NOTE — PROGRESS NOTE ADULT - ATTENDING COMMENTS
27 yo F with Hx of Crohn's disease s/p surgery for stricture/ileostomy placement in 8/ 2021, s/p PICC line for TPN approx 2 weeks prior to admission, p/w abdominal pain, n/v w/ concern for possible potential Crohn's Flare. S/p infliximab on 10/1. S/p EGD on 9/30 showing erythematous duodenoplasty, diffuse erythema and friability w/ some areas of superficial ulceration s/p biopsy. Bx notable for chronic inactive gastritis.     Pt w/ continued intermittent abdominal pain and n/v w/ clear liquid diet.       -C/w pain management with IV tylenol, dilaudid PRN. Start amitriptyline qhs per GI recs.   -c/w IV PPI, standing carafate q6hrs, and antiemetics PRN.   -serial abdominal exams and monitor for BMs while on opiods  -C/w clears as tolerated. C/w TPN and monitor LFTs.  -C/w trial of steroids. If no improvement will need imaging of small bowel.  -GI input appreciated.     Plan discussed with patient.

## 2021-10-15 NOTE — PROGRESS NOTE ADULT - PROBLEM SELECTOR PLAN 1
Hx of Crohn's with recent colonic resection for stricture. S/p EGD 9/30 with gastric inflammation, biopsied to r/o gastric Crohn's. S/p infliximab on 10/1. Inflammatory markers wnl, fecal calprotectin wnl    -GI following, appreciate recs  -Antiemetics PRN  - [facility] was unable to be reached for records  - no Crohn's or H. pylori in the gastric biopsy  - possible ileoscopy per GI but patient refused  - consider IV methylprednisone per GI    - Consider IV Acetaminophen 500mg Q6H standing x 4 doses  - IV Dialudid 0.25mg Q4H PRN for moderate pain and IV Dialudid 0.5mg Q4H PRN for severe pain. Hold for over sedation. Not to be given within one hour of any other immediate acting opioid.  - Consider PO Flexeril 5mg Q8H PRN for muscle spasms.  - Consider surgery consult for abdominal pain  - Recommend patient seeing a chronic pain management MD outpatient upon discharge.  - Recommend Holistic RN consult.  - Continuous pulse oximetry while patient on opioids. Hx of Crohn's with recent colonic resection for stricture. S/p EGD 9/30 with gastric inflammation, biopsied to r/o gastric Crohn's. S/p infliximab on 10/1. Inflammatory markers wnl, fecal calprotectin wnl    -GI following, appreciate recs  -Antiemetics PRN  - [facility] was unable to be reached for records  - no Crohn's or H. pylori in the gastric biopsy  - possible ileoscopy per GI but patient refused  - c/w IV methylprednisone per GI    - Consider IV Acetaminophen 500mg Q6H standing x 4 doses  - IV Dilaudid 0.25mg Q4H PRN for moderate pain and IV Dilaudid 0.5mg Q4H PRN for severe pain. Hold for over sedation. Not to be given within one hour of any other immediate acting opioid.  - c/w PO Flexeril 5mg Q8H PRN for muscle spasms.  - ordered Holistic RN consult.  - Continuous pulse oximetry while patient on opioids.

## 2021-10-15 NOTE — PROGRESS NOTE ADULT - ATTENDING COMMENTS
I agree with the above history, physical examination, chief complaint/diagnosis, and plan, which I have reviewed and edited where appropriate.  I agree with notes/assessment and detailed interval history of health care providers on my service.  I have seen and examined the patient.  I reviewed the laboratory and available data and agree with the history, physical assessment and plan.  I reviewed and discussed with all consultants, house staff and PA's.  The Nutrition Support Team (NST) discusses on an ongoing basis with the primary team and all consultants, House staff and PA's to have a permanent risk benefit analyses on all decisions and coordinating care.  I was physically present for the key portions of the evaluation and management (E/M) service provided.  26 year old female with PMH Crohn's disease s/p surgery for stricture/ileostomy receiving TPN was initiated on 9/29/21.  PSYCH: AAOx3  HEAD:  Atraumatic, Normocephalic  CHEST/LUNG: nonlabored respirations, CTAB  ABDOMEN: soft, mildly tender to palpation, nondistended   labs reviewed  continue TPN with infusion volume of 2L, /1325 kcal/day

## 2021-10-15 NOTE — PROGRESS NOTE ADULT - ASSESSMENT
26F w/ hx of Crohn's colitis c/b ascending colon stricture s/p resection 8/2021 w/ ileostomy (at [facility]) on remicade since 6/2021 (s/p induction and 2 maintenance doses, last 10/1/2021, admitted w/ ongoing severe post prandial epigastric pain and weight loss due to inability to take PO.    Impression:  #Post-prandial abd pain - Unclear etiology. Pt is s/p two extended hospitalizations at [facility] since her surgery, with testing including extensive imaging and upper GI endoscopy. No e/o SMA syndrome, EGD w/ esophagitis only. EGD here shows mild esophagitis and severe gastric inflammation (new finding compared to EGD 6 weeks ago, path negative for active Crohn's and is unchanged from her path results from [facility]). It is possible pain is related to severe gastritis, though having such severe pain from this is unusual. She has been evaluated for SMA syndrome at [facility]. In terms of mesenteric iscehmia, her vasculature is patent and no lactate, pointing away from any ischemic process. Suspect that she may have some underlying pain syndrome or non-GI etiology of her pain, but it is completely unclear what that might be.   #Crohn's disease - Historical details as above. It is unclear if she has active Crohn's at this time. Her ESR/CRP are negative, but they were never positive even at time of her initial diagnosis. Her fecal calprotectin, however, was very positive on diagnosis and is negative here. The only objective e/o active Crohn's is her CT scan on admission, which shows RLQ enteritis, but this is an imperfect test. Biopsies from EGD not c/w upper GI Crohn's. Ileoscopy would be useful in determining this but patient has declined. This would also not change the management of her current symptoms, as this appears to be unrelated to Crohn's.     Recommendations:  - Continue trial of IV steroids.  - Recommend giving amitriptyline 10mg PO qHS for possible neuropathic component.   - If no improvement after 48-72h of steroids would get MR enterography to evaluate small bowel.   - Recommend do not disturb/no routine vitals overnight to allow patient to sleep.  - Can c/w trial of clears for now.  - Continue high dose acid suppression w/ IV PPI.  - Continue w/ sucralfate liquid 1g q6h. Patient is trying to not miss any doses  - Agree w/ pain management consult. If responds to steroids and is able to take PO would transition away from dilaudid and start oxycodone.    This plan was discussed w/ patient and her sister at length.    Faustino Freitas  Gastroenterology/Hepatology Fellow  Available via [employer] Teams    NON-URGENT CONSULTS:  Please email [email or web address]  AT NIGHT AND ON WEEKENDS:  Contact on-call GI fellow via answering service ([phone]) from 5pm-8am and on weekends/holidays  MONDAY-FRIDAY 8AM-5PM:  Pager# [phone] ([facility]) or [phone] ([facility])  GI Phone# [phone] ([facility])

## 2021-10-15 NOTE — PROGRESS NOTE ADULT - SUBJECTIVE AND OBJECTIVE BOX
***************************************************************  Fito Norwood, PGY1  Internal Medicine   pager: [phone]  ***************************************************************        JACKSON CITLALI  26y  Female      Patient is a 26y old  Female who presents with a chief complaint of crohn's flare up (14 Oct 2021 10:38)      INTERVAL HPI/OVERNIGHT EVENTS:       FAMILY HISTORY:  FH: type 1 diabetes (Sibling)    FH: hypertension (Father)      T(C): 36.6 (10-15-21 @ 06:08), Max: 36.9 (10-14-21 @ 11:36)  HR: 94 (10-15-21 @ 06:08) (94 - 110)  BP: 105/59 (10-15-21 @ 06:08) (100/58 - 132/86)  RR: 18 (10-15-21 @ 06:08) (16 - 18)  SpO2: 98% (10-15-21 @ 06:08) (98% - 100%)  Wt(kg): --Vital Signs Last 24 Hrs  T(C): 36.6 (15 Oct 2021 06:08), Max: 36.9 (14 Oct 2021 11:36)  T(F): 97.9 (15 Oct 2021 06:08), Max: 98.5 (14 Oct 2021 11:36)  HR: 94 (15 Oct 2021 06:08) (94 - 110)  BP: 105/59 (15 Oct 2021 06:08) (100/58 - 132/86)  BP(mean): --  RR: 18 (15 Oct 2021 06:08) (16 - 18)  SpO2: 98% (15 Oct 2021 06:08) (98% - 100%)  No Known Allergies      PHYSICAL EXAM:  GENERAL: NAD, laying comfortably in bed  HEAD:  Atraumatic, Normocephalic  EYES: EOMI, PERRLA, conjunctiva and sclera clear  ENMT: No tonsillar erythema, exudates, or enlargement; Moist mucous membranes  NECK: Supple, No JVD  NERVOUS SYSTEM:  Alert & Oriented X3, Good concentration; Motor Strength 5/5 B/L upper and lower extremities;   CHEST/LUNG: Clear to auscultation bilaterally; No rales, rhonchi, wheezing, or rubs  HEART: Regular rate and rhythm; No murmurs, rubs, or gallops  ABDOMEN: Soft, Nontender, Nondistended; Bowel sounds present  EXTREMITIES:  No clubbing, cyanosis, or edema  LYMPH: No lymphadenopathy noted  SKIN: No rashes or lesions        LABS:                            8.2    5.82  )-----------( 289      ( 13 Oct 2021 11:08 )             25.6       10-13    140  |  111<H>  |  12  ----------------------------<  110<H>  3.5   |  20<L>  |  0.40<L>    Ca    7.7<L>      13 Oct 2021 11:08  Phos  1.9     10-13  Mg     1.60     10-13    TPro  6.0  /  Alb  3.8  /  TBili  0.3  /  DBili  x   /  AST  22  /  ALT  34<H>  /  AlkPhos  69  10-13                            CAPILLARY BLOOD GLUCOSE      POCT Blood Glucose.: 148 mg/dL (14 Oct 2021 17:49)                RADIOLOGY & ADDITIONAL TESTS:      chlorhexidine 2% Cloths 1 Application(s) Topical daily  cyclobenzaprine 5 milliGRAM(s) Oral three times a day PRN  fat emulsion (Fish Oil and Plant Based) 20% Infusion 14.5 mL/Hr IV Continuous <Continuous>  HYDROmorphone  Injectable 0.25 milliGRAM(s) IV Push every 4 hours PRN  HYDROmorphone  Injectable 0.5 milliGRAM(s) IV Push every 4 hours PRN  influenza   Vaccine 0.5 milliLiter(s) IntraMuscular once  lactated ringers. 1000 milliLiter(s) IV Continuous <Continuous>  lidocaine 2% Viscous 2 milliLiter(s) Swish and Spit three times a day PRN  methylPREDNISolone sodium succinate Injectable 20 milliGRAM(s) IV Push two times a day  ondansetron Injectable 4 milliGRAM(s) IV Push every 6 hours PRN  pantoprazole  Injectable 40 milliGRAM(s) IV Push two times a day  Parenteral Nutrition - Adult 1 Each TPN Continuous <Continuous>  sucralfate suspension 1 Gram(s) Oral <User Schedule>      HEALTH ISSUES - PROBLEM Dx:  Exacerbation of Crohn&#x27;s disease    Transaminitis    Need for prophylactic measure    Abdominal pain with vomiting           ***************************************************************  Fito Norwood, PGY1  Internal Medicine   pager: [phone]  ***************************************************************        CITLALI PAZ  26y  Female      Patient is a 26y old  Female who presents with a chief complaint of crohn's flare up (14 Oct 2021 10:38)      INTERVAL HPI/OVERNIGHT EVENTS: Overnight, pt reported 10/10 pain in LUQ radiating to esophagus, received IV tylenol 500 mg on top of dilaudid. This morning pt feeling better in terms of pain, reports not trying her clear liquid diet due to fear of pain. Reports increased pain after taking one of her medications yesterday. Denied fever and chills. Noted vomiting x 2 overnight, white bile, no blood.       FAMILY HISTORY:  FH: type 1 diabetes (Sibling)    FH: hypertension (Father)      T(C): 36.6 (10-15-21 @ 06:08), Max: 36.9 (10-14-21 @ 11:36)  HR: 94 (10-15-21 @ 06:08) (94 - 110)  BP: 105/59 (10-15-21 @ 06:08) (100/58 - 132/86)  RR: 18 (10-15-21 @ 06:08) (16 - 18)  SpO2: 98% (10-15-21 @ 06:08) (98% - 100%)  Wt(kg): --Vital Signs Last 24 Hrs  T(C): 36.6 (15 Oct 2021 06:08), Max: 36.9 (14 Oct 2021 11:36)  T(F): 97.9 (15 Oct 2021 06:08), Max: 98.5 (14 Oct 2021 11:36)  HR: 94 (15 Oct 2021 06:08) (94 - 110)  BP: 105/59 (15 Oct 2021 06:08) (100/58 - 132/86)  BP(mean): --  RR: 18 (15 Oct 2021 06:08) (16 - 18)  SpO2: 98% (15 Oct 2021 06:08) (98% - 100%)  No Known Allergies      PHYSICAL EXAM:  GENERAL: NAD, laying comfortably in bed  HEAD:  Atraumatic, Normocephalic  EYES: EOMI, PERRLA, conjunctiva and sclera clear  ENMT: No tonsillar erythema, exudates, or enlargement; Moist mucous membranes  NECK: Supple, No JVD  NERVOUS SYSTEM:  Alert & Oriented X3, Good concentration; Motor Strength 5/5 B/L upper and lower extremities;   CHEST/LUNG: Clear to auscultation bilaterally; No rales, rhonchi, wheezing, or rubs  HEART: Regular rate and rhythm; No murmurs, rubs, or gallops  ABDOMEN: refused examination due to pain.  EXTREMITIES:  No clubbing, cyanosis, or edema  LYMPH: No lymphadenopathy noted  SKIN: No rashes or lesions        LABS:                            8.2    5.82  )-----------( 289      ( 13 Oct 2021 11:08 )             25.6       10-13    140  |  111<H>  |  12  ----------------------------<  110<H>  3.5   |  20<L>  |  0.40<L>    Ca    7.7<L>      13 Oct 2021 11:08  Phos  1.9     10-13  Mg     1.60     10-13    TPro  6.0  /  Alb  3.8  /  TBili  0.3  /  DBili  x   /  AST  22  /  ALT  34<H>  /  AlkPhos  69  10-13                            CAPILLARY BLOOD GLUCOSE      POCT Blood Glucose.: 148 mg/dL (14 Oct 2021 17:49)                RADIOLOGY & ADDITIONAL TESTS:      chlorhexidine 2% Cloths 1 Application(s) Topical daily  cyclobenzaprine 5 milliGRAM(s) Oral three times a day PRN  fat emulsion (Fish Oil and Plant Based) 20% Infusion 14.5 mL/Hr IV Continuous <Continuous>  HYDROmorphone  Injectable 0.25 milliGRAM(s) IV Push every 4 hours PRN  HYDROmorphone  Injectable 0.5 milliGRAM(s) IV Push every 4 hours PRN  influenza   Vaccine 0.5 milliLiter(s) IntraMuscular once  lactated ringers. 1000 milliLiter(s) IV Continuous <Continuous>  lidocaine 2% Viscous 2 milliLiter(s) Swish and Spit three times a day PRN  methylPREDNISolone sodium succinate Injectable 20 milliGRAM(s) IV Push two times a day  ondansetron Injectable 4 milliGRAM(s) IV Push every 6 hours PRN  pantoprazole  Injectable 40 milliGRAM(s) IV Push two times a day  Parenteral Nutrition - Adult 1 Each TPN Continuous <Continuous>  sucralfate suspension 1 Gram(s) Oral <User Schedule>      HEALTH ISSUES - PROBLEM Dx:  Exacerbation of Crohn&#x27;s disease    Transaminitis    Need for prophylactic measure    Abdominal pain with vomiting

## 2021-10-16 LAB
GLUCOSE BLDC GLUCOMTR-MCNC: 120 MG/DL — HIGH (ref 70–99)
GLUCOSE BLDC GLUCOMTR-MCNC: 131 MG/DL — HIGH (ref 70–99)

## 2021-10-16 PROCEDURE — 99232 SBSQ HOSP IP/OBS MODERATE 35: CPT

## 2021-10-16 PROCEDURE — 99232 SBSQ HOSP IP/OBS MODERATE 35: CPT | Mod: GC

## 2021-10-16 RX ORDER — ELECTROLYTE SOLUTION,INJ
1 VIAL (ML) INTRAVENOUS
Refills: 0 | Status: DISCONTINUED | OUTPATIENT
Start: 2021-10-16 | End: 2021-10-16

## 2021-10-16 RX ORDER — I.V. FAT EMULSION 20 G/100ML
14.5 EMULSION INTRAVENOUS
Qty: 35 | Refills: 0 | Status: DISCONTINUED | OUTPATIENT
Start: 2021-10-16 | End: 2021-10-17

## 2021-10-16 RX ADMIN — Medication 1 EACH: at 19:28

## 2021-10-16 RX ADMIN — PANTOPRAZOLE SODIUM 40 MILLIGRAM(S): 20 TABLET, DELAYED RELEASE ORAL at 07:43

## 2021-10-16 RX ADMIN — HYDROMORPHONE HYDROCHLORIDE 0.25 MILLIGRAM(S): 2 INJECTION INTRAMUSCULAR; INTRAVENOUS; SUBCUTANEOUS at 18:56

## 2021-10-16 RX ADMIN — Medication 1 GRAM(S): at 17:12

## 2021-10-16 RX ADMIN — PANTOPRAZOLE SODIUM 40 MILLIGRAM(S): 20 TABLET, DELAYED RELEASE ORAL at 17:12

## 2021-10-16 RX ADMIN — HYDROMORPHONE HYDROCHLORIDE 0.5 MILLIGRAM(S): 2 INJECTION INTRAMUSCULAR; INTRAVENOUS; SUBCUTANEOUS at 23:26

## 2021-10-16 RX ADMIN — SODIUM CHLORIDE 100 MILLILITER(S): 9 INJECTION, SOLUTION INTRAVENOUS at 09:10

## 2021-10-16 RX ADMIN — HYDROMORPHONE HYDROCHLORIDE 0.5 MILLIGRAM(S): 2 INJECTION INTRAMUSCULAR; INTRAVENOUS; SUBCUTANEOUS at 03:22

## 2021-10-16 RX ADMIN — Medication 1 GRAM(S): at 22:34

## 2021-10-16 RX ADMIN — Medication 10 MILLIGRAM(S): at 22:34

## 2021-10-16 RX ADMIN — HYDROMORPHONE HYDROCHLORIDE 0.5 MILLIGRAM(S): 2 INJECTION INTRAMUSCULAR; INTRAVENOUS; SUBCUTANEOUS at 08:09

## 2021-10-16 RX ADMIN — HYDROMORPHONE HYDROCHLORIDE 0.5 MILLIGRAM(S): 2 INJECTION INTRAMUSCULAR; INTRAVENOUS; SUBCUTANEOUS at 16:47

## 2021-10-16 RX ADMIN — CHLORHEXIDINE GLUCONATE 1 APPLICATION(S): 213 SOLUTION TOPICAL at 17:15

## 2021-10-16 RX ADMIN — HYDROMORPHONE HYDROCHLORIDE 0.5 MILLIGRAM(S): 2 INJECTION INTRAMUSCULAR; INTRAVENOUS; SUBCUTANEOUS at 03:50

## 2021-10-16 RX ADMIN — Medication 20 MILLIGRAM(S): at 07:38

## 2021-10-16 RX ADMIN — SODIUM CHLORIDE 100 MILLILITER(S): 9 INJECTION, SOLUTION INTRAVENOUS at 02:43

## 2021-10-16 RX ADMIN — Medication 20 MILLIGRAM(S): at 17:12

## 2021-10-16 RX ADMIN — HYDROMORPHONE HYDROCHLORIDE 0.5 MILLIGRAM(S): 2 INJECTION INTRAMUSCULAR; INTRAVENOUS; SUBCUTANEOUS at 07:39

## 2021-10-16 RX ADMIN — HYDROMORPHONE HYDROCHLORIDE 0.5 MILLIGRAM(S): 2 INJECTION INTRAMUSCULAR; INTRAVENOUS; SUBCUTANEOUS at 12:09

## 2021-10-16 RX ADMIN — HYDROMORPHONE HYDROCHLORIDE 0.5 MILLIGRAM(S): 2 INJECTION INTRAMUSCULAR; INTRAVENOUS; SUBCUTANEOUS at 22:56

## 2021-10-16 RX ADMIN — HYDROMORPHONE HYDROCHLORIDE 0.5 MILLIGRAM(S): 2 INJECTION INTRAMUSCULAR; INTRAVENOUS; SUBCUTANEOUS at 16:32

## 2021-10-16 RX ADMIN — HYDROMORPHONE HYDROCHLORIDE 0.25 MILLIGRAM(S): 2 INJECTION INTRAMUSCULAR; INTRAVENOUS; SUBCUTANEOUS at 19:11

## 2021-10-16 RX ADMIN — SODIUM CHLORIDE 100 MILLILITER(S): 9 INJECTION, SOLUTION INTRAVENOUS at 18:54

## 2021-10-16 RX ADMIN — HYDROMORPHONE HYDROCHLORIDE 0.5 MILLIGRAM(S): 2 INJECTION INTRAMUSCULAR; INTRAVENOUS; SUBCUTANEOUS at 12:24

## 2021-10-16 RX ADMIN — I.V. FAT EMULSION 14.5 ML/HR: 20 EMULSION INTRAVENOUS at 19:29

## 2021-10-16 NOTE — PROGRESS NOTE ADULT - PROBLEM SELECTOR PLAN 2
Possibly related to esophagitis/reflux, other etiologies include active Crohn's (though no objective evidence pointing towards flare).  EGD showed gastric friability and was biopsied, negative for Crohn's. There was erythematous duodenopathy but no esophagitis.    - Treat symptomatically with IV PPI BID, c/w sucralfate liquid 1g q6h for esophagitis per GI recs  - C/w dilaudid for pain. Will attempt to avoid opiates in this patient when possible given increased mortality with opiates in IBD patients  - Pt tried on reglan but refuses bc she states it makes her anxious. Zofran PRN, Checking QTc qOD, last EKG had QTc wnl  - C/w TPN  - pain management recs appreciated

## 2021-10-16 NOTE — PROGRESS NOTE ADULT - ATTENDING COMMENTS
25 yo F with Hx of Crohn's disease s/p surgery for stricture/ileostomy placement in 8/ 2021, s/p PICC line for TPN approx 2 weeks prior to admission, p/w abdominal pain, n/v w/ concern for possible potential Crohn's Flare. S/p infliximab on 10/1. S/p EGD on 9/30 showing erythematous duodenoplasty, diffuse erythema and friability w/ some areas of superficial ulceration s/p biopsy. Bx notable for chronic inactive gastritis.     Pt reports improvement in pain today    -C/w pain management with IV tylenol, dilaudid PRN. Start amitriptyline qhs per GI recs.   -c/w IV PPI, standing carafate q6hrs, and antiemetics PRN.   -serial abdominal exams and monitor for BMs while on opiods  -C/w clears as tolerated. C/w TPN and monitor LFTs.  -C/w trial of steroids. If no improvement will need imaging of small bowel.  -GI input appreciated.     Plan discussed with patient.

## 2021-10-16 NOTE — PROGRESS NOTE ADULT - PROBLEM SELECTOR PLAN 1
Hx of Crohn's with recent colonic resection for stricture. S/p EGD 9/30 with gastric inflammation, biopsied to r/o gastric Crohn's. S/p infliximab on 10/1. Inflammatory markers wnl, fecal calprotectin wnl    -GI following, appreciate recs  -Antiemetics PRN  - [facility] was unable to be reached for records  - no Crohn's or H. pylori in the gastric biopsy  - possible ileoscopy per GI but patient refused  - c/w IV methylprednisone per GI    - Consider IV Acetaminophen 500mg Q6H standing x 4 doses  - IV Dilaudid 0.25mg Q4H PRN for moderate pain and IV Dilaudid 0.5mg Q4H PRN for severe pain. Hold for over sedation. Not to be given within one hour of any other immediate acting opioid.  - c/w PO Flexeril 5mg Q8H PRN for muscle spasms.  - ordered Holistic RN consult.

## 2021-10-16 NOTE — PROGRESS NOTE ADULT - SUBJECTIVE AND OBJECTIVE BOX
Giovanni Prajapati MS3    Patient is a 26y old  Female who presents with a chief complaint of crohn's flare up (16 Oct 2021 08:26)    SUBJECTIVE / OVERNIGHT EVENTS: Pt examined at bedside in NAD. Pt has some abdominal pain but it is improving. Denies any n/v overnight. Ostomy output stable. Denies any f/c, chest pain, SOB.    MEDICATIONS  (STANDING):  amitriptyline 10 milliGRAM(s) Oral at bedtime  chlorhexidine 2% Cloths 1 Application(s) Topical daily  fat emulsion (Fish Oil and Plant Based) 20% Infusion 14.5 mL/Hr (14.5 mL/Hr) IV Continuous <Continuous>  influenza   Vaccine 0.5 milliLiter(s) IntraMuscular once  lactated ringers. 1000 milliLiter(s) (100 mL/Hr) IV Continuous <Continuous>  methylPREDNISolone sodium succinate Injectable 20 milliGRAM(s) IV Push two times a day  pantoprazole  Injectable 40 milliGRAM(s) IV Push two times a day  Parenteral Nutrition - Adult 1 Each (83 mL/Hr) TPN Continuous <Continuous>  Parenteral Nutrition - Adult 1 Each (83 mL/Hr) TPN Continuous <Continuous>  sucralfate suspension 1 Gram(s) Oral <User Schedule>    MEDICATIONS  (PRN):  cyclobenzaprine 5 milliGRAM(s) Oral three times a day PRN Muscle Spasm  HYDROmorphone  Injectable 0.5 milliGRAM(s) IV Push every 4 hours PRN Severe Pain (7 - 10)  HYDROmorphone  Injectable 0.25 milliGRAM(s) IV Push every 4 hours PRN Moderate Pain (4 - 6)  lidocaine 2% Viscous 2 milliLiter(s) Swish and Spit three times a day PRN pain with swallowing  ondansetron Injectable 4 milliGRAM(s) IV Push every 6 hours PRN Nausea and/or Vomiting    Vital Signs Last 24 Hrs  T(C): 36.5 (16 Oct 2021 02:05), Max: 37 (15 Oct 2021 22:15)  T(F): 97.7 (16 Oct 2021 02:05), Max: 98.6 (15 Oct 2021 22:15)  HR: 101 (16 Oct 2021 07:30) (91 - 101)  BP: 106/69 (16 Oct 2021 07:30) (106/62 - 113/73)  BP(mean): --  RR: 18 (16 Oct 2021 07:30) (17 - 18)  SpO2: 100% (16 Oct 2021 07:30) (100% - 100%)  CAPILLARY BLOOD GLUCOSE    POCT Blood Glucose.: 120 mg/dL (16 Oct 2021 08:29)    I&O's Summary    15 Oct 2021 07:01  -  16 Oct 2021 07:00  --------------------------------------------------------  IN: 2421 mL / OUT: 0 mL / NET: 2421 mL        PHYSICAL EXAM:  GENERAL: NAD, malnourished  HEAD:  Atraumatic, Normocephalic  EYES: EOMI, PERRLA, conjunctiva and sclera clear  NECK: No JVD  CHEST/LUNG: Clear to auscultation bilaterally; No wheeze  HEART: Regular rate and rhythm; No murmurs, rubs, or gallops  ABDOMEN: Soft, mildly tender in epigastric region, Nondistended; Bowel sounds present  EXTREMITIES:  2+ Peripheral Pulses, No clubbing, cyanosis, or edema, no tenderness around TPN site  PSYCH: AAOx3  NEUROLOGY: non-focal  SKIN: No rashes or lesions    LABS:                        10.1   8.44  )-----------( 372      ( 15 Oct 2021 11:36 )             30.6     10-15    137  |  103  |  11  ----------------------------<  142<H>  4.3   |  23  |  0.40<L>    Ca    9.6      15 Oct 2021 11:36  Phos  3.2     10-15  Mg     2.00     10-15        RADIOLOGY & ADDITIONAL TESTS:    Imaging Personally Reviewed:    Consultant(s) Notes Reviewed:      Care Discussed with Consultants/Other Providers:

## 2021-10-16 NOTE — PROGRESS NOTE ADULT - SUBJECTIVE AND OBJECTIVE BOX
NUTRITION NOTE  [id number] MUTAMANDAA  ===============================    Interval events - Patient was seen and examined at bedside, no acute events overnight. Patient denies chest pain or shortness of breath at this time. Patient remains on TPN at this time. Patient is now on clear liquids.     ROS: Except as noted above, all other systems reviewed and are negative     Allergies  No Known Allergies    PAST MEDICAL & SURGICAL HISTORY:  Crohn disease  Peripherally inserted central catheter (PICC) in place LUE PICC place on 2021  History of ileostomy Aug 2021    Vital Signs Last 24 Hrs  T(C): 36.5 (16 Oct 2021 02:05), Max: 37 (15 Oct 2021 22:15)  T(F): 97.7 (16 Oct 2021 02:05), Max: 98.6 (15 Oct 2021 22:15)  HR: 101 (16 Oct 2021 07:30) (91 - 101)  BP: 106/69 (16 Oct 2021 07:30) (106/62 - 113/73)  RR: 18 (16 Oct 2021 07:30) (17 - 18)  SpO2: 100% (16 Oct 2021 07:30) (100% - 100%)    MEDICATIONS  (STANDING):  amitriptyline 10 milliGRAM(s) Oral at bedtime  chlorhexidine 2% Cloths 1 Application(s) Topical daily  fat emulsion (Fish Oil and Plant Based) 20% Infusion 14.5 mL/Hr (14.5 mL/Hr) IV Continuous <Continuous>  influenza   Vaccine 0.5 milliLiter(s) IntraMuscular once  lactated ringers. 1000 milliLiter(s) (100 mL/Hr) IV Continuous <Continuous>  methylPREDNISolone sodium succinate Injectable 20 milliGRAM(s) IV Push two times a day  pantoprazole  Injectable 40 milliGRAM(s) IV Push two times a day  Parenteral Nutrition - Adult 1 Each (83 mL/Hr) TPN Continuous <Continuous>  Parenteral Nutrition - Adult 1 Each (83 mL/Hr) TPN Continuous <Continuous>  sucralfate suspension 1 Gram(s) Oral <User Schedule>    MEDICATIONS  (PRN):  cyclobenzaprine 5 milliGRAM(s) Oral three times a day PRN Muscle Spasm  HYDROmorphone  Injectable 0.5 milliGRAM(s) IV Push every 4 hours PRN Severe Pain (7 - 10)  HYDROmorphone  Injectable 0.25 milliGRAM(s) IV Push every 4 hours PRN Moderate Pain (4 - 6)  lidocaine 2% Viscous 2 milliLiter(s) Swish and Spit three times a day PRN pain with swallowing  ondansetron Injectable 4 milliGRAM(s) IV Push every 6 hours PRN Nausea and/or Vomiting    I&O's Detail    15 Oct 2021 07:01  -  16 Oct 2021 07:00  --------------------------------------------------------  IN:    Fat Emulsion (Fish Oil &amp; Plant Based) 20% Infusion: 175 mL    IV PiggyBack: 50 mL    Lactated Ringers: 1200 mL    TPN (Total Parenteral Nutrition): 996 mL  Total IN: 2421 mL    OUT:  Total OUT: 0 mL    Total NET: 2421 mL    Daily Weight in k.4 (15 Oct 2021 06:08)    Drug Dosing Weight  Height (cm): 154.9 (30 Sep 2021 08:11)  Weight (kg): 35.2 (30 Sep 2021 08:11)  BMI (kg/m2): 14.7 (30 Sep 2021 08:11)  BSA (m2): 1.26 (30 Sep 2021 08:11)    PHYSICAL EXAM:  GENERAL: malnourished, resting comfortably in bed   HEAD:  Atraumatic, Normocephalic  CHEST/LUNG: nonlabored respirations, CTAB  ABDOMEN: soft, mildly tender to palpation, nondistended   PSYCH: AAOx3  PICC Site: C/D/I    Diet: clear liquids and TPN/lipids     LABORATORY                                                         10.1   8.44  )-----------( 372      ( 15 Oct 2021 11:36 )             30.6   10-15    137  |  103  |  11  ----------------------------<  142<H>  4.3   |  23  |  0.40<L>    Ca    9.6      15 Oct 2021 11:36  Phos  3.2     10-15  Mg     2.00     10-15    TPro  6.0  /  Alb  3.8  /  TBili  0.3  /  DBili  x   /  AST  22  /  ALT  34<H>  /  AlkPhos  69  10-13    LIVER FUNCTIONS - ( 13 Oct 2021 11:08 )  Alb: 3.8 g/dL / Pro: 6.0 g/dL / ALK PHOS: 69 U/L / ALT: 34 U/L / AST: 22 U/L / GGT: x           10-05 Chol -- LDL -- HDL -- Trig 91, 09-30 Chol -- LDL -- HDL -- Trig 71    ASSESSMENT/PLAN:  26 year old female with PMH Crohn's disease s/p surgery for stricture/ileostomy placement in mid 2021, s/p PICC line in place for TPN 2 weeks ago, presents with epigastric abdominal pain, nausea, and vomiting x 4 days. GI is following with possible EGD to evaluate for esophagitis. Nutrition service consulted for resuming parenteral nutrition via PICC line that is in place. Patient states that she feels very weak and is frustrated that she is not able to take in anything by mouth (liquids or food) without nausea or emesis. TPN was initiated on 21.    continue TPN with infusion volume of 2L, TPN will provide 1325 kcal/day     no AM labs today - continue same TPN formula today    monitor fingersticks, obtain daily weights    continue parenteral nutrition at this time, will follow up with primary team on plan - GI workup in process     1.  Severe protein calorie malnutrition being optimized with TPN: CHO [195] gm.  AA [78] gm. SMOF Lipids [35] gm.  2.  Hyperglycemia managed with: [0] units of regular insulin    3.  Check fluid balance daily.  Strict I/O  [ ] [ ]   4.  Daily BMP, Ionized Calcium, Magnesium and Phosphorous   5.  Triglycerides at initiation of TPN and monthly [ ] [ ]     Nutrition Support [id number]

## 2021-10-17 LAB
GLUCOSE BLDC GLUCOMTR-MCNC: 104 MG/DL — HIGH (ref 70–99)
GLUCOSE BLDC GLUCOMTR-MCNC: 99 MG/DL — SIGNIFICANT CHANGE UP (ref 70–99)

## 2021-10-17 PROCEDURE — 99232 SBSQ HOSP IP/OBS MODERATE 35: CPT

## 2021-10-17 PROCEDURE — 99232 SBSQ HOSP IP/OBS MODERATE 35: CPT | Mod: GC

## 2021-10-17 RX ORDER — ACETAMINOPHEN 500 MG
500 TABLET ORAL ONCE
Refills: 0 | Status: COMPLETED | OUTPATIENT
Start: 2021-10-17 | End: 2021-10-17

## 2021-10-17 RX ORDER — I.V. FAT EMULSION 20 G/100ML
14.5 EMULSION INTRAVENOUS
Qty: 35 | Refills: 0 | Status: DISCONTINUED | OUTPATIENT
Start: 2021-10-17 | End: 2021-10-18

## 2021-10-17 RX ORDER — ELECTROLYTE SOLUTION,INJ
1 VIAL (ML) INTRAVENOUS
Refills: 0 | Status: DISCONTINUED | OUTPATIENT
Start: 2021-10-17 | End: 2021-10-17

## 2021-10-17 RX ORDER — SODIUM CHLORIDE 9 MG/ML
500 INJECTION INTRAMUSCULAR; INTRAVENOUS; SUBCUTANEOUS ONCE
Refills: 0 | Status: COMPLETED | OUTPATIENT
Start: 2021-10-17 | End: 2021-10-17

## 2021-10-17 RX ADMIN — Medication 1 EACH: at 20:25

## 2021-10-17 RX ADMIN — PANTOPRAZOLE SODIUM 40 MILLIGRAM(S): 20 TABLET, DELAYED RELEASE ORAL at 18:50

## 2021-10-17 RX ADMIN — Medication 10 MILLIGRAM(S): at 23:24

## 2021-10-17 RX ADMIN — HYDROMORPHONE HYDROCHLORIDE 0.5 MILLIGRAM(S): 2 INJECTION INTRAMUSCULAR; INTRAVENOUS; SUBCUTANEOUS at 09:59

## 2021-10-17 RX ADMIN — ONDANSETRON 4 MILLIGRAM(S): 8 TABLET, FILM COATED ORAL at 03:05

## 2021-10-17 RX ADMIN — HYDROMORPHONE HYDROCHLORIDE 0.5 MILLIGRAM(S): 2 INJECTION INTRAMUSCULAR; INTRAVENOUS; SUBCUTANEOUS at 23:03

## 2021-10-17 RX ADMIN — HYDROMORPHONE HYDROCHLORIDE 0.5 MILLIGRAM(S): 2 INJECTION INTRAMUSCULAR; INTRAVENOUS; SUBCUTANEOUS at 02:57

## 2021-10-17 RX ADMIN — HYDROMORPHONE HYDROCHLORIDE 0.5 MILLIGRAM(S): 2 INJECTION INTRAMUSCULAR; INTRAVENOUS; SUBCUTANEOUS at 09:39

## 2021-10-17 RX ADMIN — Medication 20 MILLIGRAM(S): at 18:50

## 2021-10-17 RX ADMIN — Medication 1 GRAM(S): at 23:00

## 2021-10-17 RX ADMIN — HYDROMORPHONE HYDROCHLORIDE 0.5 MILLIGRAM(S): 2 INJECTION INTRAMUSCULAR; INTRAVENOUS; SUBCUTANEOUS at 13:52

## 2021-10-17 RX ADMIN — CHLORHEXIDINE GLUCONATE 1 APPLICATION(S): 213 SOLUTION TOPICAL at 12:07

## 2021-10-17 RX ADMIN — HYDROMORPHONE HYDROCHLORIDE 0.5 MILLIGRAM(S): 2 INJECTION INTRAMUSCULAR; INTRAVENOUS; SUBCUTANEOUS at 23:20

## 2021-10-17 RX ADMIN — Medication 20 MILLIGRAM(S): at 06:57

## 2021-10-17 RX ADMIN — SODIUM CHLORIDE 100 MILLILITER(S): 9 INJECTION, SOLUTION INTRAVENOUS at 12:06

## 2021-10-17 RX ADMIN — Medication 1 GRAM(S): at 18:50

## 2021-10-17 RX ADMIN — PANTOPRAZOLE SODIUM 40 MILLIGRAM(S): 20 TABLET, DELAYED RELEASE ORAL at 06:57

## 2021-10-17 RX ADMIN — SODIUM CHLORIDE 500 MILLILITER(S): 9 INJECTION INTRAMUSCULAR; INTRAVENOUS; SUBCUTANEOUS at 07:39

## 2021-10-17 RX ADMIN — HYDROMORPHONE HYDROCHLORIDE 0.5 MILLIGRAM(S): 2 INJECTION INTRAMUSCULAR; INTRAVENOUS; SUBCUTANEOUS at 03:11

## 2021-10-17 RX ADMIN — I.V. FAT EMULSION 14.5 ML/HR: 20 EMULSION INTRAVENOUS at 20:30

## 2021-10-17 RX ADMIN — HYDROMORPHONE HYDROCHLORIDE 0.5 MILLIGRAM(S): 2 INJECTION INTRAMUSCULAR; INTRAVENOUS; SUBCUTANEOUS at 19:03

## 2021-10-17 NOTE — PROGRESS NOTE ADULT - ASSESSMENT
26 year old female with PMH Crohn's disease s/p surgery for stricture/ileostomy placement in mid August 2021, s/p PICC line in place for TPN 2 weeks ago, presents with epigastric abdominal pain, nausea, and vomiting, admitted to medicine for inability to tolerate PO.

## 2021-10-17 NOTE — PROGRESS NOTE ADULT - ATTENDING COMMENTS
27 yo F with Hx of Crohn's disease s/p surgery for stricture/ileostomy placement in 8/ 2021, s/p PICC line for TPN approx 2 weeks prior to admission, p/w abdominal pain, n/v w/ concern for possible potential Crohn's Flare. S/p infliximab on 10/1. S/p EGD on 9/30 showing erythematous duodenoplasty, diffuse erythema and friability w/ some areas of superficial ulceration s/p biopsy. Bx notable for chronic inactive gastritis.     Pt reports improvement in pain today. Tolerated some ice chips yesterday.     -C/w pain management with IV tylenol, dilaudid PRN. Start amitriptyline qhs per GI recs.   -c/w IV PPI, standing carafate q6hrs, and antiemetics PRN.   -serial abdominal exams and monitor for BMs while on opiods  -C/w clears as tolerated. C/w TPN and monitor LFTs.  -C/w trial of steroids. If no improvement will need imaging of small bowel.  -GI input appreciated.   -requesting psych eval. Call Monday AM.     Plan discussed with patient and HS5

## 2021-10-17 NOTE — PROGRESS NOTE ADULT - PROBLEM SELECTOR PLAN 2
Possibly related to esophagitis/reflux, other etiologies include active Crohn's (though no objective evidence pointing towards flare).  EGD showed gastric friability and was biopsied, negative for Crohn's. There was erythematous duodenopathy but no esophagitis.    - Treat symptomatically with IV PPI BID, c/w sucralfate liquid 1g q6h for esophagitis per GI recs  - C/w dilaudid for pain with attempts to wean  - Pt tried on reglan but refuses bc she states it makes her anxious. Zofran PRN, Checking QTc qOD, last EKG had QTc wnl  - C/w TPN  - pain management recs appreciated Differential includes active Crohn's (though no objective evidence pointing towards flare).  EGD showed gastric friability and was biopsied, negative for Crohn's. There was erythematous duodenopathy but no esophagitis.  - IV PPI BID, c/w sucralfate liquid 1g q6h for esophagitis per GI recs  - C/w dilaudid for pain with attempts to wean  - Pt tried on reglan but refuses bc she states it makes her anxious. Zofran PRN, Checking QTc qOD, last EKG had QTc wnl  - C/w TPN

## 2021-10-17 NOTE — PROGRESS NOTE ADULT - SUBJECTIVE AND OBJECTIVE BOX
PROGRESS NOTE:     CONTACT INFO:  Henri Meier MD  Internal Medicine PGY1  Pager: [phone]    Patient is a 26y old  Female who presents with a chief complaint of crohn's flare up (16 Oct 2021 09:07)      SUBJECTIVE / OVERNIGHT EVENTS:  No acute events overnight.    MEDICATIONS  (STANDING):  acetaminophen  IVPB .. 500 milliGRAM(s) IV Intermittent once  amitriptyline 10 milliGRAM(s) Oral at bedtime  chlorhexidine 2% Cloths 1 Application(s) Topical daily  fat emulsion (Fish Oil and Plant Based) 20% Infusion 14.5 mL/Hr (14.5 mL/Hr) IV Continuous <Continuous>  influenza   Vaccine 0.5 milliLiter(s) IntraMuscular once  lactated ringers. 1000 milliLiter(s) (100 mL/Hr) IV Continuous <Continuous>  methylPREDNISolone sodium succinate Injectable 20 milliGRAM(s) IV Push two times a day  pantoprazole  Injectable 40 milliGRAM(s) IV Push two times a day  Parenteral Nutrition - Adult 1 Each (83 mL/Hr) TPN Continuous <Continuous>  sucralfate suspension 1 Gram(s) Oral <User Schedule>    MEDICATIONS  (PRN):  cyclobenzaprine 5 milliGRAM(s) Oral three times a day PRN Muscle Spasm  HYDROmorphone  Injectable 0.25 milliGRAM(s) IV Push every 4 hours PRN Moderate Pain (4 - 6)  HYDROmorphone  Injectable 0.5 milliGRAM(s) IV Push every 4 hours PRN Severe Pain (7 - 10)  lidocaine 2% Viscous 2 milliLiter(s) Swish and Spit three times a day PRN pain with swallowing  ondansetron Injectable 4 milliGRAM(s) IV Push every 6 hours PRN Nausea and/or Vomiting      CAPILLARY BLOOD GLUCOSE      POCT Blood Glucose.: 99 mg/dL (17 Oct 2021 07:17)  POCT Blood Glucose.: 131 mg/dL (16 Oct 2021 17:56)  POCT Blood Glucose.: 120 mg/dL (16 Oct 2021 08:29)    I&O's Summary    16 Oct 2021 07:01  -  17 Oct 2021 07:00  --------------------------------------------------------  IN: 2196 mL / OUT: 0 mL / NET: 2196 mL        PHYSICAL EXAM:  Vital Signs Last 24 Hrs  T(C): 36.8 (17 Oct 2021 06:30), Max: 36.8 (16 Oct 2021 22:15)  T(F): 98.2 (17 Oct 2021 06:30), Max: 98.3 (16 Oct 2021 22:15)  HR: 91 (17 Oct 2021 06:30) (83 - 97)  BP: 91/50 (17 Oct 2021 06:30) (91/50 - 112/64)  BP(mean): --  RR: 16 (17 Oct 2021 06:30) (16 - 18)  SpO2: 100% (17 Oct 2021 06:30) (100% - 100%)    GENERAL: NAD, malnourished  HEAD:  Atraumatic, Normocephalic  EYES: EOMI, PERRLA, conjunctiva and sclera clear  NECK: No JVD  CHEST/LUNG: Clear to auscultation bilaterally; No wheeze  HEART: Regular rate and rhythm; No murmurs, rubs, or gallops  ABDOMEN: Soft, mildly tender in epigastric region, Nondistended; Bowel sounds present  EXTREMITIES:  2+ Peripheral Pulses, No clubbing, cyanosis, or edema, no tenderness around TPN site  PSYCH: AAOx3  NEUROLOGY: non-focal  SKIN: No rashes or lesions    LABS:                        10.1   8.44  )-----------( 372      ( 15 Oct 2021 11:36 )             30.6     10-15    137  |  103  |  11  ----------------------------<  142<H>  4.3   |  23  |  0.40<L>    Ca    9.6      15 Oct 2021 11:36  Phos  3.2     10-15  Mg     2.00     10-15 PROGRESS NOTE:     CONTACT INFO:  Henri Meier MD  Internal Medicine PGY2  Pager: [phone]    Patient is a 26y old  Female who presents with a chief complaint of crohn's flare up (16 Oct 2021 09:07)      SUBJECTIVE / OVERNIGHT EVENTS:  No acute events overnight. Patient reports she was able to tolerate some water and ice yesterday without nausea. She still has some pain in the epigastric region which she thinks is improving but still bothering her. No n/v, f/c, change in ostomy output.    MEDICATIONS  (STANDING):  acetaminophen  IVPB .. 500 milliGRAM(s) IV Intermittent once  amitriptyline 10 milliGRAM(s) Oral at bedtime  chlorhexidine 2% Cloths 1 Application(s) Topical daily  fat emulsion (Fish Oil and Plant Based) 20% Infusion 14.5 mL/Hr (14.5 mL/Hr) IV Continuous <Continuous>  influenza   Vaccine 0.5 milliLiter(s) IntraMuscular once  lactated ringers. 1000 milliLiter(s) (100 mL/Hr) IV Continuous <Continuous>  methylPREDNISolone sodium succinate Injectable 20 milliGRAM(s) IV Push two times a day  pantoprazole  Injectable 40 milliGRAM(s) IV Push two times a day  Parenteral Nutrition - Adult 1 Each (83 mL/Hr) TPN Continuous <Continuous>  sucralfate suspension 1 Gram(s) Oral <User Schedule>    MEDICATIONS  (PRN):  cyclobenzaprine 5 milliGRAM(s) Oral three times a day PRN Muscle Spasm  HYDROmorphone  Injectable 0.25 milliGRAM(s) IV Push every 4 hours PRN Moderate Pain (4 - 6)  HYDROmorphone  Injectable 0.5 milliGRAM(s) IV Push every 4 hours PRN Severe Pain (7 - 10)  lidocaine 2% Viscous 2 milliLiter(s) Swish and Spit three times a day PRN pain with swallowing  ondansetron Injectable 4 milliGRAM(s) IV Push every 6 hours PRN Nausea and/or Vomiting      CAPILLARY BLOOD GLUCOSE      POCT Blood Glucose.: 99 mg/dL (17 Oct 2021 07:17)  POCT Blood Glucose.: 131 mg/dL (16 Oct 2021 17:56)  POCT Blood Glucose.: 120 mg/dL (16 Oct 2021 08:29)    I&O's Summary    16 Oct 2021 07:01  -  17 Oct 2021 07:00  --------------------------------------------------------  IN: 2196 mL / OUT: 0 mL / NET: 2196 mL        PHYSICAL EXAM:  Vital Signs Last 24 Hrs  T(C): 36.8 (17 Oct 2021 06:30), Max: 36.8 (16 Oct 2021 22:15)  T(F): 98.2 (17 Oct 2021 06:30), Max: 98.3 (16 Oct 2021 22:15)  HR: 91 (17 Oct 2021 06:30) (83 - 97)  BP: 91/50 (17 Oct 2021 06:30) (91/50 - 112/64)  BP(mean): --  RR: 16 (17 Oct 2021 06:30) (16 - 18)  SpO2: 100% (17 Oct 2021 06:30) (100% - 100%)    GENERAL: NAD, malnourished  HEAD:  Atraumatic, Normocephalic  EYES: EOMI, PERRLA, conjunctiva and sclera clear  NECK: No JVD  CHEST/LUNG: Clear to auscultation bilaterally; No wheeze  HEART: Regular rate and rhythm; No murmurs, rubs, or gallops  ABDOMEN: Soft, mildly tender in epigastric and RUQ, Nondistended; Ostomy in place with normal output  EXTREMITIES:  2+ Peripheral Pulses, No clubbing, cyanosis, or edema, no tenderness around TPN site  PSYCH: AAOx3  NEUROLOGY: non-focal  SKIN: No rashes or lesions    LABS:                        10.1   8.44  )-----------( 372      ( 15 Oct 2021 11:36 )             30.6     10-15    137  |  103  |  11  ----------------------------<  142<H>  4.3   |  23  |  0.40<L>    Ca    9.6      15 Oct 2021 11:36  Phos  3.2     10-15  Mg     2.00     10-15

## 2021-10-17 NOTE — PROGRESS NOTE ADULT - PROBLEM SELECTOR PLAN 1
Hx of Crohn's with recent colonic resection for stricture. S/p EGD 9/30 with gastric inflammation, biopsied to r/o gastric Crohn's. S/p infliximab on 10/1. Inflammatory markers wnl, fecal calprotectin wnl    -GI following, appreciate recs  -Antiemetics PRN  - [facility] was unable to be reached for records  - no Crohn's or H. pylori in the gastric biopsy  - possible ileoscopy per GI but patient refused  - c/w IV methylprednisone per GI (started 10/14)  - attempting to wean reliance on dilaudid Hx of Crohn's with recent colonic resection for stricture. S/p EGD 9/30 with gastric inflammation, biopsied to r/o gastric Crohn's which was negative. S/p infliximab on 10/1. Inflammatory markers wnl, fecal calprotectin wnl  -GI following, appreciate recs  - [facility] was unable to be reached for records  - possible ileoscopy per GI but patient refused  - c/w IV methylprednisone per GI (started 10/14), pain slowly improving on steroids  - attempting to wean reliance on dilaudid

## 2021-10-17 NOTE — PROGRESS NOTE ADULT - SUBJECTIVE AND OBJECTIVE BOX
NUTRITION NOTE  [id number] MUTAMANDAA  ===============================    Interval events - Patient was seen and examined at bedside, no acute events overnight. Patient denies chest pain or shortness of breath at this time. Patient remains on TPN at this time. Patient is now on clear liquids.     ROS: Except as noted above, all other systems reviewed and are negative     Allergies  No Known Allergies    PAST MEDICAL & SURGICAL HISTORY:  Crohn disease  Peripherally inserted central catheter (PICC) in place LUE PICC place on 2021  History of ileostomy Aug 2021    Vital Signs Last 24 Hrs  T(C): 36.8 (17 Oct 2021 06:30), Max: 36.8 (16 Oct 2021 22:15)  T(F): 98.2 (17 Oct 2021 06:30), Max: 98.3 (16 Oct 2021 22:15)  HR: 91 (17 Oct 2021 06:30) (83 - 97)  BP: 91/50 (17 Oct 2021 06:30) (91/50 - 112/64)  RR: 16 (17 Oct 2021 06:30) (16 - 18)  SpO2: 100% (17 Oct 2021 06:30) (100% - 100%)    MEDICATIONS  (STANDING):  amitriptyline 10 milliGRAM(s) Oral at bedtime  chlorhexidine 2% Cloths 1 Application(s) Topical daily  fat emulsion (Fish Oil and Plant Based) 20% Infusion 14.5 mL/Hr (14.5 mL/Hr) IV Continuous <Continuous>  influenza   Vaccine 0.5 milliLiter(s) IntraMuscular once  lactated ringers. 1000 milliLiter(s) (100 mL/Hr) IV Continuous <Continuous>  methylPREDNISolone sodium succinate Injectable 20 milliGRAM(s) IV Push two times a day  pantoprazole  Injectable 40 milliGRAM(s) IV Push two times a day  Parenteral Nutrition - Adult 1 Each (83 mL/Hr) TPN Continuous <Continuous>  Parenteral Nutrition - Adult 1 Each (83 mL/Hr) TPN Continuous <Continuous>  sucralfate suspension 1 Gram(s) Oral <User Schedule>    MEDICATIONS  (PRN):  cyclobenzaprine 5 milliGRAM(s) Oral three times a day PRN Muscle Spasm  HYDROmorphone  Injectable 0.5 milliGRAM(s) IV Push every 4 hours PRN Severe Pain (7 - 10)  HYDROmorphone  Injectable 0.25 milliGRAM(s) IV Push every 4 hours PRN Moderate Pain (4 - 6)  lidocaine 2% Viscous 2 milliLiter(s) Swish and Spit three times a day PRN pain with swallowing  ondansetron Injectable 4 milliGRAM(s) IV Push every 6 hours PRN Nausea and/or Vomiting    I&O's Detail    16 Oct 2021 07:01  -  17 Oct 2021 07:00  --------------------------------------------------------  IN:    Lactated Ringers: 1200 mL    TPN (Total Parenteral Nutrition): 996 mL  Total IN: 2196 mL    OUT:  Total OUT: 0 mL    Total NET: 2196 mL    POCT Blood Glucose.: 99 mg/dL (17 Oct 2021 07:17)  POCT Blood Glucose.: 131 mg/dL (16 Oct 2021 17:56)    Daily Weight in k.6 (17 Oct 2021 06:30)    Drug Dosing Weight  Height (cm): 154.9 (30 Sep 2021 08:11)  Weight (kg): 35.2 (30 Sep 2021 08:11)  BMI (kg/m2): 14.7 (30 Sep 2021 08:11)  BSA (m2): 1.26 (30 Sep 2021 08:11)    PHYSICAL EXAM:  GENERAL: malnourished, resting comfortably in bed   HEAD:  Atraumatic, Normocephalic  CHEST/LUNG: nonlabored respirations, CTAB  ABDOMEN: soft, mildly tender to palpation, nondistended   PSYCH: AAOx3  PICC Site: C/D/I    Diet: clear liquids and TPN/lipids     LABORATORY                                                         10.1   8.44  )-----------( 372      ( 15 Oct 2021 11:36 )             30.6   10-15    137  |  103  |  11  ----------------------------<  142<H>  4.3   |  23  |  0.40<L>    Ca    9.6      15 Oct 2021 11:36  Phos  3.2     10-15  Mg     2.00     10-15    TPro  6.0  /  Alb  3.8  /  TBili  0.3  /  DBili  x   /  AST  22  /  ALT  34<H>  /  AlkPhos  69  10-13    LIVER FUNCTIONS - ( 13 Oct 2021 11:08 )  Alb: 3.8 g/dL / Pro: 6.0 g/dL / ALK PHOS: 69 U/L / ALT: 34 U/L / AST: 22 U/L / GGT: x           10- Chol -- LDL -- HDL -- Trig 91,  Chol -- LDL -- HDL -- Trig 71    ASSESSMENT/PLAN:  26 year old female with PMH Crohn's disease s/p surgery for stricture/ileostomy placement in mid 2021, s/p PICC line in place for TPN 2 weeks ago, presents with epigastric abdominal pain, nausea, and vomiting x 4 days. GI is following with possible EGD to evaluate for esophagitis. Nutrition service consulted for resuming parenteral nutrition via PICC line that is in place. Patient states that she feels very weak and is frustrated that she is not able to take in anything by mouth (liquids or food) without nausea or emesis. TPN was initiated on 21.    continue TPN with infusion volume of 2L, TPN will provide 1325 kcal/day     no AM labs today - continue same TPN formula today    monitor fingersticks, obtain daily weights    continue parenteral nutrition at this time, will follow up with primary team on plan - GI workup in process     1.  Severe protein calorie malnutrition being optimized with TPN: CHO [195] gm.  AA [78] gm. SMOF Lipids [35] gm.  2.  Hyperglycemia managed with: [0] units of regular insulin    3.  Check fluid balance daily.  Strict I/O  [ ] [ ]   4.  Daily BMP, Ionized Calcium, Magnesium and Phosphorous   5.  Triglycerides at initiation of TPN and monthly [ ] [ ]     Nutrition Support [id number]

## 2021-10-17 NOTE — PROGRESS NOTE ADULT - PROBLEM SELECTOR PLAN 3
Transaminitis likely in setting of TPN +/- hypovolemia/dehydration/ poor PO. Recent hepatitis panel in 6/2021 was negative.  -s/p IV fluids   -monitor AST/ALT Resolved

## 2021-10-18 LAB
ANION GAP SERPL CALC-SCNC: 12 MMOL/L — SIGNIFICANT CHANGE UP (ref 7–14)
BASOPHILS # BLD AUTO: 0.01 K/UL — SIGNIFICANT CHANGE UP (ref 0–0.2)
BASOPHILS NFR BLD AUTO: 0.1 % — SIGNIFICANT CHANGE UP (ref 0–2)
BUN SERPL-MCNC: 11 MG/DL — SIGNIFICANT CHANGE UP (ref 7–23)
CA-I BLD-SCNC: 1.22 MMOL/L — SIGNIFICANT CHANGE UP (ref 1.15–1.29)
CALCIUM SERPL-MCNC: 9.2 MG/DL — SIGNIFICANT CHANGE UP (ref 8.4–10.5)
CHLORIDE SERPL-SCNC: 97 MMOL/L — LOW (ref 98–107)
CO2 SERPL-SCNC: 25 MMOL/L — SIGNIFICANT CHANGE UP (ref 22–31)
CREAT SERPL-MCNC: 0.44 MG/DL — LOW (ref 0.5–1.3)
EOSINOPHIL # BLD AUTO: 0 K/UL — SIGNIFICANT CHANGE UP (ref 0–0.5)
EOSINOPHIL NFR BLD AUTO: 0 % — SIGNIFICANT CHANGE UP (ref 0–6)
GLUCOSE BLDC GLUCOMTR-MCNC: 92 MG/DL — SIGNIFICANT CHANGE UP (ref 70–99)
GLUCOSE SERPL-MCNC: 113 MG/DL — HIGH (ref 70–99)
HCT VFR BLD CALC: 28.2 % — LOW (ref 34.5–45)
HGB BLD-MCNC: 9.2 G/DL — LOW (ref 11.5–15.5)
IANC: 6.84 K/UL — SIGNIFICANT CHANGE UP (ref 1.5–8.5)
IMM GRANULOCYTES NFR BLD AUTO: 0.7 % — SIGNIFICANT CHANGE UP (ref 0–1.5)
LYMPHOCYTES # BLD AUTO: 0.88 K/UL — LOW (ref 1–3.3)
LYMPHOCYTES # BLD AUTO: 11 % — LOW (ref 13–44)
MAGNESIUM SERPL-MCNC: 2 MG/DL — SIGNIFICANT CHANGE UP (ref 1.6–2.6)
MCHC RBC-ENTMCNC: 27.2 PG — SIGNIFICANT CHANGE UP (ref 27–34)
MCHC RBC-ENTMCNC: 32.6 GM/DL — SIGNIFICANT CHANGE UP (ref 32–36)
MCV RBC AUTO: 83.4 FL — SIGNIFICANT CHANGE UP (ref 80–100)
MONOCYTES # BLD AUTO: 0.24 K/UL — SIGNIFICANT CHANGE UP (ref 0–0.9)
MONOCYTES NFR BLD AUTO: 3 % — SIGNIFICANT CHANGE UP (ref 2–14)
NEUTROPHILS # BLD AUTO: 6.84 K/UL — SIGNIFICANT CHANGE UP (ref 1.8–7.4)
NEUTROPHILS NFR BLD AUTO: 85.2 % — HIGH (ref 43–77)
NRBC # BLD: 0 /100 WBCS — SIGNIFICANT CHANGE UP
NRBC # FLD: 0 K/UL — SIGNIFICANT CHANGE UP
PHOSPHATE SERPL-MCNC: 3.8 MG/DL — SIGNIFICANT CHANGE UP (ref 2.5–4.5)
PLATELET # BLD AUTO: 333 K/UL — SIGNIFICANT CHANGE UP (ref 150–400)
POTASSIUM SERPL-MCNC: 4.2 MMOL/L — SIGNIFICANT CHANGE UP (ref 3.5–5.3)
POTASSIUM SERPL-SCNC: 4.2 MMOL/L — SIGNIFICANT CHANGE UP (ref 3.5–5.3)
RBC # BLD: 3.38 M/UL — LOW (ref 3.8–5.2)
RBC # FLD: 12.7 % — SIGNIFICANT CHANGE UP (ref 10.3–14.5)
SODIUM SERPL-SCNC: 134 MMOL/L — LOW (ref 135–145)
WBC # BLD: 8.03 K/UL — SIGNIFICANT CHANGE UP (ref 3.8–10.5)
WBC # FLD AUTO: 8.03 K/UL — SIGNIFICANT CHANGE UP (ref 3.8–10.5)

## 2021-10-18 PROCEDURE — 99232 SBSQ HOSP IP/OBS MODERATE 35: CPT

## 2021-10-18 PROCEDURE — 99232 SBSQ HOSP IP/OBS MODERATE 35: CPT | Mod: GC

## 2021-10-18 PROCEDURE — 99233 SBSQ HOSP IP/OBS HIGH 50: CPT | Mod: GC

## 2021-10-18 RX ORDER — ELECTROLYTE SOLUTION,INJ
1 VIAL (ML) INTRAVENOUS
Refills: 0 | Status: DISCONTINUED | OUTPATIENT
Start: 2021-10-18 | End: 2021-10-18

## 2021-10-18 RX ORDER — I.V. FAT EMULSION 20 G/100ML
8.3 EMULSION INTRAVENOUS
Qty: 20 | Refills: 0 | Status: DISCONTINUED | OUTPATIENT
Start: 2021-10-18 | End: 2021-10-19

## 2021-10-18 RX ORDER — OXYCODONE HYDROCHLORIDE 5 MG/1
5 TABLET ORAL EVERY 4 HOURS
Refills: 0 | Status: DISCONTINUED | OUTPATIENT
Start: 2021-10-18 | End: 2021-10-20

## 2021-10-18 RX ORDER — OXYCODONE HYDROCHLORIDE 5 MG/1
5 TABLET ORAL EVERY 4 HOURS
Refills: 0 | Status: DISCONTINUED | OUTPATIENT
Start: 2021-10-18 | End: 2021-10-18

## 2021-10-18 RX ORDER — ACETAMINOPHEN 500 MG
500 TABLET ORAL ONCE
Refills: 0 | Status: COMPLETED | OUTPATIENT
Start: 2021-10-18 | End: 2021-10-18

## 2021-10-18 RX ORDER — HYDROMORPHONE HYDROCHLORIDE 2 MG/ML
0.5 INJECTION INTRAMUSCULAR; INTRAVENOUS; SUBCUTANEOUS ONCE
Refills: 0 | Status: DISCONTINUED | OUTPATIENT
Start: 2021-10-18 | End: 2021-10-18

## 2021-10-18 RX ORDER — HYDROMORPHONE HYDROCHLORIDE 2 MG/ML
0.25 INJECTION INTRAMUSCULAR; INTRAVENOUS; SUBCUTANEOUS EVERY 4 HOURS
Refills: 0 | Status: DISCONTINUED | OUTPATIENT
Start: 2021-10-18 | End: 2021-10-18

## 2021-10-18 RX ORDER — OXYCODONE HYDROCHLORIDE 5 MG/1
2.5 TABLET ORAL EVERY 4 HOURS
Refills: 0 | Status: DISCONTINUED | OUTPATIENT
Start: 2021-10-18 | End: 2021-10-18

## 2021-10-18 RX ORDER — HYDROMORPHONE HYDROCHLORIDE 2 MG/ML
0.25 INJECTION INTRAMUSCULAR; INTRAVENOUS; SUBCUTANEOUS EVERY 4 HOURS
Refills: 0 | Status: DISCONTINUED | OUTPATIENT
Start: 2021-10-18 | End: 2021-10-20

## 2021-10-18 RX ADMIN — SODIUM CHLORIDE 100 MILLILITER(S): 9 INJECTION, SOLUTION INTRAVENOUS at 22:37

## 2021-10-18 RX ADMIN — Medication 500 MILLIGRAM(S): at 03:25

## 2021-10-18 RX ADMIN — PANTOPRAZOLE SODIUM 40 MILLIGRAM(S): 20 TABLET, DELAYED RELEASE ORAL at 07:01

## 2021-10-18 RX ADMIN — Medication 200 MILLIGRAM(S): at 22:14

## 2021-10-18 RX ADMIN — HYDROMORPHONE HYDROCHLORIDE 0.5 MILLIGRAM(S): 2 INJECTION INTRAMUSCULAR; INTRAVENOUS; SUBCUTANEOUS at 22:29

## 2021-10-18 RX ADMIN — HYDROMORPHONE HYDROCHLORIDE 0.25 MILLIGRAM(S): 2 INJECTION INTRAMUSCULAR; INTRAVENOUS; SUBCUTANEOUS at 18:05

## 2021-10-18 RX ADMIN — HYDROMORPHONE HYDROCHLORIDE 0.5 MILLIGRAM(S): 2 INJECTION INTRAMUSCULAR; INTRAVENOUS; SUBCUTANEOUS at 03:25

## 2021-10-18 RX ADMIN — SODIUM CHLORIDE 100 MILLILITER(S): 9 INJECTION, SOLUTION INTRAVENOUS at 00:21

## 2021-10-18 RX ADMIN — HYDROMORPHONE HYDROCHLORIDE 0.5 MILLIGRAM(S): 2 INJECTION INTRAMUSCULAR; INTRAVENOUS; SUBCUTANEOUS at 22:14

## 2021-10-18 RX ADMIN — PANTOPRAZOLE SODIUM 40 MILLIGRAM(S): 20 TABLET, DELAYED RELEASE ORAL at 17:41

## 2021-10-18 RX ADMIN — Medication 500 MILLIGRAM(S): at 22:29

## 2021-10-18 RX ADMIN — HYDROMORPHONE HYDROCHLORIDE 0.5 MILLIGRAM(S): 2 INJECTION INTRAMUSCULAR; INTRAVENOUS; SUBCUTANEOUS at 03:05

## 2021-10-18 RX ADMIN — CHLORHEXIDINE GLUCONATE 1 APPLICATION(S): 213 SOLUTION TOPICAL at 11:42

## 2021-10-18 RX ADMIN — Medication 200 MILLIGRAM(S): at 03:04

## 2021-10-18 RX ADMIN — Medication 20 MILLIGRAM(S): at 17:42

## 2021-10-18 RX ADMIN — Medication 1 EACH: at 17:39

## 2021-10-18 RX ADMIN — Medication 1 GRAM(S): at 11:42

## 2021-10-18 RX ADMIN — Medication 1 GRAM(S): at 07:00

## 2021-10-18 RX ADMIN — ONDANSETRON 4 MILLIGRAM(S): 8 TABLET, FILM COATED ORAL at 18:36

## 2021-10-18 RX ADMIN — HYDROMORPHONE HYDROCHLORIDE 0.5 MILLIGRAM(S): 2 INJECTION INTRAMUSCULAR; INTRAVENOUS; SUBCUTANEOUS at 07:13

## 2021-10-18 RX ADMIN — I.V. FAT EMULSION 8.3 ML/HR: 20 EMULSION INTRAVENOUS at 17:40

## 2021-10-18 RX ADMIN — ONDANSETRON 4 MILLIGRAM(S): 8 TABLET, FILM COATED ORAL at 03:18

## 2021-10-18 RX ADMIN — OXYCODONE HYDROCHLORIDE 5 MILLIGRAM(S): 5 TABLET ORAL at 15:33

## 2021-10-18 RX ADMIN — SODIUM CHLORIDE 100 MILLILITER(S): 9 INJECTION, SOLUTION INTRAVENOUS at 13:10

## 2021-10-18 RX ADMIN — HYDROMORPHONE HYDROCHLORIDE 0.5 MILLIGRAM(S): 2 INJECTION INTRAMUSCULAR; INTRAVENOUS; SUBCUTANEOUS at 11:42

## 2021-10-18 RX ADMIN — Medication 20 MILLIGRAM(S): at 07:00

## 2021-10-18 RX ADMIN — Medication 1 GRAM(S): at 17:43

## 2021-10-18 NOTE — PROGRESS NOTE ADULT - ATTENDING COMMENTS
25 yo F w/ hx crohn's disease s/p surgery for stricture/ileostomy s/p PICC line for TPN p/w abdominal pain. s/p inflixamab EGD  Diffuse erythema and friability w/ some areas of  superficial ulceration. bx chronic inactive gastritis. TTG Ab neg. CT A/P +RLQ enteritis. started on steroid with improvement in abdominal pain. had chicken and rice x 2 had some nausea and vomiting second time around. slowly advance diet as tolerated. monitor EKG. 27 yo F w/ hx crohn's disease s/p surgery for stricture/ileostomy s/p PICC line for TPN p/w abdominal pain. s/p inflixamab EGD  Diffuse erythema and friability w/ some areas of  superficial ulceration. bx chronic inactive gastritis. TTG Ab neg. CT A/P +RLQ enteritis. started on steroid with improvement in abdominal pain. had chicken and rice x 2 had some nausea and vomiting second time around. slowly advance diet as tolerated. 1/2 dose TPN. monitor EKG. change to oxy ir 2.5 moderate pain and .25 IV for severe pain

## 2021-10-18 NOTE — PROGRESS NOTE ADULT - SUBJECTIVE AND OBJECTIVE BOX
Giovanni Prajapati MS4    Patient is a 26y old  Female who presents with a chief complaint of crohn's flare up (17 Oct 2021 09:50)    SUBJECTIVE / OVERNIGHT EVENTS: Pt examined at bedside in NAD. Pt states she was able to eat chicken and rice twice yesterday. The first time the patient did not experience any n/v or pain afterwards. The 2nd time she ate she experienced some n/v and pain however it was not as bad as previously and she is still willing to try eating. She wants her diet order changed from clear liquid to regular diet. Pt denies any chest pain, SOB, f/c. No changes in ostomy output.    MEDICATIONS  (STANDING):  amitriptyline 10 milliGRAM(s) Oral at bedtime  chlorhexidine 2% Cloths 1 Application(s) Topical daily  fat emulsion (Fish Oil and Plant Based) 20% Infusion 14.5 mL/Hr (14.5 mL/Hr) IV Continuous <Continuous>  influenza   Vaccine 0.5 milliLiter(s) IntraMuscular once  lactated ringers. 1000 milliLiter(s) (100 mL/Hr) IV Continuous <Continuous>  methylPREDNISolone sodium succinate Injectable 20 milliGRAM(s) IV Push two times a day  pantoprazole  Injectable 40 milliGRAM(s) IV Push two times a day  Parenteral Nutrition - Adult 1 Each (83 mL/Hr) TPN Continuous <Continuous>  sucralfate suspension 1 Gram(s) Oral <User Schedule>    MEDICATIONS  (PRN):  cyclobenzaprine 5 milliGRAM(s) Oral three times a day PRN Muscle Spasm  HYDROmorphone  Injectable 0.5 milliGRAM(s) IV Push every 4 hours PRN Severe Pain (7 - 10)  HYDROmorphone  Injectable 0.25 milliGRAM(s) IV Push every 4 hours PRN Moderate Pain (4 - 6)  lidocaine 2% Viscous 2 milliLiter(s) Swish and Spit three times a day PRN pain with swallowing  ondansetron Injectable 4 milliGRAM(s) IV Push every 6 hours PRN Nausea and/or Vomiting      Vital Signs Last 24 Hrs  T(C): 36.7 (18 Oct 2021 07:00), Max: 36.8 (17 Oct 2021 18:30)  T(F): 98 (18 Oct 2021 07:00), Max: 98.3 (17 Oct 2021 18:30)  HR: 87 (18 Oct 2021 07:00) (87 - 100)  BP: 107/72 (18 Oct 2021 07:00) (98/56 - 107/72)  BP(mean): --  RR: 16 (18 Oct 2021 07:00) (16 - 18)  SpO2: 100% (18 Oct 2021 07:00) (100% - 100%)  CAPILLARY BLOOD GLUCOSE      POCT Blood Glucose.: 92 mg/dL (18 Oct 2021 07:21)  POCT Blood Glucose.: 104 mg/dL (17 Oct 2021 17:59)    I&O's Summary      PHYSICAL EXAM:  GENERAL: NAD, malnourished  HEAD:  Atraumatic, Normocephalic  EYES: conjunctiva and sclera clear  NECK: No JVD  CHEST/LUNG: Clear to auscultation bilaterally; No wheeze  HEART: Regular rate and rhythm; No murmurs, rubs, or gallops  ABDOMEN: Soft, Nontender, Nondistended; Bowel sounds present; ostomy output stable  EXTREMITIES:  2+ Peripheral Pulses, No clubbing, cyanosis, or edema; TPN line c/d/i  PSYCH: AAOx3  NEUROLOGY: non-focal  SKIN: No rashes or lesions    LABS:            RADIOLOGY & ADDITIONAL TESTS:    Imaging Personally Reviewed:    Consultant(s) Notes Reviewed:      Care Discussed with Consultants/Other Providers:

## 2021-10-18 NOTE — PROGRESS NOTE ADULT - ATTENDING COMMENTS
Overall improved since steroid initiation.   Will plan to transition to PO steroids tomorrow.   Continue additional supportive measures, as above.

## 2021-10-18 NOTE — PROGRESS NOTE ADULT - ATTENDING COMMENTS
I agree with the above history, physical examination, chief complaint/diagnosis, and plan, which I have reviewed and edited where appropriate.  I agree with notes/assessment and detailed interval history of health care providers on my service.  I have seen and examined the patient.  I reviewed the laboratory and available data and agree with the history, physical assessment and plan.  I reviewed and discussed with all consultants, house staff and PA's.  The Nutrition Support Team (NST) discusses on an ongoing basis with the primary team and all consultants, House staff and PA's to have a permanent risk benefit analyses on all decisions and coordinating care.  I was physically present for the key portions of the evaluation and management (E/M) service provided.  26 year old female with PMH Crohn's disease s/p surgery for stricture/ileostomy receiving TPN.  comfortable in bed   PSYCH: AAOx3  CHEST/LUNG: clear  ABDOMEN: soft, mildly tender to palpation, nondistended   TPN infusion volume decreased to 1L, TPN will provide 700 kcal/day

## 2021-10-18 NOTE — PROGRESS NOTE ADULT - PROBLEM SELECTOR PLAN 1
Hx of Crohn's with recent colonic resection for stricture. S/p EGD 9/30 with gastric inflammation, biopsied to r/o gastric Crohn's which was negative. S/p infliximab on 10/1. Inflammatory markers wnl, fecal calprotectin wnl  -GI following, appreciate recs  - [facility] was unable to be reached for records  - possible ileoscopy per GI but patient refused  - c/w IV methylprednisone per GI (started 10/14), pain slowly improving on steroids  - attempting to wean reliance on dilaudid Hx of Crohn's with recent colonic resection for stricture. S/p EGD 9/30 with gastric inflammation, biopsied to r/o gastric Crohn's which was negative. S/p infliximab on 10/1. Inflammatory markers wnl, fecal calprotectin wnl  -GI following, appreciate recs  - [facility] was unable to be reached for records  - possible ileoscopy per GI but patient refused  - c/w IV methylprednisone per GI (started 10/14), pain slowly improving on steroids  - switch to oxycodone for pain

## 2021-10-18 NOTE — PROGRESS NOTE ADULT - SUBJECTIVE AND OBJECTIVE BOX
NUTRITION NOTE  [id number] MUTAMANDAA  ===============================    Interval events - Patient was seen and examined at bedside, no acute events overnight. Patient denies chest pain or shortness of breath at this time. Patient remains on TPN at this time. Patient states that she was able to eat chicken and rice twice yesterday, with some nausea after eating the second time. Patient wants to try to eat more regular food. TPN volume and calories decreased today.     ROS: Except as noted above, all other systems reviewed and are negative     Allergies  No Known Allergies    PAST MEDICAL & SURGICAL HISTORY:  Crohn disease  Peripherally inserted central catheter (PICC) in place LUE PICC place on 2021  History of ileostomy Aug 2021    Vital Signs Last 24 Hrs  T(C): 36.7 (18 Oct 2021 07:00), Max: 36.8 (17 Oct 2021 18:30)  T(F): 98 (18 Oct 2021 07:00), Max: 98.3 (17 Oct 2021 18:30)  HR: 87 (18 Oct 2021 07:00) (87 - 100)  BP: 107/72 (18 Oct 2021 07:00) (98/56 - 107/72)  RR: 16 (18 Oct 2021 07:00) (16 - 18)  SpO2: 100% (18 Oct 2021 07:00) (100% - 100%)    MEDICATIONS  (STANDING):  amitriptyline 10 milliGRAM(s) Oral at bedtime  chlorhexidine 2% Cloths 1 Application(s) Topical daily  fat emulsion (Fish Oil and Plant Based) 20% Infusion 8.3 mL/Hr (8.3 mL/Hr) IV Continuous <Continuous>  influenza   Vaccine 0.5 milliLiter(s) IntraMuscular once  lactated ringers. 1000 milliLiter(s) (100 mL/Hr) IV Continuous <Continuous>  methylPREDNISolone sodium succinate Injectable 20 milliGRAM(s) IV Push two times a day  pantoprazole  Injectable 40 milliGRAM(s) IV Push two times a day  Parenteral Nutrition - Adult 1 Each (83 mL/Hr) TPN Continuous <Continuous>  Parenteral Nutrition - Adult 1 Each (42 mL/Hr) TPN Continuous <Continuous>  sucralfate suspension 1 Gram(s) Oral <User Schedule>    MEDICATIONS  (PRN):  cyclobenzaprine 5 milliGRAM(s) Oral three times a day PRN Muscle Spasm  HYDROmorphone  Injectable 0.5 milliGRAM(s) IV Push every 4 hours PRN Severe Pain (7 - 10)  HYDROmorphone  Injectable 0.25 milliGRAM(s) IV Push every 4 hours PRN Moderate Pain (4 - 6)  lidocaine 2% Viscous 2 milliLiter(s) Swish and Spit three times a day PRN pain with swallowing  ondansetron Injectable 4 milliGRAM(s) IV Push every 6 hours PRN Nausea and/or Vomiting    POCT Blood Glucose.: 92 mg/dL (18 Oct 2021 07:21)  POCT Blood Glucose.: 104 mg/dL (17 Oct 2021 17:59)    Daily Weight in k.4 (18 Oct 2021 07:00)    Drug Dosing Weight  Height (cm): 154.9 (30 Sep 2021 08:11)  Weight (kg): 35.2 (30 Sep 2021 08:11)  BMI (kg/m2): 14.7 (30 Sep 2021 08:11)  BSA (m2): 1.26 (30 Sep 2021 08:11)    PHYSICAL EXAM:  GENERAL: malnourished, resting comfortably in bed   HEAD:  Atraumatic, Normocephalic  CHEST/LUNG: nonlabored respirations, CTAB  ABDOMEN: soft, mildly tender to palpation, nondistended   PSYCH: AAOx3  PICC Site: C/D/I    Diet: clear liquids and TPN/lipids     LABORATORY                                                   9.2    8.03  )-----------( 333      ( 18 Oct 2021 11:37 )             28.2     137  |  103  |  11  ----------------------------<  142<H>  4.3   |  23  |  0.40<L>    Ca    9.6      15 Oct 2021 11:36  Phos  3.2     10-15  Mg     2.00     10-15    TPro  6.0  /  Alb  3.8  /  TBili  0.3  /  DBili  x   /  AST  22  /  ALT  34<H>  /  AlkPhos  69  10-13    LIVER FUNCTIONS - ( 13 Oct 2021 11:08 )  Alb: 3.8 g/dL / Pro: 6.0 g/dL / ALK PHOS: 69 U/L / ALT: 34 U/L / AST: 22 U/L / GGT: x           10-05 Chol -- LDL -- HDL -- Trig 91, 09- Chol -- LDL -- HDL -- Trig 71    ASSESSMENT/PLAN:  26 year old female with PMH Crohn's disease s/p surgery for stricture/ileostomy placement in mid 2021, s/p PICC line in place for TPN 2 weeks ago, presents with epigastric abdominal pain, nausea, and vomiting x 4 days. GI is following with possible EGD to evaluate for esophagitis. Nutrition service consulted for resuming parenteral nutrition via PICC line that is in place. Patient states that she feels very weak and is frustrated that she is not able to take in anything by mouth (liquids or food) without nausea or emesis. TPN was initiated on 21.    TPN infusion volume decreased to 1L, TPN will provide 700 kcal/day     monitor fingersticks, obtain daily weights    continue parenteral nutrition at this time, will follow up with primary team on plan - continue to monitor PO intake, plan to wean off TPN     1.  Severe protein calorie malnutrition being optimized with TPN: CHO [100] gm.  AA [40] gm. SMOF Lipids [20] gm.  2.  Hyperglycemia managed with: [0] units of regular insulin    3.  Check fluid balance daily.  Strict I/O  [ ] [ ]   4.  Daily BMP, Ionized Calcium, Magnesium and Phosphorous   5.  Triglycerides at initiation of TPN and monthly [ ] [ ]     Nutrition Support [id number]

## 2021-10-18 NOTE — PROGRESS NOTE ADULT - ASSESSMENT
26 year old female with PMH Crohn's disease s/p surgery for stricture/ileostomy placement in mid August 2021, s/p PICC line in place for TPN 2 weeks ago, presents with epigastric abdominal pain, nausea, and vomiting, admitted to medicine for inability to tolerate PO. Pt is currently improving s/p steroids and is tolerating oral intake.

## 2021-10-18 NOTE — PROGRESS NOTE ADULT - PROBLEM SELECTOR PLAN 2
Differential includes active Crohn's (though no objective evidence pointing towards flare).  EGD showed gastric friability and was biopsied, negative for Crohn's. There was erythematous duodenopathy but no esophagitis.  - IV PPI BID, c/w sucralfate liquid 1g q6h for esophagitis per GI recs  - C/w dilaudid for pain with attempts to wean  - Pt tried on reglan but refuses bc she states it makes her anxious. Zofran PRN, Checking QTc qOD, last EKG had QTc wnl  - C/w TPN Differential includes active Crohn's (though no objective evidence pointing towards flare).  EGD showed gastric friability and was biopsied, negative for Crohn's. There was erythematous duodenopathy but no esophagitis.  - IV PPI BID, c/w sucralfate liquid 1g q6h for esophagitis per GI recs  - Pt tried on reglan but refuses bc she states it makes her anxious. Zofran PRN, Checking QTc qOD, last EKG had QTc wnl  - 1/2 dose TPN

## 2021-10-18 NOTE — PROGRESS NOTE ADULT - SUBJECTIVE AND OBJECTIVE BOX
Chief Complaint:  Patient is a 26y old  Female who presents with a chief complaint of crohn's flare up (18 Oct 2021 11:45)    Reason for consult: n/v, hx of Crohn's    Interval Events: Patient feeling better, ate chicken and rice yesterday and had less pain than prior and less severe vomiting.     Hospital Medications:  amitriptyline 10 milliGRAM(s) Oral at bedtime  chlorhexidine 2% Cloths 1 Application(s) Topical daily  cyclobenzaprine 5 milliGRAM(s) Oral three times a day PRN  fat emulsion (Fish Oil and Plant Based) 20% Infusion 8.3 mL/Hr IV Continuous <Continuous>  HYDROmorphone  Injectable 0.25 milliGRAM(s) IV Push every 4 hours PRN  influenza   Vaccine 0.5 milliLiter(s) IntraMuscular once  lactated ringers. 1000 milliLiter(s) IV Continuous <Continuous>  lidocaine 2% Viscous 2 milliLiter(s) Swish and Spit three times a day PRN  methylPREDNISolone sodium succinate Injectable 20 milliGRAM(s) IV Push two times a day  ondansetron Injectable 4 milliGRAM(s) IV Push every 6 hours PRN  oxyCODONE    IR 5 milliGRAM(s) Oral every 4 hours PRN  pantoprazole  Injectable 40 milliGRAM(s) IV Push two times a day  Parenteral Nutrition - Adult 1 Each TPN Continuous <Continuous>  Parenteral Nutrition - Adult 1 Each TPN Continuous <Continuous>  sucralfate suspension 1 Gram(s) Oral <User Schedule>      ROS:   General:  No  fevers, chills, night sweats, fatigue  Eyes:  Good vision, no reported pain  ENT:  No sore throat, pain, runny nose  CV:  No pain, palpitations  Pulm:  No dyspnea, cough  GI:  See HPI, otherwise negative  :  No  incontinence, nocturia  Muscle:  No pain, weakness  Neuro:  No memory problems  Psych:  No insomnia, mood problems, depression  Endocrine:  No polyuria, polydipsia, cold/heat intolerance  Heme:  No petechiae, ecchymosis, easy bruisability  Skin:  No rash    PHYSICAL EXAM:   Vital Signs:  Vital Signs Last 24 Hrs  T(C): 36.7 (18 Oct 2021 07:00), Max: 36.8 (17 Oct 2021 18:30)  T(F): 98 (18 Oct 2021 07:00), Max: 98.3 (17 Oct 2021 18:30)  HR: 87 (18 Oct 2021 07:00) (87 - 100)  BP: 107/72 (18 Oct 2021 07:00) (98/56 - 107/72)  BP(mean): --  RR: 16 (18 Oct 2021 07:00) (16 - 18)  SpO2: 100% (18 Oct 2021 07:00) (100% - 100%)  Daily     Daily Weight in k.4 (18 Oct 2021 07:00)    GENERAL: no acute distress  NEURO: alert  HEENT: anicteric sclera, no conjunctival pallor appreciated  CHEST: no respiratory distress, no accessory muscle use  CARDIAC: regular rate, rhythm  ABDOMEN: soft, non-tender, non-distended, no rebound or guarding  EXTREMITIES: warm, well perfused, no edema  SKIN: no lesions noted    LABS: reviewed                        9.2    8.03  )-----------( 333      ( 18 Oct 2021 11:37 )             28.2     10-18    134<L>  |  97<L>  |  11  ----------------------------<  113<H>  4.2   |  25  |  0.44<L>    Ca    9.2      18 Oct 2021 11:37  Phos  3.8     10-18  Mg     2.00     10-18          Interval Diagnostic Studies: see sunrise for full report

## 2021-10-18 NOTE — PROGRESS NOTE ADULT - PROBLEM SELECTOR PLAN 4
DVT ppx- Improve score low. BLACK stockings and ambulate as tolerated.    Severe protein malnutrition - nutrition recs appreciated. C/w TPN    Dispo- pending further medical optimization DVT ppx- Improve score low. BLACK stockings and ambulate as tolerated.    Severe protein malnutrition - nutrition recs appreciated. C/w 1/2 dose TPN    Full liquid diet    Dispo- pending further medical optimization

## 2021-10-18 NOTE — PROGRESS NOTE ADULT - ASSESSMENT
26F w/ hx of Crohn's colitis c/b ascending colon stricture s/p resection 8/2021 w/ ileostomy (at [facility]) on remicade since 6/2021 (s/p induction and 2 maintenance doses, last 10/1/2021, admitted w/ ongoing severe post prandial epigastric pain and weight loss due to inability to take PO.    Impression:  #Post-prandial abd pain - Unclear etiology. Pt is s/p two extended hospitalizations at [facility] since her surgery, with testing including extensive imaging and upper GI endoscopy. No e/o SMA syndrome, EGD w/ esophagitis only. EGD here shows mild esophagitis and severe gastric inflammation (new finding compared to EGD 6 weeks ago, path negative for active Crohn's and is unchanged from her path results from [facility]). It is possible pain is related to severe gastritis, though having such severe pain from this is unusual. She has been evaluated for SMA syndrome at [facility]. In terms of mesenteric iscehmia, her vasculature is patent and no lactate, pointing away from any ischemic process. Suspect that she may have some underlying pain syndrome or non-GI etiology of her pain, but it is completely unclear what that might be.   #Crohn's disease - Historical details as above. It is unclear if she has active Crohn's at this time. Her ESR/CRP are negative, but they were never positive even at time of her initial diagnosis. Her fecal calprotectin, however, was very positive on diagnosis and is negative here. The only objective e/o active Crohn's is her CT scan on admission, which shows RLQ enteritis, but this is an imperfect test. Biopsies from EGD not c/w upper GI Crohn's. Ileoscopy would be useful in determining this but patient has declined. This would also not change the management of her current symptoms, as this appears to be unrelated to Crohn's.     Recommendations:  - Switch IV steroids to PO prednisone 40mg daily starting tomorrow, 10/19  - Would attempt to switch to PO pain meds now that pt taking PO (d/w patient who agrees)  - C/w amitriptyline 10mg PO qHS.  - Full liquid diet today 10/18, can advance tomorrow if doing well.   - Continue high dose acid suppression w/ IV PPI. Would switch to PO PPI daily on discharge.   - Continue w/ sucralfate liquid 1g q6h for now.     Faustino Freitas  Gastroenterology/Hepatology Fellow  Available via [employer] Teams    NON-URGENT CONSULTS:  Please email [email or web address]  AT NIGHT AND ON WEEKENDS:  Contact on-call GI fellow via answering service ([phone]) from 5pm-8am and on weekends/holidays  MONDAY-FRIDAY 8AM-5PM:  Pager# [phone] ([facility]) or [phone] ([facility])  GI Phone# [phone] ([facility])

## 2021-10-19 LAB
ALBUMIN SERPL ELPH-MCNC: 4.2 G/DL — SIGNIFICANT CHANGE UP (ref 3.3–5)
ALP SERPL-CCNC: 81 U/L — SIGNIFICANT CHANGE UP (ref 40–120)
ALT FLD-CCNC: 102 U/L — HIGH (ref 4–33)
ANION GAP SERPL CALC-SCNC: 12 MMOL/L — SIGNIFICANT CHANGE UP (ref 7–14)
AST SERPL-CCNC: 25 U/L — SIGNIFICANT CHANGE UP (ref 4–32)
BILIRUB SERPL-MCNC: 0.4 MG/DL — SIGNIFICANT CHANGE UP (ref 0.2–1.2)
BUN SERPL-MCNC: 11 MG/DL — SIGNIFICANT CHANGE UP (ref 7–23)
CALCIUM SERPL-MCNC: 9.7 MG/DL — SIGNIFICANT CHANGE UP (ref 8.4–10.5)
CHLORIDE SERPL-SCNC: 98 MMOL/L — SIGNIFICANT CHANGE UP (ref 98–107)
CO2 SERPL-SCNC: 24 MMOL/L — SIGNIFICANT CHANGE UP (ref 22–31)
CREAT SERPL-MCNC: 0.41 MG/DL — LOW (ref 0.5–1.3)
GLUCOSE SERPL-MCNC: 140 MG/DL — HIGH (ref 70–99)
HCT VFR BLD CALC: 27.7 % — LOW (ref 34.5–45)
HGB BLD-MCNC: 9.1 G/DL — LOW (ref 11.5–15.5)
MAGNESIUM SERPL-MCNC: 2 MG/DL — SIGNIFICANT CHANGE UP (ref 1.6–2.6)
MCHC RBC-ENTMCNC: 27 PG — SIGNIFICANT CHANGE UP (ref 27–34)
MCHC RBC-ENTMCNC: 32.9 GM/DL — SIGNIFICANT CHANGE UP (ref 32–36)
MCV RBC AUTO: 82.2 FL — SIGNIFICANT CHANGE UP (ref 80–100)
NRBC # BLD: 0 /100 WBCS — SIGNIFICANT CHANGE UP
NRBC # FLD: 0 K/UL — SIGNIFICANT CHANGE UP
PHOSPHATE SERPL-MCNC: 4.9 MG/DL — HIGH (ref 2.5–4.5)
PLATELET # BLD AUTO: 389 K/UL — SIGNIFICANT CHANGE UP (ref 150–400)
POTASSIUM SERPL-MCNC: 4.1 MMOL/L — SIGNIFICANT CHANGE UP (ref 3.5–5.3)
POTASSIUM SERPL-SCNC: 4.1 MMOL/L — SIGNIFICANT CHANGE UP (ref 3.5–5.3)
PROT SERPL-MCNC: 6.5 G/DL — SIGNIFICANT CHANGE UP (ref 6–8.3)
RBC # BLD: 3.37 M/UL — LOW (ref 3.8–5.2)
RBC # FLD: 12.8 % — SIGNIFICANT CHANGE UP (ref 10.3–14.5)
SODIUM SERPL-SCNC: 134 MMOL/L — LOW (ref 135–145)
WBC # BLD: 8.98 K/UL — SIGNIFICANT CHANGE UP (ref 3.8–10.5)
WBC # FLD AUTO: 8.98 K/UL — SIGNIFICANT CHANGE UP (ref 3.8–10.5)

## 2021-10-19 PROCEDURE — 99232 SBSQ HOSP IP/OBS MODERATE 35: CPT | Mod: GC

## 2021-10-19 PROCEDURE — 99233 SBSQ HOSP IP/OBS HIGH 50: CPT | Mod: GC

## 2021-10-19 PROCEDURE — 99232 SBSQ HOSP IP/OBS MODERATE 35: CPT

## 2021-10-19 RX ORDER — HYDROMORPHONE HYDROCHLORIDE 2 MG/ML
0.5 INJECTION INTRAMUSCULAR; INTRAVENOUS; SUBCUTANEOUS ONCE
Refills: 0 | Status: DISCONTINUED | OUTPATIENT
Start: 2021-10-19 | End: 2021-10-19

## 2021-10-19 RX ORDER — ACETAMINOPHEN 500 MG
500 TABLET ORAL ONCE
Refills: 0 | Status: COMPLETED | OUTPATIENT
Start: 2021-10-19 | End: 2021-10-19

## 2021-10-19 RX ADMIN — HYDROMORPHONE HYDROCHLORIDE 0.25 MILLIGRAM(S): 2 INJECTION INTRAMUSCULAR; INTRAVENOUS; SUBCUTANEOUS at 00:55

## 2021-10-19 RX ADMIN — HYDROMORPHONE HYDROCHLORIDE 0.25 MILLIGRAM(S): 2 INJECTION INTRAMUSCULAR; INTRAVENOUS; SUBCUTANEOUS at 17:11

## 2021-10-19 RX ADMIN — HYDROMORPHONE HYDROCHLORIDE 0.25 MILLIGRAM(S): 2 INJECTION INTRAMUSCULAR; INTRAVENOUS; SUBCUTANEOUS at 00:40

## 2021-10-19 RX ADMIN — HYDROMORPHONE HYDROCHLORIDE 0.25 MILLIGRAM(S): 2 INJECTION INTRAMUSCULAR; INTRAVENOUS; SUBCUTANEOUS at 16:56

## 2021-10-19 RX ADMIN — Medication 200 MILLIGRAM(S): at 17:52

## 2021-10-19 RX ADMIN — HYDROMORPHONE HYDROCHLORIDE 0.5 MILLIGRAM(S): 2 INJECTION INTRAMUSCULAR; INTRAVENOUS; SUBCUTANEOUS at 22:03

## 2021-10-19 RX ADMIN — PANTOPRAZOLE SODIUM 40 MILLIGRAM(S): 20 TABLET, DELAYED RELEASE ORAL at 05:06

## 2021-10-19 RX ADMIN — HYDROMORPHONE HYDROCHLORIDE 0.25 MILLIGRAM(S): 2 INJECTION INTRAMUSCULAR; INTRAVENOUS; SUBCUTANEOUS at 04:48

## 2021-10-19 RX ADMIN — PANTOPRAZOLE SODIUM 40 MILLIGRAM(S): 20 TABLET, DELAYED RELEASE ORAL at 17:01

## 2021-10-19 RX ADMIN — Medication 1 GRAM(S): at 11:26

## 2021-10-19 RX ADMIN — Medication 20 MILLIGRAM(S): at 05:06

## 2021-10-19 RX ADMIN — Medication 1 GRAM(S): at 17:01

## 2021-10-19 RX ADMIN — HYDROMORPHONE HYDROCHLORIDE 0.25 MILLIGRAM(S): 2 INJECTION INTRAMUSCULAR; INTRAVENOUS; SUBCUTANEOUS at 09:35

## 2021-10-19 RX ADMIN — HYDROMORPHONE HYDROCHLORIDE 0.5 MILLIGRAM(S): 2 INJECTION INTRAMUSCULAR; INTRAVENOUS; SUBCUTANEOUS at 21:43

## 2021-10-19 RX ADMIN — SODIUM CHLORIDE 100 MILLILITER(S): 9 INJECTION, SOLUTION INTRAVENOUS at 19:13

## 2021-10-19 RX ADMIN — HYDROMORPHONE HYDROCHLORIDE 0.25 MILLIGRAM(S): 2 INJECTION INTRAMUSCULAR; INTRAVENOUS; SUBCUTANEOUS at 05:03

## 2021-10-19 RX ADMIN — CHLORHEXIDINE GLUCONATE 1 APPLICATION(S): 213 SOLUTION TOPICAL at 11:26

## 2021-10-19 RX ADMIN — Medication 500 MILLIGRAM(S): at 18:07

## 2021-10-19 RX ADMIN — Medication 20 MILLIGRAM(S): at 17:01

## 2021-10-19 RX ADMIN — HYDROMORPHONE HYDROCHLORIDE 0.25 MILLIGRAM(S): 2 INJECTION INTRAMUSCULAR; INTRAVENOUS; SUBCUTANEOUS at 09:20

## 2021-10-19 RX ADMIN — ONDANSETRON 4 MILLIGRAM(S): 8 TABLET, FILM COATED ORAL at 17:01

## 2021-10-19 NOTE — PROVIDER CONTACT NOTE (OTHER) - DATE AND TIME:
02-Oct-2021 07:30
01-Oct-2021 21:50
14-Oct-2021 12:05
06-Oct-2021 05:30
18-Oct-2021 22:15
26-Sep-2021 21:33
01-Oct-2021 23:30
15-Oct-2021 06:00
07-Oct-2021 01:42
14-Oct-2021 20:45
08-Oct-2021 01:18
09-Oct-2021 00:46
12-Oct-2021 22:15
03-Oct-2021 23:00
06-Oct-2021 02:28
13-Oct-2021 07:00
14-Oct-2021 11:38

## 2021-10-19 NOTE — PROVIDER CONTACT NOTE (OTHER) - REASON
Increased Heart Rate
pain
Low BP
Tachycardia
/58
pt HR of 102 and complaining of 9/10 abd pain
Decreased BP below Parameter
Low Blood Pressure
Low BP
Pain
Patient refuses her oral bedtime meds
Tachycardia
patient complaining of abdominal pain
Low BP
Patient complaining of pain
pt bp 105/59
patient complaining of abdominal pain with vomiting, refusing ekg for zofran

## 2021-10-19 NOTE — PROGRESS NOTE ADULT - PROBLEM SELECTOR PLAN 1
Hx of Crohn's with recent colonic resection for stricture. S/p EGD 9/30 with gastric inflammation, biopsied to r/o gastric Crohn's which was negative. S/p infliximab on 10/1. Inflammatory markers wnl, fecal calprotectin wnl  -GI following, appreciate recs  - [facility] was unable to be reached for records  - possible ileoscopy per GI but patient refused  - s/p IV methylprednisone per GI (started 10/14), pain slowly improving on steroids  - plan on transitioning to oral prednisone starting on 10/19 per GI  - switch to PO oxycodone for pain  - c/w amitriptyline Hx of Crohn's with recent colonic resection for stricture. S/p EGD 9/30 with gastric inflammation, biopsied to r/o gastric Crohn's which was negative. S/p infliximab on 10/1. Inflammatory markers wnl, fecal calprotectin wnl  -GI following, appreciate recs  - [facility] was unable to be reached for records  - possible ileoscopy per GI but patient refused  - s/p IV methylprednisone per GI (started 10/14), pain slowly improving on steroids  - plan on transitioning to oral prednisone starting on 10/19 per GI  - switch to PO oxycodone for pain  - c/w amitriptyline  - consulting psych to rule out any possible psych cause Hx of Crohn's with recent colonic resection for stricture. S/p EGD 9/30 with gastric inflammation, biopsied to r/o gastric Crohn's which was negative. S/p infliximab on 10/1. Inflammatory markers wnl, fecal calprotectin wnl  -GI following, appreciate recs  - [facility] was unable to be reached for records  - possible ileoscopy per GI but patient refused  - s/p IV methylprednisone per GI (started 10/14), pain slowly improving on steroids  - switch to PO oxycodone for pain  - c/w amitriptyline  - consulting psych to rule out any possible psych cause

## 2021-10-19 NOTE — PROGRESS NOTE ADULT - SUBJECTIVE AND OBJECTIVE BOX
Chief Complaint: Reconsult for abdominal pain.    HPI:    26 year old female with PMH Crohn's disease s/p surgery for stricture/ileostomy placement in mid August 2021, s/p PICC line in place for TPN 2 weeks ago, presents with epigastric abdominal pain, nausea, and vomiting x 4 days. Pt c/o multiple episodes of nausea and vomiting, food consistency emesis. Unable to retain any PO intake and wasn't eating nor drinking for past 3 days. Epigastric pain described as aching, 9/10, radiating to her esophagus. Admits to good ileostomy output of yellow-green watery stool with no blood. Pt was admitted at [facility] early september for nausea and vomiting and 2 weeks ago had PICC line placed for TPN infusion at home. Pt on Remicade infusion: last infusion was in July 2021 and was supposed to receive the infusion again Sept 16. However, since pt was hospitalized at [facility], she missed the infusion. Pt denies chest pain, sob, palpitations. hematochezia, melena, dysuria, dizziness, lightheadedness or sick contacts. (26 Sep 2021 12:05)      PAST MEDICAL & SURGICAL HISTORY:  Crohn disease    No pertinent past surgical history    Peripherally inserted central catheter (PICC) in place  LUE PICC place on 9/12/2021    History of ileostomy  Aug 2021        FAMILY HISTORY:  FH: type 1 diabetes (Sibling)    FH: hypertension (Father)        SOCIAL HISTORY:  [ ] Denies Smoking, Alcohol, or Drug Use    Allergies    No Known Allergies    Intolerances        PAIN MEDICATIONS:  amitriptyline 10 milliGRAM(s) Oral at bedtime  cyclobenzaprine 5 milliGRAM(s) Oral three times a day PRN  HYDROmorphone  Injectable 0.25 milliGRAM(s) IV Push every 4 hours PRN  ondansetron Injectable 4 milliGRAM(s) IV Push every 6 hours PRN  oxyCODONE    IR 5 milliGRAM(s) Oral every 4 hours PRN      Heme:    Antibiotics:    Cardiovascular:    GI:  pantoprazole  Injectable 40 milliGRAM(s) IV Push two times a day  sucralfate suspension 1 Gram(s) Oral <User Schedule>    Endocrine:  methylPREDNISolone sodium succinate Injectable 20 milliGRAM(s) IV Push every 12 hours    All Other Medications:  chlorhexidine 2% Cloths 1 Application(s) Topical daily  influenza   Vaccine 0.5 milliLiter(s) IntraMuscular once  lactated ringers. 1000 milliLiter(s) IV Continuous <Continuous>  lidocaine 2% Viscous 2 milliLiter(s) Swish and Spit three times a day PRN  Parenteral Nutrition - Adult 1 Each TPN Continuous <Continuous>          Vital Signs Last 24 Hrs  T(C): 37 (19 Oct 2021 15:54), Max: 37 (19 Oct 2021 15:54)  T(F): 98.6 (19 Oct 2021 15:54), Max: 98.6 (19 Oct 2021 15:54)  HR: 90 (19 Oct 2021 15:54) (82 - 100)  BP: 96/57 (19 Oct 2021 15:54) (96/57 - 131/93)  BP(mean): --  RR: 18 (19 Oct 2021 15:54) (17 - 18)  SpO2: 100% (19 Oct 2021 15:54) (100% - 100%)    PAIN SCORE:         SCALE USED: (1-10 VNRS)             PHYSICAL EXAM:    GENERAL: NAD, well-groomed, well-developed      LABS:                          9.1    8.98  )-----------( 389      ( 19 Oct 2021 11:14 )             27.7     10-19    134<L>  |  98  |  11  ----------------------------<  140<H>  4.1   |  24  |  0.41<L>    Ca    9.7      19 Oct 2021 11:14  Phos  4.9     10-19  Mg     2.00     10-19    TPro  6.5  /  Alb  4.2  /  TBili  0.4  /  DBili  x   /  AST  25  /  ALT  102<H>  /  AlkPhos  81  10-19                     Patient seen at bedside. Patient laying in bed. Patient states that she has severe pain last night. Patient states the IV Dilaudid was helping her, and she was switched to PO Oxycodone yesterday and the PO Oxycodone is not helping her with her pain at all. Patient states she is been tolerating clear liquid diet. Patient states she was given 0.25mg of IV Dilaudid for breakthrough and that is not helping her. Patient states the IV Dilaudid 0.5mg helps her more. Patient states she does not have a chronic pain management doctor outpatient.  Patient alert and orientedx4. Patient answers questions appropriately. Maintains eye contact. Not in any apparent distress. Patient states she does not have a chronic pain management doctor outpatient.                  RECOMMENDATIONS:  Discussed patient with chronic pain management MD Dr. Cardenas and his recommendations are as follows:  1)Consider discontinuing current orders for PO Oxycodone instead order:  >> PO Dilaudid 2mg Q4H PRN for moderate and severe pain. Hold for over sedation. Not to be given within one hour of any other immediate acting opioid.  2) Consider discontinuing all current IV Dilaudid orders.  3) Continuous pulse oximetry while patient on opioids.  4) Recommend patient making an appointment with chronic pain management MD outpatient for follow up upon discharge.    Pain service to sign off on patient. Please call pain service if further assistance is needed with pain management.

## 2021-10-19 NOTE — PROGRESS NOTE ADULT - ASSESSMENT
26F w/ hx of Crohn's colitis c/b ascending colon stricture s/p resection 8/2021 w/ ileostomy (at [facility]) on remicade since 6/2021 (s/p induction and 2 maintenance doses, last 10/1/2021, admitted w/ ongoing severe post prandial epigastric pain and weight loss due to inability to take PO.    Impression:  #Post-prandial abd pain - Unclear etiology. Pt is s/p two extended hospitalizations at [facility] since her surgery, with testing including extensive imaging and upper GI endoscopy. No e/o SMA syndrome, EGD w/ esophagitis only. EGD here shows mild esophagitis and severe gastric inflammation (new finding compared to EGD 6 weeks ago, path negative for active Crohn's and is unchanged from her path results from [facility]). It is possible pain is related to severe gastritis, though having such severe pain from this is unusual. She has been evaluated for SMA syndrome at [facility]. In terms of mesenteric iscehmia, her vasculature is patent and no lactate, pointing away from any ischemic process. Suspect that she may have some underlying pain syndrome or non-GI etiology of her pain, but it is completely unclear what that might be.   #Crohn's disease - Historical details as above. It is unclear if she has active Crohn's at this time. Her ESR/CRP are negative, but they were never positive even at time of her initial diagnosis. Her fecal calprotectin, however, was very positive on diagnosis and is negative here. The only objective e/o active Crohn's is her CT scan on admission, which shows RLQ enteritis, but this is an imperfect test. Biopsies from EGD not c/w upper GI Crohn's. Ileoscopy would be useful in determining this but patient has declined. This would also not change the management of her current symptoms, as this appears to be unrelated to Crohn's.     Recommendations:  - Per pain management plan to try PO dilaudid.  - Switch IV steroids to PO prednisone 40mg daily starting today 10/19  - C/w amitriptyline 10mg PO qHS.  - Full liquid diet today 10/19.   - If patient tolerates apple sauce today and does not require IV pain medication overnight would discharge home. Patient has follow up with Dr. Poli Davis.   - Continue high dose acid suppression w/ IV PPI. Would switch to PO PPI daily on discharge.   - Continue w/ sucralfate liquid 1g q6h for now.     Faustino Freitas  Gastroenterology/Hepatology Fellow  Available via Microsoft Teams    NON-URGENT CONSULTS:  Please email [email or web address]  AT NIGHT AND ON WEEKENDS:  Contact on-call GI fellow via answering service ([phone]) from 5pm-8am and on weekends/holidays  MONDAY-FRIDAY 8AM-5PM:  Pager# [phone] ([facility]) or [phone] ([facility])  GI Phone# [phone] ([facility])

## 2021-10-19 NOTE — PROGRESS NOTE ADULT - PROBLEM SELECTOR PLAN 2
Differential includes active Crohn's (though no objective evidence pointing towards flare).  EGD showed gastric friability and was biopsied, negative for Crohn's. There was erythematous duodenopathy but no esophagitis.  - IV PPI BID, c/w sucralfate liquid 1g q6h for esophagitis per GI recs  - plan on d/c with oral PPI  - Pt tried on reglan but refuses bc she states it makes her anxious. Zofran PRN, Checking QTc qOD, last EKG had QTc wnl  - 1/2 dose TPN starting on 10/18

## 2021-10-19 NOTE — PROVIDER CONTACT NOTE (OTHER) - ACTION/TREATMENT ORDERED:
No new orders made, will continue to monitor
Normal Saline Bolus ordered, Zofran ordered for nausea.
Md aware, will order Dilaudid for pain, do not administer zofran unless patient agrees to ekg
Okay to administer prn 0.5mg of dilaudid early   continue to monitor patient's pain
Pt seen by MD, pain medicine given as ordered, will continue to monitor
administer 0.25mg dilaudid as ordered, continue to monitor patient's pain
Will continue to monitor vital signs for any change.
Administer morphine as ordered  Continue to monitor patient's pain
MD notified. OK to give dilaudid
NS 500cc bolus started, will endorse to incoming nurse
No interventions stated at this time, continue to monitor the patient.
Okay to gibe 0.25mg dilaudid as ordered. Continue to monitor patient pain
Will repeat BP, will continue to monitor
Pt seen by MD, pain medicine and zofran given as ordered, will continue to monitor
no interventions stated at this time, continue to monitor the patient
administered 0.25 Dilaudid along with 200ml/hr Tylenol
Continue to monitor BP, assess prior to next dose of PRN Dilaudid.

## 2021-10-19 NOTE — PROGRESS NOTE ADULT - SUBJECTIVE AND OBJECTIVE BOX
NUTRITION NOTE  [id number] MUTAMANDAA  ===============================    Interval events - Patient was seen and examined at bedside, no acute events overnight. Patient denies chest pain or shortness of breath at this time. Patient tolerated some liquids throughout the day yesterday without any n/v. Breakfast tray was brought to bedside by RN this morning.     ROS: Except as noted above, all other systems reviewed and are negative     Allergies  No Known Allergies    PAST MEDICAL & SURGICAL HISTORY:  Crohn disease  Peripherally inserted central catheter (PICC) in place LUE PICC place on 2021  History of ileostomy Aug 2021    Vital Signs Last 24 Hrs  T(C): 36.9 (19 Oct 2021 09:35), Max: 36.9 (18 Oct 2021 13:58)  T(F): 98.5 (19 Oct 2021 09:35), Max: 98.5 (18 Oct 2021 13:58)  HR: 82 (19 Oct 2021 09:35) (82 - 100)  BP: 101/65 (19 Oct 2021 09:35) (101/65 - 131/93)  RR: 17 (19 Oct 2021 09:35) (17 - 18)  SpO2: 100% (19 Oct 2021 09:35) (100% - 100%)    MEDICATIONS  (STANDING):  amitriptyline 10 milliGRAM(s) Oral at bedtime  chlorhexidine 2% Cloths 1 Application(s) Topical daily  influenza   Vaccine 0.5 milliLiter(s) IntraMuscular once  lactated ringers. 1000 milliLiter(s) (100 mL/Hr) IV Continuous <Continuous>  methylPREDNISolone sodium succinate Injectable 20 milliGRAM(s) IV Push every 12 hours  pantoprazole  Injectable 40 milliGRAM(s) IV Push two times a day  Parenteral Nutrition - Adult 1 Each (42 mL/Hr) TPN Continuous <Continuous>  sucralfate suspension 1 Gram(s) Oral <User Schedule>    MEDICATIONS  (PRN):  cyclobenzaprine 5 milliGRAM(s) Oral three times a day PRN Muscle Spasm  HYDROmorphone  Injectable 0.25 milliGRAM(s) IV Push every 4 hours PRN Severe Pain (7 - 10)  lidocaine 2% Viscous 2 milliLiter(s) Swish and Spit three times a day PRN pain with swallowing  ondansetron Injectable 4 milliGRAM(s) IV Push every 6 hours PRN Nausea and/or Vomiting  oxyCODONE    IR 5 milliGRAM(s) Oral every 4 hours PRN Moderate Pain (4 - 6)    Daily Weight in k (19 Oct 2021 04:56)    Drug Dosing Weight  Height (cm): 154.9 (30 Sep 2021 08:11)  Weight (kg): 35.2 (30 Sep 2021 08:11)  BMI (kg/m2): 14.7 (30 Sep 2021 08:11)  BSA (m2): 1.26 (30 Sep 2021 08:11)    PHYSICAL EXAM:  GENERAL: malnourished, resting comfortably in bed   HEAD:  Atraumatic, Normocephalic  CHEST/LUNG: nonlabored respirations, CTAB  ABDOMEN: soft, mildly tender to palpation, nondistended   PSYCH: AAOx3  PICC Site: C/D/I    Diet: full liquids     LABORATORY                                               9.2    8.03  )-----------( 333      ( 18 Oct 2021 11:37 )             28.2   10-18    134<L>  |  97<L>  |  11  ----------------------------<  113<H>  4.2   |  25  |  0.44<L>    Ca    9.2      18 Oct 2021 11:37  Phos  3.8     10-18  Mg     2.00     10-18    TPro  6.0  /  Alb  3.8  /  TBili  0.3  /  DBili  x   /  AST  22  /  ALT  34<H>  /  AlkPhos  69  10-13    LIVER FUNCTIONS - ( 13 Oct 2021 11:08 )  Alb: 3.8 g/dL / Pro: 6.0 g/dL / ALK PHOS: 69 U/L / ALT: 34 U/L / AST: 22 U/L / GGT: x           10-05 Chol -- LDL -- HDL -- Trig 91, 09-30 Chol -- LDL -- HDL -- Trig 71    ASSESSMENT/PLAN:  26 year old female with PMH Crohn's disease s/p surgery for stricture/ileostomy placement in mid 2021, s/p PICC line in place for TPN 2 weeks ago, presents with epigastric abdominal pain, nausea, and vomiting x 4 days. GI is following with possible EGD to evaluate for esophagitis. Nutrition service consulted for resuming parenteral nutrition via PICC line that is in place. Patient states that she feels very weak and is frustrated that she is not able to take in anything by mouth (liquids or food) without nausea or emesis. TPN was initiated on 21.    Diet was changed to full liquids by primary team. Encourage PO intake. Continue to monitor PO intake and tolerance to diet.     Parenteral nutrition will be discontinued today.    Please page Nutrition Support [phone] for follow up.

## 2021-10-19 NOTE — PROGRESS NOTE ADULT - ATTENDING COMMENTS
27 yo F w/ hx crohn's disease s/p surgery for stricture/ileostomy s/p PICC line for TPN p/w abdominal pain. s/p inflixamab EGD  Diffuse erythema and friability w/ some areas of  superficial ulceration. bx chronic inactive gastritis. TTG Ab neg. CT A/P +RLQ enteritis. started on steroid with improvement in abdominal pain. diet advanced to full unable to tolerate due pain and nausea/vomiting. cont IV steroid. Pt also hx of RADHA willing to speak with psych. 25 yo F w/ hx Crohn's disease s/p surgery for stricture/ileostomy s/p PICC line for TPN p/w abdominal pain. s/p inflixamab EGD  Diffuse erythema and friability w/ some areas of  superficial ulceration. bx chronic inactive gastritis. TTG Ab neg. CT A/P +RLQ enteritis. started on steroid with improvement in abdominal pain. diet advanced to full unable to tolerate due pain and nausea/vomiting. cont IV steroid. cont on full liquid diet. f/u GI recs and electrolytes. Pt also hx of RADHA willing to speak with psych.

## 2021-10-19 NOTE — PROVIDER CONTACT NOTE (OTHER) - SITUATION
Blood pressure was 90/55, pt asymptomatic
Heart Rate elevated at 134
Pt presents with low BP of 87/37, asymptomatic otherwise
patient complaining of abdominal pain with vomiting, refusing ekg for zofran, states only want dilaudid for the pain
Patient complaining of 8/10 abdominal pain  Patient refuses 0.5ml dosage of dilaudid, requests another 0.25mg dose.   The prn 0.25mg dose isn't due until 3a
Pt asking for dilaudid and /58
BP was 86/45
Patient complaining of 8/10 abdominal pain. Pt not due for 0.5mg prn dilaudid until 4a. Is it okay to give the prn dose of 0.25mg dilaudid now?
BP 93/59
BP 97/63
Patient complaining of 6/10 abdominal pain that was not relieved by IV tylenol. Patient requests morphine. Patient also requests zofran to be given at the same time as morphine.
Patient complaining of 9/10 abdominal pain. Patient not due for prn 0.5mg dilaudid until 0315.
Patient refuses her oral bedtime meds
Patient's HR was 117
Patient's HR was 120
pt HR of 104 and complaining of 9/10 abd pain
pt bp 105/59

## 2021-10-19 NOTE — PROGRESS NOTE ADULT - PROBLEM SELECTOR PLAN 4
DVT ppx- Improve score low. BLACK stockings and ambulate as tolerated.    Severe protein malnutrition - nutrition recs appreciated. C/w 1/2 dose TPN    Full liquid diet    Dispo- pending further medical optimization

## 2021-10-19 NOTE — PROGRESS NOTE ADULT - ATTENDING COMMENTS
Overall still improved compared to symptoms pre-steroids. Yesterday with mild worsening of pain after PO intake; noted did not have relief with PO oxycodone.   Continue supportive measures and trial adjustment of pain regimen, as above.   Will need close follow up with Dr. Davis post-discharge.

## 2021-10-19 NOTE — PROGRESS NOTE ADULT - ATTENDING COMMENTS
I agree with the above history, physical examination, chief complaint/diagnosis, and plan, which I have reviewed and edited where appropriate.  I agree with notes/assessment and detailed interval history of health care providers on my service.  I have seen and examined the patient.  I reviewed the laboratory and available data and agree with the history, physical assessment and plan.  I reviewed and discussed with all consultants, house staff and PA's.  The Nutrition Support Team (NST) discusses on an ongoing basis with the primary team and all consultants, House staff and PA's to have a permanent risk benefit analyses on all decisions and coordinating care.  I was physically present for the key portions of the evaluation and management (E/M) service provided.  26 year old female with PMH Crohn's disease s/p surgery for stricture/ileostomy receiving TPN   comfortable in bed   CHEST/LUNG: clear  ABDOMEN: soft, mildly tender to palpation, nondistended   Diet; full liquids   Parenteral nutrition will be discontinued today.

## 2021-10-19 NOTE — PROVIDER CONTACT NOTE (OTHER) - BACKGROUND
Patient admitted for Chrons disease
Pt admitted with Crohn's flare up
Pt admitted with Crohn's flare up
pt admitted with Crohn's disease without complication
Patient admitted for Crohn's disease without complication
pt admitted with crohn's disease without complication
History of Crohn's Disease, presents with nausea/vomiting, on Dilaudid IV push PRN for pain
pt admitted with Crohn's disease without complication
Pt admitted with Crohn's flare up
Pt admitted with Crohn's flare-up
Patient admitted for crohn's disease flare up
patient admitted with Crohn's disease
Pt admitted with Crohn's Disease flare-up, presented with abdominal pain, nausea and vomiting over 4 days
Pt admitted with Crohn's flare up
admitted with Crohn's disease flare-up, nausea and vomiting
Pt admitted with crohn's disease without complication
Patient admitted for Crohn's disease flare up

## 2021-10-19 NOTE — PROGRESS NOTE ADULT - SUBJECTIVE AND OBJECTIVE BOX
Giovanni Prajapati MS3    Patient is a 26y old  Female who presents with a chief complaint of potential crohn's flare up (18 Oct 2021 13:36)    SUBJECTIVE / OVERNIGHT EVENTS: Pt examined at bedside in NAD. Overnight patient experienced significant n/v and abdominal pain after consuming an aloe drink. Pt required IV Dilaudid and acetaminophen to control the pain. Patient also refused overnight meds (amitriptyline and sucralfate). Pt states she does not get any pain relief from the oral oxycodone. Pt is willing to try PO intake again today. Pt denies any f/c, chest pain, SOB, changes in ostomy output, or issues with urination.    MEDICATIONS  (STANDING):  amitriptyline 10 milliGRAM(s) Oral at bedtime  chlorhexidine 2% Cloths 1 Application(s) Topical daily  fat emulsion (Fish Oil and Plant Based) 20% Infusion 8.3 mL/Hr (8.3 mL/Hr) IV Continuous <Continuous>  influenza   Vaccine 0.5 milliLiter(s) IntraMuscular once  lactated ringers. 1000 milliLiter(s) (100 mL/Hr) IV Continuous <Continuous>  pantoprazole  Injectable 40 milliGRAM(s) IV Push two times a day  Parenteral Nutrition - Adult 1 Each (42 mL/Hr) TPN Continuous <Continuous>  predniSONE   Tablet 40 milliGRAM(s) Oral every 24 hours  sucralfate suspension 1 Gram(s) Oral <User Schedule>    MEDICATIONS  (PRN):  cyclobenzaprine 5 milliGRAM(s) Oral three times a day PRN Muscle Spasm  HYDROmorphone  Injectable 0.25 milliGRAM(s) IV Push every 4 hours PRN Severe Pain (7 - 10)  lidocaine 2% Viscous 2 milliLiter(s) Swish and Spit three times a day PRN pain with swallowing  ondansetron Injectable 4 milliGRAM(s) IV Push every 6 hours PRN Nausea and/or Vomiting  oxyCODONE    IR 5 milliGRAM(s) Oral every 4 hours PRN Moderate Pain (4 - 6)      Vital Signs Last 24 Hrs  T(C): 36.9 (19 Oct 2021 04:56), Max: 36.9 (18 Oct 2021 13:58)  T(F): 98.4 (19 Oct 2021 04:56), Max: 98.5 (18 Oct 2021 13:58)  HR: 92 (19 Oct 2021 04:56) (90 - 100)  BP: 125/89 (19 Oct 2021 04:56) (102/63 - 131/93)  BP(mean): --  RR: 18 (19 Oct 2021 04:56) (18 - 18)  SpO2: 100% (19 Oct 2021 04:56) (100% - 100%)  CAPILLARY BLOOD GLUCOSE      POCT Blood Glucose.: 124 mg/dL (19 Oct 2021 06:50)  POCT Blood Glucose.: 115 mg/dL (18 Oct 2021 19:34)    I&O's Summary      PHYSICAL EXAM:  GENERAL: NAD, malnourished  HEAD:  Atraumatic, Normocephalic  EYES: EOMI, PERRLA, conjunctiva and sclera clear  NECK:  No JVD  CHEST/LUNG: Clear to auscultation bilaterally; No wheeze  HEART: Regular rate and rhythm; No murmurs, rubs, or gallops  ABDOMEN: Soft, mildly tender to palpation in epigastric region, Nondistended; Bowel sounds present; ostomy output stable  EXTREMITIES:  2+ Peripheral Pulses, No clubbing, cyanosis, or edema  PSYCH: AAOx3  NEUROLOGY: non-focal  SKIN: No rashes or lesions    LABS:                        9.2    8.03  )-----------( 333      ( 18 Oct 2021 11:37 )             28.2     10-18    134<L>  |  97<L>  |  11  ----------------------------<  113<H>  4.2   |  25  |  0.44<L>    Ca    9.2      18 Oct 2021 11:37  Phos  3.8     10-18  Mg     2.00     10-18                RADIOLOGY & ADDITIONAL TESTS:    Imaging Personally Reviewed:    Consultant(s) Notes Reviewed:      Care Discussed with Consultants/Other Providers:

## 2021-10-19 NOTE — PROGRESS NOTE ADULT - NUTRITIONAL ASSESSMENT
Severe protein calorie malnutrition in acute illness/ injury; secondary to poor appetite, weight loss >5% in 1 month and/or >7.5% in 3 months, caloric Intake <50% of nutrition needs >= 5 days, temporal wasting, severe loss of muscle mass/atrophy and loss of body fat stores.     Diet, Clear Liquid:   Halal  No Pork (10-05-21 @ 13:29) [Active]    fat emulsion (Fish Oil and Plant Based) 20% Infusion 12.5 mL/Hr (12.5 mL/Hr) IV Continuous <Continuous>, 10-06-21 @ 09:41,   Parenteral Nutrition - Adult 1 Each (83 mL/Hr) TPN Continuous <Continuous>, 10-06-21 @ 17:00, , Stop order after: 1 Days      **Greater than 50% of the encounter was spent counseling and/coordination of care on parenteral nutrition. 25 minutes were spent face to face with the patient.
Severe protein calorie malnutrition in acute illness/ injury; secondary to poor appetite, weight loss >5% in 1 month and/or >7.5% in 3 months, caloric Intake <50% of nutrition needs >= 5 days, temporal wasting, severe loss of muscle mass/atrophy and loss of body fat stores.     Diet, Mechanical Soft:   Fiber/Residue Restricted (LOWFIBER)  Halal  No Pork  Supplement Feeding Modality:  Oral  Ensure Enlive Cans or Servings Per Day:  1       Frequency:  Three Times a day (10-01-21 @ 13:46) [Active]    fat emulsion (Fish Oil and Plant Based) 20% Infusion 12.5 mL/Hr (12.5 mL/Hr) IV Continuous <Continuous>, 10-04-21 @ 08:55,   Parenteral Nutrition - Adult 1 Each (83 mL/Hr) TPN Continuous <Continuous>, 10-04-21 @ 17:00, , Stop order after: 1 Days      **Greater than 50% of the encounter was spent counseling and/coordination of care on parenteral nutrition. 25 minutes were spent face to face with the patient.
Severe protein calorie malnutrition in acute illness/ injury; secondary to poor appetite, weight loss >5% in 1 month and/or >7.5% in 3 months, caloric Intake <50% of nutrition needs >= 5 days, temporal wasting, severe loss of muscle mass/atrophy and loss of body fat stores.     Diet, Clear Liquid (10-11-21 @ 13:12) [Active]    fat emulsion (Fish Oil and Plant Based) 20% Infusion 14.5 mL/Hr (14.5 mL/Hr) IV Continuous <Continuous>, 10-13-21 @ 09:20,   Parenteral Nutrition - Adult 1 Each (83 mL/Hr) TPN Continuous <Continuous>, 10-13-21 @ 17:00, , Stop order after: 1 Days      **Greater than 50% of the encounter was spent counseling and/coordination of care on parenteral nutrition. 25 minutes were spent face to face with the patient.
Severe protein calorie malnutrition in acute illness/ injury; secondary to poor appetite, weight loss >5% in 1 month and/or >7.5% in 3 months, caloric Intake <50% of nutrition needs >= 5 days, temporal wasting, severe loss of muscle mass/atrophy and loss of body fat stores.     Diet, NPO:   Except Medications (10-06-21 @ 11:56) [Active]    fat emulsion (Fish Oil and Plant Based) 20% Infusion 14.5 mL/Hr (14.5 mL/Hr) IV Continuous <Continuous>, 10-07-21 @ 09:05,   Parenteral Nutrition - Adult 1 Each (83 mL/Hr) TPN Continuous <Continuous>, 10-07-21 @ 17:00, , Stop order after: 1 Days      **Greater than 50% of the encounter was spent counseling and/coordination of care on parenteral nutrition. 25 minutes were spent face to face with the patient.
Severe protein calorie malnutrition in acute illness/ injury; secondary to poor appetite, weight loss >5% in 1 month and/or >7.5% in 3 months, caloric Intake <50% of nutrition needs >= 5 days, temporal wasting, severe loss of muscle mass/atrophy and loss of body fat stores.     Diet, NPO:   Except Medications (10-06-21 @ 11:56) [Active]    fat emulsion (Fish Oil and Plant Based) 20% Infusion 14.5 mL/Hr (14.5 mL/Hr) IV Continuous <Continuous>, 10-09-21 @ 09:30,   Parenteral Nutrition - Adult 1 Each (83 mL/Hr) TPN Continuous <Continuous>, 10-09-21 @ 17:00, , Stop order after: 1 Days      **Greater than 50% of the encounter was spent counseling and/coordination of care on parenteral nutrition. 25 minutes were spent face to face with the patient.
Severe protein calorie malnutrition in acute illness/ injury; secondary to poor appetite, weight loss >5% in 1 month and/or >7.5% in 3 months, caloric Intake <50% of nutrition needs >= 5 days, temporal wasting, severe loss of muscle mass/atrophy and loss of body fat stores.     Diet, NPO:   Except Medications (10-10-21 @ 18:33) [Active]    fat emulsion (Fish Oil and Plant Based) 20% Infusion 14.5 mL/Hr (14.5 mL/Hr) IV Continuous <Continuous>, 10-11-21 @ 08:26,   Parenteral Nutrition - Adult 1 Each (83 mL/Hr) TPN Continuous <Continuous>, 10-11-21 @ 17:00, , Stop order after: 1 Days      **Greater than 50% of the encounter was spent counseling and/coordination of care on parenteral nutrition. 25 minutes were spent face to face with the patient.
Severe protein calorie malnutrition in acute illness/ injury; secondary to poor appetite, weight loss >5% in 1 month and/or >7.5% in 3 months, caloric Intake <50% of nutrition needs >= 5 days, temporal wasting, severe loss of muscle mass/atrophy and loss of body fat stores.     Diet, Regular (10-18-21 @ 08:25) [Pending Verification By Attending] (currently on full liquids)    Parenteral Nutrition - Adult 1 Each (42 mL/Hr) TPN Continuous <Continuous>, 10-18-21 @ 17:00, , Stop order after: 1 Days      **Greater than 50% of the encounter was spent counseling and/coordination of care on parenteral nutrition. 25 minutes were spent face to face with the patient.
Severe protein calorie malnutrition in acute illness/ injury; secondary to poor appetite, weight loss >5% in 1 month and/or >7.5% in 3 months, caloric Intake <50% of nutrition needs >= 5 days, temporal wasting, severe loss of muscle mass/atrophy and loss of body fat stores.     Diet, Clear Liquid (10-09-21 @ 10:39) [Active]    fat emulsion (Fish Oil and Plant Based) 20% Infusion 14.5 mL/Hr (14.5 mL/Hr) IV Continuous <Continuous>, 10-10-21 @ 09:00,   Parenteral Nutrition - Adult 1 Each (83 mL/Hr) TPN Continuous <Continuous>, 10-10-21 @ 17:00, , Stop order after: 1 Days      **Greater than 50% of the encounter was spent counseling and/coordination of care on parenteral nutrition. 25 minutes were spent face to face with the patient.
Severe protein calorie malnutrition in acute illness/ injury; secondary to poor appetite, weight loss >5% in 1 month and/or >7.5% in 3 months, caloric Intake <50% of nutrition needs >= 5 days, temporal wasting, severe loss of muscle mass/atrophy and loss of body fat stores.     Diet, Clear Liquid (10-11-21 @ 13:12) [Active]    fat emulsion (Fish Oil and Plant Based) 20% Infusion 14.5 mL/Hr (14.5 mL/Hr) IV Continuous <Continuous>, 10-12-21 @ 08:48,   Parenteral Nutrition - Adult 1 Each (83 mL/Hr) TPN Continuous <Continuous>, 10-12-21 @ 17:00, , Stop order after: 1 Days      **Greater than 50% of the encounter was spent counseling and/coordination of care on parenteral nutrition. 25 minutes were spent face to face with the patient.
Severe protein calorie malnutrition in acute illness/ injury; secondary to poor appetite, weight loss >5% in 1 month and/or >7.5% in 3 months, caloric Intake <50% of nutrition needs >= 5 days, temporal wasting, severe loss of muscle mass/atrophy and loss of body fat stores.     Diet, Mechanical Soft:   Fiber/Residue Restricted (LOWFIBER)  Halal  No Pork  Supplement Feeding Modality:  Oral  Ensure Enlive Cans or Servings Per Day:  1       Frequency:  Three Times a day (10-01-21 @ 13:46) [Active]    fat emulsion (Fish Oil and Plant Based) 20% Infusion 12.5 mL/Hr (12.5 mL/Hr) IV Continuous <Continuous>, 10-02-21 @ 09:08,   Parenteral Nutrition - Adult 1 Each (83 mL/Hr) TPN Continuous <Continuous>, 10-02-21 @ 17:00, , Stop order after: 1 Days      **Greater than 50% of the encounter was spent counseling and/coordination of care on parenteral nutrition. 25 minutes were spent face to face with the patient.
Severe protein calorie malnutrition in acute illness/ injury; secondary to poor appetite, weight loss >5% in 1 month and/or >7.5% in 3 months, caloric Intake <50% of nutrition needs >= 5 days, temporal wasting, severe loss of muscle mass/atrophy and loss of body fat stores.     Diet, Mechanical Soft:   Fiber/Residue Restricted (LOWFIBER)  Halal  No Pork  Supplement Feeding Modality:  Oral  Ensure Enlive Cans or Servings Per Day:  1       Frequency:  Three Times a day (10-01-21 @ 13:46) [Active]    fat emulsion (Fish Oil and Plant Based) 20% Infusion 12.5 mL/Hr (12.5 mL/Hr) IV Continuous <Continuous>, 10-03-21 @ 09:28,   Parenteral Nutrition - Adult 1 Each (83 mL/Hr) TPN Continuous <Continuous>, 10-03-21 @ 17:00, , Stop order after: 1 Days      **Greater than 50% of the encounter was spent counseling and/coordination of care on parenteral nutrition. 25 minutes were spent face to face with the patient.
Severe protein calorie malnutrition in acute illness/ injury; secondary to poor appetite, weight loss >5% in 1 month and/or >7.5% in 3 months, caloric Intake <50% of nutrition needs >= 5 days, temporal wasting, severe loss of muscle mass/atrophy and loss of body fat stores.     Diet, Mechanical Soft:   Halal  No Pork  Supplement Feeding Modality:  Oral  Ensure Enlive Cans or Servings Per Day:  1       Frequency:  Three Times a day (09-30-21 @ 11:15) [Active]    fat emulsion (Fish Oil and Plant Based) 20% Infusion 12.5 mL/Hr (12.5 mL/Hr) IV Continuous <Continuous>, 09-30-21 @ 10:43,   Parenteral Nutrition - Adult 1 Each (83 mL/Hr) TPN Continuous <Continuous>, 09-30-21 @ 17:00, , Stop order after: 1 Days      **Greater than 50% of the encounter was spent counseling and/coordination of care on parenteral nutrition. 25 minutes were spent face to face with the patient.
Severe protein calorie malnutrition in acute illness/ injury; secondary to poor appetite, weight loss >5% in 1 month and/or >7.5% in 3 months, caloric Intake <50% of nutrition needs >= 5 days, temporal wasting, severe loss of muscle mass/atrophy and loss of body fat stores.     Diet, NPO:   Except Medications (10-06-21 @ 11:56) [Active]    fat emulsion (Fish Oil and Plant Based) 20% Infusion 14.5 mL/Hr (14.5 mL/Hr) IV Continuous <Continuous>, 10-08-21 @ 09:18,   Parenteral Nutrition - Adult 1 Each (83 mL/Hr) TPN Continuous <Continuous>, 10-08-21 @ 17:00, , Stop order after: 1 Days      **Greater than 50% of the encounter was spent counseling and/coordination of care on parenteral nutrition. 25 minutes were spent face to face with the patient.
Severe protein calorie malnutrition in acute illness/ injury; secondary to poor appetite, weight loss >5% in 1 month and/or >7.5% in 3 months, caloric Intake <50% of nutrition needs >= 5 days, temporal wasting, severe loss of muscle mass/atrophy and loss of body fat stores.     Diet, Clear Liquid (10-14-21 @ 10:34) [Active]    fat emulsion (Fish Oil and Plant Based) 20% Infusion 14.5 mL/Hr (14.5 mL/Hr) IV Continuous <Continuous>, 10-14-21 @ 09:01,   Parenteral Nutrition - Adult 1 Each (83 mL/Hr) TPN Continuous <Continuous>, 10-14-21 @ 17:00, , Stop order after: 1 Days      **Greater than 50% of the encounter was spent counseling and/coordination of care on parenteral nutrition. 25 minutes were spent face to face with the patient.
Severe protein calorie malnutrition in acute illness/ injury; secondary to poor appetite, weight loss >5% in 1 month and/or >7.5% in 3 months, caloric Intake <50% of nutrition needs >= 5 days, temporal wasting, severe loss of muscle mass/atrophy and loss of body fat stores.     Diet, Clear Liquid (10-14-21 @ 10:34) [Active]    fat emulsion (Fish Oil and Plant Based) 20% Infusion 14.5 mL/Hr (14.5 mL/Hr) IV Continuous <Continuous>, 10-15-21 @ 09:02,   Parenteral Nutrition - Adult 1 Each (83 mL/Hr) TPN Continuous <Continuous>, 10-15-21 @ 17:00, , Stop order after: 1 Days      **Greater than 50% of the encounter was spent counseling and/coordination of care on parenteral nutrition. 25 minutes were spent face to face with the patient.
Severe protein calorie malnutrition in acute illness/ injury; secondary to poor appetite, weight loss >5% in 1 month and/or >7.5% in 3 months, caloric Intake <50% of nutrition needs >= 5 days, temporal wasting, severe loss of muscle mass/atrophy and loss of body fat stores.     Diet, Clear Liquid (10-14-21 @ 10:34) [Active]    fat emulsion (Fish Oil and Plant Based) 20% Infusion 14.5 mL/Hr (14.5 mL/Hr) IV Continuous <Continuous>, 10-16-21 @ 08:25,   Parenteral Nutrition - Adult 1 Each (83 mL/Hr) TPN Continuous <Continuous>, 10-16-21 @ 17:00, , Stop order after: 1 Days      **Greater than 50% of the encounter was spent counseling and/coordination of care on parenteral nutrition. 25 minutes were spent face to face with the patient.
Severe protein calorie malnutrition in acute illness/ injury; secondary to poor appetite, weight loss >5% in 1 month and/or >7.5% in 3 months, caloric Intake <50% of nutrition needs >= 5 days, temporal wasting, severe loss of muscle mass/atrophy and loss of body fat stores.     Diet, Clear Liquid (10-14-21 @ 10:34) [Active]    fat emulsion (Fish Oil and Plant Based) 20% Infusion 14.5 mL/Hr (14.5 mL/Hr) IV Continuous <Continuous>, 10-17-21 @ 08:36,   Parenteral Nutrition - Adult 1 Each (83 mL/Hr) TPN Continuous <Continuous>, 10-17-21 @ 17:00, , Stop order after: 1 Days      **Greater than 50% of the encounter was spent counseling and/coordination of care on parenteral nutrition. 25 minutes were spent face to face with the patient.
Severe protein calorie malnutrition in acute illness/ injury; secondary to poor appetite, weight loss >5% in 1 month and/or >7.5% in 3 months, caloric Intake <50% of nutrition needs >= 5 days, temporal wasting, severe loss of muscle mass/atrophy and loss of body fat stores.     Diet, Mechanical Soft:   Fiber/Residue Restricted (LOWFIBER)  Halal  No Pork  Supplement Feeding Modality:  Oral  Ensure Enlive Cans or Servings Per Day:  1       Frequency:  Three Times a day (10-01-21 @ 13:46) [Active]    fat emulsion (Fish Oil and Plant Based) 20% Infusion 12.5 mL/Hr (12.5 mL/Hr) IV Continuous <Continuous>, 10-05-21 @ 11:15,   Parenteral Nutrition - Adult 1 Each (83 mL/Hr) TPN Continuous <Continuous>, 10-05-21 @ 17:00, , Stop order after: 1 Days      **Greater than 50% of the encounter was spent counseling and/coordination of care on parenteral nutrition. 25 minutes were spent face to face with the patient.
Severe protein calorie malnutrition in acute illness/ injury; secondary to poor appetite, weight loss >5% in 1 month and/or >7.5% in 3 months, caloric Intake <50% of nutrition needs >= 5 days, temporal wasting, severe loss of muscle mass/atrophy and loss of body fat stores.     Diet, Mechanical Soft:   Halal  No Pork  Supplement Feeding Modality:  Oral  Ensure Enlive Cans or Servings Per Day:  1       Frequency:  Three Times a day (09-30-21 @ 11:15) [Active]    fat emulsion (Fish Oil and Plant Based) 20% Infusion 12.5 mL/Hr (12.5 mL/Hr) IV Continuous <Continuous>, 10-01-21 @ 08:53,   Parenteral Nutrition - Adult 1 Each (83 mL/Hr) TPN Continuous <Continuous>, 10-01-21 @ 17:00, , Stop order after: 1 Days      **Greater than 50% of the encounter was spent counseling and/coordination of care on parenteral nutrition. 25 minutes were spent face to face with the patient.
Severe protein calorie malnutrition in acute illness/ injury; secondary to poor appetite, weight loss >5% in 1 month and/or >7.5% in 3 months, caloric Intake <50% of nutrition needs >= 5 days, temporal wasting, severe loss of muscle mass/atrophy and loss of body fat stores.     Diet, Regular (10-18-21 @ 08:25) [Pending Verification By Attending]  Diet, Clear Liquid (10-14-21 @ 10:34) [Active]    fat emulsion (Fish Oil and Plant Based) 20% Infusion 8.3 mL/Hr (8.3 mL/Hr) IV Continuous <Continuous>, 10-18-21 @ 10:40,   Parenteral Nutrition - Adult 1 Each (42 mL/Hr) TPN Continuous <Continuous>, 10-18-21 @ 17:00, , Stop order after: 1 Days      **Greater than 50% of the encounter was spent counseling and/coordination of care on parenteral nutrition. 25 minutes were spent face to face with the patient.
This patient has been assessed with a concern for Malnutrition and has been determined to have a diagnosis/diagnoses of Severe protein-calorie malnutrition and Underweight (BMI < 19).    This patient is being managed with:   Parenteral Nutrition - Adult-  Entered: Oct  1 2021  5:00PM    Diet Mechanical Soft-  Fiber/Residue Restricted (LOWFIBER)  Halal  No Pork  Supplement Feeding Modality:  Oral  Ensure Enlive Cans or Servings Per Day:  1       Frequency:  Three Times a day  Entered: Oct  1 2021  1:46PM    fat emulsion (Fish Oil and Plant Based) 20% Infusion-[Known as SMOFLIPID 20% Infusion]  30 Gram(s) in IV Solution 150 milliLiter(s) infuse at 12.5 mL/Hr  Dose Rate: 12.5 mL/Hr Infuse Over: 12 Hours  Administration Instructions: Use 1.2 micron in-line filter  Entered: Oct  1 2021  8:53AM    
This patient has been assessed with a concern for Malnutrition and has been determined to have a diagnosis/diagnoses of Severe protein-calorie malnutrition and Underweight (BMI < 19).    This patient is being managed with:   Parenteral Nutrition - Adult-  Entered: Oct  2 2021  5:00PM    fat emulsion (Fish Oil and Plant Based) 20% Infusion-[Known as SMOFLIPID 20% Infusion]  30 Gram(s) in IV Solution 150 milliLiter(s) infuse at 12.5 mL/Hr  Dose Rate: 12.5 mL/Hr Infuse Over: 12 Hours  Administration Instructions: Use 1.2 micron in-line filter  Entered: Oct  2 2021  9:08AM    Diet Mechanical Soft-  Fiber/Residue Restricted (LOWFIBER)  Halal  No Pork  Supplement Feeding Modality:  Oral  Ensure Enlive Cans or Servings Per Day:  1       Frequency:  Three Times a day  Entered: Oct  1 2021  1:46PM    
26 year old female with PMH Crohn's disease s/p surgery for stricture/ileostomy placement in mid August 2021, s/p PICC line in place for TPN 2 weeks ago, presents with epigastric abdominal pain, nausea, and vomiting x 4 days. GI is following with possible EGD to evaluate for esophagitis. Nutrition service consulted for resuming parenteral nutrition via PICC line that is in place. Patient states that she feels very weak and is frustrated that she is not able to take in anything by mouth (liquids or food) without nausea or emesis. TPN was initiated on 9/29/21.    continue TPN with infusion volume of 2L, TPN will provide 1175 kcal/day    labs reviewed - increased Na and K in TPN bag     monitor fingersticks, obtain daily weights    continue parenteral nutrition at this time, will follow up with primary team on plan - will continue to monitor PO intake and tolerance to diet     1.  Severe protein calorie malnutrition being optimized with TPN: CHO [175] gm.  AA [70] gm. SMOF Lipids [30] gm.  2.  Hyperglycemia managed with: [0] units of regular insulin    3.  Check fluid balance daily.  Strict I/O  [ ] [ ]   4.  Daily BMP, Ionized Calcium, Magnesium and Phosphorous   5.  Triglycerides at initiation of TPN and monthly [ ] [ ]
This patient has been assessed with a concern for Malnutrition and has been determined to have a diagnosis/diagnoses of Severe protein-calorie malnutrition and Underweight (BMI < 19).    This patient is being managed with:   Parenteral Nutrition - Adult-  Entered: Oct  8 2021  5:00PM    fat emulsion (Fish Oil and Plant Based) 20% Infusion-[Known as SMOFLIPID 20% Infusion]  35 Gram(s) in IV Solution 175 milliLiter(s) infuse at 14.5 mL/Hr  Dose Rate: 14.5 mL/Hr Infuse Over: 12 Hours  Administration Instructions: Use 1.2 micron in-line filter  Entered: Oct  8 2021  9:18AM    Diet NPO-  Except Medications  Entered: Oct  6 2021 11:56AM    
This patient has been assessed with a concern for Malnutrition and has been determined to have a diagnosis/diagnoses of Severe protein-calorie malnutrition and Underweight (BMI < 19).    This patient is being managed with:   Parenteral Nutrition - Adult-  Entered: Oct 14 2021  5:00PM    Diet Clear Liquid-  Entered: Oct 14 2021 10:34AM    fat emulsion (Fish Oil and Plant Based) 20% Infusion-[Known as SMOFLIPID 20% Infusion]  35 Gram(s) in IV Solution 175 milliLiter(s) infuse at 14.5 mL/Hr  Dose Rate: 14.5 mL/Hr Infuse Over: 12 Hours  Administration Instructions: Use 1.2 micron in-line filter  Entered: Oct 14 2021  9:01AM    
This patient has been assessed with a concern for Malnutrition and has been determined to have a diagnosis/diagnoses of Severe protein-calorie malnutrition and Underweight (BMI < 19).    This patient is being managed with:   Parenteral Nutrition - Adult-  Entered: Oct 16 2021  5:00PM    fat emulsion (Fish Oil and Plant Based) 20% Infusion-[Known as SMOFLIPID 20% Infusion]  35 Gram(s) in IV Solution 175 milliLiter(s) infuse at 14.5 mL/Hr  Dose Rate: 14.5 mL/Hr Infuse Over: 12 Hours  Administration Instructions: Use 1.2 micron in-line filter  Entered: Oct 16 2021  8:25AM    Diet Clear Liquid-  Entered: Oct 14 2021 10:34AM    
This patient has been assessed with a concern for Malnutrition and has been determined to have a diagnosis/diagnoses of Severe protein-calorie malnutrition and Underweight (BMI < 19).    This patient is being managed with:   Parenteral Nutrition - Adult-  Entered: Oct  9 2021  5:00PM    Diet Clear Liquid-  Entered: Oct  9 2021 10:39AM    fat emulsion (Fish Oil and Plant Based) 20% Infusion-[Known as SMOFLIPID 20% Infusion]  35 Gram(s) in IV Solution 175 milliLiter(s) infuse at 14.5 mL/Hr  Dose Rate: 14.5 mL/Hr Infuse Over: 12 Hours  Administration Instructions: Use 1.2 micron in-line filter  Entered: Oct  9 2021  9:30AM    
This patient has been assessed with a concern for Malnutrition and has been determined to have a diagnosis/diagnoses of Severe protein-calorie malnutrition and Underweight (BMI < 19).    This patient is being managed with:   Parenteral Nutrition - Adult-  Entered: Oct  7 2021  5:00PM    fat emulsion (Fish Oil and Plant Based) 20% Infusion-[Known as SMOFLIPID 20% Infusion]  35 Gram(s) in IV Solution 175 milliLiter(s) infuse at 14.5 mL/Hr  Dose Rate: 14.5 mL/Hr Infuse Over: 12 Hours  Administration Instructions: Use 1.2 micron in-line filter  Entered: Oct  7 2021  9:05AM    Diet NPO-  Except Medications  Entered: Oct  6 2021 11:56AM

## 2021-10-19 NOTE — PROVIDER CONTACT NOTE (OTHER) - ASSESSMENT
6/10 abdominal pain
8/10 abdominal pain  /65
Pt is asymptomatic
VS stable. Patient is refusing oral meds (Elavil and Carafate).
pt bp 105/59, asymptomatic
9/10 abdominal pain  pt tachycardic
Pt asleep, refused to be awakened, so signs of acute distress at this time
8/10 abdominal zhen  /69  
Patient A&Ox4, complaining of abdominal pain of an 8, with vomiting
Pt alert & responsive, c/o abdominal pain and nausea at this time, patient denies cardiac discomfort
Pt presents with low BP of 87/37, rest of vital signs remain stable, asymptomatic
patient sleeping,  asymptomatic denies dizziness
Pt alert & responsive, c/o abdominal pain at this time, patient denies cardiac discomfort
patient sleeping,  asymptomatic denies dizziness
pt asymptomatic with decreased BP of 90/55
pt HR of 104 and complaining of 9/10 abd pain
Pt in severe pain 10/10 to abdomen

## 2021-10-19 NOTE — PROVIDER CONTACT NOTE (OTHER) - RECOMMENDATIONS
Continue to monitor BP
continue to monitor the patient
Continue to monitor, bolus fluids to increase BP
See patient
Contact provider
See patient
see patient
contact provider
continue to monitor pt and administer pain medications
Will continue to monitor. OK to give dilaudid
hold off on zofran?
see patient
Continue to monitor Heart rate
Notify MD
See patient
Zofran last given around 1800, not due until around 2300. Patient refuses reglan and states that she will wait until the zofran is due to take the morphine as well
contact provider

## 2021-10-19 NOTE — PROGRESS NOTE ADULT - SUBJECTIVE AND OBJECTIVE BOX
Chief Complaint:  Patient is a 26y old  Female who presents with a chief complaint of crohn's flare up (19 Oct 2021 10:46)    Reason for consult: n/v, hx of Crohn's    Interval Events: Pt feeling overall better, had some abd pain after clears yesterday, PO oxy did not help.     Hospital Medications:  amitriptyline 10 milliGRAM(s) Oral at bedtime  chlorhexidine 2% Cloths 1 Application(s) Topical daily  cyclobenzaprine 5 milliGRAM(s) Oral three times a day PRN  HYDROmorphone  Injectable 0.25 milliGRAM(s) IV Push every 4 hours PRN  influenza   Vaccine 0.5 milliLiter(s) IntraMuscular once  lactated ringers. 1000 milliLiter(s) IV Continuous <Continuous>  lidocaine 2% Viscous 2 milliLiter(s) Swish and Spit three times a day PRN  methylPREDNISolone sodium succinate Injectable 20 milliGRAM(s) IV Push every 12 hours  ondansetron Injectable 4 milliGRAM(s) IV Push every 6 hours PRN  oxyCODONE    IR 5 milliGRAM(s) Oral every 4 hours PRN  pantoprazole  Injectable 40 milliGRAM(s) IV Push two times a day  Parenteral Nutrition - Adult 1 Each TPN Continuous <Continuous>  sucralfate suspension 1 Gram(s) Oral <User Schedule>      ROS:   General:  No  fevers, chills, night sweats, fatigue  Eyes:  Good vision, no reported pain  ENT:  No sore throat, pain, runny nose  CV:  No pain, palpitations  Pulm:  No dyspnea, cough  GI:  See HPI, otherwise negative  :  No  incontinence, nocturia  Muscle:  No pain, weakness  Neuro:  No memory problems  Psych:  No insomnia, mood problems, depression  Endocrine:  No polyuria, polydipsia, cold/heat intolerance  Heme:  No petechiae, ecchymosis, easy bruisability  Skin:  No rash    PHYSICAL EXAM:   Vital Signs:  Vital Signs Last 24 Hrs  T(C): 36.9 (19 Oct 2021 09:35), Max: 36.9 (19 Oct 2021 00:40)  T(F): 98.5 (19 Oct 2021 09:35), Max: 98.5 (19 Oct 2021 00:40)  HR: 82 (19 Oct 2021 09:35) (82 - 100)  BP: 101/65 (19 Oct 2021 09:35) (101/65 - 131/93)  BP(mean): --  RR: 17 (19 Oct 2021 09:35) (17 - 18)  SpO2: 100% (19 Oct 2021 09:35) (100% - 100%)  Daily     Daily Weight in k (19 Oct 2021 04:56)    GENERAL: no acute distress  NEURO: alert  HEENT: anicteric sclera, no conjunctival pallor appreciated  CHEST: no respiratory distress, no accessory muscle use  CARDIAC: regular rate, rhythm  ABDOMEN: soft, non-tender, non-distended, no rebound or guarding  EXTREMITIES: warm, well perfused, no edema  SKIN: no lesions noted    LABS: reviewed                        9.1    8.98  )-----------( 389      ( 19 Oct 2021 11:14 )             27.7     10-19    134<L>  |  98  |  11  ----------------------------<  140<H>  4.1   |  24  |  0.41<L>    Ca    9.7      19 Oct 2021 11:14  Phos  4.9     10-19  Mg     2.00     10-19    TPro  6.5  /  Alb  4.2  /  TBili  0.4  /  DBili  x   /  AST  25  /  ALT  102<H>  /  AlkPhos  81  10-19    LIVER FUNCTIONS - ( 19 Oct 2021 11:14 )  Alb: 4.2 g/dL / Pro: 6.5 g/dL / ALK PHOS: 81 U/L / ALT: 102 U/L / AST: 25 U/L / GGT: x             Interval Diagnostic Studies: see sunrise for full report

## 2021-10-19 NOTE — PROVIDER CONTACT NOTE (OTHER) - NAME OF MD/NP/PA/DO NOTIFIED:
Giovanni [id number]
Dr. Champion [id number]
Gaurav HERMOSILLO
MD team 5 Poppy Champion
Nadia Champion MD
Poppy Champion MD
Dr. Champion
Gaurav HERMOSILLO
Shelli Caro MD
Dr. Champion [id number]
Gaurav HERMOSILLO
MD Nolasco
Dr. Champion
MD Nolasco
Poppy Champion MD
Dr. Ramírez [id number]
MD Nolasco

## 2021-10-20 PROCEDURE — 99232 SBSQ HOSP IP/OBS MODERATE 35: CPT | Mod: GC

## 2021-10-20 PROCEDURE — 99233 SBSQ HOSP IP/OBS HIGH 50: CPT

## 2021-10-20 RX ORDER — ACETAMINOPHEN 500 MG
500 TABLET ORAL ONCE
Refills: 0 | Status: COMPLETED | OUTPATIENT
Start: 2021-10-20 | End: 2021-10-20

## 2021-10-20 RX ORDER — ONDANSETRON 8 MG/1
4 TABLET, FILM COATED ORAL EVERY 8 HOURS
Refills: 0 | Status: DISCONTINUED | OUTPATIENT
Start: 2021-10-20 | End: 2021-10-22

## 2021-10-20 RX ORDER — HYDROMORPHONE HYDROCHLORIDE 2 MG/ML
2 INJECTION INTRAMUSCULAR; INTRAVENOUS; SUBCUTANEOUS EVERY 4 HOURS
Refills: 0 | Status: DISCONTINUED | OUTPATIENT
Start: 2021-10-20 | End: 2021-10-20

## 2021-10-20 RX ORDER — HYDROMORPHONE HYDROCHLORIDE 2 MG/ML
2 INJECTION INTRAMUSCULAR; INTRAVENOUS; SUBCUTANEOUS EVERY 4 HOURS
Refills: 0 | Status: DISCONTINUED | OUTPATIENT
Start: 2021-10-20 | End: 2021-10-21

## 2021-10-20 RX ORDER — PANTOPRAZOLE SODIUM 20 MG/1
40 TABLET, DELAYED RELEASE ORAL
Refills: 0 | Status: DISCONTINUED | OUTPATIENT
Start: 2021-10-20 | End: 2021-10-22

## 2021-10-20 RX ADMIN — HYDROMORPHONE HYDROCHLORIDE 2 MILLIGRAM(S): 2 INJECTION INTRAMUSCULAR; INTRAVENOUS; SUBCUTANEOUS at 11:16

## 2021-10-20 RX ADMIN — HYDROMORPHONE HYDROCHLORIDE 2 MILLIGRAM(S): 2 INJECTION INTRAMUSCULAR; INTRAVENOUS; SUBCUTANEOUS at 15:16

## 2021-10-20 RX ADMIN — HYDROMORPHONE HYDROCHLORIDE 0.25 MILLIGRAM(S): 2 INJECTION INTRAMUSCULAR; INTRAVENOUS; SUBCUTANEOUS at 01:32

## 2021-10-20 RX ADMIN — Medication 20 MILLIGRAM(S): at 18:45

## 2021-10-20 RX ADMIN — HYDROMORPHONE HYDROCHLORIDE 2 MILLIGRAM(S): 2 INJECTION INTRAMUSCULAR; INTRAVENOUS; SUBCUTANEOUS at 18:44

## 2021-10-20 RX ADMIN — HYDROMORPHONE HYDROCHLORIDE 0.25 MILLIGRAM(S): 2 INJECTION INTRAMUSCULAR; INTRAVENOUS; SUBCUTANEOUS at 04:54

## 2021-10-20 RX ADMIN — SODIUM CHLORIDE 100 MILLILITER(S): 9 INJECTION, SOLUTION INTRAVENOUS at 10:16

## 2021-10-20 RX ADMIN — HYDROMORPHONE HYDROCHLORIDE 2 MILLIGRAM(S): 2 INJECTION INTRAMUSCULAR; INTRAVENOUS; SUBCUTANEOUS at 10:16

## 2021-10-20 RX ADMIN — HYDROMORPHONE HYDROCHLORIDE 2 MILLIGRAM(S): 2 INJECTION INTRAMUSCULAR; INTRAVENOUS; SUBCUTANEOUS at 23:58

## 2021-10-20 RX ADMIN — Medication 500 MILLIGRAM(S): at 00:53

## 2021-10-20 RX ADMIN — HYDROMORPHONE HYDROCHLORIDE 2 MILLIGRAM(S): 2 INJECTION INTRAMUSCULAR; INTRAVENOUS; SUBCUTANEOUS at 14:16

## 2021-10-20 RX ADMIN — HYDROMORPHONE HYDROCHLORIDE 2 MILLIGRAM(S): 2 INJECTION INTRAMUSCULAR; INTRAVENOUS; SUBCUTANEOUS at 19:44

## 2021-10-20 RX ADMIN — HYDROMORPHONE HYDROCHLORIDE 0.25 MILLIGRAM(S): 2 INJECTION INTRAMUSCULAR; INTRAVENOUS; SUBCUTANEOUS at 08:50

## 2021-10-20 RX ADMIN — ONDANSETRON 4 MILLIGRAM(S): 8 TABLET, FILM COATED ORAL at 04:34

## 2021-10-20 RX ADMIN — Medication 200 MILLIGRAM(S): at 00:33

## 2021-10-20 RX ADMIN — HYDROMORPHONE HYDROCHLORIDE 2 MILLIGRAM(S): 2 INJECTION INTRAMUSCULAR; INTRAVENOUS; SUBCUTANEOUS at 23:28

## 2021-10-20 RX ADMIN — HYDROMORPHONE HYDROCHLORIDE 0.25 MILLIGRAM(S): 2 INJECTION INTRAMUSCULAR; INTRAVENOUS; SUBCUTANEOUS at 01:52

## 2021-10-20 RX ADMIN — HYDROMORPHONE HYDROCHLORIDE 0.25 MILLIGRAM(S): 2 INJECTION INTRAMUSCULAR; INTRAVENOUS; SUBCUTANEOUS at 04:34

## 2021-10-20 RX ADMIN — HYDROMORPHONE HYDROCHLORIDE 0.25 MILLIGRAM(S): 2 INJECTION INTRAMUSCULAR; INTRAVENOUS; SUBCUTANEOUS at 08:35

## 2021-10-20 NOTE — PROGRESS NOTE ADULT - SUBJECTIVE AND OBJECTIVE BOX
Giovanni Prajapati MS3    Patient is a 26y old  Female who presents with a chief complaint of crohn's flare up (19 Oct 2021 16:35)    SUBJECTIVE / OVERNIGHT EVENTS: Pt examined at bedside with severe n/v and abdominal pain. Pt states that after consuming some ice cream last night she has been having severe n/v and pain that is not relieved by her 0.5mg IV Dilaudid. Pt is refusing examination until after she receives more pain medication. Emesis is bilious and nonbloody.    MEDICATIONS  (STANDING):  amitriptyline 10 milliGRAM(s) Oral at bedtime  chlorhexidine 2% Cloths 1 Application(s) Topical daily  influenza   Vaccine 0.5 milliLiter(s) IntraMuscular once  lactated ringers. 1000 milliLiter(s) (100 mL/Hr) IV Continuous <Continuous>  methylPREDNISolone sodium succinate Injectable 20 milliGRAM(s) IV Push every 12 hours  pantoprazole  Injectable 40 milliGRAM(s) IV Push two times a day  sucralfate suspension 1 Gram(s) Oral <User Schedule>    MEDICATIONS  (PRN):  cyclobenzaprine 5 milliGRAM(s) Oral three times a day PRN Muscle Spasm  HYDROmorphone  Injectable 0.25 milliGRAM(s) IV Push every 4 hours PRN Severe Pain (7 - 10)  lidocaine 2% Viscous 2 milliLiter(s) Swish and Spit three times a day PRN pain with swallowing  ondansetron Injectable 4 milliGRAM(s) IV Push every 6 hours PRN Nausea and/or Vomiting  oxyCODONE    IR 5 milliGRAM(s) Oral every 4 hours PRN Moderate Pain (4 - 6)      Vital Signs Last 24 Hrs  T(C): 36.8 (20 Oct 2021 04:30), Max: 37.1 (19 Oct 2021 16:56)  T(F): 98.3 (20 Oct 2021 04:30), Max: 98.8 (19 Oct 2021 16:56)  HR: 94 (20 Oct 2021 04:30) (82 - 95)  BP: 126/84 (20 Oct 2021 04:30) (96/57 - 128/89)  BP(mean): --  RR: 17 (20 Oct 2021 04:30) (17 - 18)  SpO2: 100% (20 Oct 2021 04:30) (100% - 100%)  CAPILLARY BLOOD GLUCOSE    I&O's Summary    PHYSICAL EXAM:  GENERAL: NAD, malnourished  HEAD:  Atraumatic, Normocephalic  EYES: EOMI, conjunctiva and sclera clear  NECK: No JVD  CHEST/LUNG: Pt refused  HEART: Pt refused  ABDOMEN: Pt refused  EXTREMITIES: moving all extremities spontaneously  PSYCH: AAOx3  NEUROLOGY: non-focal  SKIN: No rashes or lesions    LABS:                        9.1    8.98  )-----------( 389      ( 19 Oct 2021 11:14 )             27.7     10-19    134<L>  |  98  |  11  ----------------------------<  140<H>  4.1   |  24  |  0.41<L>    Ca    9.7      19 Oct 2021 11:14  Phos  4.9     10-19  Mg     2.00     10-19    TPro  6.5  /  Alb  4.2  /  TBili  0.4  /  DBili  x   /  AST  25  /  ALT  102<H>  /  AlkPhos  81  10-19      RADIOLOGY & ADDITIONAL TESTS:    Imaging Personally Reviewed:    Consultant(s) Notes Reviewed:      Care Discussed with Consultants/Other Providers:

## 2021-10-20 NOTE — PROGRESS NOTE ADULT - PROBLEM SELECTOR PLAN 4
DVT ppx- Improve score low. BLACK stockings and ambulate as tolerated.    Severe protein malnutrition - nutrition recs appreciated. C/w 1/2 dose TPN    Full liquid diet    Dispo- pending further medical optimization DVT ppx- Improve score low. BLACK stockings and ambulate as tolerated.    Severe protein malnutrition - nutrition recs appreciated. TPN was d/c but may need to recontinue given patient is unable to tolerate PO.    Full liquid diet    Dispo- pending further medical optimization

## 2021-10-20 NOTE — CHART NOTE - NSCHARTNOTEFT_GEN_A_CORE
Patient last seen by this RD on 10/15, now for malnutrition follow up. Spoke with pt and family at bedside and obtained subjective information from extensive chart review.     Current Diet : Diet, Full Liquid (10-18-21 @ 12:49)    Reported:  [x] nausea  [x] vomiting [x] abdominal pain  PO intake:  < 25%   Parenteral Nutrition: d/igor 10/19  Current Weight Trend:     Weight:                                      Height: 154.9 cm                      IBW: 51.6 kg  Dosing  35.2 kg  9/30  38.1 kg          10/13  39.1 kg          10/20  38 kg  10/2  38.4 kg          10/14  38.6 kg  10/3  38.9 kg          10/15  38.4 kg  10/4  39.2 kg          10/17  38.6 kg  10/5  37.9 kg          10/18  38.4 kg  10/6  36.8 kg          10/19  38 kg      Nutrition Interval Events: TPN d/igor yeaterday with initiation of Full Fluid diet. Family said that pt had severe abdominal pain after eating ice cream last night and now refuses to eat anything. Pt was crying and would not interact with this RDN. RN said that pt refused to eat breakfast this morning. Lunch was encouraged but pt cried and said she is refusing to try PO again.  attempted to see pt, but patient refused visit. GI has been following but has not found true medical etiology to severe abdominal pain and associated symptoms. Nutrition plan of care deferred to MD at this time. RDN services to remain available as needed.       __________________ Pertinent Medications__________________   MEDICATIONS  (STANDING):  amitriptyline 10 milliGRAM(s) Oral at bedtime  chlorhexidine 2% Cloths 1 Application(s) Topical daily  influenza   Vaccine 0.5 milliLiter(s) IntraMuscular once  lactated ringers. 1000 milliLiter(s) (100 mL/Hr) IV Continuous <Continuous>  methylPREDNISolone sodium succinate Injectable 20 milliGRAM(s) IV Push every 12 hours  pantoprazole  Injectable 40 milliGRAM(s) IV Push two times a day  sucralfate suspension 1 Gram(s) Oral <User Schedule>    MEDICATIONS  (PRN):  cyclobenzaprine 5 milliGRAM(s) Oral three times a day PRN Muscle Spasm  HYDROmorphone   Tablet 2 milliGRAM(s) Oral every 4 hours PRN Severe Pain (7 - 10)  lidocaine 2% Viscous 2 milliLiter(s) Swish and Spit three times a day PRN pain with swallowing  ondansetron Injectable 4 milliGRAM(s) IV Push every 6 hours PRN Nausea and/or Vomiting  oxyCODONE    IR 5 milliGRAM(s) Oral every 4 hours PRN Moderate Pain (4 - 6)      __________________ Pertinent Labs__________________   10-19 Na134 mmol/L<L> Glu 140 mg/dL<H> K+ 4.1 mmol/L Cr  0.41 mg/dL<L> BUN 11 mg/dL 10-19 Phos 4.9 mg/dL<H> 10-19 Alb 4.2 g/dL 10-05 Chol --    LDL --    HDL --    Trig 91 mg/dL      Edema: none  Skin: intact    Estimated Needs:     1152 - 1344 kcal/day (30-35 kcal/current wt)  52 - 77 gms/day (1.0-1.5 gms/IBW)        Nutrition Diagnosis: Severe malnutrition  [x] ongoing      Goal(s):  1. Patient to meet > 75% estimated energy needs    Recommendations:   1. Continue with nutrition plan of care as per MD discretion.    Monitoring and Evaluation:   1. Monitor weights, labs, BMs, skin integrity, PO intake/tolerance.  2. RD services to remain available. Request frequent weights to continue to assess trend and determine adequacy of PO intake.

## 2021-10-20 NOTE — PROGRESS NOTE ADULT - SUBJECTIVE AND OBJECTIVE BOX
Chief Complaint:  Patient is a 26y old  Female who presents with a chief complaint of crohn's flare up (20 Oct 2021 07:47)      Interval Events: Patient in severe pain. Asking for dilaudid. states she cant tolerate food.    Allergies:  No Known Allergies      Hospital Medications:  amitriptyline 10 milliGRAM(s) Oral at bedtime  chlorhexidine 2% Cloths 1 Application(s) Topical daily  cyclobenzaprine 5 milliGRAM(s) Oral three times a day PRN  HYDROmorphone  Injectable 0.25 milliGRAM(s) IV Push every 4 hours PRN  influenza   Vaccine 0.5 milliLiter(s) IntraMuscular once  lactated ringers. 1000 milliLiter(s) IV Continuous <Continuous>  lidocaine 2% Viscous 2 milliLiter(s) Swish and Spit three times a day PRN  methylPREDNISolone sodium succinate Injectable 20 milliGRAM(s) IV Push every 12 hours  ondansetron Injectable 4 milliGRAM(s) IV Push every 6 hours PRN  oxyCODONE    IR 5 milliGRAM(s) Oral every 4 hours PRN  pantoprazole  Injectable 40 milliGRAM(s) IV Push two times a day  sucralfate suspension 1 Gram(s) Oral <User Schedule>      PMHX/PSHX:  No pertinent past medical history    Crohn disease    No pertinent past surgical history    Peripherally inserted central catheter (PICC) in place    History of ileostomy        Family history:  No pertinent family history in first degree relatives    FH: type 1 diabetes (Sibling)    FH: hypertension (Father)        ROS: As per HPI, 14-point ROS negative otherwise.    General:  No wt loss, fevers, chills, night sweats, fatigue,   Eyes:  Good vision, no reported pain  ENT:  No sore throat, pain, runny nose, dysphagia  CV:  No pain, palpitations, hypo/hypertension  Resp:  No dyspnea, cough, tachypnea, wheezing  GI:  See HPI  :  No pain, bleeding, incontinence, nocturia  Muscle:  No pain, weakness  Neuro:  No weakness, tingling, memory problems  Psych:  No fatigue, insomnia, mood problems, depression  Endocrine:  No polyuria, polydipsia, cold/heat intolerance  Heme:  No petechiae, ecchymosis, easy bruisability  Skin:  No rash, edema      PHYSICAL EXAM:     Vital Signs:  Vital Signs Last 24 Hrs  T(C): 36.8 (20 Oct 2021 08:35), Max: 37.1 (19 Oct 2021 16:56)  T(F): 98.3 (20 Oct 2021 08:35), Max: 98.8 (19 Oct 2021 16:56)  HR: 92 (20 Oct 2021 08:35) (82 - 95)  BP: 104/75 (20 Oct 2021 08:35) (96/57 - 128/89)  BP(mean): --  RR: 17 (20 Oct 2021 08:35) (17 - 18)  SpO2: 98% (20 Oct 2021 08:35) (98% - 100%)  Daily     Daily Weight in k (20 Oct 2021 04:30)    GENERAL:  uncomfortable in significant pain  HEENT:  NC/AT,  conjunctivae clear, sclera -anicteric  CHEST:  no increased effort  HEART:  Regular rate and rhythm  ABDOMEN:  Soft, TTP throughout  EXTREMITIES:  no cyanosis, clubbing or edema  SKIN:  No rash/erythema/ecchymoses/petechiae/wounds  NEURO:  Alert, oriented    LABS:                        9.1    8.98  )-----------( 389      ( 19 Oct 2021 11:14 )             27.7     10-19    134<L>  |  98  |  11  ----------------------------<  140<H>  4.1   |  24  |  0.41<L>    Ca    9.7      19 Oct 2021 11:14  Phos  4.9     10-19  Mg     2.00     10-19    TPro  6.5  /  Alb  4.2  /  TBili  0.4  /  DBili  x   /  AST  25  /  ALT  102<H>  /  AlkPhos  81  10-19    LIVER FUNCTIONS - ( 19 Oct 2021 11:14 )  Alb: 4.2 g/dL / Pro: 6.5 g/dL / ALK PHOS: 81 U/L / ALT: 102 U/L / AST: 25 U/L / GGT: x                   Imaging:

## 2021-10-20 NOTE — CHART NOTE - NSCHARTNOTEFT_GEN_A_CORE
Called by nursing staff earlier in night to evaluate patient for severe pain and vomiting, family at bedside.     Patient in severe pain, asking for additional pain medication. Refuses physical exam as patient is in too much pain.   Offered ice/warm packs, patient declined completely.     Aware of recent pain management evaluation recommending regimen including Dilaudid 2mg PO q4 PRN; patient refusing any PO medications, saying she is vomiting too much now and in too much pain to take pills, unable to initiate new proposed regimen overnight. She said she is willing to try the PO regimen per pain management recs in the AM, but for now the pain is too severe to change anything. Patient's sister confirmed that the patient's pain has been worse at night.     All night, patient is repeatedly ringing bell for nursing asking for pain medications starting after ~1-1.5h from previous med administration. Patient well aware of timing and medication dosages prescribed.    After chart review, it seems her pain etiology is unclear, and she is cleared for DC home from GI once can tolerate PO and come off IV pain medications. Patient with recent hospitalizations at [facility].    Unclear which components of the pain are from undertreatment of pain of unclear GI etiology (Crohn's, gastritis, esophagitis) vs. opioid addiction/tolerance vs. malingering     A/P:  - Would initiate PO regimen but patient not agreeing tonight. Continuing with IV dilaudid / tylenol  - Would recommend speaking with patient about the new pain management recs in the AM, she seemed agreeable to try the regimen   - Unsure how but need to make sure patient is not seeking pain medications all night and harassing nursing. Patient refused melatonin         Juvenal Nolasco PGY-1  Internal Medicine

## 2021-10-20 NOTE — PROGRESS NOTE ADULT - PROBLEM SELECTOR PLAN 1
Hx of Crohn's with recent colonic resection for stricture. S/p EGD 9/30 with gastric inflammation, biopsied to r/o gastric Crohn's which was negative. S/p infliximab on 10/1. Inflammatory markers wnl, fecal calprotectin wnl  -GI following, appreciate recs  - [facility] was unable to be reached for records  - possible ileoscopy per GI but patient refused  - s/p IV methylprednisone per GI (started 10/14), pain slowly improving on steroids  - try to transition to oral prednisone per GI  - switch to PO dilaudid for pain per pain management   - c/w amitriptyline  - consulting psych to rule out any possible psych cause

## 2021-10-20 NOTE — PROGRESS NOTE ADULT - ASSESSMENT
26 year old female with PMH Crohn's disease s/p surgery for stricture/ileostomy placement in mid August 2021, s/p PICC line in place for TPN 2 weeks ago, presents with epigastric abdominal pain, nausea, and vomiting, admitted to medicine for inability to tolerate PO. Pt is currently improving s/p steroids and is tolerating oral intake however overnight  26 year old female with PMH Crohn's disease s/p surgery for stricture/ileostomy placement in mid August 2021, s/p PICC line in place for TPN 2 weeks ago, presents with epigastric abdominal pain, nausea, and vomiting, admitted to medicine for inability to tolerate PO. Pt was improving s/p steroids and was tolerating oral intake however overnight the patient experienced significant n/v and pain and is currently not tolerating PO.

## 2021-10-20 NOTE — PROGRESS NOTE PEDS - SUBJECTIVE AND OBJECTIVE BOX
Chief Complaint: Abdominal pain  HPI:    26 year old female with PMH Crohn's disease s/p surgery for stricture/ileostomy placement in mid August 2021, s/p PICC line in place for TPN 2 weeks ago, presents with epigastric abdominal pain, nausea, and vomiting x 4 days. Pt c/o multiple episodes of nausea and vomiting, food consistency emesis. Unable to retain any PO intake and wasn't eating nor drinking for past 3 days. Epigastric pain described as aching, 9/10, radiating to her esophagus. Admits to good ileostomy output of yellow-green watery stool with no blood. Pt was admitted at [facility] early september for nausea and vomiting and 2 weeks ago had PICC line placed for TPN infusion at home. Pt on Remicade infusion: last infusion was in July 2021 and was supposed to receive the infusion again Sept 16. However, since pt was hospitalized at [facility], she missed the infusion. Pt denies chest pain, sob, palpitations. hematochezia, melena, dysuria, dizziness, lightheadedness or sick contacts. (26 Sep 2021 12:05)      PAST MEDICAL & SURGICAL HISTORY:  Crohn disease    No pertinent past surgical history    Peripherally inserted central catheter (PICC) in place  LUE PICC place on 9/12/2021    History of ileostomy  Aug 2021        FAMILY HISTORY:  FH: type 1 diabetes (Sibling)    FH: hypertension (Father)        SOCIAL HISTORY:  [ ] Denies Smoking, Alcohol, or Drug Use    Allergies    No Known Allergies    Intolerances        PAIN MEDICATIONS:  amitriptyline 10 milliGRAM(s) Oral at bedtime  cyclobenzaprine 5 milliGRAM(s) Oral three times a day PRN  HYDROmorphone   Solution 2 milliGRAM(s) Oral every 4 hours PRN  ondansetron    Tablet 4 milliGRAM(s) Oral every 8 hours PRN      Heme:    Antibiotics:    Cardiovascular:    GI:  pantoprazole    Tablet 40 milliGRAM(s) Oral before breakfast  sucralfate suspension 1 Gram(s) Oral <User Schedule>    Endocrine:  methylPREDNISolone sodium succinate Injectable 20 milliGRAM(s) IV Push every 12 hours    All Other Medications:  chlorhexidine 2% Cloths 1 Application(s) Topical daily  influenza   Vaccine 0.5 milliLiter(s) IntraMuscular once  lactated ringers. 1000 milliLiter(s) IV Continuous <Continuous>  lidocaine 2% Viscous 2 milliLiter(s) Swish and Spit three times a day PRN      gabriel      Vital Signs Last 24 Hrs  T(C): 36.8 (20 Oct 2021 08:35), Max: 37.1 (19 Oct 2021 16:56)  T(F): 98.3 (20 Oct 2021 08:35), Max: 98.8 (19 Oct 2021 16:56)  HR: 92 (20 Oct 2021 08:35) (89 - 95)  BP: 104/75 (20 Oct 2021 08:35) (96/57 - 128/89)  BP(mean): --  RR: 17 (20 Oct 2021 08:35) (17 - 18)  SpO2: 98% (20 Oct 2021 08:35) (98% - 100%)    PAIN SCORE:    9/10     SCALE USED: (1-10 VNRS)             PHYSICAL EXAM:    GENERAL: NAD, well-groomed, well-developed        LABS:                          9.1    8.98  )-----------( 389      ( 19 Oct 2021 11:14 )             27.7     10-19    134<L>  |  98  |  11  ----------------------------<  140<H>  4.1   |  24  |  0.41<L>    Ca    9.7      19 Oct 2021 11:14  Phos  4.9     10-19  Mg     2.00     10-19    TPro  6.5  /  Alb  4.2  /  TBili  0.4  /  DBili  x   /  AST  25  /  ALT  102<H>  /  AlkPhos  81  10-19                  Primary team called for follow up consult for pain. Patient seen at bedside with the primary team along with the medicine Attending. Patient continues to complain of abdominal pain keeps requesting IV Dilaudid. Patient refusing to eta and drink and refusing the PO Opioids. Patient was started on PO Dilaudid but refused to take it. Patient states that IV Dilaudid works better for her and she wants IV Dilaudid for her pain. Patient was seen by nurses trying to self-inflict vomiting as per nursing staff. Patient was seen by Psychiatry this morning, but patient refused consult. No active pathology for patient’s pain as per GI and primary team. Psychiatry attempted seeing patient twice previously too as per primary team but refused both times. Pain regimen plan explained in detail with patient, parents and patient’s sister who joined over the phone. Patient alert and orietnedx4. Patient keeps asking for IV Dilaudid throughout the conversations.   RECOMMENDATIONS:   Discussed patient with Chronic Pain Attending Dr. Cardenas and the recommendations are as follows:  Consider continuing PO Dilaudid 2mg Q4H for moderate and severe pain. Hold for over sedation and SBP<100.  Pain service not recommending any IV opioids for patient.  Recommend Psychiatry for possible opioid addiction/Anxiety.  Recommend patient getting appointment with chronic pain management doctor outpatient for follow up upon discharge, since patient has been on opioids for a longer period of time.    Pain service to sign off. Please call pain service if further assistance is needed with pain management.

## 2021-10-20 NOTE — PROGRESS NOTE ADULT - ATTENDING COMMENTS
Patient reports severe worsening of pain, nausea and vomiting since eating ice cream yesterday. She has been switched over to PO pain medication and reports this is not adequate to control her pain.   This is a very difficult situation and unfortunately patient's symptoms are relatively unchanged over the last couple of months. Similarly to her course at [facility], patient has not been able to tolerate PO and therefore was ultimately started on TPN for nutrition. Despite extensive radiologic and endoscopic testing at both [facility] and [facility], no clear GI pathology was identified. Steroids had been started here as last attempt to help with her nausea/vomiting and potentially treat any potential underlying inflammatory process, but this has been unsuccessful as well.  I have discussed with the patient that our goal is no longer to get her back to a regular diet, but rather to get her to a point where she is not in pain and can go home with a PO pain regimen; perhaps/hopefully she will be able to tolerate clear liquids. She will likely need to go home with TPN just as was decided by team at [facility].   The patient has been seen by multiple GI providers in my group during this patient's admission over the last few weeks. However, given the complexity of this case, will defer to primary team if they want to obtain a second opinion GI consultation.   Discussed case extensively with patient's primary team, Dr. Hogan.

## 2021-10-20 NOTE — CHART NOTE - NSCHARTNOTEFT_GEN_A_CORE
Psychiatry Chart Note;  Primary team consulted Psychiatry for anxiety.  Chart reviewed, went to evaluate the patient. Patient adamantly refusing Psychiatry consult at this time, she stated that her main issue is pain and she wants that to be addressed first. Denies SI and HI.   Please call C/L psychiatry if there is any further concerns and if pt. is amenable to speak with psychiatry.  Case discussed with Dr. Hogan

## 2021-10-20 NOTE — CHART NOTE - NSCHARTNOTEFT_GEN_A_CORE
Discussed together with pain management with patient and her sister (Beatris [phone]) with pt's parents present in room that we will not be administering any more IV pain medications as there are no indications. We will be transitioning to oral medications today. per GI, GI is not source of pain and unlikely IBD flare. Pt is able to swallow oral dilaudid. per pt's request, will switch to dilaudid solution prn. If unable to tolerate oral food, may need to restart TPN.

## 2021-10-20 NOTE — PROGRESS NOTE ADULT - ATTENDING COMMENTS
27 yo F w/ hx Crohn's disease s/p surgery for stricture/ileostomy s/p PICC line for TPN p/w abdominal pain. s/p inflixamab EGD  Diffuse erythema and friability w/ some areas of  superficial ulceration. bx chronic inactive gastritis. TTG Ab neg. CT A/P +RLQ enteritis. ON noted abdominal pain with multiple episodes of vomiting. This AM noted by RN noted to be inducing vomiting requesting IV dilaudid. Place on 1:1. Pt refused psych this AM. Pt seen with pain management plan for oral regimen. Prolonged discussion with pt and parents in room with sister Beatris on telephone. In efforts to manage pain with oral meds and possibly pain meds causing some component of nausea. cont IV steroid and meds advised compliance as may help with pain and underlying nausea and vomiting. Case reviewed with Dr. Pichardo of GI goal is pain management and possible tolerance of diet if not would restart TPN.

## 2021-10-20 NOTE — PROGRESS NOTE ADULT - ASSESSMENT
26F w/ hx of Crohn's colitis c/b ascending colon stricture s/p resection 8/2021 w/ ileostomy (at [facility]) on remicade since 6/2021 (s/p induction and 2 maintenance doses, last 10/1/2021, admitted w/ ongoing severe post prandial epigastric pain and weight loss due to inability to take PO.    Impression:  #Post-prandial abd pain: suspect much of this is functional and neuropsych component to pain. Pt is s/p two extended hospitalizations at [facility] since her surgery, with testing including extensive imaging and upper GI endoscopy. No e/o SMA syndrome, EGD w/ esophagitis only. EGD here shows mild esophagitis and severe gastric inflammation (new finding compared to EGD 6 weeks ago, path negative for active Crohn's and is unchanged from her path results from [facility]). Vasculature is patent and no lactate, pointing away from any ischemic process. Suspect that she may have some underlying pain syndrome or non-GI etiology of her pain.  #Crohn's disease - Historical details as above. It is unclear if she has active Crohn's at this time. Her ESR/CRP are negative, but they were never positive even at time of her initial diagnosis. Her fecal calprotectin, however, was very positive on diagnosis and is negative here. The only objective e/o active Crohn's is her CT scan on admission, which shows RLQ enteritis, but this is not specific. Biopsies from EGD not c/w upper GI Crohn's. Ileoscopy would be useful in determining this but patient has declined. This would also not change the management of her current symptoms, as this appears to be unrelated to Crohn's.     Recommendations:  - PO dilaudid perpain management  - continue with prednisone, switch to po 40mg daily  - C/w amitriptyline 10mg PO qHS.  - diet as tolerated  - continue with PPI BID  - carafate q6hr    26F w/ hx of Crohn's colitis c/b ascending colon stricture s/p resection 8/2021 w/ ileostomy (at [facility]) on remicade since 6/2021 (s/p induction and 2 maintenance doses, last 10/1/2021, admitted w/ ongoing severe post prandial epigastric pain and weight loss due to inability to take PO.    Impression:  #Post-prandial abd pain: suspect much of this is functional and neuropsych component to pain. Pt is s/p two extended hospitalizations at [facility] since her surgery, with testing including extensive imaging and upper GI endoscopy. No e/o SMA syndrome, EGD w/ esophagitis only. EGD here shows mild esophagitis and severe gastric inflammation (new finding compared to EGD 6 weeks ago, path negative for active Crohn's and is unchanged from her path results from [facility]). Vasculature is patent and no lactate, pointing away from any ischemic process. Suspect that she may have some underlying pain syndrome or non-GI etiology of her pain.  #Crohn's disease - Historical details as above. It is unclear if she has active Crohn's at this time. Her ESR/CRP are negative, but they were never positive even at time of her initial diagnosis. Her fecal calprotectin, however, was very positive on diagnosis and is negative here. The only objective e/o active Crohn's is her CT scan on admission, which shows RLQ enteritis, but this is not specific. Biopsies from EGD not c/w upper GI Crohn's. Ileoscopy would be useful in determining this but patient has declined. This would also not change the management of her current symptoms, as this appears to be unrelated to Crohn's.     Recommendations:  - PO dilaudid per pain management  - continue with prednisone, switch to po 40mg daily; though steroids appeared to help initially, it is unclear how much benefit she is having. Would still recommend a taper given her history of IBD and initial improvement. Can continue 40mg x 1-2 weeks and cut down by 10mg every week until off. However would recommend this taper be monitored by her outpatient GI provider.   - C/w amitriptyline 10mg PO qHS.  - diet as tolerated  - continue with PPI BID  - carafate q6hr

## 2021-10-20 NOTE — PROGRESS NOTE ADULT - PROBLEM SELECTOR PLAN 2
Differential includes active Crohn's (though no objective evidence pointing towards flare).  EGD showed gastric friability and was biopsied, negative for Crohn's. There was erythematous duodenopathy but no esophagitis.  - IV PPI BID, c/w sucralfate liquid 1g q6h for esophagitis per GI recs  - plan on d/c with oral PPI  - Pt tried on reglan but refuses bc she states it makes her anxious. Zofran PRN, Checking QTc qOD, last EKG had QTc wnl  - 1/2 dose TPN starting on 10/18 Differential includes active Crohn's (though no objective evidence pointing towards flare).  EGD showed gastric friability and was biopsied, negative for Crohn's. There was erythematous duodenopathy but no esophagitis.  - IV PPI BID, c/w sucralfate liquid 1g q6h for esophagitis per GI recs  - plan on d/c with oral PPI  - Pt tried on reglan but refuses bc she states it makes her anxious. Zofran PRN, Checking QTc qOD, last EKG had QTc wnl

## 2021-10-21 LAB
ALBUMIN SERPL ELPH-MCNC: 4.5 G/DL — SIGNIFICANT CHANGE UP (ref 3.3–5)
ALP SERPL-CCNC: 86 U/L — SIGNIFICANT CHANGE UP (ref 40–120)
ALT FLD-CCNC: 84 U/L — HIGH (ref 4–33)
ANION GAP SERPL CALC-SCNC: 14 MMOL/L — SIGNIFICANT CHANGE UP (ref 7–14)
AST SERPL-CCNC: 18 U/L — SIGNIFICANT CHANGE UP (ref 4–32)
BILIRUB SERPL-MCNC: 1.7 MG/DL — HIGH (ref 0.2–1.2)
BUN SERPL-MCNC: 19 MG/DL — SIGNIFICANT CHANGE UP (ref 7–23)
CALCIUM SERPL-MCNC: 9.5 MG/DL — SIGNIFICANT CHANGE UP (ref 8.4–10.5)
CHLORIDE SERPL-SCNC: 99 MMOL/L — SIGNIFICANT CHANGE UP (ref 98–107)
CO2 SERPL-SCNC: 21 MMOL/L — LOW (ref 22–31)
CREAT SERPL-MCNC: 0.48 MG/DL — LOW (ref 0.5–1.3)
GLUCOSE SERPL-MCNC: 113 MG/DL — HIGH (ref 70–99)
HCT VFR BLD CALC: 28.5 % — LOW (ref 34.5–45)
HGB BLD-MCNC: 9.7 G/DL — LOW (ref 11.5–15.5)
MAGNESIUM SERPL-MCNC: 1.8 MG/DL — SIGNIFICANT CHANGE UP (ref 1.6–2.6)
MCHC RBC-ENTMCNC: 26.4 PG — LOW (ref 27–34)
MCHC RBC-ENTMCNC: 34 GM/DL — SIGNIFICANT CHANGE UP (ref 32–36)
MCV RBC AUTO: 77.4 FL — LOW (ref 80–100)
NRBC # BLD: 0 /100 WBCS — SIGNIFICANT CHANGE UP
NRBC # FLD: 0 K/UL — SIGNIFICANT CHANGE UP
PHOSPHATE SERPL-MCNC: 6.1 MG/DL — HIGH (ref 2.5–4.5)
PLATELET # BLD AUTO: 536 K/UL — HIGH (ref 150–400)
POTASSIUM SERPL-MCNC: 3.1 MMOL/L — LOW (ref 3.5–5.3)
POTASSIUM SERPL-SCNC: 3.1 MMOL/L — LOW (ref 3.5–5.3)
PROT SERPL-MCNC: 6.8 G/DL — SIGNIFICANT CHANGE UP (ref 6–8.3)
RBC # BLD: 3.68 M/UL — LOW (ref 3.8–5.2)
RBC # FLD: 12.9 % — SIGNIFICANT CHANGE UP (ref 10.3–14.5)
SODIUM SERPL-SCNC: 134 MMOL/L — LOW (ref 135–145)
WBC # BLD: 9.13 K/UL — SIGNIFICANT CHANGE UP (ref 3.8–10.5)
WBC # FLD AUTO: 9.13 K/UL — SIGNIFICANT CHANGE UP (ref 3.8–10.5)

## 2021-10-21 PROCEDURE — 99232 SBSQ HOSP IP/OBS MODERATE 35: CPT | Mod: GC

## 2021-10-21 PROCEDURE — 99233 SBSQ HOSP IP/OBS HIGH 50: CPT | Mod: GC

## 2021-10-21 RX ORDER — POTASSIUM CHLORIDE 20 MEQ
40 PACKET (EA) ORAL
Refills: 0 | Status: DISCONTINUED | OUTPATIENT
Start: 2021-10-21 | End: 2021-10-21

## 2021-10-21 RX ORDER — HYDROMORPHONE HYDROCHLORIDE 2 MG/ML
2 INJECTION INTRAMUSCULAR; INTRAVENOUS; SUBCUTANEOUS EVERY 4 HOURS
Refills: 0 | Status: DISCONTINUED | OUTPATIENT
Start: 2021-10-21 | End: 2021-10-22

## 2021-10-21 RX ORDER — POTASSIUM CHLORIDE 20 MEQ
10 PACKET (EA) ORAL
Refills: 0 | Status: COMPLETED | OUTPATIENT
Start: 2021-10-21 | End: 2021-10-21

## 2021-10-21 RX ORDER — SODIUM CHLORIDE 9 MG/ML
1000 INJECTION, SOLUTION INTRAVENOUS
Refills: 0 | Status: DISCONTINUED | OUTPATIENT
Start: 2021-10-21 | End: 2021-10-22

## 2021-10-21 RX ADMIN — Medication 100 MILLIEQUIVALENT(S): at 17:39

## 2021-10-21 RX ADMIN — HYDROMORPHONE HYDROCHLORIDE 2 MILLIGRAM(S): 2 INJECTION INTRAMUSCULAR; INTRAVENOUS; SUBCUTANEOUS at 18:32

## 2021-10-21 RX ADMIN — Medication 100 MILLIEQUIVALENT(S): at 15:17

## 2021-10-21 RX ADMIN — Medication 20 MILLIGRAM(S): at 06:53

## 2021-10-21 RX ADMIN — SODIUM CHLORIDE 50 MILLILITER(S): 9 INJECTION, SOLUTION INTRAVENOUS at 17:38

## 2021-10-21 RX ADMIN — Medication 100 MILLIEQUIVALENT(S): at 19:32

## 2021-10-21 RX ADMIN — CHLORHEXIDINE GLUCONATE 1 APPLICATION(S): 213 SOLUTION TOPICAL at 11:59

## 2021-10-21 RX ADMIN — HYDROMORPHONE HYDROCHLORIDE 2 MILLIGRAM(S): 2 INJECTION INTRAMUSCULAR; INTRAVENOUS; SUBCUTANEOUS at 17:43

## 2021-10-21 NOTE — PROGRESS NOTE ADULT - PROBLEM SELECTOR PLAN 2
Differential includes active Crohn's (though no objective evidence pointing towards flare).  EGD showed gastric friability and was biopsied, negative for Crohn's. There was erythematous duodenopathy but no esophagitis.  - IV PPI BID, c/w sucralfate liquid 1g q6h for esophagitis per GI recs  - plan on d/c with oral PPI  - Pt tried on reglan but refuses bc she states it makes her anxious.   - Zofran PRN, Checking QTc qOD, last EKG had QTc wnl Differential includes active Crohn's (though no objective evidence pointing towards flare).  EGD showed gastric friability and was biopsied, negative for Crohn's. There was erythematous duodenopathy but no esophagitis.  - c/w sucralfate liquid 1g q6h for esophagitis per GI recs  - plan on d/c with oral PPI

## 2021-10-21 NOTE — PROGRESS NOTE ADULT - PROBLEM SELECTOR PLAN 4
DVT ppx- Improve score low. BLACK stockings and ambulate as tolerated.    Severe protein malnutrition - nutrition recs appreciated. TPN was d/c but may need to recontinue given patient is unable to tolerate PO.    Full liquid diet    Dispo- pending further medical optimization DVT ppx- Improve score low. BLACK stockings and ambulate as tolerated.    Severe protein malnutrition - nutrition recs appreciated.     Plan to restart TPN and patient is NPO    Full liquid diet    Dispo- pending further medical optimization

## 2021-10-21 NOTE — PROGRESS NOTE ADULT - ASSESSMENT
26F w/ hx of Crohn's colitis c/b ascending colon stricture s/p resection 8/2021 w/ ileostomy (at [facility]) on remicade since 6/2021 (s/p induction and 2 maintenance doses, last 10/1/2021, admitted w/ ongoing severe post prandial epigastric pain and weight loss due to inability to take PO.    Impression:  #Abd pain: pain with or without food. Suspect there is a significant psych component to pain as well as somatization. May have underlying functional GI disorder and gut-brain axis disease however would not explain the severity of symptoms and other ongoing issues. Pt is s/p two extended hospitalizations at [facility] since her surgery, with testing including extensive imaging and upper GI endoscopy. No e/o SMA syndrome, EGD w/ esophagitis only. EGD here shows mild esophagitis and gastric inflammation (new finding compared to EGD 6 weeks ago, path negative for active Crohn's and is unchanged from her path results from [facility]). Vasculature is patent and no lactate, pointing away from any ischemic process  #Crohn's disease - Historical details as above. It is unclear if she has active Crohn's at this time. Her ESR/CRP are negative, but they were never positive even at time of her initial diagnosis. Her fecal calprotectin, however, was very positive on diagnosis and is negative here. The only objective e/o active Crohn's is her CT scan on admission, which shows RLQ enteritis, but this is not specific. Biopsies from EGD not c/w upper GI Crohn's. Ileoscopy would be useful in determining this but patient has declined. This would also not change the management of her current symptoms, as this appears to be unrelated to Crohn's.     Recommendations:  - please call back pain management  - prednisone taper, continue with pred 40mg daily; though steroids appeared to help initially, it is unclear how much benefit she is having. Would still recommend a taper given her history of IBD and initial improvement. Can continue 40mg x 1-2 weeks and cut down by 10mg every week until off. However would recommend this taper be monitored by her outpatient GI provider.   - C/w amitriptyline 10mg PO qHS.  - diet as tolerated  - continue with PPI BID  - carafate q6hr

## 2021-10-21 NOTE — PROGRESS NOTE ADULT - SUBJECTIVE AND OBJECTIVE BOX
Chief Complaint:  Patient is a 26y old  Female who presents with a chief complaint of crohn's flare up (21 Oct 2021 08:07)      Interval Events: Patient still in pain. Requesting IV dilaudid. Unable to tolerate po.    Allergies:  No Known Allergies      Hospital Medications:  amitriptyline 10 milliGRAM(s) Oral at bedtime  chlorhexidine 2% Cloths 1 Application(s) Topical daily  cyclobenzaprine 5 milliGRAM(s) Oral three times a day PRN  HYDROmorphone   Solution 2 milliGRAM(s) Oral every 4 hours PRN  influenza   Vaccine 0.5 milliLiter(s) IntraMuscular once  lactated ringers. 1000 milliLiter(s) IV Continuous <Continuous>  lidocaine 2% Viscous 2 milliLiter(s) Swish and Spit three times a day PRN  ondansetron    Tablet 4 milliGRAM(s) Oral every 8 hours PRN  pantoprazole    Tablet 40 milliGRAM(s) Oral before breakfast  sucralfate suspension 1 Gram(s) Oral <User Schedule>      PMHX/PSHX:  No pertinent past medical history    Crohn disease    No pertinent past surgical history    Peripherally inserted central catheter (PICC) in place    History of ileostomy        Family history:  No pertinent family history in first degree relatives    FH: type 1 diabetes (Sibling)    FH: hypertension (Father)        ROS: As per HPI, 14-point ROS negative otherwise.    General:  No wt loss, fevers, chills, night sweats, fatigue,   Eyes:  Good vision, no reported pain  ENT:  No sore throat, pain, runny nose, dysphagia  CV:  No pain, palpitations, hypo/hypertension  Resp:  No dyspnea, cough, tachypnea, wheezing  GI:  See HPI  :  No pain, bleeding, incontinence, nocturia  Muscle:  No pain, weakness  Neuro:  No weakness, tingling, memory problems  Psych:  No fatigue, insomnia, mood problems, depression  Endocrine:  No polyuria, polydipsia, cold/heat intolerance  Heme:  No petechiae, ecchymosis, easy bruisability  Skin:  No rash, edema      PHYSICAL EXAM:     Vital Signs:  Vital Signs Last 24 Hrs  T(C): 36.8 (20 Oct 2021 23:27), Max: 36.8 (20 Oct 2021 23:27)  T(F): 98.2 (20 Oct 2021 23:27), Max: 98.2 (20 Oct 2021 23:27)  HR: 113 (20 Oct 2021 23:27) (106 - 113)  BP: 126/83 (20 Oct 2021 23:27) (124/82 - 126/83)  BP(mean): --  RR: 17 (20 Oct 2021 23:27) (17 - 17)  SpO2: 100% (20 Oct 2021 23:27) (100% - 100%)  Daily     Daily     GENERAL:  appears uncomfortable  HEENT:  NC/AT,  conjunctivae clear, sclera -anicteric  CHEST:  no increased effort  HEART:  Regular rate and rhythm  ABDOMEN:  Soft, non-tender, non-distended  EXTREMITIES:  no cyanosis, clubbing or edema  SKIN:  No rash/erythema/ecchymoses/petechiae/wounds  NEURO:  Alert, oriented    LABS:                        9.1    8.98  )-----------( 389      ( 19 Oct 2021 11:14 )             27.7     10-19    134<L>  |  98  |  11  ----------------------------<  140<H>  4.1   |  24  |  0.41<L>    Ca    9.7      19 Oct 2021 11:14  Phos  4.9     10-19  Mg     2.00     10-19    TPro  6.5  /  Alb  4.2  /  TBili  0.4  /  DBili  x   /  AST  25  /  ALT  102<H>  /  AlkPhos  81  10-19    LIVER FUNCTIONS - ( 19 Oct 2021 11:14 )  Alb: 4.2 g/dL / Pro: 6.5 g/dL / ALK PHOS: 81 U/L / ALT: 102 U/L / AST: 25 U/L / GGT: x                   Imaging:

## 2021-10-21 NOTE — PROGRESS NOTE ADULT - ATTENDING COMMENTS
Patient seen again earlier this afternoon.   Appears much more comfortable.   States she is now ready and eager to go home.   She plans to follow up with her established GI provider and at [facility] for post-surgical management.   Would maintain NPO for now given inability to tolerate PO despite multiple attempts. Suspect underlying disorder of brain-gut axis contributing to patient's symptoms.  Discussed with patient and sister at bedside.

## 2021-10-21 NOTE — PROGRESS NOTE ADULT - SUBJECTIVE AND OBJECTIVE BOX
Giovanni Prajapati MS3    Patient is a 26y old  Female who presents with a chief complaint of crohn's flare up (20 Oct 2021 09:21)    SUBJECTIVE / OVERNIGHT EVENTS: Pt examined at bedside. She stated she was experiencing 8/10 pain that was not being relieved by her PO Dilaudid. Pt states she is unable to cooperate with labs and her other medications because she is in too much pain and requires IV Dilaudid. Pt has had n/v overnight. Denies any f/c, chest pain, SOB.    MEDICATIONS  (STANDING):  amitriptyline 10 milliGRAM(s) Oral at bedtime  chlorhexidine 2% Cloths 1 Application(s) Topical daily  influenza   Vaccine 0.5 milliLiter(s) IntraMuscular once  lactated ringers. 1000 milliLiter(s) (100 mL/Hr) IV Continuous <Continuous>  methylPREDNISolone sodium succinate Injectable 20 milliGRAM(s) IV Push every 12 hours  pantoprazole    Tablet 40 milliGRAM(s) Oral before breakfast  sucralfate suspension 1 Gram(s) Oral <User Schedule>    MEDICATIONS  (PRN):  cyclobenzaprine 5 milliGRAM(s) Oral three times a day PRN Muscle Spasm  HYDROmorphone   Solution 2 milliGRAM(s) Oral every 4 hours PRN Severe Pain (7 - 10)  lidocaine 2% Viscous 2 milliLiter(s) Swish and Spit three times a day PRN pain with swallowing  ondansetron    Tablet 4 milliGRAM(s) Oral every 8 hours PRN nausea/vomiting    Vital Signs Last 24 Hrs  T(C): 36.8 (20 Oct 2021 23:27), Max: 36.8 (20 Oct 2021 08:35)  T(F): 98.2 (20 Oct 2021 23:27), Max: 98.3 (20 Oct 2021 08:35)  HR: 113 (20 Oct 2021 23:27) (92 - 113)  BP: 126/83 (20 Oct 2021 23:27) (104/75 - 126/83)  BP(mean): --  RR: 17 (20 Oct 2021 23:27) (17 - 17)  SpO2: 100% (20 Oct 2021 23:27) (98% - 100%)  CAPILLARY BLOOD GLUCOSE    I&O's Summary    PHYSICAL EXAM:  GENERAL: NAD, malnourished, diaphoretic  HEAD:  Atraumatic, Normocephalic  EYES: EOMI, conjunctiva and sclera clear  NECK: No JVD  CHEST/LUNG: Pt refusing exam  HEART: Pt refusing exam  ABDOMEN: Pt refusing exam  EXTREMITIES: moving extremities spontaneously   PSYCH: AAOx3  NEUROLOGY: non-focal  SKIN: No rashes or lesions    LABS:                        9.1    8.98  )-----------( 389      ( 19 Oct 2021 11:14 )             27.7     10-19    134<L>  |  98  |  11  ----------------------------<  140<H>  4.1   |  24  |  0.41<L>    Ca    9.7      19 Oct 2021 11:14  Phos  4.9     10-19  Mg     2.00     10-19    TPro  6.5  /  Alb  4.2  /  TBili  0.4  /  DBili  x   /  AST  25  /  ALT  102<H>  /  AlkPhos  81  10-19

## 2021-10-21 NOTE — PROGRESS NOTE ADULT - ASSESSMENT
26 year old female with PMH Crohn's disease s/p surgery for stricture/ileostomy placement in mid August 2021, s/p PICC line in place for TPN 2 weeks ago, presents with epigastric abdominal pain, nausea, and vomiting, admitted to medicine for inability to tolerate PO. Pt was improving s/p steroids and was tolerating oral intake. However, currently pt is refusing all treatment and labs until she receives IV Dilaudid.  26 year old female with PMH Crohn's disease s/p surgery for stricture/ileostomy placement in mid August 2021, s/p PICC line in place for TPN 2 weeks ago, presents with epigastric abdominal pain, nausea, and vomiting, admitted to medicine for inability to tolerate PO. Pt was started on steroids is and is being transitioned to PO pain medications, planning for d/c with TPN.

## 2021-10-21 NOTE — PROGRESS NOTE ADULT - ATTENDING COMMENTS
27 yo F w/ hx Crohn's disease s/p surgery for stricture/ileostomy s/p PICC line for TPN p/w abdominal pain. s/p inflixamab EGD  Diffuse erythema and friability w/ some areas of  superficial ulceration. bx chronic inactive gastritis. TTG Ab neg. CT A/P +RLQ enteritis. ON noted abdominal pain with multiple episodes of vomiting. This noted by RN noted to be inducing vomiting. Place on 1:1. Pt refused psych multiple times. Pain management not recommending IV meds. Placed on 1:1 ON no noted episodes of vomiting by 1:1. Did not receive any IV Dilaudid ON. tolerated PO Dilaudid. This AM states still requesting IV for abdominal pain. After we told her it was not a option as her nausea and vomiting may be a result of pain meds and also psych component. She did not want to speak with psych as she does not trust them. She and her father decided she wanted to go home with TPN as she would get same treatment at home. she was comfortable after the initial complaints of abdominal pain throughout the entire 20 min conversation. case d/w GI Would maintain NPO for now. outpt f/u with GI and [facility] for post surgical management. check electrolytes and start TPN. 25 yo F w/ hx Crohn's disease s/p surgery for stricture/ileostomy s/p PICC line for TPN p/w abdominal pain. s/p inflixamab EGD  Diffuse erythema and friability w/ some areas of  superficial ulceration. bx chronic inactive gastritis. TTG Ab neg. CT A/P +RLQ enteritis. noted abdominal pain with multiple episodes of vomiting. This noted by RN noted to be inducing vomiting. Place on 1:1. Pt refused psych multiple times. Pain management not recommending IV meds. ON no noted episodes of vomiting by 1:1. Did not receive any IV Dilaudid ON. tolerated PO Dilaudid. This AM states still requesting IV for abdominal pain. After we told her it was not a option as her nausea and vomiting may be a result of pain meds and also psych component. She did not want to speak with psych as she does not trust them. She and her father decided she wanted to go home with TPN as she would get same treatment at home. she was comfortable after the initial complaints of abdominal pain throughout the entire 20 min conversation. case d/w GI Would maintain NPO for now. outpt f/u with GI and [facility] for post surgical management. check electrolytes and start TPN.

## 2021-10-21 NOTE — PROGRESS NOTE ADULT - PROBLEM SELECTOR PLAN 1
Hx of Crohn's with recent colonic resection for stricture. S/p EGD 9/30 with gastric inflammation, biopsied to r/o gastric Crohn's which was negative. S/p infliximab on 10/1. Inflammatory markers wnl, fecal calprotectin wnl  -GI following, appreciate recs  - [facility] was unable to be reached for records  - possible ileoscopy per GI but patient refused  - s/p IV methylprednisone per GI (started 10/14), pain slowly improving on steroids  - try to transition to oral prednisone per GI  - switch to PO dilaudid for pain per pain management   - c/w amitriptyline  - consulting psych to rule out any possible psych cause Hx of Crohn's with recent colonic resection for stricture. S/p EGD 9/30 with gastric inflammation, biopsied to r/o gastric Crohn's which was negative. S/p infliximab on 10/1. Inflammatory markers wnl, fecal calprotectin wnl  -GI following, appreciate recs  - [facility] was unable to be reached for records  - possible ileoscopy per GI but patient refused  - s/p IV methylprednisone per GI (started 10/14)  - oral prednisone taper per GI and f/u outpatient  - switch to PO dilaudid for pain per pain management   - c/w amitriptyline  - pt refused psych consult Hx of Crohn's with recent colonic resection for stricture. S/p EGD 9/30 with gastric inflammation, biopsied to r/o gastric Crohn's which was negative. S/p infliximab on 10/1. Inflammatory markers wnl, fecal calprotectin wnl  - possible ileoscopy per GI but patient refused  - s/p IV methylprednisone per GI (started 10/14)  - oral prednisone taper per GI and f/u outpatient  - switch to PO dilaudid for pain per pain management   - c/w amitriptyline  - pt refused psych consult  - GI following, appreciate recs

## 2021-10-22 ENCOUNTER — TRANSCRIPTION ENCOUNTER (OUTPATIENT)
Age: 26
End: 2021-10-22

## 2021-10-22 VITALS — WEIGHT: 86.42 LBS

## 2021-10-22 LAB — SARS-COV-2 RNA SPEC QL NAA+PROBE: SIGNIFICANT CHANGE UP

## 2021-10-22 PROCEDURE — 99232 SBSQ HOSP IP/OBS MODERATE 35: CPT | Mod: GC

## 2021-10-22 PROCEDURE — 99233 SBSQ HOSP IP/OBS HIGH 50: CPT | Mod: GC

## 2021-10-22 RX ORDER — HYDROMORPHONE HYDROCHLORIDE 2 MG/ML
1 INJECTION INTRAMUSCULAR; INTRAVENOUS; SUBCUTANEOUS
Qty: 12 | Refills: 0
Start: 2021-10-22 | End: 2021-10-25

## 2021-10-22 RX ORDER — LANOLIN ALCOHOL/MO/W.PET/CERES
1 CREAM (GRAM) TOPICAL
Qty: 14 | Refills: 0
Start: 2021-10-22 | End: 2021-11-04

## 2021-10-22 RX ORDER — AMITRIPTYLINE HCL 25 MG
1 TABLET ORAL
Qty: 14 | Refills: 0
Start: 2021-10-22 | End: 2021-11-04

## 2021-10-22 RX ORDER — ONDANSETRON 8 MG/1
1 TABLET, FILM COATED ORAL
Qty: 42 | Refills: 0
Start: 2021-10-22 | End: 2021-11-04

## 2021-10-22 RX ORDER — SUCRALFATE 1 G
10 TABLET ORAL
Qty: 140 | Refills: 0
Start: 2021-10-22 | End: 2021-11-04

## 2021-10-22 RX ORDER — CYCLOBENZAPRINE HYDROCHLORIDE 10 MG/1
1 TABLET, FILM COATED ORAL
Qty: 42 | Refills: 0
Start: 2021-10-22 | End: 2021-11-04

## 2021-10-22 RX ORDER — LANOLIN ALCOHOL/MO/W.PET/CERES
3 CREAM (GRAM) TOPICAL AT BEDTIME
Refills: 0 | Status: DISCONTINUED | OUTPATIENT
Start: 2021-10-22 | End: 2021-10-22

## 2021-10-22 RX ORDER — PANTOPRAZOLE SODIUM 20 MG/1
1 TABLET, DELAYED RELEASE ORAL
Qty: 14 | Refills: 0
Start: 2021-10-22 | End: 2021-11-04

## 2021-10-22 RX ADMIN — CHLORHEXIDINE GLUCONATE 1 APPLICATION(S): 213 SOLUTION TOPICAL at 13:45

## 2021-10-22 NOTE — PROGRESS NOTE ADULT - PROVIDER SPECIALTY LIST ADULT
Gastroenterology
Nutrition Support
Pain Medicine
Anesthesia
Gastroenterology
Internal Medicine
Internal Medicine
Nutrition Support
Gastroenterology
Internal Medicine
Nutrition Support
Gastroenterology
Nutrition Support
Internal Medicine

## 2021-10-22 NOTE — CHART NOTE - NSCHARTNOTEFT_GEN_A_CORE
Was called by RN due to patient wanting to sign out AMA. Asked patient why she wanted to leave, reports she does not wish to stay here any longer. Understands risk of refeeding syndrome, abdominal pain, nausea/vomiting continued, death.     Patient is AAO x 3. I explained at length to the patient the risks of signing out AMA including but not limited to harm, injury or death. I explained the risks, benefits and alternatives to treatment as well as the attendant risks of refusing treatment at this time. I offered to answer any questions and fully answered any such questions. We believe that the patient fully understands what has been explained and answered. I informed hospitalist Dr. Maria Esther Hogan of this, aware. Patient signed form to sign out AMA and accepts responsibility for any and all results of this decision.

## 2021-10-22 NOTE — PROGRESS NOTE ADULT - SUBJECTIVE AND OBJECTIVE BOX
Giovanni Prajapati MS3    Patient is a 26y old  Female who presents with a chief complaint of crohn's flare up (21 Oct 2021 09:53)    SUBJECTIVE / OVERNIGHT EVENTS: Pt examined at bedside. Pt was being observed by PCA then nurse. No vomiting overnight but pt has had nausea and 8/10 abdominal pain that radiates to esophagus. Pt is refusing vitals and all oral medications citing she is unable to tolerate. Pt requests IV Dilaudid. Denies any v/c, chest pain, SOB.    MEDICATIONS  (STANDING):  amitriptyline 10 milliGRAM(s) Oral at bedtime  chlorhexidine 2% Cloths 1 Application(s) Topical daily  influenza   Vaccine 0.5 milliLiter(s) IntraMuscular once  lactated ringers. 1000 milliLiter(s) (50 mL/Hr) IV Continuous <Continuous>  pantoprazole    Tablet 40 milliGRAM(s) Oral before breakfast  predniSONE   Tablet 40 milliGRAM(s) Oral daily  sucralfate suspension 1 Gram(s) Oral <User Schedule>    MEDICATIONS  (PRN):  cyclobenzaprine 5 milliGRAM(s) Oral three times a day PRN Muscle Spasm  HYDROmorphone   Tablet 2 milliGRAM(s) Oral every 4 hours PRN Severe Pain (7 - 10)  lidocaine 2% Viscous 2 milliLiter(s) Swish and Spit three times a day PRN pain with swallowing  melatonin 3 milliGRAM(s) Oral at bedtime PRN Insomnia  ondansetron    Tablet 4 milliGRAM(s) Oral every 8 hours PRN nausea/vomiting      Vital Signs Last 24 Hrs (Pt is refusing)  T(C): --  T(F): --  HR: --  BP: --  BP(mean): --  RR: --  SpO2: --  CAPILLARY BLOOD GLUCOSE    I&O's Summary    PHYSICAL EXAM:  GENERAL: NAD, malnourished  HEAD:  Atraumatic, Normocephalic  EYES: EOMI, conjunctiva and sclera clear  NECK: No JVD  CHEST/LUNG: Pt refusing  HEART: Pt refusing  ABDOMEN: Pt refusing  EXTREMITIES: moving extremities spontaneously  PSYCH: AAOx3  NEUROLOGY: non-focal    LABS:                        9.7    9.13  )-----------( 536      ( 21 Oct 2021 13:22 )             28.5     10-21    134<L>  |  99  |  19  ----------------------------<  113<H>  3.1<L>   |  21<L>  |  0.48<L>    Ca    9.5      21 Oct 2021 13:22  Phos  6.1     10-21  Mg     1.80     10-21    TPro  6.8  /  Alb  4.5  /  TBili  1.7<H>  /  DBili  x   /  AST  18  /  ALT  84<H>  /  AlkPhos  86  10-21

## 2021-10-22 NOTE — PROGRESS NOTE ADULT - PROBLEM SELECTOR PLAN 2
Differential includes active Crohn's (though no objective evidence pointing towards flare).  EGD showed gastric friability and was biopsied, negative for Crohn's. There was erythematous duodenopathy but no esophagitis.  - c/w sucralfate liquid 1g q6h for esophagitis per GI recs  - plan on d/c with oral PPI

## 2021-10-22 NOTE — PROGRESS NOTE ADULT - ASSESSMENT
26 year old female with PMH Crohn's disease s/p surgery for stricture/ileostomy placement in mid August 2021, s/p PICC line in place for TPN 2 weeks ago, presents with epigastric abdominal pain, nausea, and vomiting, admitted to medicine for inability to tolerate PO. Pt was started on steroids is and is being transitioned to PO pain medications, planning for d/c with TPN.

## 2021-10-22 NOTE — PROGRESS NOTE ADULT - PROBLEM SELECTOR PLAN 4
DVT ppx- Improve score low. BLACK stockings and ambulate as tolerated.    Severe protein malnutrition - nutrition recs appreciated.     Plan to restart TPN and d/c and patient is NPO    Full liquid diet

## 2021-10-22 NOTE — PROGRESS NOTE ADULT - SUBJECTIVE AND OBJECTIVE BOX
Interval Events:   - Refused all her PO medications, requesting only IV dilaudid  - Continues to complain of sever abdominal pain    Allergies:  No Known Allergies        Hospital Medications:  amitriptyline 10 milliGRAM(s) Oral at bedtime  chlorhexidine 2% Cloths 1 Application(s) Topical daily  cyclobenzaprine 5 milliGRAM(s) Oral three times a day PRN  HYDROmorphone   Tablet 2 milliGRAM(s) Oral every 4 hours PRN  influenza   Vaccine 0.5 milliLiter(s) IntraMuscular once  lactated ringers. 1000 milliLiter(s) IV Continuous <Continuous>  lidocaine 2% Viscous 2 milliLiter(s) Swish and Spit three times a day PRN  melatonin 3 milliGRAM(s) Oral at bedtime PRN  ondansetron    Tablet 4 milliGRAM(s) Oral every 8 hours PRN  pantoprazole    Tablet 40 milliGRAM(s) Oral before breakfast  predniSONE   Tablet 40 milliGRAM(s) Oral daily  sucralfate suspension 1 Gram(s) Oral <User Schedule>      PMHX/PSHX:  No pertinent past medical history    Crohn disease    No pertinent past surgical history    Peripherally inserted central catheter (PICC) in place    History of ileostomy        Family history:  No pertinent family history in first degree relatives    FH: type 1 diabetes (Sibling)    FH: hypertension (Father)        ROS:   General:  No wt loss, fevers, chills, night sweats, fatigue,   Eyes:  Good vision, no reported pain  ENT:  No sore throat, pain, runny nose, dysphagia  CV:  No pain, palpitations, hypo/hypertension  Pulm:  No dyspnea, cough, tachypnea, wheezing  GI:  As per HPI  :  No pain, bleeding, incontinence, nocturia  Muscle:  No pain, weakness  Neuro:  No weakness, tingling, memory problems  Psych:  No fatigue, insomnia, mood problems, depression  Endocrine:  No polyuria, polydipsia, cold/heat intolerance  Heme:  No petechiae, ecchymosis, easy bruisability  Skin:  No rash, tattoos, scars, edema      PHYSICAL EXAM:   Vital Signs:  Vital Signs Last 24 Hrs  T(C): --  T(F): --  HR: --  BP: --  BP(mean): --  RR: --  SpO2: --  Daily     Daily Weight in k.2 (22 Oct 2021 06:44)      GENERAL:  appears uncomfortable  HEENT:  NC/AT,  conjunctivae clear, sclera -anicteric  CHEST:  no increased effort  HEART:  Regular rate and rhythm  ABDOMEN:  Soft, non-tender, non-distended  EXTREMITIES:  no cyanosis, clubbing or edema  SKIN:  No rash/erythema/ecchymoses/petechiae/wounds  NEURO:  Alert, oriented      LABS:                        9.7    9.13  )-----------( 536      ( 21 Oct 2021 13:22 )             28.5     Mean Cell Volume: 77.4 fL (10-21-21 @ 13:22)    10-    134<L>  |  99  |  19  ----------------------------<  113<H>  3.1<L>   |  21<L>  |  0.48<L>    Ca    9.5      21 Oct 2021 13:22  Phos  6.1     10-  Mg     1.80     10-    TPro  6.8  /  Alb  4.5  /  TBili  1.7<H>  /  DBili  x   /  AST  18  /  ALT  84<H>  /  AlkPhos  86  10-21    LIVER FUNCTIONS - ( 21 Oct 2021 13:22 )  Alb: 4.5 g/dL / Pro: 6.8 g/dL / ALK PHOS: 86 U/L / ALT: 84 U/L / AST: 18 U/L / GGT: x                                       9.7    9.13  )-----------( 536      ( 21 Oct 2021 13:22 )             28.5                         9.1    8.98  )-----------( 389      ( 19 Oct 2021 11:14 )             27.7       Imaging:           Interval Events:   - Refused all her PO medications, requesting only IV dilaudid  - Able to tolerate PO - yogurt with some abdominal pain  - Feeling well enough to go home    Allergies:  No Known Allergies        Hospital Medications:  amitriptyline 10 milliGRAM(s) Oral at bedtime  chlorhexidine 2% Cloths 1 Application(s) Topical daily  cyclobenzaprine 5 milliGRAM(s) Oral three times a day PRN  HYDROmorphone   Tablet 2 milliGRAM(s) Oral every 4 hours PRN  influenza   Vaccine 0.5 milliLiter(s) IntraMuscular once  lactated ringers. 1000 milliLiter(s) IV Continuous <Continuous>  lidocaine 2% Viscous 2 milliLiter(s) Swish and Spit three times a day PRN  melatonin 3 milliGRAM(s) Oral at bedtime PRN  ondansetron    Tablet 4 milliGRAM(s) Oral every 8 hours PRN  pantoprazole    Tablet 40 milliGRAM(s) Oral before breakfast  predniSONE   Tablet 40 milliGRAM(s) Oral daily  sucralfate suspension 1 Gram(s) Oral <User Schedule>      PMHX/PSHX:  No pertinent past medical history    Crohn disease    No pertinent past surgical history    Peripherally inserted central catheter (PICC) in place    History of ileostomy        Family history:  No pertinent family history in first degree relatives    FH: type 1 diabetes (Sibling)    FH: hypertension (Father)        ROS:   General:  No wt loss, fevers, chills, night sweats, fatigue,   Eyes:  Good vision, no reported pain  ENT:  No sore throat, pain, runny nose, dysphagia  CV:  No pain, palpitations, hypo/hypertension  Pulm:  No dyspnea, cough, tachypnea, wheezing  GI:  As per HPI  :  No pain, bleeding, incontinence, nocturia  Muscle:  No pain, weakness  Neuro:  No weakness, tingling, memory problems  Psych:  No fatigue, insomnia, mood problems, depression  Endocrine:  No polyuria, polydipsia, cold/heat intolerance  Heme:  No petechiae, ecchymosis, easy bruisability  Skin:  No rash, tattoos, scars, edema      PHYSICAL EXAM:   Vital Signs:  Vital Signs Last 24 Hrs  T(C): --  T(F): --  HR: --  BP: --  BP(mean): --  RR: --  SpO2: --  Daily     Daily Weight in k.2 (22 Oct 2021 06:44)      GENERAL:  appears uncomfortable  HEENT:  NC/AT,  conjunctivae clear, sclera -anicteric  CHEST:  no increased effort  HEART:  Regular rate and rhythm  ABDOMEN:  Soft, non-tender, non-distended  EXTREMITIES:  no cyanosis, clubbing or edema  SKIN:  No rash/erythema/ecchymoses/petechiae/wounds  NEURO:  Alert, oriented      LABS:                        9.7    9.13  )-----------( 536      ( 21 Oct 2021 13:22 )             28.5     Mean Cell Volume: 77.4 fL (10-21- @ 13:22)    10-21    134<L>  |  99  |  19  ----------------------------<  113<H>  3.1<L>   |  21<L>  |  0.48<L>    Ca    9.5      21 Oct 2021 13:22  Phos  6.1     10-21  Mg     1.80     10-21    TPro  6.8  /  Alb  4.5  /  TBili  1.7<H>  /  DBili  x   /  AST  18  /  ALT  84<H>  /  AlkPhos  86  10-21    LIVER FUNCTIONS - ( 21 Oct 2021 13:22 )  Alb: 4.5 g/dL / Pro: 6.8 g/dL / ALK PHOS: 86 U/L / ALT: 84 U/L / AST: 18 U/L / GGT: x                                       9.7    9.13  )-----------( 536      ( 21 Oct 2021 13:22 )             28.5                         9.1    8.98  )-----------( 389      ( 19 Oct 2021 11:14 )             27.7       Imaging:

## 2021-10-22 NOTE — PROGRESS NOTE ADULT - ATTENDING SUPERVISION STATEMENT
ACP
Fellow
ACP
Fellow
ACP
Fellow
Resident
Resident
Resident/Student
Student
Student
Resident
Resident/Student
Resident
Resident
Resident/Fellow
Student
Resident
Resident/Student
Resident
Student
Resident
Resident/Student
Student
Resident

## 2021-10-22 NOTE — PROGRESS NOTE ADULT - PROBLEM SELECTOR PLAN 1
Hx of Crohn's with recent colonic resection for stricture. S/p EGD 9/30 with gastric inflammation, biopsied to r/o gastric Crohn's which was negative. S/p infliximab on 10/1. Inflammatory markers wnl, fecal calprotectin wnl  - possible ileoscopy per GI but patient refused  - s/p IV methylprednisone per GI (started 10/14)  - oral prednisone taper per GI and f/u outpatient  - switch to PO dilaudid for pain per pain management   - c/w amitriptyline  - pt refused psych consult  - GI following, appreciate recs  - plan on d/c on TPN with outpatient GI follow-up

## 2021-10-22 NOTE — PROGRESS NOTE ADULT - ATTENDING COMMENTS
27 yo F w/ hx Crohn's disease s/p surgery for stricture/ileostomy s/p PICC line for TPN p/w abdominal pain. s/p inflixamab EGD  Diffuse erythema and friability w/ some areas of  superficial ulceration. bx chronic inactive gastritis. TTG Ab neg. CT A/P +RLQ enteritis. noted abdominal pain with multiple episodes of vomiting. This noted by RN noted to be inducing vomiting. Place on 1:1. Pt refused psych multiple times. Pain management not recommending IV meds. ON no noted episodes of vomiting by 1:1. tolerated PO Dilaudid. This AM family at bedside pt reports feeling better and would like to go home able to tolerate some gingerale this AM. No reported symptoms of nausea and vomiting or observed. Team d/w GI and Nutrition at this point not recommending home with TPN.  Explained to family concern for pt leaving as unable to tolerate PO meds risk and benefits discussed at bedside with pt and family at bedside would like to observe if adequate PO intake and compliance with meds.

## 2021-10-22 NOTE — DISCHARGE NOTE NURSING/CASE MANAGEMENT/SOCIAL WORK - PATIENT PORTAL LINK FT
You can access the FollowMyHealth Patient Portal offered by [facility] by registering at the following website: [email or web address]. By joining [email or web address] FollowMyHealth portal, you will also be able to view your health information using other applications (apps) compatible with our system.

## 2021-10-22 NOTE — PROGRESS NOTE ADULT - PROBLEM SELECTOR PROBLEM 1
Exacerbation of Crohn's disease

## 2021-10-22 NOTE — CHART NOTE - NSCHARTNOTESELECT_GEN_ALL_CORE
Event Note
Nutrition Consult/Nutrition Services
Nutrition Follow Up/Nutrition Services
AMA/Event Note
Event Note
Event Note
Nutrition Follow Up/Nutrition Services
Nutrition Follow Up/Nutrition Services
Nutrition Support/Nutrition Services
Plan of care/Event Note
Psychiatry/Event Note

## 2021-10-22 NOTE — PROGRESS NOTE ADULT - ASSESSMENT
26F w/ hx of Crohn's colitis c/b ascending colon stricture s/p resection 8/2021 w/ ileostomy (at [facility]) on remicade since 6/2021 (s/p induction and 2 maintenance doses, last 10/1/2021, admitted w/ ongoing severe post prandial epigastric pain and weight loss due to inability to take PO.    Impression:  #Abd pain: pain with or without food. Suspect there is a significant psych component to pain as well as somatization. May have underlying functional GI disorder and gut-brain axis disease however would not explain the severity of symptoms and other ongoing issues. Pt is s/p two extended hospitalizations at [facility] since her surgery, with testing including extensive imaging and upper GI endoscopy. No e/o SMA syndrome, EGD w/ esophagitis only. EGD here shows mild esophagitis and gastric inflammation (new finding compared to EGD 6 weeks ago, path negative for active Crohn's and is unchanged from her path results from [facility]). Vasculature is patent and no lactate, pointing away from any ischemic process  #Crohn's disease - Historical details as above. It is unclear if she has active Crohn's at this time. Her ESR/CRP are negative, but they were never positive even at time of her initial diagnosis. Her fecal calprotectin, however, was very positive on diagnosis and is negative here. The only objective e/o active Crohn's is her CT scan on admission, which shows RLQ enteritis, but this is not specific. Biopsies from EGD not c/w upper GI Crohn's. Ileoscopy would be useful in determining this but patient has declined. This would also not change the management of her current symptoms, as this appears to be unrelated to Crohn's.     Recommendations:  - please call back pain management  - prednisone taper, continue with pred 40mg daily; though steroids appeared to help initially, it is unclear how much benefit she is having. Would still recommend a taper given her history of IBD and initial improvement. Can continue 40mg x 1-2 weeks and cut down by 10mg every week until off. However would recommend this taper be monitored by her outpatient GI provider.   - C/w amitriptyline 10mg PO qHS.  - diet as tolerated  - continue with PPI BID  - carafate q6hr     Thank you for involving us in the care of this patient. Please reach out if any further questions.    Bud Juan Francisco, PGY-5  GI Fellow    Available on [employer] Teams  Pager [phone] ([facility]) or [phone] ([facility]  After 5PM/Weekends, please contact the on-call GI fellow: [phone]  Available through Microsoft Teams   26F w/ hx of Crohn's colitis c/b ascending colon stricture s/p resection 8/2021 w/ ileostomy (at [facility]) on remicade since 6/2021 (s/p induction and 2 maintenance doses, last 10/1/2021, admitted w/ ongoing severe post prandial epigastric pain and weight loss due to inability to take PO.    Impression:  #Abd pain: pain with or without food. Suspect there is a significant psych component to pain as well as somatization. May have underlying functional GI disorder and gut-brain axis disease however would not explain the severity of symptoms and other ongoing issues. Pt is s/p two extended hospitalizations at [facility] since her surgery, with testing including extensive imaging and upper GI endoscopy. No e/o SMA syndrome, EGD w/ esophagitis only. EGD here shows mild esophagitis and gastric inflammation (new finding compared to EGD 6 weeks ago, path negative for active Crohn's and is unchanged from her path results from [facility]). Vasculature is patent and no lactate, pointing away from any ischemic process  #Crohn's disease - Historical details as above. It is unclear if she has active Crohn's at this time. Her ESR/CRP are negative, but they were never positive even at time of her initial diagnosis. Her fecal calprotectin, however, was very positive on diagnosis and is negative here. The only objective e/o active Crohn's is her CT scan on admission, which shows RLQ enteritis, but this is not specific. Biopsies from EGD not c/w upper GI Crohn's. Ileoscopy would be useful in determining this but patient has declined. This would also not change the management of her current symptoms, as this appears to be unrelated to Crohn's.     Recommendations:  - please call back pain management  - prednisone taper, continue with pred 40mg daily; though steroids appeared to help initially, it is unclear how much benefit she is having. Would still recommend a taper given her history of IBD and initial improvement. Can continue 40mg x 1-2 weeks and cut down by 10mg every week until off. However would recommend this taper be monitored by her outpatient GI provider.   - Patient should follow up with her outpatient GI doctor  - C/w amitriptyline 10mg PO qHS.  - diet as tolerated  - continue with PPI BID  - carafate q6hr     Thank you for involving us in the care of this patient. Please reach out if any further questions.    Bud Chicas, PGY-5  GI Fellow    Available on [employer] Teams  Pager [phone] ([facility]) or [phone] ([facility])  After 5PM/Weekends, please contact the on-call GI fellow: [phone]  Available through [employer] Teams

## 2021-10-22 NOTE — CHART NOTE - NSCHARTNOTEFT_GEN_A_CORE
Diet, Full Liquid (10-18-21 @ 12:49)      Pt has been followed by this RDN since admission. Nutrition Consult ordered for TPN. Pt last seen by this RDN on 10/20. Nutrition Support Team had been contacted to re-evaluate for TPN, however Team determined TPN not warranted at this time with suggestion for Soft diet and Liquids as tolerated. Per RN, pt has been continuing to refuse to eat anything. Consider ordering Ensure Enlive 2x daily (700 carrie and 40 gm protein) if pt to agree to try to consume it. RDN services to remain available as needed.

## 2021-10-22 NOTE — CHART NOTE - NSCHARTNOTEFT_GEN_A_CORE
Nutrition Support service was paged regarding patient's nutritional plan. Patient is encouraged to continue with liquids and soft foods as tolerating. Plan was discussed with Dr. Damico who spoke with GI Attending - TPN is not indicated at this time.      Nutrition Support [phone] Nutrition Support service was paged regarding patient's nutritional plan. Patient is encouraged to continue with liquids and soft foods as tolerated. Plan was discussed with Dr. Damico who spoke with GI Attending - TPN is not indicated at this time.      Nutrition Support [phone]

## 2021-10-22 NOTE — PROGRESS NOTE ADULT - ATTENDING COMMENTS
26F w/ hx of Crohn's colitis c/b ascending colon stricture s/p resection 8/2021 w/ ileostomy (at [facility]) on remicade since 6/2021 (s/p induction and 2 maintenance doses, last 10/1/2021, admitted w/ ongoing severe post prandial epigastric pain and weight loss due to inability to take PO.    tolerated yogurt/liquids today  no further IV pain medications

## 2024-06-05 NOTE — ED ADULT NURSE NOTE - NSFALLRSKINDICATORS_ED_ALL_ED
SUBJECTIVE: patient reports that she is very tired right now.   CAREGIVER INVOLVEMENT/ASSISTANCE NEEDED FOR: daughter assists with IADLs as needed  .  OBJECTIVE:  See interventions.  PATIENT EDUCATION PROVIDED THIS VISIT: Edema management, ice pack use x 10 minutes every hour, HEP, fall prevention, gait training, bed mobility training, transfer training.  PATIENT RESPONSE TO EDUCATION PROVIDED: Patient verbalized understanding and return demonstration of techniques  PATIENT RESPONSE TO TREATMENT: Patient reports 4/10 ALVINA RPE transfers, 3/10 ALIVNA RPE post ther ex, and 5/10 pain levels post treatment.   .  ASSESSMENT OF PROGRESS TOWARD GOALS: Patient is making progress toward goals noted by improvements in gait distance while maintaining 50% WB status of RLE for 2 feet, demonstrates improved transfers to SBA of 1. Patient is limited secondary to impaired balance, decreased strength, impaired mobility and limited weight baring status. Patient would benefit from continued skilled PT to improve strength, mobility, improve balance to reduce risk for falls.  .  PLAN FOR NEXT VISIT: standing balance, transfer training, gait training  THE FOLLOWING DISCHARGE PLANNING WAS DISCUSSED WITH THE PATIENT/CAREGIVER: 1w1, 2w3  with discharge to self and MD following. Patient verbalized understanding and in agreement with discharge plan.
no

## 2024-06-17 NOTE — PRE-OP CHECKLIST - WEIGHT IN LBS
Show Applicator Variable?: Yes Duration Of Freeze Thaw-Cycle (Seconds): 5-10 Post-Care Instructions: I reviewed with the patient in detail post-care instructions. Patient is to wear sunprotection, and avoid picking at any of the treated lesions. Pt may apply Vaseline to crusted or scabbing areas. Include Z78.9 (Other Specified Conditions Influencing Health Status) As An Associated Diagnosis?: No Spray Paint Text: The liquid nitrogen was applied to the skin utilizing a spray paint frosting technique. Consent: The patient's consent was obtained including but not limited to risks of crusting, scabbing, blistering, scarring, darker or lighter pigmentary change, recurrence, incomplete removal and infection. 99.4 Application Tool (Optional): Liquid Nitrogen Sprayer Number Of Freeze-Thaw Cycles: 3 freeze-thaw cycles Detail Level: Detailed Medical Necessity Clause: This procedure was medically necessary because the lesions that were treated were: Medical Necessity Information: It is in your best interest to select a reason for this procedure from the list below. All of these items fulfill various CMS LCD requirements except the new and changing color options. Aperture Size (Optional): A

## 2025-05-12 NOTE — ED PROVIDER NOTE - NSTIMEPROVIDERCAREINITIATE_GEN_ER
05-Jun-2021 14:20 Here with resolved episode of confusion with history of prior. differential diagnosis inclusive of dehydration, electrolyte abnormality, occult infection, doubt CVA.